# Patient Record
Sex: FEMALE | Race: WHITE | Employment: OTHER | URBAN - METROPOLITAN AREA
[De-identification: names, ages, dates, MRNs, and addresses within clinical notes are randomized per-mention and may not be internally consistent; named-entity substitution may affect disease eponyms.]

---

## 2017-11-27 ENCOUNTER — ANESTHESIA EVENT (OUTPATIENT)
Dept: ENDOSCOPY | Age: 67
End: 2017-11-27
Payer: MEDICARE

## 2017-11-27 ENCOUNTER — ANESTHESIA (OUTPATIENT)
Dept: ENDOSCOPY | Age: 67
End: 2017-11-27
Payer: MEDICARE

## 2017-11-27 ENCOUNTER — HOSPITAL ENCOUNTER (OUTPATIENT)
Age: 67
Setting detail: OUTPATIENT SURGERY
Discharge: HOME | End: 2017-11-27
Attending: INTERNAL MEDICINE | Admitting: INTERNAL MEDICINE
Payer: MEDICARE

## 2017-11-27 VITALS
OXYGEN SATURATION: 98 % | RESPIRATION RATE: 18 BRPM | SYSTOLIC BLOOD PRESSURE: 136 MMHG | HEART RATE: 60 BPM | DIASTOLIC BLOOD PRESSURE: 60 MMHG | TEMPERATURE: 97.1 F

## 2017-11-27 VITALS
RESPIRATION RATE: 20 BRPM | OXYGEN SATURATION: 95 % | SYSTOLIC BLOOD PRESSURE: 83 MMHG | DIASTOLIC BLOOD PRESSURE: 46 MMHG

## 2017-11-27 PROCEDURE — 2500000003 HC RX 250 WO HCPCS: Performed by: NURSE ANESTHETIST, CERTIFIED REGISTERED

## 2017-11-27 PROCEDURE — 3700000000 HC ANESTHESIA ATTENDED CARE: Performed by: INTERNAL MEDICINE

## 2017-11-27 PROCEDURE — 3609010300 HC COLONOSCOPY W/BIOPSY SINGLE/MULTIPLE: Performed by: INTERNAL MEDICINE

## 2017-11-27 PROCEDURE — 7100000011 HC PHASE II RECOVERY - ADDTL 15 MIN: Performed by: INTERNAL MEDICINE

## 2017-11-27 PROCEDURE — 7100000010 HC PHASE II RECOVERY - FIRST 15 MIN: Performed by: INTERNAL MEDICINE

## 2017-11-27 PROCEDURE — 3700000001 HC ADD 15 MINUTES (ANESTHESIA): Performed by: INTERNAL MEDICINE

## 2017-11-27 PROCEDURE — 2580000003 HC RX 258: Performed by: INTERNAL MEDICINE

## 2017-11-27 PROCEDURE — 88305 TISSUE EXAM BY PATHOLOGIST: CPT

## 2017-11-27 PROCEDURE — 6360000002 HC RX W HCPCS: Performed by: NURSE ANESTHETIST, CERTIFIED REGISTERED

## 2017-11-27 RX ORDER — SODIUM CHLORIDE 9 MG/ML
INJECTION, SOLUTION INTRAVENOUS CONTINUOUS
Status: DISCONTINUED | OUTPATIENT
Start: 2017-11-27 | End: 2017-11-27 | Stop reason: HOSPADM

## 2017-11-27 RX ORDER — CARVEDILOL 12.5 MG/1
12.5 TABLET ORAL 2 TIMES DAILY WITH MEALS
COMMUNITY

## 2017-11-27 RX ORDER — SPIRONOLACTONE 25 MG/1
25 TABLET ORAL DAILY
COMMUNITY

## 2017-11-27 RX ORDER — M-VIT,TX,IRON,MINS/CALC/FOLIC 27MG-0.4MG
1 TABLET ORAL DAILY
COMMUNITY

## 2017-11-27 RX ORDER — LIDOCAINE HYDROCHLORIDE 10 MG/ML
INJECTION, SOLUTION EPIDURAL; INFILTRATION; INTRACAUDAL; PERINEURAL PRN
Status: DISCONTINUED | OUTPATIENT
Start: 2017-11-27 | End: 2017-11-27 | Stop reason: SDUPTHER

## 2017-11-27 RX ORDER — LOSARTAN POTASSIUM 25 MG/1
25 TABLET ORAL DAILY
COMMUNITY

## 2017-11-27 RX ORDER — VITAMIN E (DL,TOCOPHERYL ACET) 180 MG
CAPSULE ORAL DAILY
COMMUNITY

## 2017-11-27 RX ORDER — LEVOTHYROXINE SODIUM 0.05 MG/1
50 TABLET ORAL DAILY
COMMUNITY

## 2017-11-27 RX ORDER — LATANOPROST 50 UG/ML
1 SOLUTION/ DROPS OPHTHALMIC NIGHTLY
COMMUNITY

## 2017-11-27 RX ORDER — PROPOFOL 10 MG/ML
INJECTION, EMULSION INTRAVENOUS PRN
Status: DISCONTINUED | OUTPATIENT
Start: 2017-11-27 | End: 2017-11-27 | Stop reason: SDUPTHER

## 2017-11-27 RX ORDER — SIMVASTATIN 40 MG
40 TABLET ORAL NIGHTLY
COMMUNITY

## 2017-11-27 RX ORDER — ACETAMINOPHEN 160 MG
TABLET,DISINTEGRATING ORAL
COMMUNITY

## 2017-11-27 RX ORDER — ALLOPURINOL 100 MG/1
100 TABLET ORAL DAILY
COMMUNITY

## 2017-11-27 RX ORDER — ACETAMINOPHEN 325 MG/1
650 TABLET ORAL EVERY 6 HOURS PRN
COMMUNITY

## 2017-11-27 RX ORDER — FUROSEMIDE 40 MG/1
40 TABLET ORAL 2 TIMES DAILY
COMMUNITY

## 2017-11-27 RX ORDER — FLUVOXAMINE MALEATE 100 MG/1
100 CAPSULE, EXTENDED RELEASE ORAL NIGHTLY
COMMUNITY

## 2017-11-27 RX ORDER — OMEPRAZOLE 20 MG/1
20 CAPSULE, DELAYED RELEASE ORAL DAILY
COMMUNITY

## 2017-11-27 RX ADMIN — SODIUM CHLORIDE: 9 INJECTION, SOLUTION INTRAVENOUS at 10:14

## 2017-11-27 RX ADMIN — PROPOFOL 400 MG: 10 INJECTION, EMULSION INTRAVENOUS at 10:16

## 2017-11-27 RX ADMIN — LIDOCAINE HYDROCHLORIDE 50 MG: 10 INJECTION, SOLUTION EPIDURAL; INFILTRATION; INTRACAUDAL; PERINEURAL at 10:16

## 2017-11-27 ASSESSMENT — PAIN SCALES - GENERAL
PAINLEVEL_OUTOF10: 0

## 2017-11-27 ASSESSMENT — ENCOUNTER SYMPTOMS: SHORTNESS OF BREATH: 0

## 2017-11-27 ASSESSMENT — LIFESTYLE VARIABLES: SMOKING_STATUS: 0

## 2017-11-27 NOTE — ANESTHESIA PRE PROCEDURE
Department of Anesthesiology  Preprocedure Note       Name:  Destiny Tompkins   Age:  69 y.o.  :  1950                                          MRN:  [id number]         Date:  2017      Surgeon: Shady Paulino):  Naeem Mae DO    Procedure: Procedure(s):  COLONOSCOPY WITH BIOPSY    Medications prior to admission:   Prior to Admission medications    Not on File       Current medications:    No current facility-administered medications for this encounter. Allergies: Allergies not on file    Problem List:  There is no problem list on file for this patient. Past Medical History:  No past medical history on file. Past Surgical History:  No past surgical history on file. Social History:    Social History   Substance Use Topics    Smoking status: Not on file    Smokeless tobacco: Not on file    Alcohol use Not on file                                Counseling given: Not Answered      Vital Signs (Current): There were no vitals filed for this visit. BP Readings from Last 3 Encounters:   No data found for BP       NPO Status:                                                                                 BMI:   Wt Readings from Last 3 Encounters:   No data found for Wt     There is no height or weight on file to calculate BMI.    CBC: No results found for: WBC, RBC, HGB, HCT, MCV, RDW, PLT    CMP: No results found for: NA, K, CL, CO2, BUN, CREATININE, GFRAA, AGRATIO, LABGLOM, GLUCOSE, PROT, CALCIUM, BILITOT, ALKPHOS, AST, ALT    POC Tests: No results for input(s): POCGLU, POCNA, POCK, POCCL, POCBUN, POCHEMO, POCHCT in the last 72 hours.     Coags: No results found for: PROTIME, INR, APTT    HCG (If Applicable): No results found for: PREGTESTUR, PREGSERUM, HCG, HCGQUANT     ABGs: No results found for: PHART, PO2ART, CDE4QCE, DNT9RCW, BEART, P3RLUECK     Type & Screen (If Applicable):  No results found for: LABABO, [address]    Anesthesia Evaluation  Patient

## 2017-11-27 NOTE — H&P
History and Physical    Pt Name: Musa Castillo  MRN: [id number]  YOB: 1950  Date of evaluation: 11/27/2017  Primary Care Physician: Gabriella Gmóez  Patient evaluated at the request of  Dr. Cornell Ghosh     Reason for evaluation:  Screening colonoscopy   SUBJECTIVE:   History of Chief Complaint:      Dianne Osborne is a 69 y.o. female     Who is scheduled to have    Her  Colonoscopy   today , her last colonoscopy was approx 5 yrs ago she thinks she had colon polyps , denies smoking , denies GI bleeding , hx of non bloody diarrhea , denies abdominal pain . Past Medical History      has no past medical history on file. Past Surgical History   has no past surgical history on file. Medications   Scheduled Meds:   Continuous Infusions:   sodium chloride       PRN Meds:. Allergies  is allergic to ceclor [cefaclor]. Family History    family history is not on file. No family status information on file. Social History  Social History     Social History    Marital status:      Spouse name: N/A    Number of children: N/A    Years of education: N/A     Occupational History    Not on file. Social History Main Topics    Smoking status: Not on file    Smokeless tobacco: Not on file    Alcohol use Not on file    Drug use: Unknown    Sexual activity: Not on file     Other Topics Concern    Not on file     Social History Narrative    No narrative on file                 Hobbies:  Is a grandmother , read , and brain games     OBJECTIVE:   VITALS:  temperature is 96 °F (35.6 °C). Her blood pressure is 142/93 (abnormal) and her pulse is 61. Her respiration is 16 and oxygen saturation is 98%. CONSTITUTIONAL: Alert and oriented times 3, no acute distress and cooperative to examination. friendly and pleasant     SKIN: rash No    HEENT: Head is normocephalic, atraumatic.  EOMI, PERRLA    Oral air way :slightly narrow Yes    NECK: neck supple, no lymphadenopathy noted,

## 2017-11-27 NOTE — PROGRESS NOTES
Pt comes to bay, arouses to verbal stimuli. Skin warm and dry. IV infusing without problems. Side rails up.

## 2017-11-28 LAB — SURGICAL PATHOLOGY REPORT: NORMAL

## 2022-08-22 ENCOUNTER — HOSPITAL ENCOUNTER (OUTPATIENT)
Age: 72
Discharge: HOME | End: 2022-08-22
Payer: MEDICARE

## 2022-08-22 LAB
ANION GAP SERPL CALCULATED.3IONS-SCNC: 8 MMOL/L (ref 9–17)
BUN BLDV-MCNC: 20 MG/DL (ref 8–23)
BUN/CREAT BLD: 17 (ref 9–20)
CALCIUM SERPL-MCNC: 9.9 MG/DL (ref 8.6–10.4)
CHLORIDE BLD-SCNC: 103 MMOL/L (ref 98–107)
CO2: 26 MMOL/L (ref 20–31)
CREAT SERPL-MCNC: 1.15 MG/DL (ref 0.5–0.9)
GFR AFRICAN AMERICAN: 56 ML/MIN
GFR NON-AFRICAN AMERICAN: 46 ML/MIN
GFR SERPL CREATININE-BSD FRML MDRD: ABNORMAL ML/MIN/{1.73_M2}
GLUCOSE BLD-MCNC: 114 MG/DL (ref 70–99)
POTASSIUM SERPL-SCNC: 4.3 MMOL/L (ref 3.7–5.3)
SODIUM BLD-SCNC: 137 MMOL/L (ref 135–144)

## 2022-08-22 PROCEDURE — 36415 COLL VENOUS BLD VENIPUNCTURE: CPT

## 2022-08-22 PROCEDURE — 80048 BASIC METABOLIC PNL TOTAL CA: CPT

## 2022-09-09 ENCOUNTER — HOSPITAL ENCOUNTER (OUTPATIENT)
Dept: NON INVASIVE DIAGNOSTICS | Age: 72
Discharge: HOME | End: 2022-09-09
Payer: MEDICARE

## 2022-09-09 ENCOUNTER — HOSPITAL ENCOUNTER (OUTPATIENT)
Age: 72
Discharge: HOME | End: 2022-09-09
Payer: MEDICARE

## 2022-09-09 DIAGNOSIS — I50.43 CHF (CONGESTIVE HEART FAILURE), NYHA CLASS I, ACUTE ON CHRONIC, COMBINED (HCC): ICD-10-CM

## 2022-09-09 LAB
ANION GAP SERPL CALCULATED.3IONS-SCNC: 12 MMOL/L (ref 9–17)
BUN BLDV-MCNC: 20 MG/DL (ref 8–23)
BUN/CREAT BLD: 20 (ref 9–20)
CALCIUM SERPL-MCNC: 9.7 MG/DL (ref 8.6–10.4)
CHLORIDE BLD-SCNC: 102 MMOL/L (ref 98–107)
CO2: 22 MMOL/L (ref 20–31)
CREAT SERPL-MCNC: 1.01 MG/DL (ref 0.5–0.9)
GFR AFRICAN AMERICAN: >60 ML/MIN
GFR NON-AFRICAN AMERICAN: 54 ML/MIN
GFR SERPL CREATININE-BSD FRML MDRD: ABNORMAL ML/MIN/{1.73_M2}
GLUCOSE BLD-MCNC: 130 MG/DL (ref 70–99)
LV EF: 40 %
LVEF MODALITY: NORMAL
POTASSIUM SERPL-SCNC: 4.3 MMOL/L (ref 3.7–5.3)
PTH INTACT: 78.3 PG/ML (ref 14–72)
SODIUM BLD-SCNC: 136 MMOL/L (ref 135–144)
VITAMIN D 25-HYDROXY: 58.1 NG/ML

## 2022-09-09 PROCEDURE — 80048 BASIC METABOLIC PNL TOTAL CA: CPT

## 2022-09-09 PROCEDURE — 84155 ASSAY OF PROTEIN SERUM: CPT

## 2022-09-09 PROCEDURE — 82306 VITAMIN D 25 HYDROXY: CPT

## 2022-09-09 PROCEDURE — 36415 COLL VENOUS BLD VENIPUNCTURE: CPT

## 2022-09-09 PROCEDURE — 84165 PROTEIN E-PHORESIS SERUM: CPT

## 2022-09-09 PROCEDURE — 93306 TTE W/DOPPLER COMPLETE: CPT

## 2022-09-09 PROCEDURE — 83970 ASSAY OF PARATHORMONE: CPT

## 2022-09-13 LAB
ALBUMIN (CALCULATED): 4.3 G/DL (ref 3.2–5.2)
ALBUMIN PERCENT: 61 % (ref 45–65)
ALPHA 1 PERCENT: 2 % (ref 3–6)
ALPHA 2 PERCENT: 12 % (ref 6–13)
ALPHA-1-GLOBULIN: 0.2 G/DL (ref 0.1–0.4)
ALPHA-2-GLOBULIN: 0.8 G/DL (ref 0.5–0.9)
BETA GLOBULIN: 0.7 G/DL (ref 0.5–1.1)
BETA PERCENT: 10 % (ref 11–19)
GAMMA GLOBULIN %: 15 % (ref 9–20)
GAMMA GLOBULIN: 1.1 G/DL (ref 0.5–1.5)
PATHOLOGIST: ABNORMAL
PROTEIN ELECTROPHORESIS, SERUM: ABNORMAL
TOTAL PROT. SUM,%: 100 % (ref 98–102)
TOTAL PROT. SUM: 7.1 G/DL (ref 6.3–8.2)
TOTAL PROTEIN: 7.1 G/DL (ref 6.4–8.3)

## 2023-06-09 ENCOUNTER — HOSPITAL ENCOUNTER
Age: 73
End: 2023-06-09
Payer: MEDICARE

## 2023-06-09 DIAGNOSIS — R79.89: Primary | ICD-10-CM

## 2023-06-09 LAB
ALANINE AMINOTRANSFERASE: 25 U/L (ref 14–59)
ALBUMIN GLOBULIN RATIO: 0.9
ALBUMIN LEVEL: 3.6 G/DL (ref 3.4–5)
ALKALINE PHOSPHATASE: 81 U/L (ref 46–116)
ANION GAP: 11.7
ASPARTATE AMINO TRANSFERASE: 23 U/L (ref 15–37)
BLOOD UREA NITROGEN: 21 MG/DL (ref 7–18)
CALCIUM: 9.3 MG/DL (ref 8.5–10.1)
CARBON DIOXIDE: 26.5 MMOL/L (ref 21–32)
CHLORIDE: 104 MMOL/L (ref 98–107)
CO2 BLD-SCNC: 26.5 MMOL/L (ref 21–32)
ESTIMATED GFR (AFRICAN AMERICA: >60 (ref 60–?)
ESTIMATED GFR (NON-AFRICAN AME: 51 (ref 60–?)
GLOBULIN: 3.8 G/DL
GLUCOSE BLD-MCNC: 104 MG/DL (ref 74–106)
POTASSIUM SERPLBLD-SCNC: 4.2 MMOL/L (ref 3.5–5.1)
POTASSIUM: 4.2 MMOL/L (ref 3.5–5.1)
SODIUM BLD-SCNC: 138 MMOL/L (ref 136–145)
SODIUM: 138 MMOL/L (ref 136–145)
TOTAL PROTEIN: 7.4 G/DL (ref 6.4–8.2)

## 2023-06-09 PROCEDURE — 83970 ASSAY OF PARATHORMONE: CPT

## 2023-06-09 PROCEDURE — 80053 COMPREHEN METABOLIC PANEL: CPT

## 2023-06-09 PROCEDURE — 36415 COLL VENOUS BLD VENIPUNCTURE: CPT

## 2023-07-19 ENCOUNTER — HOSPITAL ENCOUNTER
Dept: HOSPITAL 101 - LAB | Age: 73
Discharge: HOME | End: 2023-07-19
Payer: MEDICARE

## 2023-07-19 DIAGNOSIS — G25.81: Primary | ICD-10-CM

## 2023-07-19 LAB — FERRITIN: 65 NG/ML (ref 8–252)

## 2023-07-19 PROCEDURE — 36415 COLL VENOUS BLD VENIPUNCTURE: CPT

## 2023-07-19 PROCEDURE — 82728 ASSAY OF FERRITIN: CPT

## 2023-08-15 ENCOUNTER — APPOINTMENT (OUTPATIENT)
Dept: CT IMAGING | Age: 73
End: 2023-08-15
Payer: OTHER MISCELLANEOUS

## 2023-08-15 ENCOUNTER — HOSPITAL ENCOUNTER (EMERGENCY)
Age: 73
Discharge: HOME | End: 2023-08-15
Attending: FAMILY MEDICINE
Payer: OTHER MISCELLANEOUS

## 2023-08-15 VITALS
TEMPERATURE: 98.3 F | WEIGHT: 220 LBS | OXYGEN SATURATION: 95 % | HEIGHT: 68 IN | SYSTOLIC BLOOD PRESSURE: 167 MMHG | RESPIRATION RATE: 18 BRPM | DIASTOLIC BLOOD PRESSURE: 129 MMHG | HEART RATE: 70 BPM | BODY MASS INDEX: 33.34 KG/M2
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
## 2023-08-15 DIAGNOSIS — Z79.01 LONG TERM (CURRENT) USE OF ANTICOAGULANTS: ICD-10-CM

## 2023-08-15 DIAGNOSIS — S00.03XA SCALP HEMATOMA, INITIAL ENCOUNTER: Primary | ICD-10-CM

## 2023-08-15 PROCEDURE — 70450 CT HEAD/BRAIN W/O DYE: CPT

## 2023-08-15 PROCEDURE — 99284 EMERGENCY DEPT VISIT MOD MDM: CPT

## 2023-08-15 PROCEDURE — 72125 CT NECK SPINE W/O DYE: CPT

## 2023-08-15 RX ORDER — LORATADINE 10 MG/1
10 TABLET ORAL PRN
COMMUNITY

## 2023-08-15 RX ORDER — TIMOLOL MALEATE 5 MG/ML
1 SOLUTION/ DROPS OPHTHALMIC 2 TIMES DAILY
COMMUNITY
Start: 2023-07-17 | End: 2024-07-16

## 2023-08-15 ASSESSMENT — PAIN SCALES - GENERAL: PAINLEVEL_OUTOF10: 6

## 2023-08-15 ASSESSMENT — PAIN - FUNCTIONAL ASSESSMENT
PAIN_FUNCTIONAL_ASSESSMENT: ACTIVITIES ARE NOT PREVENTED
PAIN_FUNCTIONAL_ASSESSMENT: 0-10

## 2023-08-15 ASSESSMENT — PAIN DESCRIPTION - ORIENTATION: ORIENTATION: RIGHT

## 2023-08-15 ASSESSMENT — PAIN DESCRIPTION - PAIN TYPE: TYPE: ACUTE PAIN

## 2023-08-15 NOTE — ED NOTES
Ticket to ride completed.  The following information was reported off:  Name  Allergies  Orientation Level  Destination  Safety Issues  Code Status  Oxygen Requirements  Special needs including mobility, language, communication      Octavia Dominguez RN  08/15/23 0366
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
## 2023-08-31 ENCOUNTER — HOSPITAL ENCOUNTER
Dept: HOSPITAL 101 - LAB | Age: 73
Discharge: HOME | End: 2023-08-31
Payer: MEDICARE

## 2023-08-31 DIAGNOSIS — K21.9: ICD-10-CM

## 2023-08-31 DIAGNOSIS — E78.00: Primary | ICD-10-CM

## 2023-08-31 DIAGNOSIS — E03.9: ICD-10-CM

## 2023-08-31 DIAGNOSIS — G25.81: ICD-10-CM

## 2023-08-31 LAB
ADD MANUAL DIFF: NO
ALANINE AMINOTRANSFERASE: 26 U/L (ref 14–59)
ALBUMIN GLOBULIN RATIO: 1
ALBUMIN LEVEL: 3.9 G/DL (ref 3.4–5)
ALKALINE PHOSPHATASE: 75 U/L (ref 46–116)
ANION GAP: 14.5
ASPARTATE AMINO TRANSFERASE: 21 U/L (ref 15–37)
BLOOD UREA NITROGEN: 24 MG/DL (ref 7–18)
CALCIUM: 9.5 MG/DL (ref 8.5–10.1)
CARBON DIOXIDE: 26.1 MMOL/L (ref 21–32)
CHLORIDE: 102 MMOL/L (ref 98–107)
CHOLESTEROL: 165 MG/DL (ref ?–200)
CO2 BLD-SCNC: 26.1 MMOL/L (ref 21–32)
ESTIMATED GFR (AFRICAN AMERICA: 56 (ref 60–?)
ESTIMATED GFR (NON-AFRICAN AME: 46 (ref 60–?)
FERRITIN: 88 NG/ML (ref 8–252)
FOLATE: 25.2 NG/ML (ref 8.6–58.9)
GLOBULIN: 3.9 G/DL
GLUCOSE BLD-MCNC: 98 MG/DL (ref 74–106)
HCT VFR BLD CALC: 43 % (ref 36–48)
HDL CHOLESTEROL: 62 MG/DL (ref 40–60)
HEMATOCRIT: 43 % (ref 36–48)
HEMOGLOBIN: 13.9 G/DL (ref 12–16)
IMMATURE GRANULOCYTES ABS AUTO: 0.01 10^3/UL (ref 0–0.03)
IMMATURE GRANULOCYTES PCT AUTO: 0.2 % (ref 0–0.5)
IRON: 119 UG/DL (ref 50–170)
LYMPHOCYTES  ABSOLUTE AUTO: 1.4 10^3/UL (ref 1.2–3.8)
MAGNESIUM: 1.7 MG/DL (ref 1.8–2.4)
MCV RBC: 91.7 FL (ref 81–99)
MEAN CORPUSCULAR HEMOGLOBIN: 29.6 PG (ref 26.7–34)
MEAN CORPUSCULAR HGB CONC: 32.3 G/DL (ref 29.9–35.2)
MEAN CORPUSCULAR VOLUME: 91.7 FL (ref 81–99)
PERCENT IRON SATURATION: 35.6 %
PLATELET # BLD: 368 10^3/UL (ref 150–450)
PLATELET COUNT: 368 10^3/UL (ref 150–450)
POTASSIUM SERPLBLD-SCNC: 4.6 MMOL/L (ref 3.5–5.1)
POTASSIUM: 4.6 MMOL/L (ref 3.5–5.1)
RED BLOOD COUNT: 4.69 10^6/UL (ref 4.2–5.4)
SODIUM BLD-SCNC: 138 MMOL/L (ref 136–145)
SODIUM: 138 MMOL/L (ref 136–145)
THYROID STIMULATING HORMONE: 2.77 UIU/ML (ref 0.36–3.74)
TOTAL IRON BINDING CAPACITY: 334 UG/DL (ref 250–450)
TOTAL PROTEIN: 7.8 G/DL (ref 6.4–8.2)
TRIGLYCERIDES: 147 MG/DL (ref ?–150)
VITAMIN B12: 716 PG/ML (ref 193–986)
VLDL CHOLESTEROL: 29.4 MG/DL
WBC # BLD: 6.6 10^3/UL (ref 4–11)
WHITE BLOOD COUNT: 6.6 10^3/UL (ref 4–11)

## 2023-08-31 PROCEDURE — 84443 ASSAY THYROID STIM HORMONE: CPT

## 2023-08-31 PROCEDURE — 83540 ASSAY OF IRON: CPT

## 2023-08-31 PROCEDURE — 36415 COLL VENOUS BLD VENIPUNCTURE: CPT

## 2023-08-31 PROCEDURE — 84439 ASSAY OF FREE THYROXINE: CPT

## 2023-08-31 PROCEDURE — 83735 ASSAY OF MAGNESIUM: CPT

## 2023-08-31 PROCEDURE — 82728 ASSAY OF FERRITIN: CPT

## 2023-08-31 PROCEDURE — 85025 COMPLETE CBC W/AUTO DIFF WBC: CPT

## 2023-08-31 PROCEDURE — 82746 ASSAY OF FOLIC ACID SERUM: CPT

## 2023-08-31 PROCEDURE — 80061 LIPID PANEL: CPT

## 2023-08-31 PROCEDURE — 83550 IRON BINDING TEST: CPT

## 2023-08-31 PROCEDURE — 80053 COMPREHEN METABOLIC PANEL: CPT

## 2023-08-31 PROCEDURE — 82607 VITAMIN B-12: CPT

## 2023-09-18 ENCOUNTER — HOSPITAL ENCOUNTER
Age: 73
Discharge: HOME | End: 2023-09-18
Payer: MEDICARE

## 2023-09-18 DIAGNOSIS — R79.89: Primary | ICD-10-CM

## 2023-09-18 LAB
ANION GAP: 10.8
BLOOD UREA NITROGEN: 24 MG/DL (ref 7–18)
CALCIUM: 9.5 MG/DL (ref 8.5–10.1)
CARBON DIOXIDE: 26.9 MMOL/L (ref 21–32)
CHLORIDE: 103 MMOL/L (ref 98–107)
CO2 BLD-SCNC: 26.9 MMOL/L (ref 21–32)
ESTIMATED GFR (AFRICAN AMERICA: 56 (ref 60–?)
ESTIMATED GFR (NON-AFRICAN AME: 46 (ref 60–?)
GLUCOSE BLD-MCNC: 90 MG/DL (ref 74–106)
POTASSIUM SERPLBLD-SCNC: 4.7 MMOL/L (ref 3.5–5.1)
POTASSIUM: 4.7 MMOL/L (ref 3.5–5.1)
SODIUM BLD-SCNC: 136 MMOL/L (ref 136–145)
SODIUM: 136 MMOL/L (ref 136–145)

## 2023-09-18 PROCEDURE — 84155 ASSAY OF PROTEIN SERUM: CPT

## 2023-09-18 PROCEDURE — 84165 PROTEIN E-PHORESIS SERUM: CPT

## 2023-09-18 PROCEDURE — 36415 COLL VENOUS BLD VENIPUNCTURE: CPT

## 2023-09-18 PROCEDURE — 80048 BASIC METABOLIC PNL TOTAL CA: CPT

## 2023-09-19 LAB
ALBUMIN: 3.7 G/DL (ref 2.9–4.4)
ALPHA-1-GLOBULIN: 0.2 G/DL (ref 0–0.4)
ALPHA-2-GLOBULIN: 0.8 G/DL (ref 0.4–1)
GAMMA GLOBULIN: 1.1 G/DL (ref 0.4–1.8)

## 2023-10-17 ENCOUNTER — HOSPITAL ENCOUNTER (OUTPATIENT)
Age: 73
Setting detail: OUTPATIENT SURGERY
Discharge: HOME | End: 2023-10-17
Attending: INTERNAL MEDICINE | Admitting: INTERNAL MEDICINE
Payer: MEDICARE

## 2023-10-17 ENCOUNTER — ANESTHESIA EVENT (OUTPATIENT)
Dept: OPERATING ROOM | Age: 73
End: 2023-10-17
Payer: MEDICARE

## 2023-10-17 ENCOUNTER — ANESTHESIA (OUTPATIENT)
Dept: OPERATING ROOM | Age: 73
End: 2023-10-17
Payer: MEDICARE

## 2023-10-17 VITALS
RESPIRATION RATE: 14 BRPM | TEMPERATURE: 96.8 F | HEART RATE: 68 BPM | SYSTOLIC BLOOD PRESSURE: 157 MMHG | OXYGEN SATURATION: 94 % | HEIGHT: 69 IN | BODY MASS INDEX: 34.02 KG/M2 | WEIGHT: 229.72 LBS | DIASTOLIC BLOOD PRESSURE: 73 MMHG

## 2023-10-17 DIAGNOSIS — Z86.010 HX OF COLONIC POLYPS: ICD-10-CM

## 2023-10-17 DIAGNOSIS — K21.9 GASTROESOPHAGEAL REFLUX DISEASE, UNSPECIFIED WHETHER ESOPHAGITIS PRESENT: ICD-10-CM

## 2023-10-17 PROCEDURE — 6360000002 HC RX W HCPCS: Performed by: ANESTHESIOLOGY

## 2023-10-17 PROCEDURE — 2500000003 HC RX 250 WO HCPCS: Performed by: NURSE ANESTHETIST, CERTIFIED REGISTERED

## 2023-10-17 PROCEDURE — 2580000003 HC RX 258: Performed by: NURSE ANESTHETIST, CERTIFIED REGISTERED

## 2023-10-17 PROCEDURE — 2500000003 HC RX 250 WO HCPCS: Performed by: ANESTHESIOLOGY

## 2023-10-17 PROCEDURE — 3609010700 HC COLONOSCOPY POLYPECTOMY REMOVAL SNARE/STOMA: Performed by: INTERNAL MEDICINE

## 2023-10-17 PROCEDURE — 6360000002 HC RX W HCPCS: Performed by: NURSE ANESTHETIST, CERTIFIED REGISTERED

## 2023-10-17 PROCEDURE — 2580000003 HC RX 258: Performed by: ANESTHESIOLOGY

## 2023-10-17 PROCEDURE — 3609012400 HC EGD TRANSORAL BIOPSY SINGLE/MULTIPLE: Performed by: INTERNAL MEDICINE

## 2023-10-17 PROCEDURE — 7100000011 HC PHASE II RECOVERY - ADDTL 15 MIN: Performed by: INTERNAL MEDICINE

## 2023-10-17 PROCEDURE — 7100000010 HC PHASE II RECOVERY - FIRST 15 MIN: Performed by: INTERNAL MEDICINE

## 2023-10-17 PROCEDURE — 2709999900 HC NON-CHARGEABLE SUPPLY: Performed by: INTERNAL MEDICINE

## 2023-10-17 PROCEDURE — 3700000000 HC ANESTHESIA ATTENDED CARE: Performed by: INTERNAL MEDICINE

## 2023-10-17 PROCEDURE — A4216 STERILE WATER/SALINE, 10 ML: HCPCS | Performed by: ANESTHESIOLOGY

## 2023-10-17 PROCEDURE — 3700000001 HC ADD 15 MINUTES (ANESTHESIA): Performed by: INTERNAL MEDICINE

## 2023-10-17 PROCEDURE — 88305 TISSUE EXAM BY PATHOLOGIST: CPT

## 2023-10-17 RX ORDER — SODIUM CHLORIDE 0.9 % (FLUSH) 0.9 %
5-40 SYRINGE (ML) INJECTION PRN
Status: DISCONTINUED | OUTPATIENT
Start: 2023-10-17 | End: 2023-10-17 | Stop reason: HOSPADM

## 2023-10-17 RX ORDER — MIDAZOLAM HYDROCHLORIDE 1 MG/ML
2 INJECTION INTRAMUSCULAR; INTRAVENOUS
Status: DISCONTINUED | OUTPATIENT
Start: 2023-10-17 | End: 2023-10-17 | Stop reason: HOSPADM

## 2023-10-17 RX ORDER — LIDOCAINE HYDROCHLORIDE 20 MG/ML
INJECTION, SOLUTION INFILTRATION; PERINEURAL PRN
Status: DISCONTINUED | OUTPATIENT
Start: 2023-10-17 | End: 2023-10-17 | Stop reason: SDUPTHER

## 2023-10-17 RX ORDER — METOCLOPRAMIDE HYDROCHLORIDE 5 MG/ML
10 INJECTION INTRAMUSCULAR; INTRAVENOUS ONCE
Status: COMPLETED | OUTPATIENT
Start: 2023-10-17 | End: 2023-10-17

## 2023-10-17 RX ORDER — SODIUM CHLORIDE 9 MG/ML
INJECTION, SOLUTION INTRAVENOUS CONTINUOUS
Status: DISCONTINUED | OUTPATIENT
Start: 2023-10-17 | End: 2023-10-17 | Stop reason: HOSPADM

## 2023-10-17 RX ORDER — SODIUM CHLORIDE 0.9 % (FLUSH) 0.9 %
5-40 SYRINGE (ML) INJECTION EVERY 12 HOURS SCHEDULED
Status: DISCONTINUED | OUTPATIENT
Start: 2023-10-17 | End: 2023-10-17 | Stop reason: HOSPADM

## 2023-10-17 RX ORDER — MEPERIDINE HYDROCHLORIDE 50 MG/ML
12.5 INJECTION INTRAMUSCULAR; INTRAVENOUS; SUBCUTANEOUS EVERY 5 MIN PRN
Status: DISCONTINUED | OUTPATIENT
Start: 2023-10-17 | End: 2023-10-17 | Stop reason: HOSPADM

## 2023-10-17 RX ORDER — ONDANSETRON 2 MG/ML
4 INJECTION INTRAMUSCULAR; INTRAVENOUS
Status: DISCONTINUED | OUTPATIENT
Start: 2023-10-17 | End: 2023-10-17 | Stop reason: HOSPADM

## 2023-10-17 RX ORDER — FERROUS SULFATE 325(65) MG
325 TABLET ORAL
COMMUNITY

## 2023-10-17 RX ORDER — DIPHENHYDRAMINE HYDROCHLORIDE 50 MG/ML
12.5 INJECTION INTRAMUSCULAR; INTRAVENOUS
Status: DISCONTINUED | OUTPATIENT
Start: 2023-10-17 | End: 2023-10-17 | Stop reason: HOSPADM

## 2023-10-17 RX ORDER — SODIUM CHLORIDE 9 MG/ML
INJECTION, SOLUTION INTRAVENOUS PRN
Status: DISCONTINUED | OUTPATIENT
Start: 2023-10-17 | End: 2023-10-17 | Stop reason: HOSPADM

## 2023-10-17 RX ORDER — SODIUM CHLORIDE, SODIUM LACTATE, POTASSIUM CHLORIDE, CALCIUM CHLORIDE 600; 310; 30; 20 MG/100ML; MG/100ML; MG/100ML; MG/100ML
INJECTION, SOLUTION INTRAVENOUS CONTINUOUS
Status: DISCONTINUED | OUTPATIENT
Start: 2023-10-17 | End: 2023-10-17 | Stop reason: HOSPADM

## 2023-10-17 RX ORDER — METOCLOPRAMIDE HYDROCHLORIDE 5 MG/ML
10 INJECTION INTRAMUSCULAR; INTRAVENOUS
Status: DISCONTINUED | OUTPATIENT
Start: 2023-10-17 | End: 2023-10-17 | Stop reason: HOSPADM

## 2023-10-17 RX ORDER — SODIUM CHLORIDE, SODIUM LACTATE, POTASSIUM CHLORIDE, CALCIUM CHLORIDE 600; 310; 30; 20 MG/100ML; MG/100ML; MG/100ML; MG/100ML
INJECTION, SOLUTION INTRAVENOUS CONTINUOUS PRN
Status: DISCONTINUED | OUTPATIENT
Start: 2023-10-17 | End: 2023-10-17 | Stop reason: SDUPTHER

## 2023-10-17 RX ORDER — PROPOFOL 10 MG/ML
INJECTION, EMULSION INTRAVENOUS PRN
Status: DISCONTINUED | OUTPATIENT
Start: 2023-10-17 | End: 2023-10-17 | Stop reason: SDUPTHER

## 2023-10-17 RX ORDER — HYDROMORPHONE HYDROCHLORIDE 1 MG/ML
0.5 INJECTION, SOLUTION INTRAMUSCULAR; INTRAVENOUS; SUBCUTANEOUS EVERY 5 MIN PRN
Status: DISCONTINUED | OUTPATIENT
Start: 2023-10-17 | End: 2023-10-17 | Stop reason: HOSPADM

## 2023-10-17 RX ORDER — LIDOCAINE HYDROCHLORIDE 10 MG/ML
1 INJECTION, SOLUTION EPIDURAL; INFILTRATION; INTRACAUDAL; PERINEURAL
Status: DISCONTINUED | OUTPATIENT
Start: 2023-10-18 | End: 2023-10-17 | Stop reason: HOSPADM

## 2023-10-17 RX ORDER — MORPHINE SULFATE 2 MG/ML
2 INJECTION, SOLUTION INTRAMUSCULAR; INTRAVENOUS EVERY 5 MIN PRN
Status: DISCONTINUED | OUTPATIENT
Start: 2023-10-17 | End: 2023-10-17 | Stop reason: HOSPADM

## 2023-10-17 RX ORDER — LABETALOL HYDROCHLORIDE 5 MG/ML
10 INJECTION, SOLUTION INTRAVENOUS
Status: DISCONTINUED | OUTPATIENT
Start: 2023-10-17 | End: 2023-10-17 | Stop reason: HOSPADM

## 2023-10-17 RX ADMIN — PROPOFOL 50 MG: 10 INJECTION, EMULSION INTRAVENOUS at 10:32

## 2023-10-17 RX ADMIN — PROPOFOL 80 MG: 10 INJECTION, EMULSION INTRAVENOUS at 10:15

## 2023-10-17 RX ADMIN — LIDOCAINE HYDROCHLORIDE 50 MG: 20 INJECTION, SOLUTION INFILTRATION; PERINEURAL at 10:15

## 2023-10-17 RX ADMIN — PROPOFOL 50 MG: 10 INJECTION, EMULSION INTRAVENOUS at 10:42

## 2023-10-17 RX ADMIN — METOCLOPRAMIDE HYDROCHLORIDE 10 MG: 5 INJECTION INTRAMUSCULAR; INTRAVENOUS at 08:58

## 2023-10-17 RX ADMIN — SODIUM CHLORIDE, POTASSIUM CHLORIDE, SODIUM LACTATE AND CALCIUM CHLORIDE: 600; 310; 30; 20 INJECTION, SOLUTION INTRAVENOUS at 08:38

## 2023-10-17 RX ADMIN — PROPOFOL 50 MG: 10 INJECTION, EMULSION INTRAVENOUS at 10:38

## 2023-10-17 RX ADMIN — SODIUM CHLORIDE, POTASSIUM CHLORIDE, SODIUM LACTATE AND CALCIUM CHLORIDE: 600; 310; 30; 20 INJECTION, SOLUTION INTRAVENOUS at 10:15

## 2023-10-17 RX ADMIN — PROPOFOL 50 MG: 10 INJECTION, EMULSION INTRAVENOUS at 10:18

## 2023-10-17 RX ADMIN — FAMOTIDINE 20 MG: 10 INJECTION, SOLUTION INTRAVENOUS at 08:47

## 2023-10-17 RX ADMIN — PROPOFOL 50 MG: 10 INJECTION, EMULSION INTRAVENOUS at 10:26

## 2023-10-17 RX ADMIN — PROPOFOL 50 MG: 10 INJECTION, EMULSION INTRAVENOUS at 10:22

## 2023-10-17 ASSESSMENT — PAIN - FUNCTIONAL ASSESSMENT: PAIN_FUNCTIONAL_ASSESSMENT: 0-10

## 2023-10-17 NOTE — ANESTHESIA POSTPROCEDURE EVALUATION
Department of Anesthesiology  Postprocedure Note    Patient: Elizabeth Key  MRN: [id number]  YOB: 1950  Date of evaluation: 10/17/2023      Procedure Summary     Date: 10/17/23 Room / Location: [place] [address] / [facility] - INPATIENT    Anesthesia Start: 1011 Anesthesia Stop: 1051    Procedures:       COLONOSCOPY POLYPECTOMY REMOVAL SNARE/STOMA (Anus)      EGD BIOPSY (Mouth) Diagnosis:       Gastroesophageal reflux disease, unspecified whether esophagitis present      Hx of colonic polyps      (Gastroesophageal reflux disease, unspecified whether esophagitis present [K21.9])      (Hx of colonic polyps [Z86.010])    Surgeons: Mercy Muhammad DO Responsible Provider: Dilia Leone MD    Anesthesia Type: MAC ASA Status: 3          Anesthesia Type: No value filed.     Peg Phase I:      Peg Phase II: Peg Score: 10      Anesthesia Post Evaluation    Patient location during evaluation: bedside  Patient participation: complete - patient participated  Level of consciousness: awake  Airway patency: patent  Nausea & Vomiting: no nausea and no vomiting  Complications: no  Cardiovascular status: blood pressure returned to baseline  Respiratory status: acceptable  Hydration status: euvolemic  Pain management: adequate

## 2023-10-17 NOTE — H&P
Interval H&P Note    Pt Name: Leonila Gore  MRN: [id number]  [address] [facility] 1950  Date of evaluation: 10/17/2023      [x] I have reviewed in [facility] the Preoperative Cardiac Clearance Note by Dr Sonal Nixon dated 9/21/23 attached below for the Interval History and Physical note. [x] I have examined  Leonila Gore, a 74 y.o. female, . There are no changes to the patient who is scheduled for COLONOSCOPY DIAGNOSTIC, EGD ESOPHAGOGASTRODUODENOSCOPY by Danish Camilo,  for Gastroesophageal reflux disease, unspecified whether esophagitis present; *. Patient followed bowel prep until watery clear. Previous colonoscopy 11/27/17 with polyps removed  No FH colon cancer or polyps. Patient  bowel changes, bloody tarry stools, diarrhea alternating with constipation, abdominal pain or unintentional weight loss. The patient denies new health changes, fever, chills, wheezing, cough, increased SOB, chest pain, open sores or wounds. No DM     Cardiac clearance: Follows with Dr Sonal Nixon last seen on 9/21/23. \"Patient presents for pre-op evaluation, and risk stratification prior to colonoscopy. She has non-ischemic cardiomyopathy, A Fib, is s/p BI-V ICD implant for primary prevention, and heart failure management. She denies any chest pain or shortness of breath. No recent ICD shocks reported. \" Known HTN, HLD and hypothyroid. Dr Eddie Gutiérrez below. Last Xarelto 10/13/23      [id number] Score: 4  4.8% Stroke risk per year, 6.7% risk of stroke/TIA/systemic embolism  ______________  _________  IMPRESSIONS/PLAN  1. Benign essential hypertension  - POCT EKG    2. Paroxysmal Atrial fibrillation    Pt is on Xarelto. May hold Xarelto for 2 days prior to colonoscopy, and resume it post operatively. Pt may proceed with colonoscopy with a Low cardiac risk. EKG done today shows bi- Ventricular paced rhythm.     3. Non-ischemic dilated cardiomyopathy:    Continue coreg, cozaar, and spironolactone for afterload reducing

## 2023-10-17 NOTE — OP NOTE
atient: Mercedez Rock  YOB: 1950  MRN: [id number]    Date of Procedure: 10/17/2023    Pre-Op Diagnosis Codes:     * Gastroesophageal reflux disease, unspecified whether esophagitis present [K21.9]     * Hx of colonic polyps [Z86.010]    Post-Op Diagnosis: Hiatal hernia. Gastric polyp and hernia sac. Colon polyps. Diverticulosis. Hemorrhoids       Procedure(s):  COLONOSCOPY POLYPECTOMY REMOVAL SNARE/STOMA  EGD BIOPSY    Surgeon(s):  Waldo Crandall DO    Assistant:   * No surgical staff found *    Anesthesia: Monitor Anesthesia Care    Estimated Blood Loss (mL): Minimal    Complications: None    Specimens:   ID Type Source Tests Collected by Time Destination   A : gastric biopsy Tissue Gastric SURGICAL PATHOLOGY [facility] Cr[facility],  10/17/2023 1020    B : Hiatal hernia polyp Tissue Gastric SURGICAL PATHOLOGY [facility],  10/17/2023 1021    C : transverse colon polyp Tissue Colon-Transverse SURGICAL PATHOLOGY [facility],  10/17/2023 1035        Implants:  * No implants in log *      Drains: * No LDAs found *    Findings: See below    Detailed Description of Procedure:         Informed consent was obtained from the patient after explanation of the procedure including indications, description of the procedure,  benefits and possible risks and complications of the procedure, and alternatives. Questions were answered. The patient's history was reviewed and a directed physical examination was performed prior to the procedure. Patient was monitored throughout the procedure with pulse oximetry and periodic assessment of vital signs. Patient was sedated as noted above. With the patient initially in the left lateral decubitus position, a digital rectal examination was performed and revealed negative without mass, lesions or tenderness, negative without mass or lesions.   The Olympus video colonoscope was placed in the patient's rectum and advanced without difficulty  to the cecum, which was identified

## 2023-10-17 NOTE — DISCHARGE INSTRUCTIONS
POST-ENDOSCOPY INSTRUCTIONS:    1. ACTIVITY   No driving, operating machinery, or making important decisions for 24 hours. Resume normal activity after 24 hours. You may return to work after 24 hours. 2. DIET     __x__ (EGD/ERCP):  Do not eat or drink for one hour after your exam.  You may then   try a sip of water, and if you are able to swallow as usual you may advance to a   regular diet. __x__ (Colonscopy/Flex Sig):  Resume your usual diet unless specified below. ____ Diet Modification:    3. MEDICATIONS (Do not consume alcohol, tranquilizers, or sleeping medications for 24           hours unless advised by your physician)       ___x__ Resume your usual medications    4. PHYSICIAN FOLLOW-UP        __x__ Please call the office for an appointment/further instructions. ____ See your family physician. 5. ADDITIONAL INSTRUCTIONS          6. NORMAL CHANGES YOU MAY EXPERIENCE AFTER ENDOSCOPY:          EGD/ERCP     COLONOSCOPY      Sore throat after EGD/ERCP   Passing of gas for several hours after      A bloated feeling and belching from  Some mild abdominal cramping   air in stomach    If a biopsy/polypectomy was done, you      If a biopsy was done, you may spit   may see some spotting of blood   up some blood tinged mucous  You may feel fatigued for the next 24-48         hours due to the prep and sedation    7. CALL YOUR PHYSICIAN IF YOU EXPERIENCE ANY OF THE FOLLOWING:      A.  Passing blood rectally or vomiting blood (color may be red or black)      B. Severe abdominal pain or tenderness (that is not relieved by passing air)      C.   Fever, chills, or excessive sweating      D.  Persistent nausea or vomiting      E.  Redness or swelling at the IV site

## 2023-10-20 LAB — SURGICAL PATHOLOGY REPORT: NORMAL

## 2024-02-01 ENCOUNTER — HOSPITAL ENCOUNTER
Age: 74
Discharge: HOME | End: 2024-02-01
Payer: MEDICARE

## 2024-02-01 DIAGNOSIS — I10: ICD-10-CM

## 2024-02-01 DIAGNOSIS — E03.9: Primary | ICD-10-CM

## 2024-02-01 DIAGNOSIS — G25.81: ICD-10-CM

## 2024-02-01 DIAGNOSIS — K21.9: ICD-10-CM

## 2024-02-01 LAB
ADD MANUAL DIFF: NO
ANION GAP: 13.7
BLOOD UREA NITROGEN: 21 MG/DL (ref 7–18)
CALCIUM: 9.3 MG/DL (ref 8.5–10.1)
CARBON DIOXIDE: 27.6 MMOL/L (ref 21–32)
CHLORIDE: 102 MMOL/L (ref 98–107)
CO2 BLD-SCNC: 27.6 MMOL/L (ref 21–32)
ESTIMATED GFR (AFRICAN AMERICA: 59 (ref 60–?)
ESTIMATED GFR (NON-AFRICAN AME: 48 (ref 60–?)
FERRITIN: 146 NG/ML (ref 8–252)
FOLATE: 23.9 NG/ML (ref 8.6–58.9)
GLUCOSE BLD-MCNC: 105 MG/DL (ref 74–106)
HCT VFR BLD CALC: 40.2 % (ref 36–48)
HEMATOCRIT: 40.2 % (ref 36–48)
HEMOGLOBIN: 12.6 G/DL (ref 12–16)
IMMATURE GRANULOCYTES ABS AUTO: 0.01 10^3/UL (ref 0–0.03)
IMMATURE GRANULOCYTES PCT AUTO: 0.1 % (ref 0–0.5)
IRON: 105 UG/DL (ref 50–170)
LYMPHOCYTES  ABSOLUTE AUTO: 1.2 10^3/UL (ref 1.2–3.8)
MCV RBC: 94.8 FL (ref 81–99)
MEAN CORPUSCULAR HEMOGLOBIN: 29.7 PG (ref 26.7–34)
MEAN CORPUSCULAR HGB CONC: 31.3 G/DL (ref 29.9–35.2)
MEAN CORPUSCULAR VOLUME: 94.8 FL (ref 81–99)
PERCENT IRON SATURATION: 31.4 %
PLATELET # BLD: 351 10^3/UL (ref 150–450)
PLATELET COUNT: 351 10^3/UL (ref 150–450)
POTASSIUM SERPLBLD-SCNC: 4.3 MMOL/L (ref 3.5–5.1)
POTASSIUM: 4.3 MMOL/L (ref 3.5–5.1)
RED BLOOD COUNT: 4.24 10^6/UL (ref 4.2–5.4)
SODIUM BLD-SCNC: 139 MMOL/L (ref 136–145)
SODIUM: 139 MMOL/L (ref 136–145)
THYROID STIMULATING HORMONE: 3.97 UIU/ML (ref 0.36–3.74)
TOTAL IRON BINDING CAPACITY: 334 UG/DL (ref 250–450)
VITAMIN B12: 1137 PG/ML (ref 193–986)
WBC # BLD: 6.8 10^3/UL (ref 4–11)
WHITE BLOOD COUNT: 6.8 10^3/UL (ref 4–11)

## 2024-02-01 PROCEDURE — 82728 ASSAY OF FERRITIN: CPT

## 2024-02-01 PROCEDURE — 85025 COMPLETE CBC W/AUTO DIFF WBC: CPT

## 2024-02-01 PROCEDURE — 84443 ASSAY THYROID STIM HORMONE: CPT

## 2024-02-01 PROCEDURE — 80048 BASIC METABOLIC PNL TOTAL CA: CPT

## 2024-02-01 PROCEDURE — 83540 ASSAY OF IRON: CPT

## 2024-02-01 PROCEDURE — 83550 IRON BINDING TEST: CPT

## 2024-02-01 PROCEDURE — 82746 ASSAY OF FOLIC ACID SERUM: CPT

## 2024-02-01 PROCEDURE — 36415 COLL VENOUS BLD VENIPUNCTURE: CPT

## 2024-02-01 PROCEDURE — 82607 VITAMIN B-12: CPT

## 2024-02-01 PROCEDURE — 84439 ASSAY OF FREE THYROXINE: CPT

## 2024-09-04 ENCOUNTER — HOSPITAL ENCOUNTER
Dept: HOSPITAL 101 - LAB | Age: 74
Discharge: HOME | End: 2024-09-04
Payer: MEDICARE

## 2024-09-04 DIAGNOSIS — M54.16: Primary | ICD-10-CM

## 2024-09-04 DIAGNOSIS — C79.51: ICD-10-CM

## 2024-09-04 DIAGNOSIS — M51.36: ICD-10-CM

## 2024-09-04 LAB
BLOOD UREA NITROGEN: 28 MG/DL (ref 7–18)
ESTIMATED GFR (AFRICAN AMERICA: 56 (ref 60–?)
ESTIMATED GFR (NON-AFRICAN AME: 46 (ref 60–?)

## 2024-09-04 PROCEDURE — 82565 ASSAY OF CREATININE: CPT

## 2024-09-04 PROCEDURE — 72133 CT LUMBAR SPINE W/O & W/DYE: CPT

## 2024-09-04 PROCEDURE — 36415 COLL VENOUS BLD VENIPUNCTURE: CPT

## 2024-09-04 PROCEDURE — 84520 ASSAY OF UREA NITROGEN: CPT

## 2024-09-04 NOTE — CT_ITS
The [address] [address] 
     [phone] 
  
  
Patient Name: 
PING BATRES 
  
MRN: [id number]    YOB: 1950    Sex: F 
Assigned Patient Location: LAB 
Current Patient Location:   
Accession/Order Number: [id number] 
Exam Date: 9/04/2024  10:15    Report Date: 9/05/2024  00:10 
  
At the request of: 
CJ KENNEDY   
  
Procedure:  CT lumbar spine wo/w con 
  
EXAM: CT lumbar spine wo/w con  
  
HISTORY: LUMBAR RADICULOPATHY, METASTASIS TO SPINAL COLUMN 
  
COMPARISON: 4/13/2022.  
  
TECHNIQUE: Axial CT scans of the lumbar spine were obtained without and with  
IV  
contrast administration. MPR images were obtained. Dose reduction techniques  
were achieved by using: automated exposure control and/or adjustment of mA and  
  
/or kV according to patient size and/or use of iterative reconstruction  
technique.. 
  
FINDINGS: Lumbar levoscoliosis without segmental anomaly is unchanged,  
measuring 23 degrees. Decreased disc height with vacuum disc phenomena from L2  
  
to S1 is secondary to disc degeneration. Mild discovertebral complexes from L2  
  
to S1 without central spinal stenosis. Mild to moderate facet arthropathy from  
  
L3 to S1. No significant neural foraminal stenosis. 
  
Cerebral small radiolucencies in from L2 to L5 vertebral bodies are unchanged.  
  
Some of these radiolucencies have fatty density, likely representing focal  
areas of fatty marrow. A benign hemangioma in L3 vertebral body with coarse  
trabeculae is unchanged. 
  
SI joints appear unremarkable. The visualized retroperitoneum shows no  
adenopathy. 
  
ORDER #: [id number] CT/CT lumbar spine wo/w con  
IMPRESSION:   
   
Lumbar levoscoliosis without segmental anomaly is unchanged, measuring 23   
degrees.  
   
Discovertebral degenerative changes from L2 to S1 without central spinal   
stenosis or significant neural foraminal stenosis. Mild to moderate facet   
arthropathy from L3 to S1.  
   
Several small radiolucencies from L2 to L5 are unchanged. I am doubtful that   
these radiolucencies result from metastatic disease.  
   
   
Electronically authenticated by: FLORIAN MENJIVAR   Date: 9/05/2024  00:10

## 2024-09-23 ENCOUNTER — HOSPITAL ENCOUNTER
Dept: HOSPITAL 101 - LAB | Age: 74
Discharge: HOME | End: 2024-09-23
Payer: MEDICARE

## 2024-09-23 DIAGNOSIS — I50.42: Primary | ICD-10-CM

## 2024-09-23 LAB
ANION GAP: 9.9
BLOOD UREA NITROGEN: 23 MG/DL (ref 7–18)
CALCIUM: 9.8 MG/DL (ref 8.5–10.1)
CARBON DIOXIDE: 28.6 MMOL/L (ref 21–32)
CHLORIDE: 104 MMOL/L (ref 98–107)
CO2 BLD-SCNC: 28.6 MMOL/L (ref 21–32)
ESTIMATED GFR (AFRICAN AMERICA: 52 (ref 60–?)
ESTIMATED GFR (NON-AFRICAN AME: 43 (ref 60–?)
GLUCOSE BLD-MCNC: 101 MG/DL (ref 74–106)
POTASSIUM SERPLBLD-SCNC: 4.5 MMOL/L (ref 3.5–5.1)
POTASSIUM: 4.5 MMOL/L (ref 3.5–5.1)
SODIUM BLD-SCNC: 138 MMOL/L (ref 136–145)
SODIUM: 138 MMOL/L (ref 136–145)

## 2024-09-23 PROCEDURE — 80048 BASIC METABOLIC PNL TOTAL CA: CPT

## 2024-09-23 PROCEDURE — 36415 COLL VENOUS BLD VENIPUNCTURE: CPT

## 2024-09-23 NOTE — XMS_ITS
Comprehensive CCD (C-CDA v2.1)  
  
                         Created on: 2024  
  
  
PING CHAVEZ  
External Reference #: CDR,PersonID:[id number]  
: [id number]  
Sex: Undifferentiated  
  
Demographics  
  
  
                                        Address             [address] [place]  [address]  
   
                                        Home Phone          [phone]  
   
                                        Preferred Language  en  
   
                                        Marital Status        
   
                                        Sikhism Affiliation Unknown  
   
                                        Race                White  
   
                                        Ethnic Group        Not  or Lati  
no  
  
  
Author  
  
  
                                        Organization        [facility]  
  
  
Care Team Providers  
  
  
                                Care Team Member Name Role            Phone  
   
                                GREG ROCHA   Unavailable     Unavailable  
   
                                GREG ROCHA   Unavailable     Unavailable  
   
                                KHAMOUSIA, NIDAA Unavailable     Unavailable  
   
                                Unavailable     Primary Care Provider UnavailDR JASMIN [employer]      Unavailable  
   
                                AYALA MOONEY    Attending       Unavailable  
   
                                AYALA MOONEY    Admitting       Unavailable  
   
                                AYALA MOONEY    Consulting      Unavailable  
   
                                KHAMOUSIA, NIDAA Primary Care Physician [phone]  
   
                                Syeda JORDAN [facility] Primary Care Provider [phone]  
   
                                Hamman, Bryan H Unavailable     Unavailable  
   
                                Tram Odom Unavailable     Unavailable  
   
                                MD Salvador Corcoran Attending Provider [phone]  
   
                                MD Syeda [facility] Primary Care Provider [phone]  
   
                                AMELIA HENDRICKSON Attending       Unavailable  
   
                                Salvador CORCORAN Attending       Unavailable  
   
                                Salvador CORCORAN Attending       Unavailable  
   
                                Marty Osborne Attending       Unavaila  
Marty Acuna Admitting       Unavailtio hughes  
   
                                NONE, XXXX      Referring       Unavailable  
   
                                AMELIA HENDRICKSON Referring       Unavailable  
   
                                AMELIA HENDRICKSON Attending       Unavailable  
   
                                AMELIA HENDRICKSON Admitting       Unavailable  
   
                                Babak Montoya Attending       Unavailable  
   
                                J Luis VERDUGO Admitting       Unavailable  
   
                                Blank, Dominguez S Consulting      Unavailable  
   
                                Blank, Dominguez S Consulting      Unavailable  
   
                                Blank, Dominguez S Consulting      Unavailable  
   
                                Blank, Dominguez S Consulting      Unavailable  
   
                                Blank, Dominguez S Consulting      Unavailable  
   
                                Blank, Dominguez S Consulting      Unavailable  
   
                                Blank, Dominguez S Consulting      Unavailable  
   
                                Blank, Dominguez S Consulting      Unavailable  
   
                                Blank, Dominguez S Consulting      Unavailable  
   
                                Blank, Dominguez S Consulting      Unavailable  
   
                                Trae Garcia Attending       Unavailable  
   
                                KHAMOUSIA, NIDAA Referring       Unavailable  
   
                                KHAMOUSIA, NIDAA Primary Care    Unavailable  
   
                                KHAMOUSIA, NIDAA Primary Care    Unavailable  
   
                                KHAMOUSIA, NIDAA Referring       Unavailable  
   
                                KHAMOUSIA, NIDAA Primary Care    Unavailable  
   
                                MARY RIVAS Attending       Unavailable  
   
                                ANGEL GERARDO Referring       Unavailable  
   
                                KHAMOUSIA, NIDAA Primary Care    Unavailable  
   
                                GREG ROCHA   Admitting       Unavailable  
   
                                GREG ROCHA   Attending       Unavailable  
   
                                KHAMOUSIA, NIDAA Primary Care    Unavailable  
   
                                Khamousia Alexander JORDAN Primary Care Provider [phone]  
   
                                MD Alexander Guerra Primary Care Provider [phone]  
   
                                MD Alexei Bartlett II Attending Provider [phone]  
   
                                Khamousia, Nidaa Primary Care    Unavailable  
   
                                Salvador Corcoran Attending       Unavailable  
   
                                Salvador Corcoran Admitting       Unavailable  
   
                                Alexei Bartlett II Attending       Unavailabl  
e  
   
                                DareAlexei desir II Admitting       Unavailabl  
e  
   
                                Khamousia, Nidaa Primary Care    Unavailable  
   
                                [facility]       [facility]  
ilable  
   
                                KHAMOUSIA, NIDAA O Primary Care    Unavailable  
   
                                CJ KENNEDY  Attending       Unavailable  
   
                                CJ KENNEDY  Attending       Unavailable  
   
                                CJ KENNEDY  Attending       Unavailable  
   
                                ANGEL GERARDO Attending       Unavailable  
   
                                KHAMOUSIA, NIDAA O Referring       Unavailable  
   
                                KHAMOUSIA, NIDAA O Primary Care    Unavailable  
   
                                CORRINE COTTER Attending       Unavailable  
   
                                KHAMOUSIA, NIDAA O Referring       Unavailable  
   
                                KHAMOUSIA, NIDAA O Primary Care    Unavailable  
   
                                MICHAELLE HUTCHINSON Attending       Unavailable  
   
                                KHAMOUSIA, NIDAA O Referring       Unavailable  
   
                                KHAMOUSIA, NIDAA O Primary Care    Unavailable  
   
                                CHAPARRO HOLLEY Attending       Unavailable  
   
                                KHAMOUSIA, NIDAA O Referring       Unavailable  
   
                                KHAMOUSIA, NIDAA O Primary Care    Unavailable  
   
                                CHAPARRO HOLLEY Referring       Unavailable  
   
                                KHAMOUSIA, NIDAA O Primary Care    Unavailable  
   
                                KHAMOUSIA, NIDAA O Referring       Unavailable  
   
                                KHAMOUSIA, NIDAA O Primary Care    Unavailable  
   
                                KHAMOUSIA, NIDAA O Referring       Unavailable  
   
                                KHAMOUSIA, NIDAA O Primary Care    Unavailable  
   
                                MICHAELLE HUTCHINSON Attending       Unavailable  
   
                                KHAMOUSIA, NIDAA O Referring       Unavailable  
   
                                KHAMOUSIA, NIDAA O Primary Care    Unavailable  
   
                                CHAPARRO HOLLEY Attending       Unavailable  
   
                                KHAMOUSIA, NIDAA O Referring       Unavailable  
   
                                KHAMOUSIA, NIDAA O Primary Care    Unavailable  
   
                                KHAMOUSIA, NIDAA O Referring       Unavailable  
   
                                KHAMOUSIA, NIDAA O Primary Care    Unavailable  
   
                                CHAPARRO HOLLEY Referring       Unavailable  
   
                                KHAMOUSIA, NIDAA O Primary Care    Unavailable  
  
  
  
Allergies  
  
  
                                                    Allergy   
Classification                          Reported   
Allergen(s)               Allergy Type              Date of   
Onset                     Reaction(s)               Facility  
   
                                                      
(15 sources)                            Cefaclor;   
Translations:   
[cefaclor]                Drug Allergy              10-  
6                                       Eruption of   
skin   
(disorder),   
Itching, Rash                           [facility]  
   
                                                      
(1 source)          Cefaclor            Drug Allergy        02                                                   [facility]   
Repository  
  
  
  
Medications  
Current Medications  
  
  
  
                      Medication Drug Class(es) Dates      Sig (Normalized) Sig   
(Original)  
   
                                                    acetaminophen 325 mg   
oral capsule  
(13 sources)                                        Start:   
2024                              take 325 mg by   
mouth every six   
hours                                   Acetaminophen   
Active 325 MG PO   
Every 6 hours   
2024   
12:00am  
  
  
  
                                        Start: 2018   take 2 tablets by mo  
uth every   
six hours as needed for pain            acetaminophen 325 mg Tab 650 mg = 2   
tab(s), Oral, q6hr, PRN for pain   
Start Date: 18 Status: Ordered  
   
                                        Start: 2018   take 2 tablets by mo  
uth every   
four hours as needed for pain           acetaminophen 325 mg Tab 650 mg = 2   
tab(s), Oral, q4hr, PRN for pain   
Start Date: 18 Status: Ordered  
  
  
  
                                                    acetaminophen 325 mg   
/ HYDROcodone   
bitartrate 5 mg oral   
tablet  
(2 sources)               Opioid Agonist            Start:   
10-                                          Norco 325 mg-5 mg   
oral tablet 1   
tab(s), Oral,   
q4hr for pain, 12   
tab(s), Refill(s)   
0 Start Date:   
10/9/19 Status:   
Ordered  
   
                                                    [id number] 200 actuat   
albuterol 0.09   
mg/actuat metered   
dose inhaler  
(4 sources)                             beta2-Adrenergic   
Agonist                                 Start:   
2022                              take 2 puff(s)   
by inhalation   
every four hours   
for wheezing                            Pro-Air HFA CFC   
free 90 mcg/inh   
MDI 2 puff(s),   
Inhalation, q4hr   
for wheezing, 8.5   
gram, Refill(s)   
0, [facility] ,   
172.7, cm,   
22 0:48:00   
EST,   
Height/Length   
Dosing, 103.5,   
kg, 22   
0:48:00 EST,   
Weight Dosing   
Start Date:   
22 Status:   
Ordered  
   
                                                    Allopurinol  
(13 sources)                            Xanthine Oxidase   
Inhibitor                               Start:   
2024                              take 1 mg by   
mouth once daily                        Allopurinol   
Active MG PO   
Daily 2024   
12:00am  
  
  
  
                                        Start: 2018   take 1 tablet by dawn  
 once   
daily                                   allopurinol 100 mg Tab 100 mg = 1   
tab(s), Oral, Daily, Refills(s) 0   
Start Date: 18 Status: Ordered  
   
                                        Start: 2018   take 1 tablet by dawn  
th twice   
daily                                   allopurinol 100 mg Tab 100 mg = 1   
tab(s), Oral, BID, Refills(s) 0   
Start Date: 18 Status: Ordered  
  
  
  
                                                    calcium carbonate 1500   
mg oral tablet  
(10 sources)                            Start: 2021   take 1 tablet by   
mouth once daily                        calcium (as carbonate)   
600 mg oral tablet 600   
mg = 1 tab(s), Oral,   
Daily, Refills(s) 0   
Start Date: 21   
Status: Ordered  
  
  
  
                                                                calcium carbonat  
e (CALCIUM 500 ORAL) Take by mouth daily. 0 Active  
  
  
  
                                                    carvedilol 6.25   
mg oral tablet  
(13 sources)                            alpha-Adrenergic   
Blocker,   
beta-Adrenergic Blocker   Start: 2024         take 1 mg by   
mouth every   
twelve hours                            Carvedilol   
Active MG PO   
Every 12 hours   
2024 12:00am  
  
  
  
                                        Start: 2022   take 1 tablet by dawn  
th twice   
daily                                   carvedilol 12.5 mg Tab 12.5 mg = 1   
tab(s), Oral, BID, Refills(s) 0   
Start Date: 22 Status:   
Ordered  
   
                                        Start: 2020   take 1 tablet by dawn  
th twice   
daily at mealtime                       carvediloL (COREG) 6.25 mg tablet   
Indications: Paroxysmal atrial   
fibrillation (CMS-HCC) TAKE 1   
TABLET BY MOUTH TWICE DAILY WITH   
MEALS 60 tablet 3 2020 Active  
   
                                        Start: 2018   take 1 tablet by dawn  
th twice   
daily                                   carvedilol 12.5 mg Tab 12.5 mg = 1   
tab(s), Oral, BID, Refills(s) 0   
Start Date: 18 Status: Ordered  
  
  
  
                                                    cholecalciferol 0.05 mg   
oral tablet  
(6 sources)         Vitamin D                               take 1 tablet by   
mouth in the   
morning                                 cholecalciferol, vitamin   
D3, 2,000 units tablet Take   
1 tablet (2,000 Units   
total) by mouth in the   
morning. 0 Active  
  
  
  
                                                                Cholecalciferol   
(VITAMIN D3) 2000 units CAPS Take by mouth 0 Active  
  
  
  
                                                    esomeprazole 20 mg   
delayed release   
oral capsule  
(10 sources)                            Proton Pump   
Inhibitor                 Start: 2021         take 1 capsule   
by mouth in   
the evening                             esomeprazole   
(NexIUM) 20 mg   
capsule Take 1   
capsule (20 mg   
total) by mouth in   
the evening. 0   
2021 Active  
   
                                                    ferrous sulfate 325   
mg oral tablet  
(5 sources)                             Start: 2024   take 1 tablet   
by mouth once   
daily                                   Ferrous Sulfate   
(Ferosul) 325 mg   
(65 mg iron) tablet   
Active MG PO Daily   
2024   
12:00am  
  
  
  
                                                take 1 tablet by mouth in the mo  
rning ferrous sulfate 325 (65 FE) mg EC tablet   
Take   
1 tablet (325 mg total) by mouth in the   
morning. 0 Active  
  
  
  
                                                    fluticasone   
propionate 0.05   
mg/actuat metered   
dose nasal spray  
(4 sources)     Corticosteroid  Start: 2022                 Flonase 0.05 m  
g/inh   
Spray 0.1 mg, 2   
spray(s), Nasal,   
Daily, Refill(s) 0   
Start Date: 22   
Status: Ordered  
   
                                                    fluvoxaMINE maleate   
100 mg oral tablet  
(10 sources)                            Serotonin Reuptake   
Inhibitor           Start: 2022                       fluvoxamine 100 mg   
oral tablet 50 mg =   
0.5 tab(s), Oral,   
BID, # 180 tab(s),   
Refills(s) 0 Start   
Date: 22   
Status: Ordered  
  
  
  
                                                            take 0.5 tablet by m  
outh in the morning,   
then take 0.5 tablet by mouth at bedtime fluvoxaMINE (LUVOX) 100 mg tablet Take   
0.5   
tablets (50 mg total) by mouth in the   
morning and 0.5 tablets (50 mg total)   
before bedtime. 0 Active  
   
                                                take 1 capsule by mouth once mila  
ly fluvoxaMINE (LUVOX CR) 100 MG CP24 extended   
release capsule Take 100 mg by mouth   
nightly 0 Active  
  
  
  
                                                    furosemide 20 mg   
oral tablet  
(13 sources)        Loop Diuretic       Start: 2024   take 10 mg by   
mouth once   
daily                                   Furosemide Active   
10 MG PO Daily   
2024   
12:00am  
  
  
  
                                Start: 2021                 furosemide (LA  
SIX) 20 mg tablet   
Take 0.5 tablets (10 mg total) by   
mouth as needed (please have labs   
drawn for future refills.). 90   
tablet 2 2021 Active  
   
                                Start: 2018                 furosemide 20   
mg Tab See   
Instructions, 1 tab daily prn for   
peripheral edema, tab(s),   
Refills(s) 0 Start Date: 18   
Status: Ordered  
   
                                                            take 1 tablet by dawn  
th twice   
daily                                   furosemide (LASIX) 40 MG tablet   
Take 40 mg by mouth 2 times daily 0   
Active  
  
  
  
                                                    gabapentin 100 mg oral   
capsule  
(11 sources)    Anti-epileptic Agent Start: 2024                 Gabapenti  
n Active MG   
PO 2024   
12:00am  
  
  
  
                                        Start: 2022   take 1 capsule by mo  
uth three   
times daily                             gabapentin 100 mg Cap 100 mg = 1   
cap(s), Oral, TID, Refills(s) 0   
Start Date: 22 Status: Ordered  
  
  
  
                                                    12 hr guaiFENesin   
600 mg extended   
release oral tablet  
(1 source)                                          Start:   
2022  
End: 2022                         take 2   
tablets by   
mouth twice   
daily                                   Mucinex 600 mg   
Tab-ER 1,200 mg =   
2 tab(s), Oral,   
BID, X 7 day(s),   
Refills(s) 0 Start   
Date: 22   
Stop Date: 22   
Status: Ordered  
   
                                                    L.acid/B.bifidum/B.  
animal/FOS   
(PROBIOTIC COMPLEX   
ORAL)  
(4 sources)                                                     L.acid/B.bifidum  
/B  
.animal/FOS   
(PROBIOTIC COMPLEX   
ORAL) Take by   
mouth daily. 0   
Active  
   
                                                    latanoprost 0.05   
mg/ml ophthalmic   
solution  
(2 sources)                             Prostaglandin   
Analog                                              take 1   
drop(s) into   
the eye(s)   
once daily                              latanoprost   
(XALATAN) 0.005 %   
ophthalmic   
solution   
Indications: right   
eye 1 drop nightly   
0 Active  
   
                                                    levoFLOXacin 750 mg   
oral tablet  
(1 source)                              Quinolone   
Antimicrobial                           Start:   
2022  
End: 2022                         take 1 tablet   
by mouth once   
daily                                   Levaquin 750 mg   
Tab 750 mg = 1   
tab(s), Oral,   
Daily, X 8 day(s),   
# 8 tab(s),   
Refills(s) 0,   
Pharmacy: [facility]   
Pharmacy ,   
172.7, cm,   
22 0:48:00   
EST, Height/Length   
Dosing, 103.5, kg,   
22 0:48:00   
EST, Weight Dosing   
Start Date:   
22 Stop   
Date: 22   
Status: Ordered  
   
                                                    levothyroxine   
sodium 0.05 mg oral   
tablet  
(13 sources)              l-Thyroxine               Start:   
2024                              take 1 ug by   
mouth once   
daily                                   Levothyroxine   
Active MCG PO   
Daily 2024 12:00am  
  
  
  
                                        Start: 2018   take 1 tablet by [facility] once   
daily                                   levothyroxine 50 mcg (0.05 mg) Tab   
50 microgram = 1 tab(s), Oral,   
Daily, Refills(s) 0 Start Date:   
18 Status: Ordered  
   
                                                            take 1 tablet by dawn  
 in the   
morning                                 levothyroxine (SYNTHROID,   
LEVOTHROID) 50 MCG tablet Take 1   
tablet (50 mcg total) by mouth in   
the morning. 0 Active  
  
  
  
                                                    loratadine 10 mg oral   
tablet  
(7 sources)                             Start: 2024   take 1 tablet by   
mouth once daily                        Loratadine (Allergy   
Relief (Loratadine)) 10   
mg tablet Active 10 MG PO   
Daily 2024   
12:00am  
  
  
  
                                        Start: 2018   take 1 capsule by mo  
uth once   
daily                                   loratadine 10 mg oral capsule 10 mg   
= 1 cap(s), Oral, Daily, # 20   
cap(s), Refills(s) 0, Pharmacy:   
[facility]  Start Date:   
18 Status: Ordered  
  
  
  
                                                    losartan potassium   
25 mg oral tablet  
(13 sources)                            Angiotensin 2   
Receptor Blocker          Start: 2024         take 1 mg by   
mouth once   
daily                                   Losartan Active   
MG PO Daily   
2024 12:00am  
  
  
  
                                        Start: 2018   take 1 tablet by dawn  
 once   
daily                                   losartan 25 mg Tab 25 mg = 1   
tab(s), Oral, Daily, Refills(s) 0   
Start Date: 18 Status: Ordered  
  
  
  
                                                    magnesium oxide 500 mg   
oral capsule  
(13 sources)                            Start: 2024   take 500 mg by   
mouth once daily                        Magnesium Oxide Active   
500 MG PO Daily   
2024   
12:00am  
  
  
  
                                        Start: 2018   take 1 tablet by dawn  
 once   
daily                                   magnesium oxide 500 mg oral tablet   
500 mg = 1 tab(s), Oral, Daily,   
Refills(s) 0 Start Date: 18   
Status: Ordered  
   
                                                                Magnesium Oxide   
500 MG CAPS Take by   
mouth Daily 0 Active  
  
  
  
                                                    melatonin 3 mg   
oral tablet  
(4 sources)                             Start: 2022   take 1 tablet   
by mouth at   
bedtime as   
needed                                  melatonin 3 mg   
Tab 3 mg = 1   
tab(s), Oral,   
Bedtime, PRN   
Insomnia,   
Refills(s) 0   
Start Date:   
22 Status:   
Ordered  
   
                                                    Multi-Day Plus   
Minerals  
(6 sources)                             Start: 2018   take 1 tablet   
by mouth once   
daily                                   Multi-Day Plus   
Minerals 1   
tab(s), Oral,   
Daily, Refill(s)   
0 Start Date:   
18 Status:   
Ordered  
   
                                                    Multiple   
Vitamins-Minerals   
(THERAPEUTIC   
MULTIVITAMIN-MINE  
RALS) tablet  
(2 sources)                                                 take 1 tablet   
by mouth once   
daily                                   Multiple   
Vitamins-Minerals   
(THERAPEUTIC   
MULTIVITAMIN-MINE  
RALS) tablet Take   
1 tablet by mouth   
daily 0 Active  
   
                                                    multivit-iron-FA-  
calcium &mins   
(THERAGRAN-M) 9   
mg iron-400 mcg   
tablet  
(4 sources)                                                     multivit-iron-FA  
-  
calcium &mins   
(THERAGRAN-M) 9   
mg iron-400 mcg   
tablet Take 1   
tablet by mouth   
in the morning. 0   
Active  
   
                                                    mupirocin 0.02   
mg/mg topical   
ointment  
(2 sources)                             RNA Synthetase   
Inhibitor   
Antibacterial       Start: 2021                       mupirocin Top 2%   
Oint 1 yoko,   
Topical, TID, 15   
gram, Refill(s) 0   
Start Date:   
3/4/21 Status:   
Ordered  
   
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   
[Mass/Vol]      18 mg/dL        Normal          5-21            [facility]  
   
                                        Comment on above:   Performed By: #### 2  
[id number], [id number] ####[facility] [address] [place] [address]   
   
                                                    Urea   
nitrogen/Creatinine   
[Mass ratio]    20 No Units     Normal          10-20           [facility]  
   
                                        Comment on above:   Performed By: #### 2  
492795, [id number] ####[facility] [address] [place] [address]   
   
                                                    C Sputumon 2022   
   
                                                    Bacteria identified   
Respiratory culture Nom   
(Sput)                          Normal                          [facility]  
   
                                        Comment on above:   Performed By: #### 2  
786955 ####[facility]   
[address] [place] [address]   
   
                                                    CHEMISTRYOrdered By: Papo millsolasio on 2022   
   
                                                    Vancomycin trough   
[Moles/Vol]         microgram/mL        Low                 10 - 20   
mcg/mL                                  [facility]   
Remisol  
   
                                        Comment on above:   Result Comment: Resu  
lts verified by repeat analysis   
   
                                                    CHEMISTRYOrdered By: SYSTEM   
SYSTEM on 2022   
   
                          Anion gap [Moles/Vol] 10 mmol/L    Normal       6 - 16  
   
mEq/L                                   [facility]   
Remisol  
   
                          Calcium [Mass/Vol] 10.1 mg/dL   Normal       8.9 - 11.  
1   
mg/dL                                   [facility]   
Remisol  
   
                          Chloride [Moles/Vol] 106 mmol/L   Normal       101 - 1  
11   
mmol/L                                  [facility]   
Remisol  
   
                          CO2 [Moles/Vol] 24 mmol/L    Normal       21 - 31   
mmol/L                                  [facility]   
Remisol  
   
                          Creatinine [Mass/Vol] 0.9 mg/dL    Normal       0.5 -   
1.3   
mg/dL                                   [facility]   
Remisol  
   
                                                    GFR/1.73 sq M.predicted   
among blacks MDRD   
(S/P/Bld) [Vol   
rate/Area]          mL/min/1.73 m2      Normal              >=59mL/min/  
1.73 m2                                 [facility]  
   
                                                    GFR/1.73 sq M.predicted   
among non-blacks MDRD   
(S/P/Bld) [Vol   
rate/Area]          mL/min/1.73 m2      Normal              >=59mL/min/  
1.73 m2                                 [facility]  
   
                          Glucose [Mass/Vol] 102 mg/dL    Normal       55 - 199   
mg/dL                                   [facility]   
Remisol  
   
                          Potassium [Moles/Vol] 3.7 mmol/L   Normal       3.5 -   
5.3   
mmol/L                                  [facility]   
Remisol  
   
                          Sodium [Moles/Vol] 136 mmol/L   Normal       135 - 145  
   
mmol/L                                  [facility]   
Remisol  
   
                                                    Urea nitrogen   
[Mass/Vol]          18 mg/dL            Normal              5 - 21   
mg/dL                                   [facility]   
Remisol  
   
                                                    Urea   
nitrogen/Creatinine   
[Mass ratio]    20 mg/mg        Normal          10 - 20         [facility]   
Remisol  
   
                                                    Monitor Recordon 2022   
   
                                        Monitor Record      [id number]  
1002  
[id number]#[id number]  
:127                Normal                                  [facility]  
   
                                        Monitor Record      [id number]  
1002  
[id number]#1.00CD  
:127                Normal                                  [facility]  
   
                                        Monitor Record      [id number]  
1002  
[id number]#[id number]  
:127                Normal                                  [facility]  
   
                                                    Patient Education - Texton 1  
2022   
   
                                                    Patient Education -   
Text                            Normal                          [facility]  
   
                                                    Progress Note-Physicianon    
   
                      Progress Note-Physician            Normal                F  
[facility]  
   
                                        Comment on above:   Result Comment: Elec  
tronically Signed By: OUMAR OWENS,   
Hilary\.br\Date and Time Signed: 22 08:04   
EST\.br\Electronically Co-Signed By: Jan JORDAN, Babak RUIZ\.br\Date and Time Co-Signed: 22 09:21 EST   
   
                                                    Vanco Troughon 2022   
   
                      VANCOMYCIN <4         Low        10-20      [facility]  
   
                                        Comment on above:   Result Comment: Resu  
lts verified by repeat analysis   
   
                                                            Performed By: #### 2  
[id number] ####[facility]   
[address] [place] [address]   
   
                                                    eGFRon 2022   
   
                                                    GFR/1.73 sq M.predicted   
among blacks MDRD   
(S/P/Bld) [Vol   
rate/Area]      mL/min/{1.73_m2} Normal          >=59            [facility]  
   
                                        Comment on above:   Order Comment: Order  
 added by Discern Expert.   
   
                                                            Result Comment: eGFR  
 is race adjusted. AA=.   
   
                                                            Performed By: #### 2  
522903, [id number] ####[facility] [address] [place] [address]   
   
                                                    GFR/1.73 sq M.predicted   
among non-blacks MDRD   
(S/P/Bld) [Vol   
rate/Area]      mL/min/{1.73_m2} Normal          >=59            [facility]  
   
                                        Comment on above:   Order Comment: Order  
 added by Discern Expert.   
   
                                                            Result Comment:   
deion kidney disease could be indicated at   
eGFR's of less than 60 mL/min/1.73m2. Kidney failure is   
indicated at less than 15 mL/min/1.73m2.   
   
                                                            Performed By: #### 2  
471027, [id number] ####[facility] [address] [place] [address]   
   
                                                    BMPon 2022   
   
                      Anion gap [Moles/Vol] 8 mmol/L   Normal     6-16       [facility]  
   
                                        Comment on above:   Performed By: #### 2  
240372, 22283825, 9409520, [id number]   
####[facility] [address] [place] [address]   
   
                      Calcium [Mass/Vol] 9.8 mg/dL  Normal     8.9-11.1   [facility]  
   
                                        Comment on above:   Performed By: #### 2  
776949, 38053869, 2422098, 0157047   
####[facility] [address] [place] [address]   
   
                      Chloride [Moles/Vol] 108 mmol/L Normal     101-111    [facility]  
   
                                        Comment on above:   Performed By: #### 2  
188120, 60193030, 2401829, 9961278   
####[facility] [address] [place] [address]   
   
                      CO2 [Moles/Vol] 21 mmol/L  Normal     21-31      [facility]  
   
                                        Comment on above:   Performed By: #### 2  
274267, 51929909, 5690802, 1509004   
####[facility] [address] [place] [address]   
   
                      Creatinine [Mass/Vol] 1.0 mg/dL  Normal     0.5-1.3    [facility]  
   
                                        Comment on above:   Performed By: #### 2  
191015, [id number], 6903326, 8192215   
####[facility] [address] [place] [address]   
   
                      Glucose [Mass/Vol] 111 mg/dL  Normal          [facility]  
   
                                        Comment on above:   Result Comment: If t  
his glucose result represents a fasting   
glucose, interpretation should refer to the following   
reference range: 55-99 mg/dL   
   
                                                            Performed By: #### 2  
643641, 10583512, 7426833, 5511511   
####[facility] [address] [place] [address]   
   
                      Potassium [Moles/Vol] 3.2 mmol/L Low        3.5-5.3    [facility]  
   
                                        Comment on above:   Performed By: #### 2  
842034, 75033134, 1687501, 0995873   
####[facility] [address] [place] [address]   
   
                      Sodium [Moles/Vol] 134 mmol/L Low        135-145    [facility]  
   
                                        Comment on above:   Performed By: #### 2  
378935, 40502065, 5962830, 0903706   
####[facility] [address] [place] [address]   
   
                                                    Urea nitrogen   
[Mass/Vol]      20 mg/dL        Normal          5-21            [facility]  
   
                                        Comment on above:   Performed By: #### 2  
155433, 03222368, 1782329, 5059413   
####[facility] [address] [place] [address]   
   
                                                    Urea   
nitrogen/Creatinine   
[Mass ratio]    20 No Units     Normal          10-20           [facility]  
   
                                        Comment on above:   Performed By: #### 2  
808928, 66953271, 0623514, 3226897   
####[facility] [address] [place] [address]   
   
                                                    CHEMISTRYOrdered By: SYSTEM   
SYSTEM on 2022   
   
                          Anion gap [Moles/Vol] 8 mmol/L     Normal       6 - 16  
   
mEq/L                                   [facility]   
Remisol  
   
                          Calcium [Mass/Vol] 9.8 mg/dL    Normal       8.9 - 11.  
1   
mg/dL                                   [facility]   
Remisol  
   
                          Chloride [Moles/Vol] 108 mmol/L   Normal       101 - 1  
11   
mmol/L                                  [facility]   
Remisol  
   
                          CO2 [Moles/Vol] 21 mmol/L    Normal       21 - 31   
mmol/L                                  [facility]   
Remisol  
   
                          Creatinine [Mass/Vol] 1.0 mg/dL    Normal       0.5 -   
1.3   
mg/dL                                   [facility]   
Remisol  
   
                                                    GFR/1.73 sq M.predicted   
among blacks MDRD   
(S/P/Bld) [Vol   
rate/Area]          mL/min/1.73 m2      Normal              >=59mL/min/  
1.73 m2                                 [facility]  
   
                                                    GFR/1.73 sq M.predicted   
among non-blacks MDRD   
(S/P/Bld) [Vol   
rate/Area]          54 mL/min/1.73 m2   Low                 >=59mL/min/  
1.73 m2                                 [facility]  
   
                          Glucose [Mass/Vol] 111 mg/dL    Normal       55 - 199   
mg/dL                                   [facility]   
Remisol  
   
                          Magnesium [Mass/Vol] 1.5 mg/dL    Normal       1.3 - 2  
.4   
mg/dL                                   [facility]   
Remisol  
   
                          Potassium [Moles/Vol] 3.2 mmol/L   Low          3.5 -   
5.3   
mmol/L                                  [facility]   
Remisol  
   
                          Sodium [Moles/Vol] 134 mmol/L   Low          135 - 145  
   
mmol/L                                  [facility]   
Remisol  
   
                          TSH Qn       2.52 m[IU]/L Normal       0.34 - 5.60   
mcIU/mL                                 [facility]   
Remisol  
   
                                                    Urea nitrogen   
[Mass/Vol]          20 mg/dL            Normal              5 - 21   
mg/dL                                   [facility]   
Remisol  
   
                                                    Urea   
nitrogen/Creatinine   
[Mass ratio]    20 mg/mg        Normal          10 - 20         [facility]   
Rem[facility]  
   
                                                    EMS Documentationon 20   
   
                      EMS Documentation            Normal                [facility]  
   
                      EMS Documentation            Normal                [facility]  
   
                                                    Magnesiumon 2022   
   
                      Magnesium [Mass/Vol] 1.5 mg/dL  Normal     1.3-2.4    [facility]  
[facility]  
   
                                        Comment on above:   Performed By: #### 2  
601128, [id number], 9152190, 8639584   
####[facility] [address] [place] [address]   
   
                                                    Monitor Recordon 2022   
   
                                        Monitor Record      [id number]  
1062  
[id number]#[id number]  
:127                Normal                                  [facility]  
   
                                        Monitor Record      [id number]  
1062  
[id number]#[id number]  
:127                Normal                                  [facility]  
   
                                                    Progress Note-Physicianon    
   
                      Progress Note-Physician            Normal                F  
[facility]  
   
                                        Comment on above:   Result Comment: Elec  
tronically Signed By: Hilary NIEVES\.br\Date and Time Signed: 22 09:07   
EST\.br\Electronically Co-Signed By: Jan JORDAN, Babak RUIZ\.erum\Date and Time Co-Signed: 22 09:48 EST   
   
                                                    Sodiumon 2022   
   
                      Sodium [Moles/Vol] 135 mmol/L Normal     135-145    [facility]  
   
                                        Comment on above:   Performed By: #### 2  
251634 ####[facility]   
[address] [place] [address]   
   
                                                    TSHon 2022   
   
                      TSH Qn     2.52 m[IU]/L Normal     0.34-5.60  [facility]  
   
                                        Comment on above:   Performed By: #### 2  
427768, 58464809, 5008671, 8457252   
####[facility] [address] [place] [address]   
   
                                                    eGFRon 2022   
   
                                                    GFR/1.73 sq M.predicted   
among blacks MDRD   
(S/P/Bld) [Vol   
rate/Area]      mL/min/{1.73_m2} Normal          >=59            [facility]  
   
                                        Comment on above:   Order Comment: Order  
 added by Discern Expert.   
   
                                                            Result Comment: eGFR  
 is race adjusted. AA=.   
   
                                                            Performed By: #### 2  
709321, 61714807, 2005080, 9996658   
####[facility] [address] [place] [address]   
   
                                                    GFR/1.73 sq M.predicted   
among non-blacks MDRD   
(S/P/Bld) [Vol   
rate/Area]      54 mL/min/1.73 m2 Low             >=59            [facility]  
   
                                        Comment on above:   Order Comment: Order  
 added by Discern Expert.   
   
                                                            Result Comment:   
deion kidney disease could be indicated at   
eGFR's of less than 60 mL/min/1.73m2. Kidney failure is   
indicated at less than 15 mL/min/1.73m2.   
   
                                                            Performed By: #### 2  
752664, 52836112, 3348671, 4847604   
####[facility] [address] [place] [address]   
   
                                                    BMPon 2022   
   
                      Anion gap [Moles/Vol] 11 mmol/L  Normal     6-16       [facility]  
   
                                        Comment on above:   Performed By: #### 2  
299934, 43751518 ####[facility] [address] [place] [address]   
   
                      Calcium [Mass/Vol] 9.7 mg/dL  Normal     8.9-11.1   [facility]  
   
                                        Comment on above:   Performed By: #### 2  
204820, 96192857 ####[facility] [address] [place] [address]   
   
                      Chloride [Moles/Vol] 104 mmol/L Normal     101-111    [facility]  
   
                                        Comment on above:   Performed By: #### 2  
886458, 49236708 ####[facility] [address] [place] [address]   
   
                      CO2 [Moles/Vol] 22 mmol/L  Normal     21-31      [facility]  
   
                                        Comment on above:   Performed By: #### 2  
209959, 57241375 ####[facility] [address] [place] [address]   
   
                      Creatinine [Mass/Vol] 0.9 mg/dL  Normal     0.5-1.3    [facility]  
   
                                        Comment on above:   Performed By: #### 2  
447983, 58388572 ####[facility] [address] [place] [address]   
   
                      Glucose [Mass/Vol] 98 mg/dL   Normal          [facility]  
   
                                        Comment on above:   Result Comment: If t  
his glucose result represents a fasting   
glucose, interpretation should refer to the following   
reference range: 55-99 mg/dL   
   
                                                            Performed By: #### 2  
821850, [id number] ####[facility] [address] [place] [address]   
   
                      Potassium [Moles/Vol] 3.7 mmol/L Normal     3.5-5.3    [facility]  
   
                                        Comment on above:   Performed By: #### 2  
602324, 54937134 ####[facility] [address] [place] [address]   
   
                      Sodium [Moles/Vol] 133 mmol/L Low        135-145    [facility]  
   
                                        Comment on above:   Performed By: #### 2  
308782, 89305709 ####[facility] [address] [place] [address]   
   
                                                    Urea nitrogen   
[Mass/Vol]      20 mg/dL        Normal          5-21            [facility]  
   
                                        Comment on above:   Performed By: #### 2  
686235, 54159058 ####[facility] [address] [place] [address]   
   
                                                    Urea   
nitrogen/Creatinine   
[Mass ratio]    22 No Units     High            10-20           [facility]  
   
                                        Comment on above:   Performed By: #### 2  
353227, 95544691 ####[facility] [address] [place] [address]   
   
                                                    C Blood Charcoalon    
   
                      Blood Culture Charcoal            Normal                [facility]  
   
                                        Comment on above:   Performed By: #### 1  
[id number] ####[facility]   
[address] [place] [address]   
   
                      Blood Culture Charcoal            Normal                [facility]  
   
                                        Comment on above:   Performed By: #### 1  
[id number] ####[facility]   
[address] [place] [address]   
   
                                                    C Urineon 2022   
   
                                                    Bacteria identified Cx   
Nom (U)                         Normal                          [facility]  
   
                                        Comment on above:   Performed By: #### 2  
120168, [id number] ####[facility] [address] [place] [address]   
   
                                                    CHEMISTRYOrdered By: SYSTEM   
SYSTEM on 2022   
   
                          Calcium [Mass/Vol] 9.7 mg/dL    Normal       8.9 - 11.  
1   
mg/dL                                   [facility]   
Remisol  
   
                          Creatinine [Mass/Vol] 0.9 mg/dL    Normal       0.5 -   
1.3   
mg/dL                                   [facility]   
Rem[facility]  
   
                                                    GFR/1.73 sq M.predicted   
among blacks MDRD   
(S/P/Bld) [Vol   
rate/Area]          mL/min/1.73 m2      Normal              >=59mL/min/  
1.73 m2                                 [facility]  
   
                                                    GFR/1.73 sq M.predicted   
among non-blacks MDRD   
(S/P/Bld) [Vol   
rate/Area]          mL/min/1.73 m2      Normal              >=59mL/min/  
1.73 m2                                 [facility]  
   
                          Glucose [Mass/Vol] 98 mg/dL     Normal       55 - 199   
mg/dL                                   [facility]   
[facility]  
   
                                                    Urea nitrogen   
[Mass/Vol]          20 mg/dL            Normal              5 - 21   
mg/dL                                   [facility]   
[facility]  
   
                                                    Urea   
nitrogen/Creatinine   
[Mass ratio]    22 mg/mg        High            10 - 20         [facility]   
[facility]  
   
                                                    CT Abdomen/Pelvis w/o Contra  
ston 2022   
   
                                                    CT Abdomen/Pelvis w/o   
Contrast                        Normal                          [facility]  
   
                                                    CT Chest w/o Contraston    
   
                      CT Chest w/o Contrast            Normal                [facility]  
   
                                                    Interdisciplinary Note - Jeff  
e Manageron 2022   
   
                                                    Interdisciplinary Note   
-                   Normal                          [facility]  
   
                                        Comment on above:   Result Comment: Elec  
tronically Signed By: Martha Arciniega\.br\Date and Time Signed: 22 12:34 EST   
   
                                                    Laboratory - Microbiology an  
d Antimicrobial susceptibilityOrdered By: Carol Seip  
on   
2022   
   
                                                    Bacteria identified   
Respiratory culture Nom   
(Sput)                                  1+ Candida albicans   
Presumptive  
Scant growth of Normal   
upper respiratory cathi   
isolated                                                    [facility]  
   
                                                    Monitor Recordon 2022   
   
                                        Monitor Record      [id number]  
[id number]  
[id number]  
:127                Normal                                  [facility]  
   
                                                    No Panel InformationOrdered   
By: Carol Seip on 2022   
   
                                        GS                  Rare epithelial cell  
s  
No organisms seen.                                          [facility]  
   
                                                    Outside Recordson 2022  
   
   
                                        Outside Records     [id number]  
[id number]  
[id number]  
:127                Normal                                  [facility]  
   
                                                    Progress Note-Physicianon    
   
                      Progress Note-Physician            Normal                F  
[facility]  
   
                                        Comment on above:   Result Comment: Elec  
tronically Signed By: Po Champagne MD\.br\Date and Time Signed: 22 15:46 EST   
   
                      Progress Note-Physician            Normal                F  
[facility]  
   
                                        Comment on above:   Result Comment: Elec  
tronically Signed By: Hilary NIEVES\.br\Date and Time Signed: 22 10:27   
EST\.br\Electronically Co-Signed By: Babak Montoya MD\.br\Date and Time Co-Signed: 22 11:15 EST   
   
                      Progress Note-Physician            Normal                F  
[facility]  
   
                                        Comment on above:   Result Comment: Elec  
tronically Signed By: Tone JORDAN,   
[place]\.br\Date and Time Signed: 22 07:27 EST   
   
                                                    Sodiumon 2022   
   
                      Sodium [Moles/Vol] 135 mmol/L Normal     135-145    [facility]  
   
                                        Comment on above:   Performed By: #### 2  
[id number] ####[facility]   
[address] [place] [address]   
   
                      Sodium [Moles/Vol] 135 mmol/L Normal     135-145    [facility]  
   
                                        Comment on above:   Performed By: #### 2  
[id number] ####[facility]   
[address] [place] [address]   
   
                      Sodium [Moles/Vol] 132 mmol/L Low        135-145    [facility]  
   
                                        Comment on above:   Performed By: #### 2  
726621 ####[facility]   
[address] [place] [address]   
   
                                                    U Legi Agon 2022   
   
                                                    L. pneumophila 1 Ag IA   
Ql (U)                    Negative                  Invalid   
Interpretation   
Code                      Negative                  [facility]  
   
                                        Comment on above:   Result Comment: Pres  
umptive negative for L. pneumophila   
serogroup 1 antigen in urine,suggesting no recent or current   
infection. Legionnaires' diseasecannot be ruled out since   
other serogroups and species may also causedisease.Performed   
at:  [facility]   
[id number] MD Juwan Butt   
   
                                                            Performed By: #### 2  
[id number] ####[facility]   
[address] [place] [address]   
   
                                                    US Renalon 2022   
   
                      US Renal              Normal                [facility]  
   
                                                    eGFRon 2022   
   
                                                    GFR/1.73 sq M.predicted   
among blacks MDRD   
(S/P/Bld) [Vol   
rate/Area]      mL/min/{1.73_m2} Normal          >=59            [facility]  
   
                                        Comment on above:   Order Comment: Order  
 added by Discern Expert.   
   
                                                            Result Comment: eGFR  
 is race adjusted. AA=.   
   
                                                            Performed By: #### 2  
480223, [id number] ####[facility] [address] [place] [address]   
   
                                                    GFR/1.73 sq M.predicted   
among non-blacks MDRD   
(S/P/Bld) [Vol   
rate/Area]      mL/min/{1.73_m2} Normal          >=59            [facility]  
   
                                        Comment on above:   Order Comment: Order  
 added by Discern Expert.   
   
                                                            Result Comment:   
deion kidney disease could be indicated at   
eGFR's of less than 60 mL/min/1.73m2. Kidney failure is   
indicated at less than 15 mL/min/1.73m2.   
   
                                                            Performed By: #### 2  
750522, [id number] ####[facility] [address] [place] [address]   
   
                                                    Auto Diffon 2022   
   
                      Basophils/100 WBC (Bld) 0.3 %      Normal     0.0-2.0    [facility]  
   
                                        Comment on above:   Order Comment: Order  
 Added by Discern Expert.   
   
                                                            Performed By: #### 2  
432810, 66897233, 8438345, 1373559   
####[facility] [address] [place] [address]   
   
                                                    Basophils/Leukocytes   
Auto (Bld) [Pure #   
fraction]       0.0 E9/L        Normal          0.0-0.2         [facility]  
   
                                        Comment on above:   Order Comment: Order  
 Added by Discern Expert.   
   
                                                            Performed By: #### 2  
320136, 04790071, 6947413, 7169167   
####[address] [place] [address]   
   
                                                    Eosinophils/100 WBC   
(Bld)           0.5 %           Normal          0.0-8.0         [facility]  
   
                                        Comment on above:   Order Comment: Order  
 Added by Discern Expert.   
   
                                                            Performed By: #### 2  
474883, 37595597, 5874212, 8527737   
####[facility] [address] [place] [address]   
   
                                                    Eosinophils/Leukocytes   
Auto (Bld) [Pure #   
fraction]       0.0 E9/L        Normal          0.0-0.5         [facility]  
   
                                        Comment on above:   Order Comment: Order  
 Added by Discern Expert.   
   
                                                            Performed By: #### 2  
022766, 47277145, 9370648, 0147144   
####[facility] [address] [place] [address]   
   
                                                    Lymphocytes/100 WBC   
(Bld)           5.3 %           Low             14.0-50.0       [facility]  
   
                                        Comment on above:   Order Comment: Order  
 Added by Discern Expert.   
   
                                                            Performed By: #### 2  
190630, 43319299, 6744031, 7274940   
####[address] [place] [address]   
   
                                                    Lymphocytes/Leukocytes   
Auto (Bld) [Pure #   
fraction]       0.5 E9/L        Low             1.0-4.0         [facility]  
   
                                        Comment on above:   Order Comment: Order  
 Added by Discern Expert.   
   
                                                            Performed By: #### 2  
707840, 15659912, 3964897, 5336427   
####[address] [place] [address]   
   
                      Monocytes/100 WBC (Bld) 8.5 %      Normal     4.0-14.0   [facility]  
   
                                        Comment on above:   Order Comment: Order  
 Added by Discern Expert.   
   
                                                            Performed By: #### 2  
294233, 64331516, 4675064, 8046642   
####[facility] [address] [place] [address]   
   
                                                    Monocytes/Leukocytes   
Auto (Bld) [Pure #   
fraction]       0.8 E9/L        Normal          0.2-1.0         [facility]  
   
                                        Comment on above:   Order Comment: Order  
 Added by Discern Expert.   
   
                                                            Performed By: #### 2  
438414, 33671952, 1457242, 6701507   
####[facility] [address] [place] [address]   
   
                                                    Neutrophils/100 WBC   
(Bld)           85.4 %          High            36.0-75.0       [facility]  
   
                                        Comment on above:   Order Comment: Order  
 Added by Discern Expert.   
   
                                                            Performed By: #### 2  
815069, 43739812, 8561693, 9045281   
####[facility] [address] [place] [address]   
   
                                                    Neutrophils/Leukocytes   
Auto (Bld) [Pure #   
fraction]       8.5 E9/L        High            2.0-7.5         [facility]  
   
                                        Comment on above:   Order Comment: Order  
 Added by Discern Expert.   
   
                                                            Performed By: #### 2  
117651, 43299542, 4518701, 0652404   
####[facility] [address] [place]   
[place] [address]   
   
                                                    BMPon 2022   
   
                      Anion gap [Moles/Vol] 9 mmol/L   Normal     6-16       [facility]  
   
                                        Comment on above:   Performed By: #### 2  
311343, 80074916, 6476262, 1132996   
####[facility] [address] [place] [address]   
   
                      Calcium [Mass/Vol] 9.2 mg/dL  Normal     8.9-11.1   [facility]  
   
                                        Comment on above:   Performed By: #### 2  
861928, 89706840, 9130381, 3456464   
####[facility] [address] [place] [address]   
   
                      Chloride [Moles/Vol] 106 mmol/L Normal     101-111    [facility]  
   
                                        Comment on above:   Performed By: #### 2  
496191, 52283137, 4120026, 3944299   
####[facility] [address] [place] [address]   
   
                      CO2 [Moles/Vol] 21 mmol/L  Normal     21-31      [facility]  
   
                                        Comment on above:   Performed By: #### 2  
164896, 39687882, 0500506, 5572640   
####[facility] [address] [place] [address]   
   
                      Creatinine [Mass/Vol] 1.0 mg/dL  Normal     0.5-1.3    [facility]  
   
                                        Comment on above:   Performed By: #### 2  
687225, 34996195, 3286128, 7400537   
####[facility] [address] [place] [address]   
   
                      Glucose [Mass/Vol] 95 mg/dL   Normal          [facility]  
   
                                        Comment on above:   Result Comment: If t  
his glucose result represents a fasting   
glucose, interpretation should refer to the following   
reference range: 55-99 mg/dL   
   
                                                            Performed By: #### 2  
680989, 68548031, 9954471, 4902083   
####[facility] [address] [place] [address]   
   
                      Potassium [Moles/Vol] 3.9 mmol/L Normal     3.5-5.3    [facility]  
   
                                        Comment on above:   Performed By: #### 2  
584465, 80912493, 1017105, 2524674   
####[facility] [address] [place] [address]   
   
                      Sodium [Moles/Vol] 132 mmol/L Low        135-145    [facility]  
   
                                        Comment on above:   Performed By: #### 2  
497575, 18305671, 5633258, 4792261   
####[facility] [address] [place] [address]   
   
                                                    Urea nitrogen   
[Mass/Vol]      15 mg/dL        Normal          5-21            [facility]  
   
                                        Comment on above:   Performed By: #### 2  
130592, 35159003, 3685767, 4554415   
####[facility] [address] [place] [address]   
   
                                                    Urea   
nitrogen/Creatinine   
[Mass ratio]    15 No Units     Normal          10-20           [facility]  
   
                                        Comment on above:   Performed By: #### 2  
039773, 71713766, 9428006, 3980258   
####[facility] [address] [place] [address]   
   
                                                    CBC w/ Auto Diffon   
2   
   
                                                    Erythrocyte   
distribution width   
(RBC) [Ratio]   13.6 %          Normal          10.9-14.2       [facility]  
   
                                        Comment on above:   Performed By: #### 2  
172936, 58049368, 3214007, 2298897   
####[facility] [address] [place] [address]   
   
                                                    Hematocrit (Bld)   
[Volume fraction] 35.8 %          Normal          34.0-46.0       [facility]  
   
                                        Comment on above:   Performed By: #### 2  
989317, 55663905, 2914091, 1410833   
####[facility] [address] [place] [address]   
   
                                                    Hemoglobin (Bld)   
[Mass/Vol]      12.2 g/dL       Normal          12.0-16.0       [facility]  
   
                                        Comment on above:   Performed By: #### 2  
749382, 20348579, 1767617, 9806256   
####[facility] [address] [place] [address]   
   
                                                    MCH (RBC) [Entitic   
mass]           30.3 pg         Normal          27.0-34.0       [facility]  
   
                                        Comment on above:   Performed By: #### 2  
034979, 05571561, 1523775, 1316919   
####[facility] [address] [place] [address]   
   
                      MCHC (RBC) [Mass/Vol] 34.1 g/dL  Normal     31.4-36.0  [facility]  
   
                                        Comment on above:   Performed By: #### 2  
746436, 45762734, 3029127, 2440909   
####[place] [id number]   
   
                      MCV (RBC) [Entitic vol] 89.0 fL    Normal     80.0-100.0 F  
[facility]  
   
                                        Comment on above:   Performed By: #### 2  
346749, 14433557, 4431098, 8317449   
####[address] [address]   
   
                                                    Platelet mean volume   
(Bld) [Entitic vol] 8.1 fL          Normal          6.4-10.8        [facility]  
   
                                        Comment on above:   Performed By: #### 2  
586757, 67695543, 3262627, 1183421   
####[address] [address]   
   
                      Platelets (Bld) [#/Vol] 180.0 E9/L Normal     150.0-500.0   
[facility]  
   
                                        Comment on above:   Performed By: #### 2  
230039, 94369467, 9639516, 2367425   
####[address] [address]   
   
                      RBC (Bld) [#/Vol] 4.0 E12/L  Low        4.3-5.9    [facility]  
   
                                        Comment on above:   Performed By: #### 2  
913662, 24673267, 1046470, 9606347   
####[facility] [address] [place] [address]   
   
                                                    WBC corrected for nucl   
RBC Auto (Bld) [#/Vol] 9.9 E9/L        Normal          4.0-11.0        [facility]  
   
                                        Comment on above:   Performed By: #### 2  
644809, 23710917, 3992294, 8415272   
####[facility] [address] [place] [address]   
   
                                                    HEMATOLOGYOrdered By: SYSTEM  
 SYSTEM on 2022   
   
                      Basophils/100 WBC (Bld) 0.3 %      Normal     0.0 - 2.0 %   
[facility]   
HemeAutoSS  
   
                                                    Basophils/Leukocytes   
Auto (Bld) [Pure #   
fraction]           0.0 E9/L            Normal              0.0 - 0.2   
E9/L                                    [facility]   
HemeAutoSS  
   
                                                    Eosinophils/100 WBC   
(Bld)           0.5 %           Normal          0.0 - 8.0 %     [facility]   
HemeAutoSS  
   
                                                    Eosinophils/Leukocytes   
Auto (Bld) [Pure #   
fraction]           0.0 E9/L            Normal              0.0 - 0.5   
E9/L                                    [facility]   
HemeAutoSS  
   
                                                    Lymphocytes/100 WBC   
(Bld)               5.3 %               Low                 14.0 - 50.0   
%                                       [facility]   
HemeAutoSS  
   
                                                    Lymphocytes/Leukocytes   
Auto (Bld) [Pure #   
fraction]           0.5 E9/L            Low                 1.0 - 4.0   
E9/L                                    [facility]   
HemeAutoSS  
   
                          Monocytes/100 WBC (Bld) 8.5 %        Normal       4.0   
- 14.0   
%                                       [facility]   
HemeAutoSS  
   
                                                    Monocytes/Leukocytes   
Auto (Bld) [Pure #   
fraction]           0.8 E9/L            Normal              0.2 - 1.0   
E9/L                                    [facility]   
HemeAutoSS  
   
                                                    Neutrophils/100 WBC   
(Bld)               85.4 %              High                36.0 - 75.0   
%                                       [facility]   
HemeAutoSS  
   
                                                    Neutrophils/Leukocytes   
Auto (Bld) [Pure #   
fraction]           8.5 E9/L            High                2.0 - 7.5   
E9/L                                    [facility]   
HemeAutoSS  
   
                                                    HEMATOLOGYOrdered By: Akiko davis on 2022   
   
                                                    Erythrocyte   
distribution width   
(RBC) [Ratio]       13.6 %              Normal              10.9 - 14.2   
%                                       [facility]   
HemeAutoSS  
   
                                                    Hematocrit (Bld)   
[Volume fraction]   35.8 %              Normal              34.0 - 46.0   
%                                       [facility]   
HemeAutoSS  
   
                                                    Hemoglobin (Bld)   
[Mass/Vol]          12.2 g/dL           Normal              12.0 - 16.0   
gm/dL                                   [facility]   
HemeAutoSS  
   
                                                    MCH (RBC) [Entitic   
mass]               30.3 pg             Normal              27.0 - 34.0   
pg                                      [facility]   
HemeAutoSS  
   
                          MCHC (RBC) [Mass/Vol] 34.1 g/dL    Normal       31.4 -  
 36.0   
gm/dL                                   [facility]   
HemeAutoSS  
   
                          MCV (RBC) [Entitic vol] 89.0 fL      Normal       80.0  
 -   
100.0 fL                                [facility]   
HemeAutoSS  
   
                                                    Platelet mean volume   
(Bld) [Entitic vol] 8.1 fL              Normal              6.4 - 10.8   
fL                                      [facility]   
HemeAutoSS  
   
                          Platelets (Bld) [#/Vol] 180.0 E9/L   Normal       150.  
0 -   
500.0 E9/L                              [facility]   
HemeAutoSS  
   
                          RBC (Bld) [#/Vol] 4.0 E12/L    Low          4.3 - 5.9   
E12/L                                   [facility]   
HemeAutoSS  
   
                                                    WBC corrected for nucl   
RBC Auto (Bld) [#/Vol] 9.9 E9/L            Normal              4.0 - 11.0   
E9/L                                    [facility]   
HemeAutoSS  
   
                                                    No Panel InformationOrdered   
By: ANGPROCESSSERVER MICROBIOLOGY on 2022   
   
                                        Blood Culture Charcoal No growth at 4 da  
ys.   
Final to follow at 7   
days.                                                       [facility]  
   
                                                    Progress Note-Physicianon    
   
                      Progress Note-Physician            Normal                F  
[facility]  
   
                                        Comment on above:   Result Comment: Elec  
tronically Signed By: Dylon JORDAN,   
Marty BAIRD\.br\Date and Time Signed: 22 10:36 EST   
   
                                                    Sodiumon 2022   
   
                      Sodium [Moles/Vol] 133 mmol/L Low        135-145    [facility]  
   
                                        Comment on above:   Performed By: #### 2  
487195 ####[facility]   
[address] [place] [address]   
   
                                                    eGFRon 2022   
   
                                                    GFR/1.73 sq M.predicted   
among blacks MDRD   
(S/P/Bld) [Vol   
rate/Area]      mL/min/{1.73_m2} Normal          >=59            [facility]  
   
                                        Comment on above:   Order Comment: Order  
 added by Discern Expert.   
   
                                                            Result Comment: eGFR  
 is race adjusted. AA=.   
   
                                                            Performed By: #### 2  
726484, 56759994, 5307052, 5158929   
####[facility] [address] [place] [address]   
   
                                                    GFR/1.73 sq M.predicted   
among non-blacks MDRD   
(S/P/Bld) [Vol   
rate/Area]      54 mL/min/1.73 m2 Low             >=59            [facility]  
   
                                        Comment on above:   Order Comment: Order  
 added by Discern Expert.   
   
                                                            Result Comment:   
deion kidney disease could be indicated at   
eGFR's of less than 60 mL/min/1.73m2. Kidney failure is   
indicated at less than 15 mL/min/1.73m2.   
   
                                                            Performed By: #### 2  
375343, 49036227, 2834785, 8451096   
####[facility] [address] [place] [address]   
   
                                                    Auto Diffon 2022   
   
                      Basophils/100 WBC (Bld) 0.9 %      Normal     0.0-2.0    [facility]  
   
                                        Comment on above:   Order Comment: Order  
 Added by Lenin Expert.   
   
                                                            Performed By: #### 2  
702311, 24443352, 0275193, [id number],   
4398250, 4721993 ####[facility] [address]   
[place] [address]   
   
                                                    Basophils/Leukocytes   
Auto (Bld) [Pure #   
fraction]       0.1 E9/L        Normal          0.0-0.2         [facility]  
   
                                        Comment on above:   Order Comment: Order  
 Added by Discern Expert.   
   
                                                            Performed By: #### 2  
315675, 47439246, 9253906, 5367235379,   
0058105, 7147423 ####[facility] [address]   
[place] [address]   
   
                                                    Eosinophils/100 WBC   
(Bld)           0.1 %           Normal          0.0-8.0         [facility]  
   
                                        Comment on above:   Order Comment: Order  
 Added by Lenin Expert.   
   
                                                            Performed By: #### 2  
870434, 94417737, 9479989, 9382477755,   
7040447, 3128072 ####[address]   
[place] [address]   
   
                                                    Eosinophils/Leukocytes   
Auto (Bld) [Pure #   
fraction]       0.0 E9/L        Normal          0.0-0.5         [facility]  
   
                                        Comment on above:   Order Comment: Order  
 Added by Discern Expert.   
   
                                                            Performed By: #### 2  
093363, 17320278, 9959406, 3046063215,   
2628274, 8051357 ####[address] [address]   
   
                                                    Lymphocytes/100 WBC   
(Bld)           4.7 %           Low             14.0-50.0       [facility]  
   
                                        Comment on above:   Order Comment: Order  
 Added by Discern Expert.   
   
                                                            Performed By: #### 2  
513678, 85616995, 7465373, 9065659021,   
9381005, 8185120 ####[address] [address]   
   
                                                    Lymphocytes/Leukocytes   
Auto (Bld) [Pure #   
fraction]       0.4 E9/L        Low             1.0-4.0         [facility]  
   
                                        Comment on above:   Order Comment: Order  
 Added by Discern Expert.   
   
                                                            Performed By: #### 2  
374067, 33544985, 5874531, 0117087966,   
5511631, 0981724 ####[address] [address]   
   
                      Monocytes/100 WBC (Bld) 7.2 %      Normal     4.0-14.0   [facility]  
   
                                        Comment on above:   Order Comment: Order  
 Added by Discern Expert.   
   
                                                            Performed By: #### 2  
429058, 27355775, 5294299, 5125826240,   
7250646, 5902838 ####[address] [address]   
   
                                                    Monocytes/Leukocytes   
Auto (Bld) [Pure #   
fraction]       0.6 E9/L        Normal          0.2-1.0         [facility]  
   
                                        Comment on above:   Order Comment: Order  
 Added by Discern Expert.   
   
                                                            Performed By: #### 2  
262703, 73179808, 7090489, 2702046093,   
5805484, 5037198 ####[address] [place] [address]   
   
                                                    Neutrophils/100 WBC   
(Bld)           87.1 %          High            36.0-75.0       [facility]  
   
                                        Comment on above:   Order Comment: Order  
 Added by Discern Expert.   
   
                                                            Performed By: #### 2  
679547, 30034447, 0560212, 2257534012,   
1759123, 4729661 ####[address] [address]   
   
                                                    Neutrophils/Leukocytes   
Auto (Bld) [Pure #   
fraction]       7.0 E9/L        Normal          2.0-7.5         [facility]  
   
                                        Comment on above:   Order Comment: Order  
 Added by Discern Expert.   
   
                                                            Performed By: #### 2  
702637, 13257432, 5488779, 0907639663,   
3884444, 2344162 ####[address] [address]   
   
                      Basophils/100 WBC (Bld) 0.5 %      Normal     0.0-2.0    [facility]  
   
                                        Comment on above:   Order Comment: Order  
 Added by Discern Expert.   
   
                                                            Performed By: #### 2  
429749, 8756186, 89337049, 2418070,   
40028679, 5011819, 5871789 ####[address] [address]   
   
                                                    Basophils/Leukocytes   
Auto (Bld) [Pure #   
fraction]       0.0 E9/L        Normal          0.0-0.2         [facility]  
   
                                        Comment on above:   Order Comment: Order  
 Added by Discern Expert.   
   
                                                            Performed By: #### 2  
750440, 9033467, 57763542, 5964731,   
13327815, 9025434, 4644868 ####[address] [address]   
   
                                                    Eosinophils/100 WBC   
(Bld)           0.3 %           Normal          0.0-8.0         [facility]  
   
                                        Comment on above:   Order Comment: Order  
 Added by Discern Expert.   
   
                                                            Performed By: #### 2  
147250, 1822395, 26803848, 8327094,   
71603509, 7104394, 0218484 ####[address] [place] [address]   
   
                                                    Eosinophils/Leukocytes   
Auto (Bld) [Pure #   
fraction]       0.0 E9/L        Normal          0.0-0.5         [facility]  
   
                                        Comment on above:   Order Comment: Order  
 Added by Discern Expert.   
   
                                                            Performed By: #### 2  
232873, 1411060, 78041280, 9155826,   
53830359, 9230773, 6696008 ####[address] [address]   
   
                                                    Lymphocytes/100 WBC   
(Bld)           3.6 %           Low             14.0-50.0       [facility]  
   
                                        Comment on above:   Order Comment: Order  
 Added by Discern Expert.   
   
                                                            Performed By: #### 2  
177479, 4336543, 70673289, 9472934,   
18172281, 8784039, 8722443 ####[address] [address]   
   
                                                    Lymphocytes/Leukocytes   
Auto (Bld) [Pure #   
fraction]       0.3 E9/L        Low             1.0-4.0         [facility]  
   
                                        Comment on above:   Order Comment: Order  
 Added by Discern Expert.   
   
                                                            Performed By: #### 2  
960647, 5116766, 54462676, 0915863,   
19611071, 3988144, 0893217 ####[address] [address]   
   
                      Monocytes/100 WBC (Bld) 6.2 %      Normal     4.0-14.0   [facility]  
   
                                        Comment on above:   Order Comment: Order  
 Added by Discern Expert.   
   
                                                            Performed By: #### 2  
411397, 5191564, 69849384, 7877871,   
31190947, 9853895, 4911025 ####[address] [place] [address]   
   
                                                    Monocytes/Leukocytes   
Auto (Bld) [Pure #   
fraction]       0.5 E9/L        Normal          0.2-1.0         [facility]  
   
                                        Comment on above:   Order Comment: Order  
 Added by Lenin Expert.   
   
                                                            Performed By: #### 2  
255211, 3050527, 43470165, 6602351,   
57164390, 6014297, 6930292 ####[facility]   
[address] [place] [address]   
   
                                                    Neutrophils/100 WBC   
(Bld)           89.4 %          High            36.0-75.0       [facility]  
   
                                        Comment on above:   Order Comment: Order  
 Added by Discern Expert.   
   
                                                            Performed By: #### 2  
862100, 1245212, 46611841, 3442482,   
70144167, 0219836, 8297421 ####[facility]   
[address] [place] [address]   
   
                                                    Neutrophils/Leukocytes   
Auto (Bld) [Pure #   
fraction]       7.9 E9/L        High            2.0-7.5         [facility]  
   
                                        Comment on above:   Order Comment: Order  
 Added by Discern Expert.   
   
                                                            Performed By: #### 2  
549795, 5948682, 96499139, 1356436,   
72049662, 3986893, 6204855 ####[facility]   
[address] [place] [address]   
   
                                                    BMPon 2022   
   
                      Anion gap [Moles/Vol] 7 mmol/L   Normal     6-16       [facility]  
   
                                        Comment on above:   Performed By: #### 2  
709204, 81559635, 9030954, 7065603738,   
3350285, 9086722 ####[facility] [address]   
[place] [address]   
   
                      Calcium [Mass/Vol] 9.0 mg/dL  Normal     8.9-11.1   [facility]  
   
                                        Comment on above:   Performed By: #### 2  
246951, 53576131, 3781634, 0372191318,   
2018451, 6002030 ####[facility] [address]   
[place] [address]   
   
                      Chloride [Moles/Vol] 105 mmol/L Normal     101-111    [facility]  
   
                                        Comment on above:   Performed By: #### 2  
769119, 57138014, 1457234, 4960727765,   
9813165, 3776482 ####[facility] [address]   
[place] [address]   
   
                      CO2 [Moles/Vol] 21 mmol/L  Normal     21-31      [facility]  
   
                                        Comment on above:   Performed By: #### 2  
438899, 37171298, 1350439, 3074019388,   
8392562, 3694509 ####[facility] [address]   
[place] [address]   
   
                      Creatinine [Mass/Vol] 1.0 mg/dL  Normal     0.5-1.3    [facility]  
   
                                        Comment on above:   Performed By: #### 2  
680544, 07933362, 6703851, 9850144928,   
7195073, 0667238 ####[facility] [address]   
[place] [address]   
   
                      Glucose [Mass/Vol] 100 mg/dL  Normal          [facility]  
   
                                        Comment on above:   Result Comment: If t  
his glucose result represents a fasting   
glucose, interpretation should refer to the following   
reference range: 55-99 mg/dL   
   
                                                            Performed By: #### 2  
336457, 05604480, 8184686, 8821623989,   
4625258, 4775693 ####[facility] [address]   
[place] [address]   
   
                      Potassium [Moles/Vol] 3.7 mmol/L Normal     3.5-5.3    [facility]  
   
                                        Comment on above:   Performed By: #### 2  
670537, 63590924, 1856497, 3750866660,   
3596801, 2138258 ####[facility] [address]   
[place] [address]   
   
                      Sodium [Moles/Vol] 129 mmol/L Low        135-145    [facility]  
   
                                        Comment on above:   Performed By: #### 2  
177421, 72199004, 2808083, 7978417587,   
7169396, 2417902 ####[facility] [address]   
[place] [address]   
   
                                                    Urea nitrogen   
[Mass/Vol]      15 mg/dL        Normal          5-21            [facility]  
   
                                        Comment on above:   Performed By: #### 2  
161432, 84143027, 7840428, 3782792578,   
1793121, 2888323 ####[facility] [address]   
[place] [address]   
   
                                                    Urea   
nitrogen/Creatinine   
[Mass ratio]    15 No Units     Normal          10-20           [facility]  
   
                                        Comment on above:   Performed By: #### 2  
271984, 89908391, 0994223, 5690656351,   
1750044, 8364938 ####[facility] [address]   
[place] [address]   
   
                                                    CBC w/ Auto Diffon    
   
                                                    Erythrocyte   
distribution width   
(RBC) [Ratio]   13.8 %          Normal          10.9-14.2       [facility]  
   
                                        Comment on above:   Performed By: #### 2  
793524, 21404676, 2163382, 9613013968,   
1658585, 0801465 ####[address]   
[place] [address]   
   
                                                    Hematocrit (Bld)   
[Volume fraction] 36.5 %          Normal          34.0-46.0       [facility]  
   
                                        Comment on above:   Performed By: #### 2  
914437, 05397828, 9466233, 1571991027,   
1891422, 7731027 ####[facility] [address]   
[place] [address]   
   
                                                    Hemoglobin (Bld)   
[Mass/Vol]      12.5 g/dL       Normal          12.0-16.0       [facility]  
   
                                        Comment on above:   Performed By: #### 2  
356554, 83232786, 9046891, 4105015338,   
8486918, 7451946 ####[address]   
[place] [address]   
   
                                                    MCH (RBC) [Entitic   
mass]           29.7 pg         Normal          27.0-34.0       [facility]  
   
                                        Comment on above:   Performed By: #### 2  
203633, 52307160, 9429009, 6424498504,   
1068002, 4523960 ####[address]   
[place] [address]   
   
                      MCHC (RBC) [Mass/Vol] 34.2 g/dL  Normal     31.4-36.0  [facility]  
   
                                        Comment on above:   Performed By: #### 2  
119145, 28436204, 1348536, 8625788947,   
6188955, 5159656 ####[facility] [address]   
[place] [address]   
   
                      MCV (RBC) [Entitic vol] 86.7 fL    Normal     80.0-100.0 F  
[facility]  
   
                                        Comment on above:   Performed By: #### 2  
552522, 45582305, 2221143, 1448449039,   
3338341, 4302785 ####[facility] [address]   
[place] [address]   
   
                                                    Platelet mean volume   
(Bld) [Entitic vol] 7.9 fL          Normal          6.4-10.8        [facility]  
   
                                        Comment on above:   Performed By: #### 2  
685045, 50323305, 8965554, 1572874856,   
8586287, 2971174 ####[address] [address]   
   
                      Platelets (Bld) [#/Vol] 193.0 E9/L Normal     150.0-500.0   
[facility]  
   
                                        Comment on above:   Performed By: #### 2  
870106, 89299042, 1116872, 4430774586,   
3947156, 6376669 ####[address] [address]   
   
                      RBC (Bld) [#/Vol] 4.2 E12/L  Low        4.3-5.9    [facility]  
   
                                        Comment on above:   Performed By: #### 2  
291798, 24663016, 0363325, 9287070588,   
5059478, 2619175 ####[address]   
[place] [address]   
   
                                                    WBC corrected for nucl   
RBC Auto (Bld) [#/Vol] 8.0 E9/L        Normal          4.0-11.0        [facility]  
   
                                        Comment on above:   Performed By: #### 2  
721971, 38943485, 0455375, 5374933373,   
1524666, 5338176 ####[address]   
[place] [address]   
   
                                                    Erythrocyte   
distribution width   
(RBC) [Ratio]   13.4 %          Normal          10.9-14.2       [facility]  
   
                                        Comment on above:   Performed By: #### 2  
724559, 9106102, 34682778, 1694920,   
60972830, 1734287, 2534123 ####[facility]   
[address] [place] [address]   
   
                                                    Hematocrit (Bld)   
[Volume fraction] 36.6 %          Normal          34.0-46.0       [facility]  
   
                                        Comment on above:   Performed By: #### 2  
573835, 3627459, 36827228, 0990101,   
87248848, 0862862, 4392444 ####[facility]   
[address] [place] [address]   
   
                                                    Hemoglobin (Bld)   
[Mass/Vol]      12.5 g/dL       Normal          12.0-16.0       [facility]  
   
                                        Comment on above:   Performed By: #### 2  
002112, 8375061, 74031469, 8821663,   
41348998, 6953557, 5696138 ####[facility]   
[address] [place] [address]   
   
                                                    MCH (RBC) [Entitic   
mass]           29.5 pg         Normal          27.0-34.0       [facility]  
   
                                        Comment on above:   Performed By: #### 2  
864687, 3451385, 68598074, 1257101,   
94835956, 5397209, 4754177 ####[address] [address]   
   
                      MCHC (RBC) [Mass/Vol] 34.1 g/dL  Normal     31.4-36.0  [facility]  
   
                                        Comment on above:   Performed By: #### 2  
063218, 8851853, 74571678, 4028386,   
22628305, 8961369, 7315888 ####[address] [address]   
   
                      MCV (RBC) [Entitic vol] 86.5 fL    Normal     80.0-100.0 F  
[facility]  
   
                                        Comment on above:   Performed By: #### 2  
047972, 2652096, 30503833, 8931534,   
03615474, 4659017, 8249091 ####[facility]   
[address] [place] [address]   
   
                                                    Platelet mean volume   
(Bld) [Entitic vol] 7.5 fL          Normal          6.4-10.8        [facility]  
   
                                        Comment on above:   Performed By: #### 2  
683556, 2399031, 87422486, 1652551,   
15303334, 2703134, 7395567 ####[address] [place] [address]   
   
                      Platelets (Bld) [#/Vol] 191.0 E9/L Normal     150.0-500.0   
[facility]  
   
                                        Comment on above:   Performed By: #### 2  
300533, 9583602, 62924904, 2852761,   
58596756, 9230472, 7347237 ####[address] [address]   
   
                      RBC (Bld) [#/Vol] 4.2 E12/L  Low        4.3-5.9    [facility]  
   
                                        Comment on above:   Performed By: #### 2  
854173, 3485439, 58085915, 6043101,   
67056258, 0357099, 4930483 ####[address] [address]   
   
                                                    WBC corrected for nucl   
RBC Auto (Bld) [#/Vol] 8.8 E9/L        Normal          4.0-11.0        [facility]  
   
                                        Comment on above:   Performed By: #### 2  
209729, 3229228, 88853268, 5998243,   
48343470, 2057079, 4704495 ####[address] [place] [address]   
   
                                                    CEFTRIAXONE:SUSC:PT:ISOLATE:  
ORDQN:MICOrdered By: Richelle Sarkar on 2022   
   
                                        cefTRIAXone KAMRAN [Susc] Streptococcus sal  
ivarius  
In 2 of 2 blood culture   
bottles drawn. Isolated   
from aerobic and   
anaerobic bottles  
Preliminary gram stain   
result of gram positive   
cocci in chains Result   
called to Dr. Wayne   
by [facility] and results read   
back for confirmation on   
2022 16:10:37                                         [facility]  
   
                                                    CHEMISTRYOrdered By: SYSTEM   
SYSTEM on 2022   
   
                          Procalcitonin 1.08 ng/mL   High         0.00 - 0.50   
ng/mL                                   [facility]   
Remisol  
   
                                                    Troponin I.cardiac   
[Mass/Vol]          30.40 pg/mL         High                10.10 -   
27.10 pg/mL                             [facility]   
Remisol  
   
                          Albumin [Mass/Vol] 3.5 g/dL     Normal       3.3 - 5.0  
   
gm/dL                                   [facility]   
Remisol  
   
                                                    Albumin/Globulin [Mass   
ratio]          1.1 {ratio}     Normal          1.1 - 2.2       [facility]   
Remisol  
   
                                                    ALP [Catalytic   
activity/Vol]       67 [iU]/d           Normal              21 - 98   
Int._Unit/L                             [facility]   
Remisol  
   
                                                    ALT No additional   
P-5'-P [Catalytic   
activity/Vol]       33 [iU]/d           Normal              6 - 46   
Int._Unit/L                             [facility]   
Remisol  
   
                                                    AST [Catalytic   
activity/Vol]       38 [iU]/d           Normal              5 - 43   
Int._Unit/L                             [facility]   
Remisol  
   
                          Bilirubin [Mass/Vol] 1.3 mg/dL    High         0.0 - 1  
.1   
mg/dL                                   [facility]   
Remisol  
   
                          Globulin (S) [Mass/Vol] 3.3 g/dL     Normal       1.4   
- 4.0   
gm/dL                                   [facility]   
Remisol  
   
                          Lactate [Mass/Vol] 0.8 mmol/L   Normal       0.5 - 2.2  
   
mmol/L                                  [facility]   
Remisol  
   
                          Protein [Mass/Vol] 6.8 g/dL     Normal       6.0 - 7.8  
   
gm/dL                                   [facility]   
Remisol  
   
                                                    Troponin I.cardiac   
[Mass/Vol]          31.00 pg/mL         High                10.10 -   
27.10 pg/mL                             [facility]   
Remisol  
   
                                                    CHEMISTRYOrdered By: Lab ROP  
User on 2022   
   
                          Glucose [Mass/Vol] 112 mg/dL    High         55 - 99   
mg/dL                                   [facility]   
Subsection  
   
                                        Comment on above:   Result Comment: Catarina limon RN/MD   
   
                                POC Device SN   [id number]    Invalid   
Interpretation   
Code                                                [facility] POC   
Subsection  
   
                                POC User ID     [id number]       Invalid   
Interpretation   
Code                                                [facility] POC   
Subsection  
   
                                POC Username    SARMAD PERDOMO   Invalid   
Interpretation   
Code                                                [facility] POC   
Subsection  
   
                                                    CMPon 2022   
   
                      Albumin [Mass/Vol] 3.5 g/dL   Normal     3.3-5.0    [facility]  
   
                                        Comment on above:   Performed By: #### 2  
719467, 1484684, 96554170, 0948816,   
43093894, 5894889, 3533705 ####[address] [place] [address]   
   
                                                    Albumin/Globulin (S)   
[Mass conc ratio] 1.1             Normal          1.1-2.2         [facility]  
   
                                        Comment on above:   Performed By: #### 2  
364614, 8349629, 52992498, 5806203,   
97293609, 7438312, 7270191 ####[address] [address]   
   
                                                    ALP [Catalytic   
activity/Vol]   67 Int._Unit/L  Normal          21-98           [facility]  
   
                                        Comment on above:   Performed By: #### 2  
846544, 7665812, 53533040, 7371202,   
96444956, 0797552, 9561966 ####[address] [address]   
   
                                                    ALT No additional   
P-5'-P [Catalytic   
activity/Vol]   33 Int._Unit/L  Normal          6-46            [facility]  
   
                                        Comment on above:   Performed By: #### 2  
601991, 2728484, 43629434, 9613411,   
69853383, 0684363, 1338646 ####[address] [address]   
   
                                                    AST [Catalytic   
activity/Vol]   38 Int._Unit/L  Normal          5-43            [facility]  
   
                                        Comment on above:   Performed By: #### 2  
200931, 2063943, 98188144, 1826899,   
57292926, 3171357, 2600532 ####[address] [place] [address]   
   
                      Bilirubin [Mass/Vol] 1.3 mg/dL  High       0.0-1.1    [facility]  
   
                                        Comment on above:   Performed By: #### 2  
234169, 2557531, 51521837, 7994980,   
94673039, 7243920, 2538947 ####[address] [place] [address]   
   
                      Creatinine [Mass/Vol] 0.9 mg/dL  Normal     0.5-1.3    [facility]  
   
                                        Comment on above:   Performed By: #### 2  
483648, 9940650, 41253319, 0374999,   
73946953, 4172443, 8846205 ####[facility]   
[address] [place] [address]   
   
                      Globulin (S) [Mass/Vol] 3.3 g/dL   Normal     1.4-4.0    [facility]  
   
                                        Comment on above:   Performed By: #### 2  
367492, 6470136, 85506697, 6270201,   
47790338, 0192416, 6817581 ####[facility]   
[address] [place] [address]   
   
                      Protein [Mass/Vol] 6.8 g/dL   Normal     6.0-7.8    [facility]  
   
                                        Comment on above:   Performed By: #### 2  
214088, 9264188, 92200686, 6098360,   
03598824, 7656246, 7770003 ####[facility]   
[address] [place] [address]   
   
                                                    Urea nitrogen   
[Mass/Vol]      16 mg/dL        Normal          5-21            [facility]  
   
                                        Comment on above:   Performed By: #### 2  
187143, 3180997, 28882602, 2818986,   
66982521, 2029796, 7079687 ####[facility]   
[address] [place] [address]   
   
                                                    Urea   
nitrogen/Creatinine   
[Mass ratio]    18 No Units     Normal          10-20           [facility]  
   
                                        Comment on above:   Performed By: #### 2  
008313, 1235102, 86190186, 5100521,   
81694812, 1762752, 9805087 ####[facility]   
[address] [place] [address]   
   
                      Anion gap [Moles/Vol] 13 mmol/L  Normal     6-16       [facility]  
   
                                        Comment on above:   Performed By: #### 2  
146340, 9563004, 74040828, 9155582,   
48832975, 0806354, 6171800 ####[facility]   
[address] [place] [address]   
   
                      Calcium [Mass/Vol] 9.1 mg/dL  Normal     8.9-11.1   [facility]  
   
                                        Comment on above:   Performed By: #### 2  
551716, 4570263, 29629606, 4463751,   
08098196, 7583067, 5181890 ####[facility]   
[address] [place] [address]   
   
                      Chloride [Moles/Vol] 99 mmol/L  Low        101-111    [facility]  
[facility]  
   
                                        Comment on above:   Performed By: #### 2  
108947, 3625067, 62674767, 9576924,   
31967961, 7473258, 0195431 ####[facility]   
[address] [place] [address]   
   
                      CO2 [Moles/Vol] 21 mmol/L  Normal     21-31      [facility]  
   
                                        Comment on above:   Performed By: #### 2  
867540, 6700273, 35668061, 3286118,   
69172683, 4029459, 7568664 ####[facility]   
[address] [place] [address]   
   
                      Glucose [Mass/Vol] 113 mg/dL  Normal          [facility]  
   
                                        Comment on above:   Result Comment: If t  
his glucose result represents a fasting   
glucose, interpretation should refer to the following   
reference range: 55-99 mg/dL   
   
                                                            Performed By: #### 2  
726897, 3229508, 99523972, 0530755,   
12194275, 6768284, 9926531 ####[facility]   
[address] [place] [address]   
   
                      Potassium [Moles/Vol] 3.5 mmol/L Normal     3.5-5.3    [facility]  
   
                                        Comment on above:   Performed By: #### 2  
131560, 5819697, 81114184, 3195837,   
41689769, 5040828, 3618297 ####[facility]   
[address] [place] [address]   
   
                      Sodium [Moles/Vol] 129 mmol/L Low        135-145    [facility]  
   
                                        Comment on above:   Performed By: #### 2  
975639, 4616877, 01097727, 6252698,   
95778439, 3112484, 4116576 ####[facility]   
[address] [place] [address]   
   
                                                    COAGULATIONOrdered By: Stacie Patricia on 2022   
   
                          aPTT Coag (PPP) [Time] 34.5 s       Normal       25.1   
- 36.5   
second(s)                               [facility]   
Coag  
   
                                                    INR Coag (PPP)   
[Relative time]           2.7 {INR}                 Invalid   
Interpretation   
Code                                                [facility]   
Coag  
   
                          PT Coag (PPP) [Time] 31.2 s       High         9.4 - 1  
2.5   
second(s)                               [facility]   
Coag  
   
                                                    Capillary Glucose POCon 11-2  
3-2022   
   
                      Glucose [Mass/Vol] 112 mg/dL  High       55-99      [facility]  
   
                                        Comment on above:   Result Comment: Catarina limon RN/MD   
   
                                                            Performed By: #### 2  
[id number] ####[facility]   
[address] [place] [address]   
   
                                                    Cardiology Progress Noteon 1  
2022   
   
                                                    Cardiology Progress   
Note                            Normal                          [facility]  
   
                                        Comment on above:   Result Comment: Elec  
tronically Signed By: Meenu HAWKINS CNP\.br\Date and Time Signed: 22 15:44   
EST\.br\Electronically Co-Signed By: Marty Osborne MD\.br\Date and Time Co-Signed: 22 17:08 EST   
   
                                                    Consent for Treatmenton 11-2  
3-2022   
   
                                        Consent for Treatment [id number]  
[id number]  
[id number]:  
127                 Normal                                  [facility]  
   
                                                    Consultation Noteon 20   
   
                      Consultation Note            Normal                [facility]  
   
                                        Comment on above:   Result Comment: Elec  
tronically Signed By: Dylon JORDAN,   
Marty BAIRD\.br\Date and Time Signed: 22 17:07 EST   
   
                      Consultation Note            Normal                [facility]  
   
                                        Comment on above:   Result Comment: Elec  
tronically Signed By: Po Champagne MD\.br\Date and Time Signed: 22 13:20 EST   
   
                                                    ED Clinical Summaryon 2022   
   
                      ED Clinical Summary            Normal                [facility]  
   
                                                    ED Note-Nursingon 2022  
   
   
                      ED Note-Nursing            Normal                [facility]  
   
                                                    ED Note-Physicianon 20   
   
                      ED Note-Physician            Normal                [facility]  
   
                                        Comment on above:   Result Comment: Elec  
tronically Signed By: Rama Long DO\Date and Time Signed: 22 03:18 EST   
   
                                                    ED Patient Education Noteon   
2022   
   
                                                    ED Patient Education   
Note                            Normal                          [facility]  
   
                                                    ED Patient Summaryon   
022   
   
                      ED Patient Summary            Normal                [facility]  
   
                                                    Echo Transthoracic Completeo  
n 2022   
   
                                                    Echo Transthoracic   
Complete                        Normal                          [facility]  
   
                                                    HEMATOLOGYOrdered By: SYSTEM  
 SYSTEM on 2022   
   
                      Basophils/100 WBC (Bld) 0.9 %      Normal     0.0 - 2.0 %   
[facility]   
[facility]  
   
                                                    Basophils/Leukocytes   
Auto (Bld) [Pure #   
fraction]           0.1 E9/L            Normal              0.0 - 0.2   
E9/L                                    [facility]   
[facility]  
   
                                                    Eosinophils/100 WBC   
(Bld)           0.1 %           Normal          0.0 - 8.0 %     [facility]   
[facility]  
   
                                                    Eosinophils/Leukocytes   
Auto (Bld) [Pure #   
fraction]           0.0 E9/L            Normal              0.0 - 0.5   
E9/L                                    [facility]   
[facility]  
   
                                                    Lymphocytes/100 WBC   
(Bld)               4.7 %               Low                 14.0 - 50.0   
%                                       [facility]   
[facility]  
   
                                                    Lymphocytes/Leukocytes   
Auto (Bld) [Pure #   
fraction]           0.4 E9/L            Low                 1.0 - 4.0   
E9/L                                    [facility]   
[facility]  
   
                          Monocytes/100 WBC (Bld) 7.2 %        Normal       4.0   
- 14.0   
%                                       [facility]   
[facility]  
   
                                                    Monocytes/Leukocytes   
Auto (Bld) [Pure #   
fraction]           0.6 E9/L            Normal              0.2 - 1.0   
E9/L                                    [facility]   
[facility]  
   
                                                    Neutrophils/100 WBC   
(Bld)               87.1 %              High                36.0 - 75.0   
%                                       [facility]   
[facility]  
   
                                                    Neutrophils/Leukocytes   
Auto (Bld) [Pure #   
fraction]           7.0 E9/L            Normal              2.0 - 7.5   
E9/L                                    [facility]   
[facility]  
   
                      Basophils/100 WBC (Bld) 0.5 %      Normal     0.0 - 2.0 %   
[facility]   
[facility]  
   
                                                    Basophils/Leukocytes   
Auto (Bld) [Pure #   
fraction]           0.0 E9/L            Normal              0.0 - 0.2   
E9/L                                    [facility]   
[facility]  
   
                                                    Eosinophils/100 WBC   
(Bld)           0.3 %           Normal          0.0 - 8.0 %     [facility]   
[facility]  
   
                                                    Eosinophils/Leukocytes   
Auto (Bld) [Pure #   
fraction]           0.0 E9/L            Normal              0.0 - 0.5   
E9/L                                    [facility]   
[facility]  
   
                                                    Lymphocytes/100 WBC   
(Bld)               3.6 %               Low                 14.0 - 50.0   
%                                       [facility]   
[facility]  
   
                                                    Lymphocytes/Leukocytes   
Auto (Bld) [Pure #   
fraction]           0.3 E9/L            Low                 1.0 - 4.0   
E9/L                                    [facility]   
[facility]  
   
                          Monocytes/100 WBC (Bld) 6.2 %        Normal       4.0   
- 14.0   
%                                       [facility]   
[facility]  
   
                                                    Monocytes/Leukocytes   
Auto (Bld) [Pure #   
fraction]           0.5 E9/L            Normal              0.2 - 1.0   
E9/L                                    [facility]   
[facility]  
   
                                                    Neutrophils/100 WBC   
(Bld)               89.4 %              High                36.0 - 75.0   
%                                       [facility]   
[facility]  
   
                                                    Neutrophils/Leukocytes   
Auto (Bld) [Pure #   
fraction]           7.9 E9/L            High                2.0 - 7.5   
E9/L                                    [facility]   
[facility]  
   
                                                    HEMATOLOGYOrdered By: Sagar Adams on 2022   
   
                                                    Erythrocyte   
distribution width   
(RBC) [Ratio]       13.8 %              Normal              10.9 - 14.2   
%                                       [facility]   
[facility]  
   
                                                    Hematocrit (Bld)   
[Volume fraction]   36.5 %              Normal              34.0 - 46.0   
%                                       [facility]   
[facility]  
   
                                                    Hemoglobin (Bld)   
[Mass/Vol]          12.5 g/dL           Normal              12.0 - 16.0   
gm/dL                                   [facility]   
[facility]  
   
                                                    MCH (RBC) [Entitic   
mass]               29.7 pg             Normal              27.0 - 34.0   
pg                                      [facility]   
[facility]  
   
                          MCHC (RBC) [Mass/Vol] 34.2 g/dL    Normal       31.4 -  
 36.0   
gm/dL                                   [facility]   
[facility]  
   
                          MCV (RBC) [Entitic vol] 86.7 fL      Normal       80.0  
 -   
100.0 fL                                [facility]   
[facility]  
   
                                                    Platelet mean volume   
(Bld) [Entitic vol] 7.9 fL              Normal              6.4 - 10.8   
fL                                      [facility]   
[facility]  
   
                          Platelets (Bld) [#/Vol] 193.0 E9/L   Normal       150.  
0 -   
500.0 E9/L                              [facility]   
[facility]  
   
                          RBC (Bld) [#/Vol] 4.2 E12/L    Low          4.3 - 5.9   
E12/L                                   [facility]   
[facility]  
   
                                                    WBC corrected for nucl   
RBC Auto (Bld) [#/Vol] 8.0 E9/L            Normal              4.0 - 11.0   
E9/L                                    [facility]   
[facility]  
   
                                                    HEMATOLOGYOrdered By: Prashant Patricia on 2022   
   
                                                    Erythrocyte   
distribution width   
(RBC) [Ratio]       13.4 %              Normal              10.9 - 14.2   
%                                       [facility]   
[facility]  
   
                                                    Hematocrit (Bld)   
[Volume fraction]   36.6 %              Normal              34.0 - 46.0   
%                                       [facility]   
[facility]  
   
                                                    Hemoglobin (Bld)   
[Mass/Vol]          12.5 g/dL           Normal              12.0 - 16.0   
gm/dL                                   [facility]   
[facility]  
   
                                                    MCH (RBC) [Entitic   
mass]               29.5 pg             Normal              27.0 - 34.0   
pg                                      [facility]   
[facility]  
   
                          MCHC (RBC) [Mass/Vol] 34.1 g/dL    Normal       31.4 -  
 36.0   
gm/dL                                   [facility]   
[facility]  
   
                          MCV (RBC) [Entitic vol] 86.5 fL      Normal       80.0  
 -   
100.0 fL                                [facility]   
[facility]  
   
                                                    Platelet mean volume   
(Bld) [Entitic vol] 7.5 fL              Normal              6.4 - 10.8   
fL                                      [facility]   
[facility]  
   
                          Platelets (Bld) [#/Vol] 191.0 E9/L   Normal       150.  
0 -   
500.0 E9/L                              [facility]   
[facility]  
   
                          RBC (Bld) [#/Vol] 4.2 E12/L    Low          4.3 - 5.9   
E12/L                                   [facility]   
[facility]  
   
                                                    WBC corrected for nucl   
RBC Auto (Bld) [#/Vol] 8.8 E9/L            Normal              4.0 - 11.0   
E9/L                                    [facility]   
[facility]  
   
                                                    Influenza A&B Agon   
2   
   
                      Influenzae A Ag Negative   Normal     Negative   [facility]  
   
                                        Comment on above:   Performed By: #### 1  
[id number], [id number] ####[facility] [address] [place] [address]   
   
                      Influenzae B Ag Negative   Normal     Negative   [facility]  
   
                                        Comment on above:   Result Comment: Test  
 sensitivity and specificity vary for age   
group, specimen type, antigen types, and prevalence of   
disease. Test results must be evaluated in conjunction with   
other clinical data available to the physician. Individuals   
who received nasally administered Influenza A vaccine may have   
positive test results up to 3 days after vaccination.   
   
                                                            Performed By: #### 1  
[id number], [id number] ####[facility] [address] [place] [address]   
   
                                                    Interdisciplinary Note - Jeff  
e Manageron 2022   
   
                                                    Interdisciplinary Note   
-                   Normal                          [facility]  
   
                                        Comment on above:   Result Comment: Elec  
tronically Signed By: Martha Arciniega.erum\Date and Time Signed: 22 09:47 EST   
   
                                                    Laboratory - Microbiology an  
d Antimicrobial susceptibilityOrdered By: Richelle Sarkar   
on 2022   
   
                                                    Bacteria identified Cx   
Nom (U)                                 5,000 cfu/ml Mixed skin   
contaminants                                                [facility]  
   
                                                    Lactic Acidon 2022   
   
                      Lactate [Mass/Vol] 0.8 mmol/L Normal     0.5-2.2    [facility]  
   
                                        Comment on above:   Performed By: #### 2  
648912, 2172183, 45438858, 6287053,   
91862667, 3949551, 3069412 ####[facility]   
[address] [place] [address]   
   
                                                    MICRO OTHER TESTSOrdered By:  
 Ana Patricia on 2022   
   
                                        Influenzae A Ag     Negative  
(22 12:48 AM) Normal              Negative            [facility]   
[facility]  
   
                                        Influenzae B Ag     Negative  
(22 12:48 AM) Normal              Negative            [facility]   
[facility]  
   
                                        Rapid COV Int NEG Ctl Pass  
(22 12:48 AM) Normal                                  [facility]   
Sero  
   
                                        Rapid COV Int POS Ctl Pass  
(22 12:48 AM) Normal                                  [facility]   
S[facility]  
   
                                                    SARS-CoV+SARS-CoV-2   
(COVID-19) Ag IA.rapid   
Ql (Resp)                               Not Detected  
(22 12:48 AM)       Normal                    Not   
Detected                                [facility]   
[facility]  
   
                                                    Message from Medicareon 11-2  
3-2022   
   
                                        Message from Medicare [id number].  
[id number]  
[id number]  
:127                Normal                                  [facility]  
   
                                                    Monitor Recordon 2022   
   
                                        Monitor Record      [id number]  
[id number]  
[id number]  
:127                Normal                                  [facility]  
   
                                                    No Panel InformationOrdered   
By: Richelle Sarkar on 2022   
   
                                        Blood Culture Charcoal Streptococcus khalif  
ivarius   
Presumptive refer to   
blood culture from   
22 at 0124 for   
complete susceptibility  
In 2 of 2 blood culture   
bottles drawn. Isolated   
from aerobic and   
anaerobic bottles  
Preliminary gram stain   
result of gram positive   
cocci in chains Result   
called to Dr. Wayne   
by [facility] and results read   
back for confirmation on   
2022 16:11:53                                         [facility]  
   
                                                    PT & PTTon 2022   
   
                      aPTT Coag (PPP) [Time] 34.5 second(s) Normal     25.1-36.5  
  [facility]  
   
                                        Comment on above:   Result Comment: Para  
meter 15 days - 4 weeks 1 - 5 months 6 -   
11 months 1 - 5 years 6 - 10 years 11 - 17 years PTT Mean:   
35.4 (27.6-45.6) Mean: 33.5 (24.8-40.7) Mean: 32.4 (25.1-40.7)   
Mean: 31.6 (24.0-39.2) Mean: 31.6 (26.9-38.7) Mean: 31.0   
(24.6-38.4) Pediatric Reference ranges were obtained from a   
study by Edgar Wilkes et al. prepared from 1437 samples   
obtained at 7 different centers using the same coagulation   
reagent and instrumentation as [facility]. Currently there are no   
coagulation studies available worldwide for children birth to   
14 days, and no normal ranges. Heparin therapeutic range   
(represented by Anti-Factor Xa activity of 0.2 - 0.4 U/mL)   
corresponds to PTT of 56.6 - 109.0 sec.   
   
                                                            Performed By: #### 2  
486234, 2122820, 10150748, 6468487,   
[id number], 4550673, 2948856 ####[facility]   
[address] [place] [address]   
   
                                                    INR Coag (PPP)   
[Relative time]           2.7 {INR}                 Invalid   
Interpretation   
Code                                                [facility]  
   
                                        Comment on above:   Result Comment: INR   
results are specifically intended to   
assess patients stabilized on long-term Anticoagulation   
therapy suggested INR?s ?Less Intensive Anticoagulation? 2.0 ?   
3.0Conventional Range 3.0 ? 4.5   
   
                                                            Performed By: #### 2  
[id number], 7320624, [id number], 7656418,   
[id number], 9683910, 1317458 ####[facility]   
[address] [place] [address]   
   
                      PT Coag (PPP) [Time] 31.2 second(s) High       9.4-12.5     
[facility]  
   
                                        Comment on above:   Result Comment: 15 d  
ays - 4 weeks 1 - 5 months 6 -11 months 1   
? 5 years 6 ? 10 years 11 -17 years Mean: 11.2 (9.5 ? 12.6)   
Mean: 11.0 (9.7 ? 12.8) Mean: 11.0 (9.8 ? 13.0) Mean: 11.3   
(9.9 ? 13.4) Mean: 11.7 (10.0 ? 14.6) Mean: 11.8 (10.0 - 14.1)   
Pediatric Reference ranges were obtained from a study by   
Edgar Wilkes et al. prepared from 1437 samples obtained at 7   
different centers using the same coagulation reagent and   
instrumentation as [facility]. Currently there are no coagulation   
studies available worldwide for children birth to 14 days, and   
no normal ranges.   
   
                                                            Performed By: #### 2  
[id number], 6432107, 71076648, 7473408,   
98187803, 0061759, 3411183 ####[facility]   
[address] [place] [address]   
   
                                                    Procalcitoninon 2022   
   
                      Procalcitonin 1.08 ng/mL High       .00-.50    [facility]  
   
                                        Comment on above:   Result Comment: <0.5  
 ng/mL Low risk of severe sepsis and/or   
shock>2.0 ng/mL High risk of severe sepsis and/or   
shockConcentrations under 0.5 ng/mL do not exclude local   
infections or systemic infections in their initial stages   
(e.g.. under six hours from onset of illness). PCT   
concentrations between 0.5 and 2.0 ng/mL should be interpreted   
with consideration of the patient's history. In this range, it   
is recommended to retest PCT within 6 to 24 hours.   
   
                                                            Performed By: #### 2  
458097, 75926765, 8024597, [id number],   
4510878, 8095422 ####[facility] [address]   
[place] [address]   
   
                                                    Progress Note - Pharmacyon 1  
2022   
   
                                                    Progress Note -   
Pharmacy                        Normal                          [facility]  
   
                                                    Rapid COVID Antigen ([facility])on  
 2022   
   
                      Rapid COV Int NEG Ctl Pass       Normal                [facility]  
   
                                        Comment on above:   Performed By: #### 1  
[id number], [id number] ####[address] [place] [address]   
   
                      Rapid COV Int POS Ctl Pass       Normal                [facility]  
   
                                        Comment on above:   Performed By: #### 1  
[id number], [id number] ####[address] [place] [address]   
   
                                                    SARS-CoV+SARS-CoV-2   
(COVID-19) Ag IA.rapid   
Ql (Resp)           Not detected        Normal              Not   
Detected                                [facility]  
   
                                        Comment on above:   Result Comment: The   
[employer]? System for Rapid Detection of   
SARS-CoV-2 is a chromatographic digital immunoassay intended   
for the direct and qualitative detection of SARS-CoV-2   
nucleocapsid antigens in nasal swabs from individuals who are   
suspected of COVID-19 by their healthcare provider within the   
first five days of the onset of symptoms. Negative results   
should be treated as presumptive, do not rule out SARS-CoV-2   
infection and should not be used as the sole basis for   
treatment or patient management decisions, including infection   
control decisions. Negative results should be considered in   
the context of a patient?s recent exposures, history and the   
presence of clinical signs and symptoms consistent with   
COVID-19, and confirmed with a molecular assay, if necessary,   
for patient management. For in vitro diagnostic use. In the   
[place], only for use under an Emergency Use Authorization. In the   
[place], this test has not been FDA cleared or approved; this test   
has been authorized by FDA under an EUA for use by authorized   
laboratories; use by laboratories certified under the CLIA, 42   
U.S.C. ?263a, that meet requirements to perform moderate,   
high, or waived complexity tests and at the Point of Care   
(POC), i.e., in patient care settings operating under a CLIA   
Certificate of Waiver, Certificate of Compliance, or   
Certificate of Accreditation.This test has been authorized   
only for the detection of proteins from SARS-CoV-2, not for   
any other viruses or pathogens; and, in the [place], this test is   
only authorized for the duration of the declaration that   
circumstances exist justifying the authorization of emergency   
use of in vitro diagnostics for detection and/or diagnosis of   
the virus that causes COVID-19 under Section 564(b)(1) of the   
Act, 21 U.S.C. ? 360bbb-3(b)(1), unless the authorization is   
terminated or revoked sooner.   
   
                                                            Performed By: #### 1  
[id number], [id number] ####[place] [address]   
   
                                                    ADMITTED TO INTENSIVE   
CARE UNIT FOR CONDITION   
OF INTEREST:FIND:PT: NO              Normal                          [facility]  
   
                                        Comment on above:   Performed By: #### 1  
[id number], [id number] ####[place] [address]   
   
                                                    EMPLOYED IN A   
HEALTHCARE   
SETTING:FIND:PT: NO              Normal                          [facility]  
   
                                        Comment on above:   Performed By: #### 1  
[id number], [id number] ####[place] [address]   
   
                                                    FIRST TEST FOR   
CONDITION OF   
INTEREST:FIND:PT: YES             Normal                          [facility]  
   
                                        Comment on above:   Performed By: #### 1  
[id number], [id number] ####[place] [address]   
   
                                                    HAS SYMPTOMS RELATED TO   
CONDITION OF   
INTEREST:FIND:PT: YES             Normal                          [facility]  
   
                                        Comment on above:   Performed By: #### 1  
7807741, [id number] ####[place] [address]   
   
                                                    HOSPITALIZED FOR   
CONDITION OF   
INTEREST:FIND:PT: NO              Normal                          [facility]  
   
                                        Comment on above:   Performed By: #### 1  
8133318, [id number] ####[facility] [address] [place] [address]   
   
                                                    PREGNANCY   
STATUS:FIND:PT: NO              Normal                          [facility]  
   
                                        Comment on above:   Performed By: #### 1  
3444783, [id number] ####[facility] [address] [place] [address]   
   
                                                    RESIDES IN A [place]GREGA   
CARE SETTING:FIND:PT: NO              Normal                          [facility]  
   
                                        Comment on above:   Performed By: #### 1  
[id number], [id number] ####[facility] [address] [place] [address]   
   
                                                    Reference Laboratory Testing  
Ordered By: Generated DomainUser on 2022   
   
                                                    L. pneumophila 1 Ag IA   
Ql (U)                    Negative                  Invalid   
Interpretation   
Code                      Negative                  [facility]   
SendOutsSS  
   
                                        Comment on above:   Result Comment: Pres  
umptive negative for L. pneumophila   
serogroup 1 antigen in urine,  
suggesting no recent or current infection. Legionnaires'   
disease  
cannot be ruled out since other serogroups and species may   
also cause  
disease.  
Performed at:  [address] [id number]  
[id number] MD Juwan Butt   
   
                                                    Troponinon 2022   
   
                                                    Troponin I.cardiac   
[Mass/Vol]      30.40 pg/mL     High            10.10-27.10     [facility]  
   
                                        Comment on above:   Result Comment: The   
95% CI (Confidence Interval) PPV (Positive  
   
Predictive Value) for myocardial infarction in females is 38   
pg/mL, in males 51 pg/mL. The results should be used in   
conjunction with clinical conditions of myocardial   
infarction.(Access High Sensitivity Troponin I Instructions   
For Use, Estefany [place], 2018)   
   
                                                            Performed By: #### 2  
111869, 49062863, 7486981, [id number],   
0920034, 2872633 ####[facility] [address]   
[place] [address]   
   
                                                    Troponin I.cardiac   
[Mass/Vol]      31.00 pg/mL     High            10.10-27.10     [facility]  
   
                                        Comment on above:   Result Comment: The   
95% CI (Confidence Interval) PPV (Positive  
   
Predictive Value) for myocardial infarction in females is 38   
pg/mL, in males 51 pg/mL. The results should be used in   
conjunction with clinical conditions of myocardial   
infarction.(Access High Sensitivity Troponin I Instructions   
For Use, Estefany [place]lys[place], 2018)   
   
                                                            Performed By: #### 2  
241353, 7447563, 59072574, 2281576,   
13013849, 1426509, 1942256 ####[facility]   
[address] [place] [address]   
   
                                                    UA With Cult Reflexon 2022   
   
                                                    Bacteria LM Ql (Urine   
sed)            TRACE           Normal          Trace           [facility]  
   
                                        Comment on above:   Performed By: #### 2  
076381, 32103584 ####[facility] [address] [place] [address]   
   
                      Bilirubin Ql (U) Negative   Normal     Negative   [facility]  
   
                                        Comment on above:   Performed By: #### 2  
345768, 84867807 ####[address] [address]   
   
                      Clarity (U) CLEAR      Normal     Clear      [facility]  
   
                                        Comment on above:   Performed By: #### 2  
120138, 51080956 ####[address] [address]   
   
                      Color (U)  YELLOW     Normal     Yellow     [facility]  
   
                                        Comment on above:   Performed By: #### 2  
460764, 10909859 ####[address] [address]   
   
                                                    Epithelial   
cells.squamous LM.HPF   
(Urine sed) [#/Area] 3-4             Normal          0-2             [facility]  
   
                                        Comment on above:   Performed By: #### 2  
384074, 38369526 ####[facility] [address] [place] [address]   
   
                                                    Glucose Test strip (U)   
[Mass/Vol]      Negative        Normal          Negative        [facility]  
   
                                        Comment on above:   Performed By: #### 2  
401656, 25389751 ####[facility] [address] [place] [address]   
   
                      Hemoglobin Ql (U) 1+         Abnormal   Negative   [facility]  
   
                                        Comment on above:   Performed By: #### 2  
317217, 64722511 ####[facility] [address] [place] [address]   
   
                      Ketones (U) [Mass/Vol] 1+         Abnormal   Negative   [facility]  
   
                                        Comment on above:   Performed By: #### 2  
206030, 61005701 ####[address] [address]   
   
                                                    Lithium.plasma/Lithium.  
RBC (Bld) [Mass ratio] 4-20            Normal          0-3             [facility]  
   
                                        Comment on above:   Performed By: #### 2  
152240, 77373943 ####[address] [address]   
   
                      Nitrite Ql (U) Negative   Normal     Negative   [facility]  
   
                                        Comment on above:   Performed By: #### 2  
938586, 52683809 ####[address] [address]   
   
                                pH (U)          6.0 [pH]        Invalid   
Interpretation   
Code                      5.0-9.0                   [facility]  
   
                                        Comment on above:   Performed By: #### 2  
179698, 47286649 ####[address] [address]   
   
                      Protein (U) [Mass/Vol] 2+         Abnormal   Negative   [facility]  
   
                                        Comment on above:   Performed By: #### 2  
436414, 54820940 ####[address] [address]   
   
                                                    Specific gravity (U)   
[Rel density]             1.015                     Invalid   
Interpretation   
Code                      1.005-1.030               [facility]  
   
                                        Comment on above:   Performed By: #### 2  
851670, 59813282 ####[address] [address]   
   
                                                    Type of Urine   
collection method Clean Catch     Normal                          [facility]  
   
                                        Comment on above:   Performed By: #### 2  
005804, 08470567 ####[address] [address]   
   
                      Urobilinogen Qn (U) 0.2 {Miguel'U}/dL Normal     0.0-1.0   
   [facility]  
   
                                        Comment on above:   Performed By: #### 2  
918321, 84651389 ####[address] [place] [address]   
   
                      WBC Auto Ql (U) TRACE      Abnormal   Negative   [facility]  
   
                                        Comment on above:   Performed By: #### 2  
986775, 25998137 ####[facility] [address] [place] [address]   
   
                                                    WBC LM.HPF (Urine sed)   
[#/Area]        6-15            Abnormal        0-5             [facility]  
   
                                        Comment on above:   Performed By: #### 2  
005219, 37313281 ####[facility] [address] [place] [address]   
   
                                                    URINALYSISOrdered By: Prashant Patricia on 2022   
   
                                                    Bacteria LM Ql (Urine   
sed)            Trace /HPF      Normal          Trace/HPF       [facility]A   
Auto SS  
   
                                        Bilirubin Ql (U)    Negative  
(22 2:58 AM)  Normal              Negative            [facility]   
Auto SS  
   
                                        Clarity (U)         Clear  
(22 2:58 AM)  Normal              Clear               [facility]A   
Auto SS  
   
                                        Color (U)           Yellow  
(22 2:58 AM)  Normal              Yellow              [facility]   
Auto SS  
   
                                                    Epithelial   
cells.squamous LM.HPF   
(Urine sed) [#/Area] 3-4 /HPF        Normal          0-2/HPF         [facility]   
Auto SS  
   
                                                    Glucose Test strip (U)   
[Mass/Vol]                              Negative  
(22 2:58 AM)  Normal              Negative            [facility]A   
Auto SS  
   
                                        Hemoglobin Ql (U)   1+  
*ABN*  
(22 2:58 AM)                      Invalid   
Interpretation   
Code                      Negative                  [facility]   
Auto SS  
   
                                        Ketones (U) [Mass/Vol] 1+  
*ABN*  
(22 2:58 AM)                      Invalid   
Interpretation   
Code                      Negative                  [facility]   
Auto SS  
   
                                                    Lithium.plasma/Lithium.  
RBC (Bld) [Mass ratio] 4-20 /HPF       Normal          0-3/HPF         [facility]   
Auto SS  
   
                                        Nitrite Ql (U)      Negative  
(22 2:58 AM)  Normal              Negative            [facility]   
Auto SS  
   
                                        pH (U)              6.0  
*NA*  
(22 2:58 AM)                      Invalid   
Interpretation   
Code                      5.0 - 9.0                 [facility]   
Auto SS  
   
                                        Protein (U) [Mass/Vol] 2+  
*ABN*  
(22 2:58 AM)                      Invalid   
Interpretation   
Code                      Negative                  [facility]A   
Auto SS  
   
                                                    Specific gravity (U)   
[Rel density]                           1.015  
*NA*  
(22 2:58 AM)                      Invalid   
Interpretation   
Code                                    1.005 -   
1.030                                   [facility]   
Auto SS  
   
                                        UA Spec Desc        Clean Catch  
(22 2:58 AM)  Normal                                  [facility]   
Auto SS  
   
                          Urobilinogen Qn (U) 0.3581371 {Miguel'U}/dL Normal     
    0.0 - 1.0   
EU/dL                                   [facility]   
Auto SS  
   
                                        WBC Auto Ql (U)     Trace  
*ABN*  
(22 2:58 AM)                      Invalid   
Interpretation   
Code                      Negative                  [facility]   
Auto SS  
   
                                                    WBC LM.HPF (Urine sed)   
[#/Area]                  6-15 /HPF                 Invalid   
Interpretation   
Code                      0-5/HPF                   [facility]   
Auto SS  
   
                                                    XR Chest Single Viewon    
   
                      XR Chest Single View            Normal                Fish  
er   
[facility]  
   
                                                    cefTRIAXone KAMRAN [Susc]Ordere  
d By: Richelle Sarkar on 2022   
   
                                                    Streptococcus   
salivarius      Streptococcus salivarius                                 [facility]  
   
                                                    eGFRon 2022   
   
                                                    GFR/1.73 sq M.predicted   
among blacks MDRD   
(S/P/Bld) [Vol   
rate/Area]      mL/min/{1.73_m2} Normal          >=59            [facility]  
   
                                        Comment on above:   Order Comment: Order  
 added by Discern Expert.   
   
                                                            Result Comment: eGFR  
 is race adjusted. AA=.   
   
                                                            Performed By: #### 2  
074838, 30606769, 3258069, [id number],   
0403461, 0618209 ####[facility] [address]   
[place] [address]   
   
                                                    GFR/1.73 sq M.predicted   
among non-blacks MDRD   
(S/P/Bld) [Vol   
rate/Area]      54 mL/min/1.73 m2 Low             >=59            [facility]  
   
                                        Comment on above:   Order Comment: Order  
 added by Discern Expert.   
   
                                                            Result Comment:   
deion kidney disease could be indicated at   
eGFR's of less than 60 mL/min/1.73m2. Kidney failure is   
indicated at less than 15 mL/min/1.73m2.   
   
                                                            Performed By: #### 2  
232085, 34378172, 3597099, [id number],   
9699776, 8721175 ####[facility] [address]   
[place] [address]   
   
                                                    GFR/1.73 sq M.predicted   
among blacks MDRD   
(S/P/Bld) [Vol   
rate/Area]      mL/min/{1.73_m2} Normal          >=59            [facility]  
   
                                        Comment on above:   Order Comment: Order  
 added by Discern Expert.   
   
                                                            Result Comment: eGFR  
 is race adjusted. AA=.   
   
                                                            Performed By: #### 2  
034631, 1437456, 47446732, 8254423,   
34649115, 0413203, 4760904 ####[facility]   
[address] [place] [address]   
   
                                                    GFR/1.73 sq M.predicted   
among non-blacks MDRD   
(S/P/Bld) [Vol   
rate/Area]      mL/min/{1.73_m2} Normal          >=59            [facility]  
   
                                        Comment on above:   Order Comment: Order  
 added by Discern Expert.   
   
                                                            Result Comment:   
deion kidney disease could be indicated at   
eGFR's of less than 60 mL/min/1.73m2. Kidney failure is   
indicated at less than 15 mL/min/1.73m2.   
   
                                                            Performed By: #### 2  
284852, 8272263, 08337169, 4869152,   
35891877, 6598848, 8341493 ####[address] [place] [address]   
   
                                                    Prot. Electroph, Blon 2022   
   
                                        Pathologist Review: ELECTRONICALLY SUDHIR MCWILLIAMS M.D. Normal                                  [facility]  
   
                                        Comment on above:   Performed By: #### P  
SHAR, VD25, PE ####  
[facility]  
[address] [place] [address] [phone]  
: Tae Mccallum MD  
#### BMP ####  
[facility]  
[address] [place] [address] [phone]  
: Greg Paredes MD   
   
                                        Prot. Elect-Interp  NORMAL ELECTROPHORET  
IC   
PATTERN             Normal                                  [facility]  
   
                                        Comment on above:   Performed By: #### P  
SHAR VD25, PE ####  
[address] [id number] [phone]  
: Tae Mccallum MD  
#### BMP ####  
[facility]  
[address] [place] [address]  
[phone]  
: Greg Paredes MD   
   
                                                    Prot. Electroph, Blon 2022   
   
                      Albumin [Mass/Vol] 4.3 g/dL   Normal     3.2-5.2    [facility]  
   
                                        Comment on above:   Performed By: #### P  
THNCA, VD25, PE ####  
[address] [address]  
[phone]  
: Tae Mccallum MD  
#### BMP ####  
[facility]  
[address] [place], [place] [address] [phone]  
: Greg Paredes MD   
   
                      Albumin, % 61 %       Normal     45-65      [facility]  
   
                                        Comment on above:   Performed By: #### P  
THNCA, VD25, PE ####  
[address] [address]  
[phone]  
: Tae Mccallum MD  
#### BMP ####  
[facility]  
[address] [place], [place] [address] [phone]  
: Greg Paredes MD   
   
                      Alpha-1-globulins 0.2 g/dL   Normal     0.1-0.4    [facility]  
   
                                        Comment on above:   Performed By: #### P  
THNCA, VD25, PE ####  
[address] [address]  
[phone]  
: Tae Mccallum MD  
#### BMP ####  
[facility]  
[address], [place] [address] [phone]  
: Greg Paredes MD   
   
                      Alpha-1-globulins,% 2 %        Low        3-6        [facility]  
   
                                        Comment on above:   Performed By: #### P  
THNCA, VD25, PE ####  
[address] [address]208 [phone]  
: Tae Mccallum MD  
#### BMP ####  
[facility]  
[address] [place], [place] [address] [phone]  
: Greg Paredes MD   
   
                      Alpha-2-globulins 0.8 g/dL   Normal     0.5-0.9    [facility]  
   
                                        Comment on above:   Performed By: #### P  
THNCA, VD25, PE ####  
[address] [address]  
[phone]  
: Tae Mccallum MD  
#### BMP ####  
[facility]  
[address], [place] [address] [phone]  
: Greg Paredes MD   
   
                      Alpha-2-globulins,% 12 %       Normal     6-13       [facility]  
   
                                        Comment on above:   Performed By: #### P  
THNCA, VD25, PE ####  
[address] 7873208 [phone]  
: Tae Mccallum MD  
#### BMP ####  
[facility]  
[address], [place] [address] [phone]  
: Greg Paredes MD   
   
                      Beta-globulins 0.7 g/dL   Normal     0.5-1.1    [facility]  
   
                                        Comment on above:   Performed By: #### P  
THNCA, VD25, PE ####  
[address] [address]  
[phone]  
: Tae Mccallum MD  
#### BMP ####  
[facility]  
[address], [place] [address] [phone]  
: Greg Paredes MD   
   
                      Beta-globulins,% 10 %       Low        11-19      [facility]  
   
                                        Comment on above:   Performed By: #### P  
THNCA, VD25, PE ####  
[address] [address]  
[phone]  
: Tae Mccallum MD  
#### BMP ####  
[facility]  
[address], [place] [address] [phone]  
: Greg Paredes MD   
   
                      Gamma-globulins 1.1 g/dL   Normal     0.5-1.5    [facility]  
   
                                        Comment on above:   Performed By: #### P  
THNCA, VD25, PE ####  
[address] [address]  
[phone]  
: Tae Mccallum MD  
#### BMP ####  
[facility]  
[address], [place] [address] [phone]  
: Greg Paredes MD   
   
                      Gamma-globulins,% 15 %       Normal     9-20       [facility]  
   
                                        Comment on above:   Performed By: #### P  
THNCA, VD25, PE ####  
[address] 9000208 [phone]  
: Tae Mccallum MD  
#### BMP ####  
[facility]  
[address], [place] [address] [phone]  
: Greg Paredes MD   
   
                      Total Prot. Sum 7.1 g/dL   Normal     6.3-8.2    [facility]  
   
                                        Comment on above:   Performed By: #### P  
THNCA, VD25, PE ####  
[address] [address]  
[phone]  
: Tae Mccallum MD  
#### BMP ####  
[facility]  
[address] [place] [address]  
[phone]  
: Greg Paredes MD   
   
                      Total Prot. Sum,% 100 %      Normal          [facility]  
   
                                        Comment on above:   Performed By: #### P  
THNCA, VD25, PE ####  
[address] [address]  
[phone]  
: Tae Mccallum MD  
#### BMP ####  
[facility]  
[address]  
[place] [address]  
[phone]  
: Greg Paredes MD   
   
                                                    Basic Metabolic Panelon    
   
                          Anion gap [Moles/Vol] 12 mmol/L                 9 - 17  
   
mmol/L                                  [facility]  
   
                          Calcium [Mass/Vol] 9.7 mg/dL                 8.6 - 10.  
4   
mg/dL                                   [facility]  
   
                          Chloride [Moles/Vol] 102 mmol/L                98 - 10  
7   
mmol/L                                  [facility]  
   
                          CO2 [Moles/Vol] 22 mmol/L                 20 - 31   
mmol/L                                  [facility]  
   
                          Creatinine [Mass/Vol] 1.01 mg/dL   High         0.5 -   
0.9   
mg/dL                                   [facility]  
   
                          GFR  >60                       60 - PI  
NF   
mL/min                                  [facility]  
   
                                                    GFR Non-   
American            54 mL/min           Low                 60 - PINF   
mL/min                                  [facility]  
   
                                                    GFR/1.73 sq M.predicted   
MDRD (S/P/Bld) [Vol   
rate/Area]                                                      [facility]  
   
                                        Comment on above:   Average GFR for 70 o  
r more years old:  
75 mL/min/1.73sq m  
Chronic Kidney Disease:  
<60 mL/min/1.73sq m  
Kidney failure:  
<15 mL/min/1.73sq m  
  
  
eGFR calculated using average adult body mass. Additional eGFR   
calculator available at:  
  
http://www.[employer]/multiple_crcl_2012.htm  
  
  
   
   
                          Glucose [Mass/Vol] 130 mg/dL    High         70 - 99   
mg/dL                                   [facility]  
   
                                                    Interpretation and   
review of laboratory   
results         Abnormal                                        [facility]  
   
                          Potassium [Moles/Vol] 4.3 mmol/L                3.7 -   
5.3   
mmol/L                                  [facility]  
   
                          Sodium [Moles/Vol] 136 mmol/L                135 - 144  
   
mmol/L                                  [facility]  
   
                                                    Urea nitrogen (BldV)   
[Mass/Vol]          20 mg/dL                                8 - 23   
mg/dL                                   [facility]  
   
                                                    Urea   
nitrogen/Creatinine   
(Bld) [Mass ratio] 20                              9 - 20          [facility]  
   
                                                    Basic Metabolic Profon    
   
                      (cont.)               Normal                [facility]  
   
                                        Comment on above:   Result Comment: Aver  
age GFR for 70 or more years old:  
75 mL/min/1.73sq m  
Chronic Kidney Disease:  
<60 mL/min/1.73sq m  
Kidney failure:  
<15 mL/min/1.73sq m  
eGFR calculated using average adult body mass. Additional eGFR   
calculator  
available at:  
http://www.[employer]/multiple_crcl_2012.htm   
   
                                                            Performed By: #### P  
THNCA, VD25, PE ####  
[address] [address]  
[phone]  
: Tae Mccallum MD  
#### BMP ####  
[facility]  
[address], [place] [address] [phone]  
: Greg Paredes MD   
   
                      Anion gap [Moles/Vol] 12 mmol/L  Normal     9-17       [facility]  
   
                                        Comment on above:   Performed By: #### P  
THNCA, VD25, PE ####  
[address] 9[address]2708 [phone]  
: Tae Mccallum MD  
#### BMP ####  
[facility]  
[address], [place] [address] [phone]  
: Greg Paredes MD   
   
                      BUN/CRE Ratio 20         Normal     9-20       [facility]  
   
                                        Comment on above:   Performed By: #### P  
THNCA, VD25, PE ####  
[address] [address]  
[phone]  
: Tae Mccallum MD  
#### BMP ####  
[facility]  
[address], [place] [address] [phone]  
: Greg Paredes MD   
   
                      Calcium [Mass/Vol] 9.7 mg/dL  Normal     8.6-10.4   [facility]  
   
                                        Comment on above:   Performed By: #### P  
THNCA, VD25, PE ####  
[address] 6271408 [phone]  
: Tae Mccallum MD  
#### BMP ####  
[facility]  
[address], [place] [address] [phone]  
: Greg Paredes MD   
   
                      Chloride [Moles/Vol] 102 mmol/L Normal          [facility]  
   
                                        Comment on above:   Performed By: #### P  
THNCA, VD25, PE ####  
[facility]  
[address] [place], [place] [address] [phone]  
: Tae Mccallum MD  
#### BMP ####  
[facility]  
[address], [place] [address] [phone]  
: Greg Paredes MD   
   
                      CO2 [Moles/Vol] 22 mmol/L  Normal     20-31      [facility]  
   
                                        Comment on above:   Performed By: #### P  
THNCA, VD25, PE ####  
[address] [address]2408 [phone]  
: Tae Mccallum MD  
#### BMP ####  
[facility]  
[address], [place] [address] [phone]  
: Greg Paredes MD   
   
                      Creatinine [Mass/Vol] 1.01 mg/dL High       0.50-0.90  [facility]  
   
                                        Comment on above:   Performed By: #### P  
THNCA, VD25, PE ####  
[facility]  
[address] [place] [id number] [phone]  
: Tae Mccallum MD  
#### BMP ####  
[facility]  
[address], [place] [address] [phone]  
: Greg Paredes MD   
   
                      GFR, Amer >60        Normal     >60        [facility]  
   
                                        Comment on above:   Performed By: #### P  
THNCA, VD25, PE ####  
[facility]  
[address] [place], [place] [address] [phone]  
: Tae Mccallum MD  
#### BMP ####  
[facility]  
[address], [place] [address] [phone]  
: Greg Paredes MD   
   
                      GFR,non African Amer 54 mL/min  Low        >60        [facility]  
   
                                        Comment on above:   Performed By: #### P  
THNCA, VD25, PE ####  
[address] 9913708 [phone]  
: Tae Mccallum MD  
#### BMP ####  
[facility]  
[address], [place] [address] [phone]  
: Greg Paredes MD   
   
                      Glucose [Mass/Vol] 130 mg/dL  High       70-99      [facility]  
   
                                        Comment on above:   Performed By: #### P  
THNCA, VD25, PE ####  
[address] [address] [phone]  
: Tae Mccallum MD  
#### BMP ####  
[facility]  
[address] [place] [address]  
[phone]  
: Greg Paredes MD   
   
                      Potassium [Moles/Vol] 4.3 mmol/L Normal     3.7-5.3    [facility]  
   
                                        Comment on above:   Performed By: #### P  
THJAME, VD25, PE ####  
[address] [address]08 [phone]  
: Tae Mccallum MD  
#### BMP ####  
[facility]  
[address] [place] [address]  
[phone]  
: Greg Paredes MD   
   
                      Sodium [Moles/Vol] 136 mmol/L Normal     135-144    [facility]  
   
                                        Comment on above:   Performed By: #### P  
THNCA, VD25, PE ####  
[address] [address]  
[phone]  
: Tae Mccallum MD  
#### BMP ####  
[facility]  
[address], [place] [address] [phone]  
: Greg Paredes MD   
   
                                                    Urea nitrogen   
[Mass/Vol]      20 mg/dL        Normal          8-23            [facility]  
   
                                        Comment on above:   Performed By: #### P  
SHAR VD25, PE ####  
[facility]  
[address] [place] [address]  
[phone]  
: Tae Mccallum MD  
#### BMP ####  
[facility]  
[address], [place] [address] [phone]  
: Greg Paredes MD   
   
                                                    PTH, Intacton 2022   
   
                      PTH, Intact 78.3 pg/mL High       14.0-72.0  [facility]  
   
                                        Comment on above:   Result Comment: SAMP  
LES FROM PATIENTS ROUTINELY RECEIVING HIGH  
   
DOSE BIOTIN THERAPY MAY SHOW  
FALSELY DEPRESSED RESULTS. ADDITIONAL INFORMATION MAY BE   
REQUIRED FOR  
DIAGNOSIS.   
   
                                                            Performed By: #### P  
SHAR VD25, PE ####  
[address] [place], [place] [address] [phone]  
: Tae Mccallum MD  
#### BMP ####  
[facility]  
[address], [place] [address] [phone]  
: Greg Paredes MD   
   
                                                    Interpretation and   
review of laboratory   
results         Abnormal                                        [facility]  
   
                          Pth Intact   78.3 pg/mL   High         14 - 72   
pg/mL                                   [facility]  
   
                                        Comment on above:   SAMPLES FROM PATIENT  
S ROUTINELY RECEIVING HIGH DOSE BIOTIN   
THERAPY MAY SHOW FALSELY  
DEPRESSED RESULTS. ADDITIONAL INFORMATION MAY BE REQUIRED FOR   
DIAGNOSIS.  
  
   
   
                                                                  [facility]  
   
                                                    Prot. Electroph, Blon 2022   
   
                      Protein [Mass/Vol] 7.1 g/dL   Normal     6.4-8.3    [facility]  
   
                                        Comment on above:   Performed By: #### P  
SHAR VD25, PE ####  
[facility]  
[address] [place], [place] [address] [phone]  
: Tae Mccallum MD  
#### BMP ####  
[facility]  
[address], [place] [address] [phone]  
: Greg Paredes MD   
   
                                                    Vitamin D 25 Hydroxyon    
   
                          Vit D, 25-Hydroxy 58.1 ng/mL                29.9 - PIN  
F   
ng/mL                                   [facility]  
   
                                        Comment on above:     
Reference Range:  
Vitamin D status Range  
Deficiency <20 ng/mL  
Mild Deficiency 20-30 ng/mL  
Sufficiency  ng/mL  
Toxicity >100 ng/mL  
  
   
   
                                                                  [facility]  
   
                                                    Vitamin D 25 OHon 2022  
   
   
                      Vitamin D 25 OH 58.1 ng/mL Normal     >29.9      [facility]  
   
                                        Comment on above:   Result Comment:  
Reference Range:  
Vitamin D status Range  
Deficiency <20 ng/mL  
Mild Deficiency 20-30 ng/mL  
Sufficiency  ng/mL  
Toxicity >100 ng/mL   
   
                                                            Performed By: #### P  
SHAR, VD25, PE ####  
[facility]  
[address] [place] [address] [phone]  
: Tae Mccallum MD  
#### BMP ####  
[facility]  
[address] [place] [address] [phone]  
: Greg Paredes MD   
   
                                                    Basic Metabolic Panelon    
   
                          Anion gap [Moles/Vol] 8 mmol/L     Low          9 - 17  
   
mmol/L                                  [facility]  
   
                          Calcium [Mass/Vol] 9.9 mg/dL                 8.6 - 10.  
4   
mg/dL                                   [facility]  
   
                          Chloride [Moles/Vol] 103 mmol/L                98 - 10  
7   
mmol/L                                  [facility]  
   
                          CO2 [Moles/Vol] 26 mmol/L                 20 - 31   
mmol/L                                  [facility]  
   
                          Creatinine [Mass/Vol] 1.15 mg/dL   High         0.5 -   
0.9   
mg/dL                                   [facility]  
   
                          GFR  56 mL/min    Low          60 - PI  
NF   
mL/min                                  [facility]  
   
                                                    GFR Non-   
American            46 mL/min           Low                 60 - PINF   
mL/min                                  [facility]  
   
                                                    GFR/1.73 sq M.predicted   
MDRD (S/P/Bld) [Vol   
rate/Area]                                                      [facility]  
   
                                        Comment on above:   Average GFR for 70 o  
r more years old:  
75 mL/min/1.73sq m  
Chronic Kidney Disease:  
<60 mL/min/1.73sq m  
Kidney failure:  
<15 mL/min/1.73sq m  
  
  
eGFR calculated using average adult body mass. Additional eGFR   
calculator available at:  
  
http://www.[employer]/multiple_crcl_2012.htm  
  
  
   
   
                          Glucose [Mass/Vol] 114 mg/dL    High         70 - 99   
mg/dL                                   [facility]  
   
                                                    Interpretation and   
review of laboratory   
results         Abnormal                                        [facility]  
   
                          Potassium [Moles/Vol] 4.3 mmol/L                3.7 -   
5.3   
mmol/L                                  [facility]  
   
                          Sodium [Moles/Vol] 137 mmol/L                135 - 144  
   
mmol/L                                  [facility]  
   
                                                    Urea nitrogen (BldV)   
[Mass/Vol]          20 mg/dL                                8 - 23   
mg/dL                                   [facility]  
   
                                                    Urea   
nitrogen/Creatinine   
(Bld) [Mass ratio] 17                              9 -           [facility]  
   
                                                    Basic Metabolic Profon    
   
                      (cont.)               [facility]  
   
                                        Comment on above:   Result Comment: Aver  
age GFR for 70 or more years old:  
75 mL/min/1.73sq m  
Chronic Kidney Disease:  
<60 mL/min/1.73sq m  
Kidney failure:  
<15 mL/min/1.73sq m  
eGFR calculated using average adult body mass. Additional eGFR   
calculator  
available at:  
http://www.[employer]/multiple_crcl_2012.htm   
   
                                                            Performed By: #### B  
MP ####  
[facility]  
[address] [place] [address] [phone]  
: Greg Paredes MD   
   
                      Anion gap [Moles/Vol] 8 mmol/L   Low        -       [facility]  
   
                                        Comment on above:   Performed By: #### B  
MP ####  
[facility]  
[address] [place]  
[place] [address] [phone]  
: Greg Paredes MD   
   
                      BUN/CRE Ratio 17         Normal     -       [facility]  
   
                                        Comment on above:   Performed By: #### B  
MP ####  
[facility]  
[address] [place] [place]  
[place] [address] [phone]  
: Greg Paredes MD   
   
                      Calcium [Mass/Vol] 9.9 mg/dL  Normal     8.6-10.4   [facility]  
   
                                        Comment on above:   Performed By: #### B  
MP ####  
[facility]  
[address], [place] [address] [phone]  
: Greg Paredes MD   
   
                      Chloride [Moles/Vol] 103 mmol/L Normal          [facility]  
   
                                        Comment on above:   Performed By: #### B  
MP ####  
[facility]  
[address], [place] [address] [phone]  
: Greg Paredes MD   
   
                      CO2 [Moles/Vol] 26 mmol/L  Normal     20-31      [facility]  
   
                                        Comment on above:   Performed By: #### B  
MP ####  
[facility]  
[address] [place] [address] [phone]  
: Greg Paredes MD   
   
                      Creatinine [Mass/Vol] 1.15 mg/dL High       0.50-0.90  [facility]  
   
                                        Comment on above:   Performed By: #### B  
MP ####  
[facility]  
[address], [place] [address] [phone]  
: Greg Paredes MD   
   
                      GFR, Amer 56 mL/min  Low        >60        [facility]  
   
                                        Comment on above:   Performed By: #### B  
MP ####  
[facility]  
[address], [place] [address] [phone]  
: Greg Paredes MD   
   
                      GFR,non African Amer 46 mL/min  Low        >60        [facility]  
   
                                        Comment on above:   Performed By: #### B  
MP ####  
[facility]  
[address], [place] [address] [phone]  
: Greg Paredes MD   
   
                      Glucose [Mass/Vol] 114 mg/dL  High       70-99      [facility]  
   
                                        Comment on above:   Performed By: #### B  
MP ####  
[facility]  
[address], [place] [address] [phone]  
: Greg Paredes MD   
   
                      Potassium [Moles/Vol] 4.3 mmol/L Normal     3.7-5.3    [facility]  
   
                                        Comment on above:   Performed By: #### B  
MP ####  
[facility]  
[address] [place], [place] [address] [phone]  
: Greg Paredes MD   
   
                      Sodium [Moles/Vol] 137 mmol/L Normal     135-144    [facility]  
   
                                        Comment on above:   Performed By: #### B  
MP ####  
[facility]  
[address] [place], [place] [address] [phone]  
: Greg Paredes MD   
   
                                                    Urea nitrogen   
[Mass/Vol]      20 mg/dL        Normal          8-23            [facility]  
   
                                        Comment on above:   Performed By: #### B  
MP ####  
[facility]  
[address] [place], [place] [address] [phone]  
: Greg Paredes MD   
   
                                                    Coding Summary.on 2022  
   
   
                      Coding Summary.            Normal                [facility]  
   
                                                    Coding Summary.on 2022  
   
   
                      Coding Summary.            Normal                [facility]  
   
                                                    Auto Diffon 2022   
   
                      Basophils/100 WBC (Bld) 0.5 %      Normal     0.0-2.0    [facility]  
   
                                        Comment on above:   Order Comment: Order  
 Added by Discern Expert.   
   
                                                            Performed By: #### 2  
[id number], 0240213, 4575532, [id number],   
[id number] ####[facility] [address]   
[place] [address]   
   
                                                    Basophils/Leukocytes   
Auto (Bld) [Pure #   
fraction]       0.1 E9/L        Normal          0.0-0.2         [facility]  
   
                                        Comment on above:   Order Comment: Order  
 Added by Discern Expert.   
   
                                                            Performed By: #### 2  
709384, 7874232, 8411414, 03062634,   
[id number] ####[facility] [address]   
[place] [address]   
   
                                                    Eosinophils/100 WBC   
(Bld)           1.8 %           Normal          0.0-8.0         [facility]  
   
                                        Comment on above:   Order Comment: Order  
 Added by Discern Expert.   
   
                                                            Performed By: #### 2  
222052, 6671634, 5248596, 34108315,   
[id number] ####[address]   
[place] [address]   
   
                                                    Eosinophils/Leukocytes   
Auto (Bld) [Pure #   
fraction]       0.2 E9/L        Normal          0.0-0.5         [facility]  
   
                                        Comment on above:   Order Comment: Order  
 Added by Discern Expert.   
   
                                                            Performed By: #### 2  
070313, 9073259, 1163807, 11334087,   
[id number] ####[address] [address]   
   
                                                    Lymphocytes/100 WBC   
(Bld)           9.1 %           Low             14.0-50.0       [facility]  
   
                                        Comment on above:   Order Comment: Order  
 Added by Lenin Expert.   
   
                                                            Performed By: #### 2  
617446, 4574864, 9562655, 90635201,   
[id number] ####[address] [address]   
   
                                                    Lymphocytes/Leukocytes   
Auto (Bld) [Pure #   
fraction]       1.2 E9/L        Normal          1.0-4.0         [facility]  
   
                                        Comment on above:   Order Comment: Order  
 Added by Lenin Expert.   
   
                                                            Performed By: #### 2  
077690, 2329213, 7718743, 71730956,   
[id number] ####[address]   
[place] [address]   
   
                      Monocytes/100 WBC (Bld) 11.3 %     Normal     4.0-14.0   [facility]  
   
                                        Comment on above:   Order Comment: Order  
 Added by Discern Expert.   
   
                                                            Performed By: #### 2  
785346, 7138958, 4264161, 46837315,   
[id number] ####[address]   
[place] [address]   
   
                                                    Monocytes/Leukocytes   
Auto (Bld) [Pure #   
fraction]       1.4 E9/L        High            0.2-1.0         [facility]  
   
                                        Comment on above:   Order Comment: Order  
 Added by Lenin Expert.   
   
                                                            Performed By: #### 2  
176078, 3120218, 1347118, 59396762,   
[id number] ####[facility] [address]   
[place] [address]   
   
                                                    Neutrophils/100 WBC   
(Bld)           77.3 %          High            36.0-75.0       [facility]  
   
                                        Comment on above:   Order Comment: Order  
 Added by Discern Expert.   
   
                                                            Performed By: #### 2  
224817, 7491838, 6430795, 91441504,   
[id number] ####[facility] [address]   
[place] [address]   
   
                                                    Neutrophils/Leukocytes   
Auto (Bld) [Pure #   
fraction]       9.9 E9/L        High            2.0-7.5         [facility]  
   
                                        Comment on above:   Order Comment: Order  
 Added by Discern Expert.   
   
                                                            Performed By: #### 2  
181008, 2133535, 3524949, 49448777,   
84290938 ####[facility] [address]   
[place] [address]   
   
                                                    BMPon 2022   
   
                      Creatinine [Mass/Vol] 0.9 mg/dL  Normal     0.5-1.3    [facility]  
   
                                        Comment on above:   Performed By: #### 2  
034336, 5409287, 6223925, 33391087,   
[id number] ####[facility] [address]   
[place] [address]   
   
                                                    Urea nitrogen   
[Mass/Vol]      19 mg/dL        Normal          5-21            [facility]  
   
                                        Comment on above:   Performed By: #### 2  
517630, 7473506, 7497920, 72593905,   
[id number] ####[facility] [address]   
[place] [address]   
   
                                                    Urea   
nitrogen/Creatinine   
[Mass ratio]    21 No Units     High            10-20           [facility]  
   
                                        Comment on above:   Performed By: #### 2  
247037, 6231636, 7440510, 44661937,   
[id number] ####[facility] [address]   
[place] [address]   
   
                      Anion gap [Moles/Vol] 15 mmol/L  Normal     6-16       [facility]  
   
                                        Comment on above:   Performed By: #### 2  
698380, 2408589, 5684337, 57704350,   
66738323 ####[facility] [address]   
[place] [address]   
   
                      Calcium [Mass/Vol] 9.9 mg/dL  Normal     8.9-11.1   [facility]  
   
                                        Comment on above:   Performed By: #### 2  
463518, 4730881, 8814425, 16023785,   
[id number] ####[facility] [address]   
[place] [address]   
   
                      Chloride [Moles/Vol] 99 mmol/L  Low        101-111    [facility]  
er   
[facility]  
   
                                        Comment on above:   Performed By: #### 2  
827276, 1879206, 2014632, 92868289,   
34851281 ####[facility] [address]   
[facility] [place] [address]   
   
                      CO2 [Moles/Vol] 22 mmol/L  Normal     21-31      [facility]  
   
                                        Comment on above:   Performed By: #### 2  
713986, 4409808, 4109664, 90934126,   
63650437 ####[facility] [address]   
[facility] [place] [address]   
   
                      Glucose [Mass/Vol] 101 mg/dL  Normal          [facility]  
   
                                        Comment on above:   Result Comment: If t  
his glucose result represents a fasting   
glucose, interpretation should refer to the following   
reference range: 55-99 mg/dL   
   
                                                            Performed By: #### 2  
388932, 6252311, 4519236, 83267745,   
38580661 ####[facility] [address]   
[place] [address]   
   
                      Potassium [Moles/Vol] 4.5 mmol/L Normal     3.5-5.3    [facility]  
   
                                        Comment on above:   Performed By: #### 2  
517592, 5566251, 9143604, 66542144,   
13428886 ####[facility] [address]   
[place] [address]   
   
                      Sodium [Moles/Vol] 131 mmol/L Low        135-145    [facility]  
   
                                        Comment on above:   Performed By: #### 2  
485222, 5170485, 8480014, 97391282,   
52584333 ####[facility] [address]   
[place] [address]   
   
                                                    CBC w/ Auto Diffon    
   
                                                    Erythrocyte   
distribution width   
(RBC) [Ratio]   13.1 %          Normal          10.9-14.2       [facility]  
   
                                        Comment on above:   Performed By: #### 2  
118402, 2099273, 8788585, 45748647,   
38539377 ####[address]   
[place] [address]   
   
                                                    Hematocrit (Bld)   
[Volume fraction] 36.1 %          Normal          34.0-46.0       [facility]  
   
                                        Comment on above:   Performed By: #### 2  
185307, 2987450, 5228657, 13620182,   
03819217 ####[address] [address]   
   
                                                    Hemoglobin (Bld)   
[Mass/Vol]      11.8 g/dL       Low             12.0-16.0       [facility]  
   
                                        Comment on above:   Performed By: #### 2  
522872, 8904429, 2345688, 92555793,   
16905679 ####[address] [address]   
   
                                                    MCH (RBC) [Entitic   
mass]           29.4 pg         Normal          27.0-34.0       [facility]  
   
                                        Comment on above:   Performed By: #### 2  
585602, 5483706, 3559873, 34830572,   
71012154 ####[address] [address]   
   
                      MCHC (RBC) [Mass/Vol] 32.7 g/dL  Normal     31.4-36.0  [facility]  
   
                                        Comment on above:   Performed By: #### 2  
565278, 8267414, 5152664, 24585870,   
34701999 ####[address] [address]   
   
                      MCV (RBC) [Entitic vol] 89.7 fL    Normal     80.0-100.0 F  
[facility]  
   
                                        Comment on above:   Performed By: #### 2  
688403, 6211216, 1194983, 24069224,   
28080923 ####[facility] [address]   
[place] [address]   
   
                                                    Platelet mean volume   
(Bld) [Entitic vol] 7.5 fL          Normal          6.4-10.8        [facility]  
   
                                        Comment on above:   Performed By: #### 2  
727152, 6824799, 8795057, 46613510,   
70882226 ####[address]   
[place] [address]   
   
                      Platelets (Bld) [#/Vol] 340.0 E9/L Normal     150.0-500.0   
[facility]  
   
                                        Comment on above:   Performed By: #### 2  
854452, 3263773, 5639258, 28952561,   
92435855 ####[address] [address]   
   
                      RBC (Bld) [#/Vol] 4.0 E12/L  Low        4.3-5.9    [facility]  
   
                                        Comment on above:   Performed By: #### 2  
070587, 8731099, 8257345, 10343899,   
39921568 ####[address] [address]   
   
                                                    WBC corrected for nucl   
RBC Auto (Bld) [#/Vol] 12.8 E9/L       High            4.0-11.0        [facility]  
   
                                        Comment on above:   Performed By: #### 2  
817117, 2461207, 1621301, 15025003,   
23397999 ####[address] [address]   
   
                                                    CHEMISTRYOrdered By: SYSTEM   
SYSTEM on 2022   
   
                          Anion gap [Moles/Vol] 15 mmol/L    Normal       6 - 16  
   
mEq/L                                   [facility]   
[facility]  
   
                          Calcium [Mass/Vol] 9.9 mg/dL    Normal       8.9 - 11.  
1   
mg/dL                                   [facility]   
[facility]  
   
                          Chloride [Moles/Vol] 99 mmol/L    Low          101 - 1  
11   
mmol/L                                  [facility]   
[facility]  
   
                          CO2 [Moles/Vol] 22 mmol/L    Normal       21 - 31   
mmol/L                                  [facility]   
[facility]  
   
                          Creatinine [Mass/Vol] 0.9 mg/dL    Normal       0.5 -   
1.3   
mg/dL                                   [facility]   
[facility]  
   
                                                    GFR/1.73 sq M.predicted   
among blacks MDRD   
(S/P/Bld) [Vol   
rate/Area]          mL/min/1.73 m2      Normal              >=59mL/min/  
1.73 m2                                 [facility]  
   
                                                    GFR/1.73 sq M.predicted   
among non-blacks MDRD   
(S/P/Bld) [Vol   
rate/Area]          mL/min/1.73 m2      Normal              >=59mL/min/  
1.73 m2                                 [facility]  
   
                          Glucose [Mass/Vol] 101 mg/dL    Normal       55 - 199   
mg/dL                                   [facility]   
[facility]  
   
                          Potassium [Moles/Vol] 4.5 mmol/L   Normal       3.5 -   
5.3   
mmol/L                                  [facility]   
[facility]  
   
                          Sodium [Moles/Vol] 131 mmol/L   Low          135 - 145  
   
mmol/L                                  [facility]   
[facility]  
   
                                                    Troponin I.cardiac   
[Mass/Vol]          12.30 pg/mL         Normal              10.10 -   
27.10 pg/mL                             [facility]   
[facility]  
   
                                                    Urea nitrogen   
[Mass/Vol]          19 mg/dL            Normal              5 - 21   
mg/dL                                   [facility]   
[facility]  
   
                                                    Urea   
nitrogen/Creatinine   
[Mass ratio]    21 mg/mg        High            10 - 20         [facility]   
[facility]  
   
                                                    Consent for Treatmenton    
   
                                        Consent for Treatment [id number]  
[id number]  
[id number]:  
127                 Normal                                  [facility]  
   
                                                    ED Clinical Summaryon 2022   
   
                      ED Clinical Summary            Normal                [facility]  
   
                                                    ED Note-Nursingon 2022  
   
   
                                        ED Note-Nursing     pt came up to nurses  
   
station and stated she   
wanted to go home. pt   
was asked to stay to   
sign paperwork and pt   
ignored the nurse and   
walked out of the ED   
with her . ED    
made aware.         Normal                                  [facility]  
   
                                        ED Note-Nursing     pt arrived to ed fro  
m   
home with her    
c/o fever of 99.5F and   
persistent cough since   
tuesday. pt denies any   
SOB or CP. pt has been   
taking robitussin and   
musinex at home. pt has   
a non productive cough   
with clear lung sounds   
throughout. VSS     Normal                                  [facility]  
   
                                                    ED Note-Physicianon 20   
   
                      ED Note-Physician            Normal                [facility]  
   
                                        Comment on above:   Result Comment: Elec  
tronically Signed By: Trae Garcia M.D.\Date and Time Signed: 22 06:46 EDT   
   
                                                    ED Patient Education Noteon   
2022   
   
                                                    ED Patient Education   
Note                            Normal                          [facility]  
   
                                                    ED Patient Summaryon   
022   
   
                      ED Patient Summary            Normal                [facility]  
   
                                                    HEMATOLOGYOrdered By: SYSTEM  
 SYSTEM on 2022   
   
                      Basophils/100 WBC (Bld) 0.5 %      Normal     0.0 - 2.0 %   
[facility]   
HemeAutoSS  
   
                                                    Basophils/Leukocytes   
Auto (Bld) [Pure #   
fraction]           0.1 E9/L            Normal              0.0 - 0.2   
E9/L                                    [facility]   
HemeAutoSS  
   
                                                    Eosinophils/100 WBC   
(Bld)           1.8 %           Normal          0.0 - 8.0 %     [facility]   
HemeAutoSS  
   
                                                    Eosinophils/Leukocytes   
Auto (Bld) [Pure #   
fraction]           0.2 E9/L            Normal              0.0 - 0.5   
E9/L                                    [facility]   
HemeAutoSS  
   
                                                    Lymphocytes/100 WBC   
(Bld)               9.1 %               Low                 14.0 - 50.0   
%                                       [facility]   
HemeAutoSS  
   
                                                    Lymphocytes/Leukocytes   
Auto (Bld) [Pure #   
fraction]           1.2 E9/L            Normal              1.0 - 4.0   
E9/L                                    [facility]   
HemeAutoSS  
   
                          Monocytes/100 WBC (Bld) 11.3 %       Normal       4.0   
- 14.0   
%                                       [facility]   
HemeAutoSS  
   
                                                    Monocytes/Leukocytes   
Auto (Bld) [Pure #   
fraction]           1.4 E9/L            High                0.2 - 1.0   
E9/L                                    [facility]   
HemeAutoSS  
   
                                                    Neutrophils/100 WBC   
(Bld)               77.3 %              High                36.0 - 75.0   
%                                       [facility]   
HemeAutoSS  
   
                                                    Neutrophils/Leukocytes   
Auto (Bld) [Pure #   
fraction]           9.9 E9/L            High                2.0 - 7.5   
E9/L                                    [facility]   
HemeAutoSS  
   
                                                    HEMATOLOGYOrdered By: Papo Valdes on 2022   
   
                                                    Erythrocyte   
distribution width   
(RBC) [Ratio]       13.1 %              Normal              10.9 - 14.2   
%                                       [facility]   
HemeAutoSS  
   
                                                    Hematocrit (Bld)   
[Volume fraction]   36.1 %              Normal              34.0 - 46.0   
%                                       [facility]   
HemeAutoSS  
   
                                                    Hemoglobin (Bld)   
[Mass/Vol]          11.8 g/dL           Low                 12.0 - 16.0   
gm/dL                                   [facility]   
HemeAutoSS  
   
                                                    MCH (RBC) [Entitic   
mass]               29.4 pg             Normal              27.0 - 34.0   
pg                                      [facility]   
HemeAutoSS  
   
                          MCHC (RBC) [Mass/Vol] 32.7 g/dL    Normal       31.4 -  
 36.0   
gm/dL                                   [facility]   
HemeAutoSS  
   
                          MCV (RBC) [Entitic vol] 89.7 fL      Normal       80.0  
 -   
100.0 fL                                [facility]   
HemeAutoSS  
   
                                                    Platelet mean volume   
(Bld) [Entitic vol] 7.5 fL              Normal              6.4 - 10.8   
fL                                      [facility]   
HemeAutoSS  
   
                          Platelets (Bld) [#/Vol] 340.0 E9/L   Normal       150.  
0 -   
500.0 E9/L                              [facility]   
HemeAutoSS  
   
                          RBC (Bld) [#/Vol] 4.0 E12/L    Low          4.3 - 5.9   
E12/L                                   [facility]   
HemeAutoSS  
   
                                                    WBC corrected for nucl   
RBC Auto (Bld) [#/Vol] 12.8 E9/L           High                4.0 - 11.0   
E9/L                                    [facility]   
HemeAutoSS  
   
                                                    Troponin 0 Hr.on 2022   
   
                                                    Troponin I.cardiac   
[Mass/Vol]      12.30 pg/mL     Normal          10.10-27.10     [facility]  
   
                                        Comment on above:   Result Comment: The   
95% CI (Confidence Interval) PPV (Positive  
   
Predictive Value) for myocardial infarction in females is 38   
pg/mL, in males 51 pg/mL. The results should be used in   
conjunction with clinical conditions of myocardial   
infarction.(Access High Sensitivity Troponin I Instructions   
For Use, [employer], 2018)   
   
                                                            Performed By: #### 2  
251313, 8678626, 9211149, [id number],   
[id number] ####[facility] [address]   
[place] [address]   
   
                                                    XR Chest Single Viewon    
   
                      XR Chest Single View            Normal                [facility]  
[facility]  
   
                                                    eGFRon 2022   
   
                                                    GFR/1.73 sq M.predicted   
among blacks MDRD   
(S/P/Bld) [Vol   
rate/Area]      mL/min/{1.73_m2} Normal          >=59            [facility]  
   
                                        Comment on above:   Order Comment: Order  
 added by Discern Expert.   
   
                                                            Result Comment: eGFR  
 is race adjusted. AA=.   
   
                                                            Performed By: #### 2  
362871, 3183856, 1457857, 30927204,   
[id number] ####[facility] [address]   
[place] [address]   
   
                                                    GFR/1.73 sq M.predicted   
among non-blacks MDRD   
(S/P/Bld) [Vol   
rate/Area]      mL/min/{1.73_m2} Normal          >=59            [facility]  
   
                                        Comment on above:   Order Comment: Order  
 added by Discern Expert.   
   
                                                            Result Comment:   
deion kidney disease could be indicated at   
eGFR's of less than 60 mL/min/1.73m2. Kidney failure is   
indicated at less than 15 mL/min/1.73m2.   
   
                                                            Performed By: #### 2  
614418, 4663148, 6445531, 82152823,   
[id number] ####[facility] [address]   
[place] [address]   
   
                                                    Ambulatory Visit Summaryon 0  
2022   
   
                                                    Ambulatory Visit   
Summary                         Normal                          [facility]  
   
                                                    Consent for Treatmenton 07-1  
3-2022   
   
                                        Consent for Treatment [id number]  
[id number]  
[id number]  
:127                Normal                                  [facility]  
   
                                                    Family Medicine Office/Clini  
c Noteon 2022   
   
                                                    Family Medicine   
Office/Clinic Note                 Normal                          [facility]  
   
                                        Comment on above:   Result Comment: Elec  
tronically Signed By: Consuelo HENDRICKSON CNP\.erum\Date and Time Signed: 22 14:31 EDT   
   
                                                    MICRO OTHER TESTSOrdered By:  
 Randy Delarosa on 2022   
   
                                        Rapid COV Int NEG Ctl Pass  
(22 2:56 PM)   Normal                                  FT Man UA   
SS  
   
                                        Rapid COV Int POS Ctl Pass  
(22 2:56 PM)   Normal                                  FT Man UA   
SS  
   
                                                    SARS-CoV-2 (COVID-19)   
RNA JOSÉ ANTONIO+probe Ql (Unsp   
spec)                                   Not Detected  
(22 2:56 PM)         Normal                    Not   
Detected                                FT Man UA   
SS  
   
                                                    Patient Educationon 20   
   
                      Patient Education            Normal                [facility]  
   
                                                    Rapid COVID Antigen ([facility])on  
 2022   
   
                      Rapid COV Int NEG Ctl Pass       Normal                Fis  
[facility]  
   
                                        Comment on above:   Performed By: #### 2  
[id number] ####Bhavesh [facility]   
[id number] [place] [address]   
   
                      Rapid COV Int POS Ctl Pass       Normal                Fis  
her   
[facility]  
   
                                        Comment on above:   Performed By: #### 2  
[id number] ####Bhavesh [facility]   
[id number] [place] [address]   
   
                                                    SARS-CoV-2 (COVID-19)   
RNA JOSÉ ANTONIO+probe Ql (Unsp   
spec)               Not detected        Normal              Not   
Detected                                [facility]  
   
                                        Comment on above:   Result Comment: The   
[email or web address]? System for Rapid Detection of   
SARS-CoV-2 is a chromatographic digital immunoassay intended   
for the direct and qualitative detection of SARS-CoV-2   
nucleocapsid antigens in nasal swabs from individuals who are   
suspected of COVID-19 by their healthcare provider within the   
first five days of the onset of symptoms. Negative results   
should be treated as presumptive, do not rule out SARS-CoV-2   
infection and should not be used as the sole basis for   
treatment or patient management decisions, including infection   
control decisions. Negative results should be considered in   
the context of a patient?s recent exposures, history and the   
presence of clinical signs and symptoms consistent with   
COVID-19, and confirmed with a molecular assay, if necessary,   
for patient management. For in vitro diagnostic use. In the   
[place], only for use under an Emergency Use Authorization. In the   
[place], this test has not been FDA cleared or approved; this test   
has been authorized by FDA under an EUA for use by authorized   
laboratories; use by laboratories certified under the CLIA, 42   
U.S.C. ?263a, that meet requirements to perform moderate,   
high, or waived complexity tests and at the Point of Care   
(POC), i.e., in patient care settings operating under a CLIA   
Certificate of Waiver, Certificate of Compliance, or   
Certificate of Accreditation.This test has been authorized   
only for the detection of proteins from SARS-CoV-2, not for   
any other viruses or pathogens; and, in the [place], this test is   
only authorized for the duration of the declaration that   
circumstances exist justifying the authorization of emergency   
use of in vitro diagnostics for detection and/or diagnosis of   
the virus that causes COVID-19 under Section 564(b)(1) of the   
Act, 21 U.S.C. ? 360bbb-3(b)(1), unless the authorization is   
terminated or revoked sooner.   
   
                                                            Performed By: #### 2  
[id number] ####[facility]   
[id number] [place] [address]   
   
                      Employed in Healthcare NO         Normal                [facility]  
   
                                        Comment on above:   Performed By: #### 2  
[id number] ####[facility]   
[address] [place] [address]   
   
                      First Test Unknown    [facility]  
   
                                        Comment on above:   Performed By: #### 2  
[id number] ####[facility]   
[id number] [place] [address]   
   
                      Hospitalized? NO         [facility]  
   
                                        Comment on above:   Performed By: #### 2  
[id number] ####[place] [address] [place] [address]   
   
                      ICU        NO         [facility]  
   
                                        Comment on above:   Performed By: #### 2  
[id number] ####[place] [address] [place] [address]   
   
                      Pregnant?  NO         [facility]  
   
                                        Comment on above:   Performed By: #### 2  
[id number] ####[place] [place] [address]   
   
                                                    Resides in a Congregate   
Care Setting    NO              [facility]  
   
                                        Comment on above:   Performed By: #### 2  
[id number] ####[facility]   
[address] [place] [address]   
   
                                                    Symptomatic as defined   
by CDC          YES             [facility]  
   
                                        Comment on above:   Performed By: #### 2  
[id number] ####[facility]   
[address] [place] [address]   
   
                                                    BUNon 2022   
   
                                                    Urea nitrogen   
[Mass/Vol]      24.0 mg/dL      Critically high 7.0-18.0        [facility]  
   
                                        Comment on above:   Performed By: #### MADDY BENDER ####  
[facility]  
[address] [place] [address]  
Dr. Jayson Jennings   
   
                                                    CREATININEon 2022   
   
                      Creatinine [Mass/Vol] 1.15 mg/dL Critically high 0.52-1.04  
  [facility]  
   
                                        Comment on above:   Performed By: #### B  
UN, CREA ####  
[facility]  
[address] [address]  
Dr. Jayson Jennings   
   
                      EGFR-AF AMERICAN 56 mL/min/1.73m2 Critically low >=60       
  [facility]  
   
                                        Comment on above:   Performed By: #### B  
UN, CREA ####  
[facility]  
[address] [address]  
Dr. Jayson Jennings   
   
                      EGFR-NON AF AMERICAN 46 mL/min/1.73m2 Critically low >=60   
      The   
[facility]  
   
                                        Comment on above:   Performed By: #### B  
UN, CREA ####  
[facility]  
[address] [address]  
Dr. Jayson Jennings   
   
                                                    CT LSPINE WO W CONon   
022   
   
                                        CT LSPINE WO W CON  EXAMINATION: CT TSPI  
NE   
WO W CON, CT LSPINE WO W   
CON  
HISTORY: Secondary   
malignant neoplasm of   
bone  
COMPARISON: No relevant   
comparison available.  
FINDINGS:  
BONES: T5-6: Prominent   
sclerosis and anterior   
margin of the contiguous   
endplates  
suspected secondary to   
bone-on-bone contact and   
degenerative changes.  
T7: Complete loss of   
height and destruction   
with anterior and   
posterior  
displacement of the   
walls resulting in   
marked central canal   
narrowing.  
T8, T9, T11: Mild   
compression fractures   
suspected to be chronic.  
L2, 3, 4, 5: Small areas   
of sonolucency which may   
represent metastasis. A  
larger 17 mm area within   
L3 may represent a   
hemangioma.  
Incidental Schmorl's   
nodes at multiple levels   
projecting into the   
inferior  
endplates of thoracic   
and lumbar vertebral   
bodies. Multilevel   
mild-moderate  
degenerative facet   
arthropathy. Moderate   
left convex curvature of   
the lumbar  
spine.  
DISC SPACES: Marked   
narrowing C6-7, T7-8,   
L2-3, L3-4, L5-S1.   
Multilevel moderate  
and mild narrowing.  
PARASPINOUS: Right renal   
cyst, nonspecific but   
favoring benign   
etiology.  
OTHER: Negative.  
IMPRESSION:  
1. T7 marked compression   
fracture with complete   
loss of height; post   
traumatic  
versus pathologic.   
Retropulsion of the   
posterior wall causing   
marked central  
canal narrowing.  
2. Several areas within   
L2-L5 vertebral bodies   
suspicious for   
metastatic  
disease. Consider   
nuclear medicine whole   
body bone scan for   
evaluation of all  
skeletal structures.  
3. Multilevel marked   
degenerative disc   
disease, degenerative   
facet arthropathy,  
and what is suspected to   
be mild, old compression   
fractures at multiple   
levels.  
Electronically   
authenticated by: JASMIN LINDO Date: 2022   
10:49               Normal                                  [facility]  
   
                                                    ANES POSTPROC EVALon   
021   
   
                                        ANES POSTPROC EVAL  HNO ID: [id number]  
Author: Linda Rushing MD  
Service: Anesthesiology  
Author Type:   
Anesthesiologist  
Type: Anesthesia   
Postprocedure Evaluation  
Filed: 2021 11:29 AM  
Note Text:  
POST ANESTHESIA   
EVALUATION NOTE  
: 1950  
Procedure Summary  
Date: 21 Room /   
Location: AV ENDO 01 /   
AV ENDO  
Anesthesia Start: 1005   
Anesthesia Stop: 1015  
Procedure: EGD (N/A   
Throat) Diagnosis:   
Dysphagia, unspecified   
type  
Surgeons: Roberto Carlos Calderon MD   
Responsible Provider:   
Linda Rushing MD  
Anesthesia Type: MAC ASA   
Status: 3  
Anesthesia Type: MAC  
Last vitals  
Vitals Value Taken Time  
/69 21 1040  
Temp 36.9 ?C (98.4 ?F)   
21 1017  
Pulse 64 21 1040  
Resp 16 21 1040  
SpO2 95 % 21 1040  
Post Anesthesia Patient   
Status  
Patient Evaluation:   
bedside.  
Anticipated Disposition:   
phase 2 then home.  
Neurological Status:   
aware and responsive.  
Pulmonary Status:   
breathing comfortably on   
room air  
Airway Control: returned   
to baseline unsupported.  
Cardiovascular Status:   
stable.  
Pain Management:   
clinically adequate  
- multimodal analgesia   
pain management approach  
Postoperative Hydration:   
acceptable.  
Intraoperative Events:  
no significant   
anesthesia events  
Recommendation: continue   
current plan of care and   
further care per  
PACU/ICU/floor team.  
No complications   
documented.  
SIGNATURE: Linda Rushing MD PATIENT NAME: Ping Chavez  
DATE: 2021   
MRN: [id number]  
TIME: 11:29 AM CSN:   
[id number]           [facility]  
   
                                                    ANES PRE-OPon 2021   
   
                                        ANES PRE-OP         HNO ID: [id number]  
Author: Linda Rushing MD  
Service: Anesthesiology  
Author Type:   
Anesthesiologist  
Type: Anesthesia   
Preprocedure Evaluation  
Filed: 2021 10:09 AM  
Note Text:  
ANESTHESIOLOGY DAY OF   
SURGERY NOTE  
: 1950  
Procedure(s) (LRB):  
EGD (N/A)  
Surgeon(s):  
Roberto Carlos Calderon MD  
Estimated body mass   
index is 34.19 kg/m? as   
calculated from the   
following:  
Height as of 21: 174   
cm (5' 8.5 ).  
Weight as of 21:   
103.5 kg (228 lb 3.2   
oz).  
Most recent hematocrit   
and potassium results:  
Potassium 4.8 2021  
Relevant Problems  
CARDIO  
(+) CHF (congestive   
heart failure) (HCC)  
ENDO  
(+) Hypothyroidism  
Other  
(+) Chronic gout of   
multiple sites  
I - PHYSICAL EVALUATION  
AIRWAY  
Patient intubated: No.  
Tracheostomy tube not   
present  
Mallampati: II.  
TM distance: >3 FB.  
Neck ROM: full ROM   
without neurological   
symptoms.  
Mouth opening: adequate.  
Short neck: no.  
Thick neck: no  
DENTAL  
Normal dental   
observations.  
Dental findings: teeth   
intact.  
Additional exam   
findings: no  
II - ANESTHESIA PLAN  
ASA Score: 3  
Anesthetic Plan: MAC  
NPO Status: adequate  
Monitoring plan:   
Standard ASA.   
Postoperative analgesic   
plan: parenteral or  
oral opioids and   
multimodal analgesia.  
Anesthetic Risks,   
Benefits, Alternatives,   
Personnel Discussed.   
Consent  
obtained from:   
patient.Patient /   
Surrogate agrees to   
blood products: blood  
products not planned  
DNR status not reviewed   
with patient and/or   
family prior to surgery.  
Significant changes in   
the patient condition   
since the History and  
Physical, not otherwise   
documented in primary   
service progress note:   
no.  
Potential Anesthesia   
issues that may suggest   
increased risk of  
complications or   
contraindication to   
planned procedure: none.  
Vitals Value Taken Time  
/89 21 0950  
Pulse  
Resp 16 21 0950  
Temp 35.7 ?C (96.3 ?F)   
21 0950  
SpO2 99 % 21 0950  
Facility-Administered   
Medications as of   
2021  
Medication Dose Route   
Frequency  
- NaCl 0.9% iv infusion   
50 mL/hr INTRAVENOUS   
CONTINUOUS  
Outpatient Medications   
as of 2021  
Medication Sig  
- Omeprazole Magnesium   
(PRILOSEC OTC) 20 mg   
tablet Take 1 tablet by   
mouth  
once daily.  
- acetaminophen   
(TYLENOL) 325 mg tablet   
Take 650 mg by mouth   
every 6 hours  
as needed.  
- allopurinol (ZYLOPRIM)   
100 mg tablet TAKE 1   
TABLET BY MOUTH ONCE   
DAILY  
FOR 90 DAYS  
- carvedilol (COREG)   
6.25 mg tablet Take 6.25   
mg by mouth twice daily   
with  
meals.  
- cholecalciferol   
(VITAMIN D3) 50 mcg   
(2,000 unit) tablet Take   
2,000 Units  
by mouth once daily.  
- dorzolamide-timolol   
(COSOPT) 22.3-6.8 mg/mL   
ophthalmic solution Use   
1  
Drop in both eyes twice   
daily.  
- furosemide (LASIX) 40   
mg tablet Take 10 mg by   
mouth as needed.  
- levothyroxine   
(SYNTHROID) 50 mcg   
tablet Take 50 mcg by   
mouth every  
morning. Take On an   
Empty Stomach  
- loratadine (CLARITIN)   
10 mg tablet Take 10 mg   
by mouth at bedtime as  
needed.  
- losartan (COZAAR) 25   
mg tablet TAKE 1 TABLET   
BY MOUTH ONCE DAILY FOR   
90  
DAYS  
- Magnesium Oxide 500 mg   
tab Take 500 mg by mouth   
once daily.  
- therapeutic   
multivitamin-minerals   
(THERA-M PLUS) 9 mg   
iron-400 mcg  
tablet Take 1 tablet by   
mouth once daily.  
- Lactobacillus   
acidophilus (PROBIOTIC   
ORAL) Take by mouth.  
- flaxseed oil (OMEGA 3   
ORAL) Take by mouth.  
- lactase (ULTRA DAIRY   
DIGESTIVE ORAL) Take by   
mouth.  
- fluvoxaMINE (LUVOX)   
100 mg tablet Take 100   
mg by mouth twice daily.  
- rivaroxaban (XARELTO)   
20 mg tablet TAKE 1   
TABLET BY MOUTH ONCE   
DAILY  
WITH SUPPER  
- simvastatin (ZOCOR) 40   
mg tablet Take 40 mg by   
mouth daily at bedtime.  
- spironolactone   
(ALDACTONE) 25 mg tablet   
TAKE 1 2 (ONE HALF)   
TABLET BY  
MOUTH ONCE DAILY FOR 90   
DAYS  
I have interviewed and   
examined the patient. I   
have reviewed the   
medical  
record and/or the   
pre-anesthesia   
evaluation, pertinent   
labs, and test  
results.  
This contains updated   
information obtained   
within 48 hours of  
Surgery/Procedure.  
SIGNATURE: Linda Rushing MD PATIENT NAME: Ping Chavez  
DATE: 2021   
MRN: [id number]  
TIME: 10:09 AM CSN:   
[id number]           [facility]  
   
                                                    HISTORY PHYSICALon    
   
                                        HISTORY PHYSICAL    HNO ID: [id number]  
Author: Roberto Carlos Calderon MD  
Service: General Surgery  
Author Type: Physician  
Type: HANDP  
Filed: 2021 9:13 AM  
Note Text:  
Here for EGD  
PE:  
HEENT: PERRLA  
Neck: supple  
Chest: Clear  
Heart:RRR  
Abdomen: Benign  
Neuro: wnl  
Back, spine,   
extremities: wnl    Normal                                  [facility]  
   
                                                    Basic Metabolic Panlon    
   
                      Anion gap [Moles/Vol] 10 mmol/L  Normal     9-18       [facility]  
   
                      Calcium [Mass/Vol] 10.2 mg/dL Normal     8.5-10.2   [facility]  
   
                      Chloride [Moles/Vol] 103 mmol/L Normal          [facility]  
   
                      CO2 [Moles/Vol] 25 mmol/L  Normal     22-30      [facility]  
   
                      Creatinine [Mass/Vol] 0.88 mg/dL Normal     0.58-0.96  [facility]  
   
                      eGFR- Amer. >60        Normal                [facility]  
   
                      eGFR-All Other Races >60        Normal                [facility]  
   
                                        Comment on above:   Result Comment: eGFR  
 (Estimated GFR) Units of measure:   
mL/min/1.73 meters squared  
eGFR is derived from the reexpressed MDRD Study equation using   
the following parameters: serum creatinine, age, gender and   
race. The creatinine assay has been calibrated to be traceable   
to IDMS.  
An eGFR <60 mL/min/1.73m2 for >3 months is consistent with   
chronic kidney disease. Refer to KDOQI guidelines for clinical   
interpretation.  
In patients with unstable renal function, e.g. those with   
acute kidney injury, the eGFR may not accurately reflect   
actual GFR.   
   
                      Glucose [Mass/Vol] 85 mg/dL   Normal     74-99      [facility]  
   
                                        Comment on above:   Result Comment: The   
American Diabetes Association (ADA)   
provides guidance for cutoff values for fasting glucose and   
random glucose. The ADA defines fasting as no caloric intake   
for at least 8 hours. Fasting plasma glucose results between   
100 to 125 mg/dL indicate increased risk for diabetes   
(prediabetes).  
Fasting plasma glucose results greater than or equal to 126   
mg/dL meet the criteria for diagnosis of diabetes. In the   
absence of unequivocal hyperglycemia, results should be   
confirmed by repeat testing. In a patient with classic   
symptoms of hyperglycemia or hyperglycemic crisis, random   
plasma glucose results greater than or equal to 200 mg/dL meet   
the criteria for diagnosis of diabetes.  
Reference: Standards of Medical Care in Diabetes 2016,   
American Diabetes Association. Diabetes Care. 2016.39(Suppl   
1).   
   
                      Potassium [Moles/Vol] 4.8 mmol/L Normal     3.7-5.1    [facility]  
   
                      Sodium [Moles/Vol] 138 mmol/L Normal     136-144    [facility]  
   
                                                    Urea nitrogen   
[Mass/Vol]      24 mg/dL        High            7-21            [facility]  
   
                                                    HISTORY PHYSICALon    
   
                                        HISTORY PHYSICAL    HNO ID: [id number]  
Author: GERRI Martinez.CNS  
Service: ?  
Author Type: Nurse   
Specialist  
Type: HANDP  
Filed: 2021 12:21 PM  
Note Text:  
HISTORY AND PHYSICAL   
EXAMINATION  
SERVICE DATE: 2021  
SERVICE TIME: 12:19 PM  
PRIMARY CARE PHYSICIAN:   
Alexander Guerra MD  
REASON FOR VISIT: Ping Chavez is a 71 year old   
female who is scheduled  
for : EGD at the request   
of Dr. Roberto Carlos Calderon for consultation.  
My final recommendation   
will be communicated   
back to the requesting  
physician by way of   
shared medical record or   
letter.  
Subjective  
The patient has the   
following:  
ACTIVE PROBLEM LIST  
Primary Osteoarthritis   
of Both Knees  
Dysphagia  
Chf (Congestive Heart   
Failure) (Hcc)  
Hypothyroidism  
Neuropathy  
Icd (Implantable   
Cardioverter-Defibrillat  
or) in Place  
Chronic Gout of Multiple   
Sites  
Bilateral Malignant   
Neoplasm of Overlapping   
Sites of Breast in   
Female  
(Hcc)  
Other Hyperlipidemia  
Nonischemic Congestive   
Cardiomyopathy (Hcc)  
CHIEF COMPLAINT: Patient   
stated  I have   
difficulty with   
swallowing food so  
will have EGD   
HPI: H/O 71 year old   
female stated she has   
had difficulty with   
swallowing  
food. She has had   
occasional N/V. No mid   
abdominal pain.   
Consulted Dr. Calderon. After exam, Dx   
with Dysphagia   
Unspecified Type.  
REVIEW OF SYSTEMS:  
General: BMI 34.1  
Neurological: H/O   
neuropathy has tingling   
both hands occasionaly.   
No  
history of TIA's,   
stroke, CNS tumor,   
impaired sensorium,   
hemiplegia,  
paraplegia or   
quadraplegia. No   
neurological symptoms or   
problems.  
Respiratory: No history   
of current cough or   
dyspnea, or pneumonia in   
the  
past 6 weeks. No history   
of respiratory/pulmonary   
symptoms or problems.  
Cardiovascular: H/O   
Nonischemic Congestive   
Cardiomyopathy  
Positive for: AICD/PPM   
(iFlexMe   
placed originally 2013   
recent  
check scanned 21),   
CHF (in the past ) and   
hyperlipidemia (takes  
med)  
GI: Recent dysphagia  
: H/O Gout  
GYN: Negative for   
abnormal vaginal   
bleeding, abnormal   
vaginal discharge.  
Endocrine:  
Positive for:   
hypothyroidism (takes   
med daily).  
Hematology: No history   
of bleeding or clotting   
disorder. Patient is not  
taking anti-coagulation   
or platelet medications.   
No history of  
hematological symptoms   
or problems.  
Oncology: H/O Bilateral   
Breast CA had   
rigttMastectomy 1998 had   
radiation  
and chemotherapy  
Psych: No history of   
psychiatric symptoms or   
problems.  
Musculoskeletal:  
Positive for: joint pain   
(generalized ,both   
knees,neck).  
Skin: Negative for   
lesions, rash and   
itching.  
PAST MEDICAL HISTORY  
Diagnosis Date  
- Bilateral malignant   
neoplasm of overlapping   
sites of breast in   
female  
(HCC)  
right  
- CHF (congestive heart   
failure) (HCC)  
- Chronic gout of   
multiple sites  
- Dysphagia  
- History of bilateral   
breast cancer  
- Hypothyroidism  
- ICD (implantable   
cardioverter-defibrillat  
or) in place   
[employer]  
- Neuropathy  
- Nonischemic congestive   
cardiomyopathy (HCC)  
- Other hyperlipidemia  
PAST SURGICAL HISTORY  
Procedure Laterality   
Date  
- CHOLECYSTECTOMY  
- COLONOSCOPY  
- EGD  
- FOOT SURGERY HX Left  
some excess bones   
removed  
- KNEE ARTHROSCOPY Right  
- MASTECTOMY HX Right  
FAMILY HISTORY  
Problem Relation Age of   
Onset  
- Diabetes Mother  
- Hypertension Mother  
- other (MI) Father  
- Cancer Sister  
Social History  
Tobacco Use  
- Smoking status: Never   
Smoker  
- Smokeless tobacco:   
Never Used  
Substance Use Topics  
- Alcohol use: Yes  
Comment: OCC  
- Drug use: Never  
Prior to Admission   
medications as of 21   
1025  
Medication Sig Last Dose   
Taking  
esomeprazole (NEXIUM   
24HR) 20 mg capsule Take   
20 mg by mouth DAILY (6   
AM).  
Yes  
acetaminophen (TYLENOL)   
325 mg tablet Take 650   
mg by mouth every 6   
hours  
as needed. Yes  
allopurinol (ZYLOPRIM)   
100 mg tablet TAKE 1   
TABLET BY MOUTH ONCE   
DAILY FOR  
90 DAYS Yes  
carvedilol (COREG) 6.25   
mg tablet Take 6.25 mg   
by mouth twice daily   
with  
meals. Yes  
dorzolamide-timolol   
(COSOPT) 22.3-6.8 mg/mL   
ophthalmic solution Use   
1 Drop  
in both eyes twice   
daily.  
Yes  
furosemide (LASIX) 40 mg   
tablet Take 10 mg by   
mouth as needed.  
Yes  
levothyroxine   
(SYNTHROID) 50 mcg   
tablet Take 50 mcg by   
mouth every  
morning. Take On an   
Empty Stomach Yes  
loratadine (CLARITIN) 10   
mg tablet Take 10 mg by   
mouth at bedtime as  
needed. Yes  
losartan (COZAAR) 25 mg   
tablet TAKE 1 TABLET BY   
MOUTH ONCE DAILY FOR 90  
DAYS Yes  
Magnesium Oxide 500 mg   
tab Take 500 mg by mouth   
once daily.  
Yes  
simvastatin (ZOCOR) 40   
mg tablet Take 40 mg by   
mouth daily at bedtime.  
Yes  
spironolactone   
(ALDACTONE) 25 mg tablet   
TAKE 1 2 (ONE HALF)   
TABLET BY  
MOUTH ONCE DAILY FOR 90   
DAYS Yes  
Lactobacillus   
acidophilus (PROBIOTIC   
ORAL) Take by mouth. Yes  
calcium carbonate   
(CALCIUM 600 ORAL) Take   
1 Dose by mouth once   
daily.  
8/3/21  
Omeprazole Magnesium   
(PRILOSEC OTC) 20 mg   
tablet Take 1 tablet by   
mouth  
once daily.  
cholecalciferol (VITAMIN   
D3) 50 mcg (2,000 unit)   
tablet Take 2,000 Units  
by mouth once daily.  
8/3/21  
fluvoxaMINE (BULMARO (more   
content not included)... Normal                                  [facility] 2021   
   
                                        CNPN                Telephone (GENSAV)  
------------------------  
--------  
PING CHAVEZ ([id number])   
1950 [address]  
Date Time Provider   
Department  
21 ROBERTO CARLOS CALDERON  
During your visit today,   
we recorded the   
following information   
about you:  
Loreto Gandhi RN   
2021 2:12 PM Signed  
Date/Provider 21   
Gerard  
Procedure:EGD  
Facility:[facility]  
Prep ordered:None  
Knowledge of prep   
instructions:yes  
Diabetic:no  
Blood Thinners:Xarelto-   
pt stated that she has   
permission from her   
cardiologist  
to hold this medication   
prior to her procedure  
Pacemaker with   
defibrillator:no  
Patient aware of date,   
[facility] will call with   
arrival time, and   
verbalized  
understanding.  
Allergies As of Date:   
2021 Noted Allergy   
Reaction  
CECLOR (CEFACLOR)   
2020 2 - Rash  
7 - Swelling  
Comments: And redness  
Date Reviewed:   
2021  
Reviewed by: Lydia Bassett LPN - Fully   
Assessed  
Reason for Visit:  
Pre-Op Teaching [134]  
Prescriptions as of   
2021  
- Omeprazole Magnesium   
(PRILOSEC OTC) 20 mg   
tablet  
Take 1 tablet by mouth   
once daily.  
- acetaminophen   
(TYLENOL) 325 mg tablet  
Take 650 mg by mouth   
every 6 hours as needed.  
- allopurinol (ZYLOPRIM)   
100 mg tablet  
TAKE 1 TABLET BY MOUTH   
ONCE DAILY FOR 90 DAYS  
- carvedilol (COREG)   
6.25 mg tablet  
Take 6.25 mg by mouth   
twice daily with meals.  
- cholecalciferol   
(VITAMIN D3) 50 mcg   
(2,000 unit) tablet  
Take 2,000 Units by   
mouth once daily.  
- dorzolamide-timolol   
(COSOPT) 22.3-6.8 mg/mL   
ophthalmic solution  
Use 1 Drop in both eyes   
twice daily.  
- fluvoxaMINE (LUVOX)   
100 mg tablet  
Take 100 mg by mouth   
twice daily.  
- furosemide (LASIX) 40   
mg tablet  
Take 10 mg by mouth at   
bedtime as needed.  
- levothyroxine   
(SYNTHROID) 50 mcg   
tablet  
Take 50 mcg by mouth   
every morning. Take On   
an Empty Stomach  
- loratadine (CLARITIN)   
10 mg tablet  
Take 10 mg by mouth at   
bedtime as needed.  
- losartan (COZAAR) 25   
mg tablet  
TAKE 1 TABLET BY MOUTH   
ONCE DAILY FOR 90 DAYS  
- Magnesium Oxide 500 mg   
tab  
Take 500 mg by mouth   
once daily.  
- therapeutic   
multivitamin-minerals   
(THERA-M PLUS) 9 mg   
iron-400 mcg tablet  
Take 1 tablet by mouth   
once daily.  
- rivaroxaban (XARELTO)   
20 mg tablet  
TAKE 1 TABLET BY MOUTH   
ONCE DAILY WITH SUPPER  
- simvastatin (ZOCOR) 40   
mg tablet  
Take 40 mg by mouth   
daily at bedtime.  
- spironolactone   
(ALDACTONE) 25 mg tablet  
TAKE 1 2 (ONE HALF)   
TABLET BY MOUTH ONCE   
DAILY FOR 90 DAYS  
- Lactobacillus   
acidophilus (PROBIOTIC   
ORAL)  
Take by mouth.  
- flaxseed oil (OMEGA 3   
ORAL)  
Take by mouth.  
- lactase (ULTRA DAIRY   
DIGESTIVE ORAL)  
Take by mouth.  
Problem List As Of Date   
2021 Noted   
Resolved  
Primary osteoarthritis   
of both knees [M17.0]   
2021  
Encounter Number:   
[id number]  
Encounter Status:Closed   
by LORETO GANDHI RN on   
21             [facility]  
   
                                                    [place] 2021   
   
                                        CNOV                Office Visit (OTOL[facility]  
)  
------------------------  
--------  
PING CHAVEZ ([id number])   
1950 F  
Date Time Provider   
Department  
3/30/21 4:00 PM GAB MCNEILL (CNP) [facility]  
During your visit today,   
we recorded the   
following information   
about you:  
Temperature Pulse   
Respiration  
98.4 degrees 60/minute   
16/minute  
Gab Mcneill APRN.CNP 3/30/2021 5:10   
PM Signed  
Ms. Chavez is a 71 year   
old female who comes in   
for evaluation of sinus   
issues  
and sore throat.  
72yo F presents to   
clinic for chronic   
maxillary sinus issues   
and trouble  
swallowing for the last   
6 months that has   
started to worsen.   
Patient reports  
that she thinks that the   
increase in saliva is   
due to her sinuses.   
Patient  
constantly feels like   
she needs to clear her   
throat. Previously   
worked up for  
acid reflux and was on   
PPIs then transitioned   
to Pepcid which the   
patient  
states has been helping   
with her symptoms.   
Trouble swallowing both   
liquids and  
solids, occasionally   
chokes when eating.   
Patient has tried   
smaller portions,  
more chewing between   
bites, and more liquid   
to help but still   
experiencing  
these issues. When she   
does swallow, she feels   
a thick mucus sensation,  
sometimes goes down the   
wrong pipe.  
Patient is currently   
experiencing sinus   
pressure with mild   
headache. Patient  
endorses PND. Never been   
treated with antibiotics   
in the past.  
Patient has a history of   
radiation for   
inflammatory breast   
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  
her spine.  
Past history :  
PAST MEDICAL HISTORY  
Diagnosis Date  
- CHF (congestive heart   
failure) (HCC)  
- History of bilateral   
breast cancer  
- Hypothyroidism  
- Neuropathy  
Current medication:  
Current Outpatient   
Medications  
Medication Sig  
- acetaminophen   
(TYLENOL) 325 mg tablet   
Take 650 mg by mouth   
every 6 hours as  
needed.  
- allopurinol (ZYLOPRIM)   
100 mg tablet TAKE 1   
TABLET BY MOUTH ONCE   
DAILY FOR 90  
DAYS  
- carvedilol (COREG)   
6.25 mg tablet Take 6.25   
mg by mouth twice daily   
with  
meals.  
- cholecalciferol   
(VITAMIN D3) 50 mcg   
(2,000 unit) tablet Take   
2,000 Units by  
mouth once daily.  
- dorzolamide-timolol   
(COSOPT) 22.3-6.8 mg/mL   
ophthalmic solution Use   
1 Drop in  
both eyes twice daily.  
- famotidine (PEPCID) 20   
mg tablet Take 20 mg by   
mouth at bedtime as   
needed.  
- fluvoxaMINE (LUVOX)   
100 mg tablet Take 100   
mg by mouth twice daily.  
- furosemide (LASIX) 40   
mg tablet Take 10 mg by   
mouth at bedtime as   
needed.  
- levothyroxine   
(SYNTHROID) 50 mcg   
tablet Take 50 mcg by   
mouth every morning.  
Take On an Empty Stomach  
- loratadine (CLARITIN)   
10 mg tablet Take 10 mg   
by mouth at bedtime as   
needed.  
- losartan (COZAAR) 25   
mg tablet TAKE 1 TABLET   
BY MOUTH ONCE DAILY FOR   
90 DAYS  
- Magnesium Oxide 500 mg   
tab Take 500 mg by mouth   
once daily.  
- therapeutic   
multivitamin-minerals   
(THERA-M PLUS) 9 mg   
iron-400 mcg tablet  
Take 1 tablet by mouth   
once daily.  
- rivaroxaban (XARELTO)   
20 mg tablet TAKE 1   
TABLET BY MOUTH ONCE   
DAILY WITH  
SUPPER  
- simvastatin (ZOCOR) 40   
mg tablet Take 40 mg by   
mouth daily at bedtime.  
- spironolactone   
(ALDACTONE) 25 mg tablet   
TAKE 1 2 (ONE HALF)   
TABLET BY MOUTH  
ONCE DAILY FOR 90 DAYS  
- Lactobacillus   
acidophilus (PROBIOTIC   
ORAL) Take by mouth.  
- flaxseed oil (OMEGA 3   
ORAL) Take by mouth.  
- lactase (ULTRA DAIRY   
DIGESTIVE ORAL) Take by   
mouth.  
No current   
facility-administered   
medications for this   
visit.  
Allergies:  
ALLERGIES  
Allergen Reactions  
- Ceclor [Cefaclor]   
Rash, Swelling  
And redness  
Social history:  
Social History  
Tobacco Use  
- Smoking status: Never   
Smoker  
- Smokeless tobacco:   
Never Used  
Substance Use Topics  
- Alcohol use: Yes  
- Drug use: Never  
Family history: No   
family history on file.  
There are no exam notes   
on file for this visit.  
Physical exam:  
General Appearance: 71   
year old female is   
alert, oriented, not in   
acute  
distress. Hearing is   
grossly normal, voice is   
raspy. There is no   
tenderness  
with percussion over the   
paranasal sinuses.  
Eyes: PEERLA,   
extraocular movements   
are full.  
Nose: Clean, septum is   
straight. There are no   
polyps. There is no   
discharge.  
Oropharynx: Teeth are in   
good repair. Lips, gums,   
tongue and posterior   
pharynx  
are within normal   
limits. Gag reflex is   
intact.  
Nasopharynx:   
Visualization is   
limited.  
Hypopharynx:   
Visualization is   
limited.  
Neck: No masses   
palpated. Thyroid is not   
enlarged. Trachea is in   
the midline.  
Ears: Both ear canals   
are clean. Both TMs are   
intact and mobile.  
Impression: Throat   
discomfort, swallowing   
problem, and hoarseness   
of voice  
exact etiology unclear.   
GERD may be   
contributing.  
Plan of management: I   
will perform fiberoptic   
laryngoscopy and discuss   
further  
management options with   
her.  
Procedure: After the   
procedure was explained   
to the patient and   
patient agreed  
to have the procedure   
done 1% Yobani-Synephrine   
and 4% Xylocaine was   
sprayed to  
both nasal cavities.   
Fiberoptic scope was   
inserted through the   
right nasal  
airway. Right nasal   
airway was normal.   
Nasopharynx was normal.   
(more content not   
included)...        Normal                                  [facility]  
   
                                                    CNOVon 2021   
   
                                        CNOV                Office Visit (LOORRM  
)  
------------------------  
--------  
GISELPING ([id number])   
1950 F  
Date Time Provider   
Department  
3/5/21 9:20 AM DAGO LO  
During your visit today,   
we recorded the   
following information   
about you:  
Dago Lo MD   
3/5/2021 8:53 AM Signed  
This document has been   
created with the use of   
voice recognition   
technology. It  
may contain   
inaccuracies:   
misspellings, inaccurate   
syntax or word sense   
that  
escaped review.  
CHIEF COMPLAINT: Ping Chavez is a 71 year old   
female who presents   
today for  
follow up of bilateral   
knee pain secondary to   
osteoarthritis.  
HISTORY OF PRESENT   
ILLNESS:  
PAIN EVALUATION  
3/5/2021  
0839  
Pain Level: 5  
Pain Location: ?  
bilateral knees  
Description: Aching  
Duration Units: Unknown  
Frequency: Intermittent  
Intervention:   
Reposition;Relaxation;Po  
sitioning;Medication;Hea  
t  
HISTORY: Ping Chavez is   
here for follow up of   
complaints of bilateral   
knee  
pain right greater than   
left. She states that   
the injection she   
received in  
September of last year   
offered relief. She   
requested we inject both   
knees  
again today. No new   
injury. No hip or   
neurologic complaints.  
No other musculoskeletal   
complaints  
ROS:  
REVIEW OF SYMPTOMS:  
Constitutional: patient   
denies any recent fever   
or significant change in   
weight  
Gastrointestinal:   
patient denies any   
current abdominal   
discomfort  
Musculoskeletal: as   
noted in the HPI  
Neurologic: as noted in   
the HPI  
SOCIAL HISTORY:  
Tobacco Use: Not on file  
ALLERGIES:  
ALLERGIES  
Allergen Reactions  
- Ceclor [Cefaclor]   
Rash, Swelling  
And redness  
PAST MEDICAL HISTORY:  
PAST MEDICAL HISTORY  
Diagnosis Date  
- CHF (congestive heart   
failure) (HCC)  
- History of bilateral   
breast cancer  
- Hypothyroidism  
- Neuropathy  
SOCIAL HISTORY:  
Tobacco Use: Not on file  
EXAMINATION:  
GENERAL: Appears   
healthy, well-nourished,   
no deformities.  
ORIENTATION: Alert and   
oriented to person place   
and time  
HABITUS: Normal  
GAIT: Normal, the   
patient did not have   
trouble getting onto the   
exam table.  
bilateral knee exam:  
No effusion,  
Tenderness with patellar   
apprehension  
Right knee valgus left   
knee neutral alignment  
Active full extension,   
flexion 120. Good   
patellar tracking/mild   
patella  
femoral crepitation  
Mild pain with palpating   
medial compartment, mild   
pain with palpating   
lateral  
compartment.  
Stable to varus and   
valgus stresses.  
Lachman is negative  
Negative   
anterior/posterior   
drawer.  
Palpable dorsalis pedis   
pulse  
Calf soft and nontender.  
Hip exam negative  
Intact sensation to   
light touch distally.  
.  
RADIOGRAPHS: XR Obtained   
today and personally   
reviewed by myself   
demonstrating  
none today x-rays some   
2020   
reviewed with severe   
lateral  
compartment   
osteoarthritis on the   
right moderate on the   
left  
IMPRESSION:  
Encounter Diagnosis  
ICD-10-CM  
1. Primary   
osteoarthritis of both   
knees M17.0  
Large Joint Arthro/Inj:   
bilateral knee joints  
The risks, benefits and   
alternatives of the   
procedure were reviewed   
with the  
patient/surrogate, who   
agreed to proceed.  
Written Consent   
Obtained: Yes  
Sign In Communication:   
Completed  
Time Out: Time Out   
completed  
The  Time-Out  verifies   
the correct patient,   
procedure, side/site,   
position (if  
applicable) and   
completion and review of   
fire risk   
assessment/protocols (if  
appropriate):  
Affirmation of Time Out:   
Yes  
Signout Discussion: yes  
3/5/2021 8:52 AM  
The procedure site was   
prepped in the usual   
sterile fashion.  
Allergies were reviewed  
Site: bilateral knee   
joints  
Medications (Right): 40   
mg triamcinolone   
acetonide 40 mg/mL  
Medications (Left): 40   
mg triamcinolone   
acetonide 40 mg/mL  
Anesthetics (Right): 4   
mL lidocaine (PF) 10   
mg/mL (1 %)  
Anesthetics (Left): 4 mL   
lidocaine (PF) 10 mg/mL   
(1 %)  
Outcome: Tolerated well,   
no immediate   
complications  
Post-injection   
instructions were   
reviewed with the   
patient and the patient  
voiced understanding of   
these instructions.  
Plan: Patient with   
bilateral knee   
osteoarthritis. She   
requested we injected  
both knees today with   
cortisone. We again   
discussed surgical   
options  
especially with the   
right knee. We discussed   
intervals between   
injections.  
Follow-up as symptoms   
dictate  
Dago Lo MD  
Referring Provider: SELF   
[200]  
Allergies As of Date:   
2021 Noted Allergy   
Reaction  
CECLOR (CEFACLOR)   
2020 2 - Rash  
7 - Swelling  
Comments: And redness  
Date Reviewed:   
2021  
Reviewed by: Kemi Cabrera)   
Demetrio - Fully   
Assessed  
Reason for Visit:  
Follow Up [171]  
Established Patient   
[175]  
Knee Pain [132]  
Injections [199]  
Established Patient   
[175]  
Follow Up [171]  
Knee Pain [132]  
Injections [199]  
Primary Visit   
Diagnosis:Primary   
osteoarthritis of both   
knees [M17.0]  
Order(s):Large Joint   
Arthro/Inj: bilateral   
knee joints [UCZ686]   
Order #:  
[id number]  
[] lidocaine (PF)   
10 mg/mL (1 %) 4 mL   
injection  
(XYLOCAINE)Disp: Rfl:  
[] triamcinolone   
acetonide 40 mg (more   
content not included)... Normal                                  [facility]  
   
                                                    History and Physicalon    
   
                                                    HIM IP Note OR   
Transcription                   Normal                          [facility]  
   
                                                    Surgical Pathologyon   
017   
   
                                        Surgical Pathology  (NOTE)[id number]  
Y   
LABORATORIESCONSULTWaltham Hospital   
PATHOLOGISTS   
[facility] DepartmentANATOMIC   
[id number] [place], [place] [phone]: [phone]   
PATHOLOGY   
CONSULTATIONPatient   
Name: Skyler CHAVEZ   
Rec: [id number] Number:   
[id number]:   
2017Received:   
2017Reported:   
2017 09:51--   
Diagnosis --1. COLON,   
RANDOM BIOPSIES:- NORMAL   
COLONIC MUCOSA.2. COLON,   
BIOPSIES:- ADENOMATOUS   
POLYP.John Momin,**Electronically   
Signed Out**   
sls/2017Clinical   
InformationPre-op   
Diagnosis: FECAL OCCULT   
BLOOD Operative   
Findings: RANDOM COLON   
BX'S; COLON   
POLYPOperation   
Performed: COLONOSCOPY   
WITH BIOPSY Source of   
Specimen1: RANDOM COLON   
BX'S2: COLON POLYPGross   
Description1.  PING CHAVEZ RANDOM COLON   
BX'S  Multiple tan-white   
tissuefragments from 0.2   
to 0.3 cm and are 2.4 x   
0.2 x 0.1 cm in   
aggregate. Entirely 1cs.   
2.  PING GISEL, COLON   
POLYP  Two tan tissue   
fragments, 0.3 to 0.4cm   
and are 0.7 x 0.3 x 0.2   
cm in aggregate.   
Entirely 1cs. rh   
tmMicroscopic   
Description1. The   
mucosal architecture is   
normal. There is no   
evidence ofactive   
inflammation,   
granulomatous   
inflammation, ulcer,   
dysplasia   
ormalignancy.2. The   
biopsies demonstrate   
adenomatous polyp.  Normal                                  [facility]  
  
  
  
Vital Signs  
  
  
                      Date Time  Vital Sign Value      Performing Clinician Jaiden morton  
   
                                                    2024   
13:          Body height         175.3 cm            Angel Sydnie DO  
Work Phone:   
[phone]                          [facility]  
   
                                                    2024   
13:                              Body mass index   
(BMI) [Ratio]             33.82 kg/m2               Angel Sydnie DO  
Work Phone:   
[phone]                          [facility]  
   
                                                    2024   
13:          Body weight         103.87 kg           Angel Sydnie DO  
Work Phone:   
[phone]                          [facility]  
   
                                                    2024   
13:                              Diastolic blood   
pressure                  90 mm[Hg]                 Angel Sydnie DO  
Work Phone:   
[phone]                          [facility]  
   
                                                    2024   
13:          Heart rate          70 /min             Angel Sydnie DO  
Work Phone:   
[phone]                          [employer]  
   
                                                    2024   
13:                              SaO2% (BldA) [Mass   
fraction]                 100 %                     Angel Sydnie DO  
Work Phone:   
[phone]                          [facility]  
   
                                                    2024   
13:                              Systolic blood   
pressure                  152 mm[Hg]                Angel Sydnie DO  
Work Phone:   
[phone]                          [employer]  
   
                                                    2024   
11:          Body height         173.99 cm           MD Alexander Guerra  
Work Phone:   
[phone]                          [facility]  
   
                                                    2024   
11:                              Body mass index   
(BMI) [Ratio]             34.4 kg/m2                MD Alexander Guerra  
Work Phone:   
[phone]                          [facility]  
   
                                                    2024   
11:          Body weight         104.32 kg           MD Alexander Guerra  
Work Phone:   
[phone]                          [facility]  
   
                                                    2023   
09:                              Blood Pressure   
Location                                            Salvador COOK  
Work Phone:   
[phone]                           [facility]  
   
                                                    2023   
09:                              Diastolic blood   
pressure                  81 mm[Hg]                 Salvador COOK  
Work Phone:   
[phone]                           [facility]  
   
                                                    2023   
09:          Heart rate          65 /min             Salvador COOK  
Work Phone:   
[phone]                           [facility]  
   
                                                    2023   
09:                              Systolic blood   
pressure                  184 mm[Hg]                Salvador COOK  
Work Phone:   
[phone]                           [facility]  
   
                                                    2022   
09:                              Blood Pressure   
Location                                            Salvador COOK  
Work Phone:   
[phone]                           [facility]  
   
                                                    2022   
09:                              Diastolic blood   
pressure                  82 mm[Hg]                 Salvador COOK  
Work Phone:   
[phone]                           [facility]  
   
                                                    2022   
09:          Heart rate          77 /min             Salvador COOK  
Work Phone:   
[phone]                           [facility]  
   
                                                    2022   
09:                              Systolic blood   
pressure                  132 mm[Hg]                Salvador COOK  
Work Phone:   
[phone]                           [facility]  
   
                                                    2022   
14:                              Blood Pressure   
Location                                            Marty Osborne  
Work Phone:   
[phone]                           [facility]  
   
                                                    2022   
14:                              Diastolic blood   
pressure                  59 mm[Hg]                 Marty Osborne  
Work Phone:   
[phone]                           [facility]  
   
                                                    2022   
14:          Heart rate          71 /min             Marty Castanedaofferson  
Work Phone:   
[phone]                           [facility]  
   
                                                    2022   
14:          Respiratory rate    18 /min             Marty Christofferson  
Work Phone:   
[phone]                           [facility]  
   
                                                    2022   
14:                              SaO2% (BldA) [Mass   
fraction]                 96 %                      Marty Christofferson  
Work Phone:   
[phone]                           [facility]  
   
                                                    2022   
14:                              Systolic blood   
pressure                  119 mm[Hg]                Marty Castanedaofferson  
Work Phone:   
[phone]                           [facility]  
   
                                                    2022   
15:          Hourly Rounding                         J Luis KARTIK  
Work Phone:   
[phone]                           [facility]  
   
                                                    2022   
15:          Promise to Return                       J Luis KARTIK  
Work Phone:   
[phone]                           [facility]  
   
                                                    2022   
14:          Hourly Rounding                         J Luis KARTIK  
Work Phone:   
[phone]                           [facility]  
   
                                                    2022   
14:          Promise to Return                       J Luis KARTIK  
Work Phone:   
[phone]                           [facility]  
   
                                                    2022   
13:          Hourly Rounding                         J Luis KARTIK  
Work Phone:   
[phone]                           [facility]  
   
                                                    2022   
13:          Promise to Return                       J Luis KARTIK  
Work Phone:   
[phone]                           [facility]  
   
                                                    2022   
11:                              Diastolic blood   
pressure                  88 mm[Hg]                 J Luis KARTIK  
Work Phone:   
[phone]                           [facility]  
   
                                                    2022   
11:                              Systolic blood   
pressure                  174 mm[Hg]                J Luis KARTIK  
Work Phone:   
[phone]                           [facility]  
   
                                                    2022   
11:          Body temperature    97.52 [degF]        J Luis KARTIK  
Work Phone:   
[phone]                           [facility]  
   
                                                    2022   
11:                              Diastolic blood   
pressure                  88 mm[Hg]                 J Luis KARTIK  
Work Phone:   
[phone]                           [facility]  
   
                                                    2022   
11:          Heart rate          66 /min             J Luis KARTIK  
Work Phone:   
[phone]                           [facility]  
   
                                                    2022   
11:          Mean blood pressure 117 mm[Hg]          J Luis KARTIK  
Work Phone:   
[phone]                           [facility]  
   
                                                    2022   
11:                              SaO2% (BldA) [Mass   
fraction]                 96 %                      J Luis KARTIK  
Work Phone:   
[phone]                           [facility]  
   
                                                    2022   
11:                              Systolic blood   
pressure                  174 mm[Hg]                J Luis KARTIK  
Work Phone:   
[phone]                           [facility]  
   
                                                    2022   
07:          Body temperature    98.06 [degF]        J Luis KARTIK  
Work Phone:   
[phone]                           [facility]  
   
                                                    2022   
07:                              Diastolic blood   
pressure                  81 mm[Hg]                 J Luis KARTIK  
Work Phone:   
[phone]                           [facility]  
   
                                                    2022   
07:          Heart rate          79 /min             J Luis KARTIK  
Work Phone:   
[phone]                           [facility]  
   
                                                    2022   
07:          Mean blood pressure 104 mm[Hg]          J Luis KARTIK  
Work Phone:   
[phone]                           [facility]  
   
                                                    2022   
07:                              SaO2% (BldA) [Mass   
fraction]                 95 %                      J Luis KARTIK  
Work Phone:   
[phone]                           [facility]  
   
                                                    2022   
07:                              Systolic blood   
pressure                  151 mm[Hg]                J Luis KARTIK  
Work Phone:   
[phone]                           [facility]  
   
                                                    2022   
07:          Heart rate          80 /min             J Luis KARTIK  
Work Phone:   
[phone]                           [facility]  
   
                                                    2022   
07:                              SaO2% (BldA) [Mass   
fraction]                 96 %                      J Luis LANDASLIN  
Work Phone:   
[phone]                           [facility]  
   
                                                    2022   
01:          Body temperature    98.06 [degF]        J Luis LANDASLIN  
Work Phone:   
[phone]                           [facility]  
   
                                                    2022   
01:          Mean blood pressure 105 mm[Hg]          J Luissavannah LANDASLIN  
Work Phone:   
[phone]                           [facility]  
   
                                                    2022   
01:          Respiratory rate    17 /min             J Luissavannah LANDASLIN  
Work Phone:   
[phone]                           [facility]  
   
                                                    2022   
23:          FIO2                40 %                J Luis VERDUGO  
Work Phone:   
[phone]                           [facility]  
   
                                                    2022   
21:          Mean blood pressure 103 mm[Hg]          J Luissavannah LANDASLIN  
Work Phone:   
[phone]                           [facility]  
   
                                                    2022   
21:          Respiratory rate    18 /min             J Luissavannah LANDASLIN  
Work Phone:   
[phone]                           [facility]  
   
                                                    2022   
20:          Mean blood pressure 110 mm[Hg]          J Luis LANDASLIN  
Work Phone:   
[phone]                           [facility]  
   
                                                    2022   
17:                              Blood Pressure   
Location                                            J Luissavannah LANDASLIN  
Work Phone:   
[phone]                           [facility]  
   
                                                    2022   
17:                              BP/Pulse Patient   
Position                                            J Luissavannah LANDASLIN  
Work Phone:   
[phone]                           [facility]  
   
                                                    2022   
17:          Mean blood pressure 101 mm[Hg]          J Luissavannah LANDASLIN  
Work Phone:   
[phone]                           [facility]  
   
                                                    2022   
15:          Respiratory rate    16 /min             J Luissavannah LANDASLIN  
Work Phone:   
[phone]                           [facility]  
   
                                                    2022   
14:          Respiratory rate    19 /min             J Luissavannah LANDASLIN  
Work Phone:   
[phone]                           [facility]  
   
                                                    2022   
04:          Heart rate          71 /min             J Luis KARTIK  
Work Phone:   
[phone]                           [facility]  
   
                                                    2022   
04:          Respiratory rate    11 /min             J Luissavannah LANDASLIN  
Work Phone:   
[phone]                           [facility]  
   
                                                    2022   
03:          Respiratory rate    18 /min             J Luissavannah LANDASLIN  
Work Phone:   
[phone]                           [facility]  
   
                                                    2022   
00:          gluc                112 mg/dL           J Luis KARTIK  
Work Phone:   
[phone]                           [facility]  
   
                                                    2022   
00:          gluc                                    J Luissavannah LANDASLIN  
Work Phone:   
[phone]                           [facility]  
   
                                                    2022   
00:          Heart rate          83 /min             J Luis LANDASLIN  
Work Phone:   
[phone]                           [facility]  
   
                                                    2022   
01:      Body temperature 98.42 [degF]    [facility]  
   
                                                    2022   
01:                              Diastolic blood   
pressure            70 mm[Hg]           [facility]  
   
                                                    2022   
01:      Heart rate      73 /min         [facility]  
   
                                                    2022   
01:      Mean blood pressure 92 mm[Hg]       [facility]  
   
                                                    2022   
01:      Respiratory rate 19 /min         [facility]  
   
                                                    2022   
01:                              SaO2% (BldA) [Mass   
fraction]           99 %                [facility]  
   
                                                    2022   
01:                              Systolic blood   
pressure            135 mm[Hg]          [facility]  
   
                                                    2022   
00:      Body temperature 98.6 [degF]     [facility]  
   
                                                    2022   
00:                              Diastolic blood   
pressure            67 mm[Hg]           [facility]  
   
                                                    2022   
00:      Heart rate      72 /min         [facility]  
   
                                                    2022   
00:      Mean blood pressure 86 mm[Hg]       [facility]  
   
                                                    2022   
00:                              Systolic blood   
pressure            123 mm[Hg]          [facility]  
   
                                                    2022   
23:                              Diastolic blood   
pressure            78 mm[Hg]           [facility]  
   
                                                    2022   
23:      Heart rate      70 /min         [facility]  
   
                                                    2022   
23:      Respiratory rate 18 /min         [facility]  
   
                                                    2022   
23:                              SaO2% (BldA) [Mass   
fraction]           98 %                [facility]  
   
                                                    2022   
23:                              Systolic blood   
pressure            120 mm[Hg]          [facility]  
  
  
  
Encounters  
  
  
                          Encounter Date Encounter Type Care Provider Facility  
   
                                                    Start: 2024  
End: 2024     ambulatory          [facility]   
[facility]  
   
                                                    Start: 2024  
End: 2024     ambulatory          CJ KENNEDY       Not Available  
   
                          Start: 2024 ambulatory   [facility]   
[facility]  
   
                                                    Start: 2024  
End: 2024     ambulatory          [facility]   
[facility]  
   
                                                    Start: 2024  
End: 2024     ambulatory          CHAPARRO HOLLEY    [facility]   
[facility]  
   
                                                    Start: 2024  
End: 2024     ambulatory          [facility]   
[facility]  
   
                                                    Start: 2024  
End: 2024     ambulatory          CJ KENNEDY       Not Available  
   
                                                    Start: 2024  
End: 2024     ambulatory          [facility]   
[facility]  
   
                                                    Start: 2024  
End: 2024           ambulatory                NEO DAMON   
[facility]  
   
                                                    Start: 2024  
End: 2024     ambulatory          CJ KENNEDY       Not Available  
   
                                                    Start: 2024  
End: 2024                         Office outpatient visit   
25 minutes                              Angel Gerardo DO  
Work Phone:   
[phone]                          [facility]   
Cardiology  
   
                                        Comment on above:   Cardiomyopathy (Prim  
any Dx);  
Persistent atrial fibrillation (CMS-HCC);  
Chronic systolic heart failure (CMS-HCC);  
Left bundle branch block (LBBB)   
   
                                                    Start: 2024  
End: 2024     ambulatory          ANGEL GERARDO     [facility]   
[facility]  
   
                          Start: 2024 Telephone encounter [facility]   
Cardiology  
   
                                        Comment on above:   Appointment   
   
                                                    Start: 2024  
End: 2024     ambulatory          [facility]  [facility]  
   
[facility]  
   
                                        Comment on above:   Primary open angle g  
laucoma of right eye, mild stage   
   
                                                    Start: 2024  
End: 2024     ambulatory          Alexei Bartlett II Facility:[facility]  
   
                                                    Start: 2024  
End: 2024           ambulatory                MD Alexander Guerra  
Work Phone:   
[phone]                          [facility]  
Work Phone:   
[phone]  
   
                                                    Start: 2024  
End: 2024                         Patient encounter   
procedure                               MD Alexander Guerra  
Work Phone:   
[phone]                          [facility]   
[facility]   
Orthopedics  
Work Phone:   
[phone]  
   
                                Start: 2024 Refill          Butch Sifuentes  
sser   
APRN-CNP  
Work Phone:   
[phone]                          [facility]   
Cardiology  
   
                                        Comment on above:   Med Refill   
   
                                                    Start: 10-  
End: 10-     ambulatory          GREG ROCHA        [facility]  
   
                                                    Start: 08-  
End: 08-                         Emergency department   
[facility]             [facility]  
   
                                                    Start: 2023  
End: 2023     ambulatory          Salvador CORCORAN      Facility:[facility]  
   
                                                    Start: 2023  
End: 2023                         Patient encounter   
procedure                               Salvador CORCORAN  
Work Phone:   
[phone]                           [facility]   
[place] [facility]  
Work Phone:   
[phone]  
   
                          Start: 2023 ambulatory   AMELIA Munoz  
ity:[facility]  
   
                                                    Start: 2023  
End: 2023     ambulatory          Alexander Guerra     Facility:[facility]  
   
                                                    Start: 2023  
End: 2023           ambulatory                MD Alexander Guerra  
Work Phone:   
[phone]                          [facility]  
Work Phone:   
[phone]  
   
                                                    Start: 2023  
End: 2023                         Patient encounter   
procedure                               MD Alexander Guerra  
Work Phone:   
[phone]                          [facility]   
[facility]   
[place]  
Work Phone:   
[phone]  
   
                                                    Start: 2022  
End: 2022     ambulatory          Salvador CORCORAN      Facility:[facility]  
   
                                                    Start: 2022  
End: 2022                         Patient encounter   
procedure                               Salvador CORCORAN  
Work Phone:   
[phone]                           [facility]   
[place] [facility]  
Work Phone:   
[phone]  
   
                                                    Start: 2022  
End: 2022     ambulatory          Marty Osborne Facility:[facility]  
   
                                                    Start: 2022  
End: 2022                         Patient encounter   
procedure                               Marty Osborne  
Work Phone:   
[phone]                           [facility]  
Work Phone:   
[phone]  
   
                                                    Start: 2022  
End: 2022                         Evaluation and   
management of inpatient   Babak Montoya          Facility:[facility]  
   
                                                    Start: 2022  
End: 2022                         Evaluation and   
management of inpatient                 J Luis TIO VERDUGO  
Work Phone:   
[phone]                           [facility]  
Work Phone:   
[phone]  
   
                                                    Start: 2022  
End: 09-     ambulatory          [facility]  
   
                                                    Start: 2022  
End: 2022                         Subsequent hospital   
visit by physician                      Alexander Guerra MD  
Work Phone:   
[phone] [facility]  
   
                                                    Start: 2022  
End: 2022     ambulatory          [facility]  
   
                                                    Start: 2022  
End: 2022                         Subsequent hospital   
visit by physician                      Alexander Guerra MD  
Work Phone:   
[phone] [facility]  
   
                                                    Start: 2022  
End: 2022                         Emergency department   
[facility]             [facility]          [facility]  
   
                                                    Start: 2022  
End: 2022                         Emergency department   
patient visit             [facility]          [facility]  
Work Phone:   
[phone]  
   
                                                    Start: 2022  
End: 10-     ambulatory          AMELIA HENDRICKSON Facility:[facility]  
   
                                                    Start: 2022  
End: 10-           Recurring                 Consuelo HENDRICKSON  
Work Phone:   
[phone]                           [facility]  
Work Phone:   
[phone]  
   
                                                    Start: 2022  
End: 2022     ambulatory          DR JASMIN LINDO  Facility:  
   
                                                    Start: 2020  
End: 2020           Orders Only               Dago Lo  
Work Phone:   
[phone]                          Orthopaedics  
   
                                        Comment on above:   Pain (Primary Dx)   
   
                                                    Start: 2017  
End: 2017     Ambulatory          GREG DELGADILLO TODD        [facility]  
  
  
  
Procedures  
  
  
                          Date         Procedure    Procedure Detail Performing   
Clinician  
   
                          Start: 2024 Follow-up visit Follow-up    ANGEL GERARDO  
   
                                        Start: 2024   Visual field xm uni/  
bi   
w/interp extended exam                              Chaparro Holley OD  
Work Phone:   
[phone]  
   
                          Start: 2024 Pelvis X-ray              MD Alexander coleman  
Work Phone:   
[phone]  
   
                          Start: 2024 X-ray of both knees              MD Alexander Guerra  
Work Phone:   
[phone]  
   
                                        Start: 2023   Ultrasonography of b  
ilateral   
kidneys                                             MD Alexander Guerra  
Work Phone:   
[phone]  
   
                                        Start: 2022   Basic metabolic pane  
l   
calcium total                                       Alexander Guerra MD  
Work Phone:   
[phone]  
   
                                        Start: 2022   Basic metabolic pane  
l   
calcium total                                       Alexander Guerra MD  
Work Phone:   
[phone]  
   
                          Start: 2017 SURGICAL PATHOLOGY              ETHAN ROCHA  
   
                          Start: 2017 DISCHARGE PATIENT              GREG ROCHA  
   
                          Start: 2017 SURGICAL PATHOLOGY              ETHANYENIFER ROCHA  
   
                          Start: 2017 Colonoscopy               Alexander wyatt MD  
Work Phone:   
[phone]  
   
                                       Simple mastectomy              Trae armas  
  
  
  
Plan of Treatment  
  
  
                          Date         Care Activity Detail       Author  
   
                                        Start: 2027   Screening for malign  
ant   
neoplasm of colon                                   [facility]  
   
                          Start: 2025 Adult BMI Screening Adult BMI Screen  
ing [facility]  
   
                          Start: 2025 Tobacco Screening Tobacco Screening   
[facility]  
   
                          Start: 2025 Tobacco Screening Tobacco Screening   
[facility]  
   
                                                    Start: 10-  
End: 10-     Clinical Support                        [facility]   
[facility]  
   
                          Start: 2024 Adult BMI Screening Adult BMI Screen  
ing [facility]  
   
                          Start: 2024 Tobacco Screening Tobacco Screening   
[facility]  
   
                                                    Start: 2024  
End: 2024                         Patient encounter   
procedure                               2024 11:10 AM EDT   
Office Visit [facility] [address] [place] [id number] [phone] Chaparro Holley, OD [address] [place]   
[address] [phone]   
(Work) [phone]   
(Fax)                                   [facility]  
   
                                                    Start: 2024  
End: 2024           ambulatory                2024 10:40 AM EDT   
Ophthalmology Imaging   
[facility] [address] [place] [id number] [phone]                            [facility]  
   
                                                    Start: 2024  
End: 2024                         Patient encounter   
procedure                               2024 1:45 PM EDT   
Office Visit [facility]   
[address] [facility] [address]   
[place]   
[id number] [phone]   
Angel Gerardo DO   
[address],   
[address] [place]   
[address] [phone]   
(Work) [phone]   
(Fax)                                   [facility]  
   
                                                    Start: 01-  
End: 01-                         Patient encounter   
procedure                               01/15/2024 11:10 AM EST   
Office Visit [facility] [address] [place] [id number] [phone] Chaparro Holley, OD [address] [place], [place]   
[id number] [phone]   
(Work) [phone]   
(Fax)                                   [facility]  
   
                                                    Start: 01-  
End: 01-           ambulatory                01/15/2024 10:40 AM EST   
Ophthalmology Imaging   
[facility] [address] [place] [id number] [phone]                            [facility]  
   
                                        Start: 2023   COVID-19 Vaccine ( season)                         COVID-19 Vaccine ( season)                         [facility]   
System  
   
                          Start: 2023 Lipid panel  Lipids       [facility]  
   
                          Start: 2022 Influenza vaccination Flu vaccine (#  
1) [facility]  
   
                          Start: 2020 Influenza vaccination INFLUENZA (#1)  
 [facility]  
   
                                        Start: 2013   ADVANCE DIRECTIVE   
DISCUSSION                              ADVANCE DIRECTIVE   
DISCUSSION                              [facility]  
   
                          Start: 2013 BONE DENSITY BONE DENSITY [facility]  
   
                          Start: 2013 Fall Risk Screening Fall Risk Screen  
ing [facility]  
   
                                        Start: 2013   PNEUMOVAX AGE 65 AND  
 OVER   
WITH 5YR LOOKBACK (#1)                  PNEUMOVAX AGE 65 AND   
OVER WITH 5YR LOOKBACK   
(#1)                                    [facility]  
   
                                        Start: 12-   DTaP,Tdap and Td Vac  
cines   
(1 - Tdap)                              DTaP,Tdap and Td   
Vaccines (1 - Tdap)                     [facility]  
   
                                        Start: 12-   DTaP/Tdap/Td vaccine  
 (1 -   
Tdap)                                   DTaP/Tdap/Td vaccine (1   
- Tdap)                                 [facility]  
   
                                        Start: 2003   Screening for   
osteoporosis                            DEXA (modify frequency   
per FRAX score)                         [facility]  
   
                                        Start: 1998   Screening for malign  
ant   
neoplasm of breast        Breast cancer screen      [facility]  
   
                                Start: 1998 SHINGRIX VACCINE (1 of 2) SHIN  
GRIX VACCINE (1 of   
2)                                      [facility]  
   
                                Start: 1998 Tuberculosis screening COLOREC  
COLLINS CANCER   
SCREENING,SEE MODIFIER                  [facility]  
   
                          Start: 1993 DIABETES SCREEN DIABETES SCREEN [facility]  
   
                          Start: 1993 LIPID SCREEN LIPID SCREEN [facility]  
   
                                        Start: 1993   Screening for malign  
ant   
neoplasm of colon                                   [facility]  
   
                          Start: 1988 Mammography  MAMMOGRAM    [facility]  
   
                                        Start: 1967   DTaP/Tdap/Td vaccine  
 (1 -   
Tdap)                                   DTaP/Tdap/Td vaccine (1   
- Tdap)                                 [facility]  
   
                                        Start: 1967   Urine microalbumin   
profile                   DTAP,TDAP,TD (1 - Tdap)   [facility]  
   
                          Start: 1966 Adult BMI Follow Up Plan Adult BMI F  
ollow Up Plan [facility]   
[employer]  
   
                          Start: 1966 HEPATITIS C SCREENING HEPATITIS C SC  
[facility]  
   
                          Start: 1966 Hepatitis C screening Hepatitis C sc  
[facility]  
   
                          Start: 1960 Depression Screen Depression Screen   
[facility]  
   
                          Start: 1960 Depression Screening Depression Scre  
ening [facility]   
[facility]  
   
                          Start: 1948 COVID-19 Vaccine (#1) COVID-19 Vacci  
ne (#1) [facility]  
   
                                        Start: 1948   Medicare Annual Well  
ness   
Visit                                   Medicare Annual Wellness   
Visit                                   [facility]  
   
                                                            Electrophoresis Prot  
ein,   
Serum                                   Electrophoresis Protein,   
Serum Lab Routine   
2022 11:01 AM EDT                 [facility] [employer]  
Work Phone:   
[phone]  
   
                                                      
End: 10-                         Radiologic exam knee   
complete 4/more views                   XR KNEE GENERAL 4V AP   
BOTH/PA BOTH/LAT/MERC   
BILAT Radiology Routine   
Pain 1 Occurrences   
starting 2020   
until 10/08/2021                        [facility]  
   
                                        Comment on above:   1 Occurrences starti  
ng 2020 until 10/08/2021   
   
                                                                 [facility]  
[facility]  
   
                                                                 [facility]  
c  
  
  
  
Immunizations  
  
  
                      Immunization Date Immunization Notes      Care Provider Kal basurto  
   
                                        10-          influenza virus vacc  
ine,   
unspecified formulation                             [employer]  
Work Phone:   
[phone]                           [facility]  
   
                                        10-          SARS-CoV-2 (COVID-19  
)   
mRNA-1273 vaccine                                   [employer]  
Work Phone:   
[phone]                           [facility]  
   
                                        2021          influenza virus vacc  
ine,   
unspecified formulation                             [employer]  
Work Phone:   
[phone]                           [facility]  
   
                                        2021          SARS-CoV-2 (COVID-19  
)   
mRNA-1273 vaccine                                   [employer]  
Work Phone:   
[phone]                           [facility]  
   
                                        2021          SARS-CoV-2 (COVID-19  
)   
mRNA-1273 vaccine                                   [employer]  
Work Phone:   
[phone]                           [facility]  
   
                                        Comment on above:   Result Comment: 2022: [id number]   
   
                                        10-          influenza virus vacc  
ine,   
unspecified formulation                             [employer]  
Work Phone:   
[phone]                           [facility]  
   
                                        2019          pneumococcal   
polysaccharide vaccine,   
23 valent                                           [employer]  
Work Phone:   
[phone]                           [facility]  
   
                                        10-          influenza virus vacc  
ine,   
unspecified formulation                             [employer]  
Work Phone:   
[phone]                           [facility]  
   
                                        2018          zoster vaccine   
recombinant                                         [employer]  
Work Phone:   
[phone]                           [facility]  
   
                                        2018          zoster vaccine   
recombinant                                         [employer]  
Work Phone:   
[phone]                           [facility]  
   
                                        10-          influenza virus vacc  
ine,   
unspecified formulation                             [employer]  
Work Phone:   
[phone]                           [facility]  
   
                                        2017          pneumococcal conjuga  
te   
vaccine, 13 valent                                  [employer]  
Work Phone:   
[phone]                           [facility]  
   
                                        10-          influenza virus vacc  
ine,   
unspecified formulation                             [employer]  
Work Phone:   
[phone]                           [facility]  
   
                                        09-          influenza virus vacc  
ine,   
unspecified formulation                             [employer]  
Work Phone:   
[phone]                           [facility]  
   
                                        2015          pneumococcal conjuga  
te   
vaccine, 13 valent                                  [employer]  
Work Phone:   
[phone]                           [facility]  
   
                                        2014          influenza virus vacc  
ine,   
unspecified formulation                             [employer]  
Work Phone:   
[phone]                           [facility]  
   
                                        2013          influenza virus vacc  
ine,   
unspecified formulation                             [employer]  
Work Phone:   
[phone]                           [facility]  
   
                                        2012          Td(adult) unspecifie  
d   
formulation                                         [employer]  
Work Phone:   
[phone]                           [facility]  
   
                                        10-          influenza virus vacc  
ine,   
unspecified formulation                             [employer]  
Work Phone:   
[phone]                           [facility]  
   
                                        1999          influenza virus vacc  
ine,   
unspecified formulation                             [employer]  
Work Phone:   
[phone]                           [facility]  
  
  
  
Payers  
  
  
                          Date         Payer Category Payer        Policy ID  
   
                          08-   Unknown                   [id number]  
   
                          2023   Self-pay                    
   
                                2020      Unknown         [employer] MEDICARE  
 SUPPLEMENT   
[id number] 2020-Present   
Indemnity                               [id number]   
[id number]  
[id number]  
   
                          2017   Medicare                  [id number]  
   
                                2016      Unknown         MEDICAL MUT[employer] M  
MO   
TRADITIONAL [id number]   
2016-Present [phone]   
[address] [place]   
[address]                              [id number]  
[id number]  
   
                                2013      Medicare        MEDICARE MEDICAR  
E A AND B   
[id number] 2013-Present   
[place]                  [id number]   
[id number]  
[id number]  
   
                                2013      Medicare        MEDICARE MEDICAR  
E PART A & B   
[id number] 2013-Present   
[phone] [address]   
[place] [address]               [id number]  
[id number]  
   
                          1960   Medicare                  [id number]  
   
                          1960   Unknown                   [id number]  
   
                          1948   Unknown                   [id number]   
[id number]  
   
                          1948   Unknown                   [id number]   
[id number]  
   
                          1948   Unknown                   [id number]   
[id number]  
   
                          1948   Unknown                   [id number]   
[id number]  
   
                          1948   Unknown                   [id number]   
[id number]  
   
                          1948   Unknown                   [id number]   
[id number]  
   
                          1948   Unknown                   [id number]   
[id number]  
   
                          1948   Unknown                   [id number]   
[id number]  
   
                          1948   Unknown                   [id number]   
[id number]  
   
                          1948   Unknown                   [id number]   
[id number]  
   
                          1948   Unknown                   [id number]   
[id number]  
   
                          1948   Unknown                   [id number]   
[id number]  
   
                          1948   Unknown                   [id number]   
[id number]  
   
                          1948   Unknown                   [id number]   
[id number]  
   
                          1948   Unknown                   [id number]   
[id number]  
   
                          1948   Unknown                   [id number]   
[id number]  
   
                          1948   Unknown                   [id number]   
[id number]  
   
                          1948   Unknown                   [id number]   
[id number]  
   
                          1948   Unknown                   [id number]   
[id number]  
   
                          1948   Unknown                   [id number]   
[id number]  
   
                          1948   Unknown                   [id number]   
[id number]  
   
                          1948   Unknown                   [id number]   
[id number]   Unknown                   [id number]   
[id number]  
   
                          1948   Unknown                   [id number]   
[id number]  
   
                          1948   Unknown                   [id number]   
[id number]  
   
                          1948   Unknown                   [id number]   
[id number]  
   
                          1948   Unknown                   [id number]   
[id number]  
   
                          1948   Unknown                   [id number]   
[id number]  
   
                          1948   Unknown                   [id number]   
[id number]  
   
                                       Unknown                   [id number]   
[id number]  
   
                                       Unknown                   [id number]   
[id number]  
  
  
  
Social History  
  
  
                          Date         Type         Detail       Facility  
   
                                                            Tobacco smoking stat  
[facility]                      Unknown if ever smoked    [facility]  
   
                          Start: 1950 Sex Assigned At Birth Not on file  C  
[facility]  
   
                                                            Exposure to SARS-CoV  
-2   
(event)                   Not sure                  [facility]  
   
                                       Tobacco                   [facility]  
   
                                        Comment on above:   denies   
   
                                                    Start: 2021  
End: 2023     Sex Assigned At Birth Female              [facility]  
   
                                                            Tobacco smoking stat  
[facility]                                    Tobacco smoking   
consumption unknown                     [facility] [employer]  
Work Phone:   
[phone]  
   
                                                Tobacco smoking status No Smokin  
g Status   
Entered                                 [facility]  
   
                          Start: 1950 Sex Assigned At Birth Female       [facility]  
   
                                                    Start: 2022  
End: 2023           Tobacco smoking status    Never smoked tobacco   
(finding)                               [facility]   
[place]  
   
                                        Comment on above:   denies   
   
                                        Start: 2022   Tobacco use and   
exposure                                Smokeless tobacco   
non-user                                [facility]   
System  
   
                                                    Start: 2023  
End: 2024           Alcohol intake            Current drinker of   
alcohol (finding)                       [facility]   
System  
   
                                                    Start: 2021  
End: 2023                         History of Social   
function                                            [facility]   
System  
   
                                       Housing Instability Unknown      [facility]  
   
                          Start: 2021 Alcohol Comment BEER ONE A WEEK [facility]  
  
  
  
Medical Equipment  
  
  
                                Procedure Code  Equipment Code  Equipment Origin  
al   
Text                      Equipment Identifier      Dates  
   
                                            Biv Icd-10/20/2016 [id number]  Start:  
   
10-  
  
  
  
Functional Status  
  
  
                          Date         Assessment   Result       Facility  
   
                          2023   Functional Status N/A          [facility]  
   
                          2022   Functional Status N/A          [facility]  
   
                          2022   Functional Status N/A          [facility]  
   
                          2022   Functional Status N/A          [facility]  
   
                          2022   Functional Status              [facility]  
   
                          2022   Functional Status N/A          [facility]  
  
  
  
Clinical Notes 2021 to 2024  
Angel Gerardo,  - 2024 1:45 PM EDTTelephone Encounter - Elba Ibrahim,   
[facility] - 2024 8:24 AM EDTTelephone Encounter - Elba Ibrahim, [facility] - 2024
 8:24 AM EDT  
  
                                Note Date & Type Note            Facility  
   
                                                    2024 History of Presen  
t   
illness Narrative                       Formatting of this note is   
different from the original.  
Ping Luevanozhao Chavez  
  
Date of visit: 3/12/2024 YOB: 1950  
Age: 74 y.o. MRN: [id number]  
_______________________  
Patient Active Problem List  
Diagnosis  
Automatic implantable   
cardioverter-defibrillator in   
situ [employer]  
Cancer (CMS-[facility])  
Gout  
Dyslipidemia  
Dyspnea  
Edema  
Dizziness  
Vertigo  
Left bundle branch block   
(LBBB)  
Benign essential hypertension  
Nonischemic congestive   
cardiomyopathy (CMS-HCC)  
Overweight  
Atrial fibrillation (CMS-HCC)  
CHF (congestive heart failure)   
(CMS-HCC)  
  
_______________________  
Allergies  
Allergen Reactions  
Cefaclor  
Other reaction(s):   
Intolerance-unknown  
  
_______________________  
Current Outpatient Medications  
Medication Sig Dispense Refill  
acetaminophen (TYLENOL) 325 mg   
tablet Take 2 tablets (650 mg   
total) by mouth every 6 (six)   
hours as needed for pain.  
allopurinol (ZYLOPRIM) 100 mg   
tablet Take 1 tablet (100 mg   
total) by mouth in the   
morning.  
calcium carbonate (CALCIUM 500   
ORAL) Take by mouth daily.  
carvediloL (COREG) 6.25 mg   
tablet TAKE 1 TABLET BY MOUTH   
TWICE DAILY WITH MEALS 60   
tablet 3  
cholecalciferol, vitamin D3,   
2,000 units tablet Take 1   
tablet (2,000 Units total) by   
mouth in the morning.  
esomeprazole (NexIUM) 20 mg   
capsule Take 1 capsule (20 mg   
total) by mouth in the   
evening.  
ferrous sulfate 325 (65 FE) mg   
EC tablet Take 1 tablet (325   
mg total) by mouth in the   
morning.  
fluvoxaMINE (LUVOX) 100 mg   
tablet Take 0.5 tablets (50 mg   
total) by mouth in the morning   
and 0.5 tablets (50 mg total)   
before bedtime.  
furosemide (LASIX) 20 mg   
tablet Take 0.5 tablets (10 mg   
total) by mouth as needed   
(please have labs drawn for   
future refills.). 90 tablet 2  
gabapentin (NEURONTIN) 100 mg   
capsule Take 1 capsule (100 mg   
total) by mouth 3 (three)   
times a day.  
L.acid/B.bifidum/B.animal/FOS   
(PROBIOTIC COMPLEX ORAL) Take   
by mouth daily.  
levothyroxine (SYNTHROID,   
LEVOTHROID) 50 MCG tablet Take   
1 tablet (50 mcg total) by   
mouth in the morning.  
loratadine (CLARITIN) 10 mg   
tablet Take 1 tablet (10 mg   
total) by mouth as needed for   
allergies.  
losartan (COZAAR) 25 mg tablet   
Take 1 tablet (25 mg total) by   
mouth in the morning.  
magnesium oxide 500 mg tablet   
Take 1 tablet (500 mg total)   
by mouth in the morning.  
multivit-iron-FA-calcium &mins   
(THERAGRAN-M) 9 mg iron-400   
mcg tablet Take 1 tablet by   
mouth in the morning.  
omega-3/dha/epa/dpa/fish oil   
(OMEGA-3 2100 ORAL) Take by   
mouth 2 (two) times a day.  
  
simvastatin (ZOCOR) 40 mg   
tablet Take 1 tablet (40 mg   
total) by mouth nightly.  
spironolactone (ALDACTONE) 25   
mg tablet Take 0.5 tablets   
(12.5 mg total) by mouth in   
the morning.  
timolol (TIMOPTIC) 0.5 %   
ophthalmic solution Administer   
1 drop to both eyes in the   
morning and 1 drop before   
bedtime. 15 mL 3  
XARELTO 20 mg tablet tablet   
TAKE 1 TABLET(20 MG) BY MOUTH   
IN THE MORNING 30 tablet 9  
TRELEGY ELLIPTA 200-62.5-25   
mcg blister with device INHALE   
1 PUFF BY MOUTH ONCE DAILY   
(Patient not taking: Reported   
on 2023)  
  
No current   
facility-administered   
medications for this visit.  
  
_______________________  
Chief Complaint  
Patient presents with  
Follow-up  
Hypertension  
Atrial Fibrillation  
  
_______________________  
History of Present Illness  
Ping was seen today for   
follow-up of her nonischemic   
cardiomyopathy.  
  
She has a biventricular ICD in   
place. She has had no shocks.  
  
She denies any chest pain,   
pressure, or shortness of   
breath. She states that she   
feels somewhat foggy recently   
but has had difficulty with   
the vertigo as well. She has   
been compliant with her   
medications.  
_______________________  
Past Medical History:  
Diagnosis Date  
Cancer (CMS-[facility])  
BREAST  
Cardiomyopathy  
CHF (congestive heart failure)   
(CMS-Formerly [facility])  
Dizziness  
Dyslipidemia  
Dyspnea  
Edema  
Glaucoma  
Gout  
History of YAG laser   
capsulotomy of lens of right   
eye-Dr. Dolan 2018  
History of YAG laser   
capsulotomy of lens, left-Dr. Dolan 2018  
Hypertension  
LBBB (left bundle branch   
block)  
PVD (posterior vitreous   
detachment)  
Vertigo  
  
No data recorded  
_______________________  
Past Surgical History:  
Procedure Laterality Date  
APPENDECTOMY  
BREAST SURGERY  
CARDIAC DEFIBRILLATOR   
PLACEMENT 2013  
[employer] BiV ICD/ Generator   
replacement: 10/20/2016  
CATARACT EXTRACTION W/   
INTRAOCULAR LENS IMPLANT   
Bilateral   
CHOLECYSTECTOMY  
CHOLECYSTECTOMY  
TONSILLECTOMY  
W/ADENOIDS  
  
_______________________  
Family History  
Problem Relation Age of Onset  
Hypertension Mother  
Coronary artery disease Father  
  
_______________________  
Social History  
  
Socioeconomic History  
Marital status:   
Spouse name: Not on file  
Number of children: Not on   
file  
Years of education: Not on   
file  
Highest education level: Not   
on file  
Occupational History  
Not on file  
Tobacco Use  
Smoking status: Never  
Smokeless tobacco: Never  
Vaping Use  
Vaping Use: Never used  
Substance and Sexual Activity  
Alcohol use: Yes  
Comment: BEER ONE A WEEK  
Drug use: No  
Sexual activity: Defer  
Other Topics Concern  
Caffeine Use Yes  
Comment: COFFEE RARE  
Social History Narrative  
Not on file  
  
Social Determinants of Health  
  
Financial Resource Strain: Not   
on file  
Food Insecurity: No Food   
Insecurity (2023)  
Hunger Screening  
Food Insecurity - Worry: Never   
True  
Food Insecurity - Inability:   
Never True  
Transportation Needs: Not on   
file  
Physical Activity: Not on file  
Stress: Not on file  
Social Connections: Not on   
file  
Interpersonal Safety: Not on   
file  
Housing Instability: Not on   
file  
  
_______________________  
Review of Systems  
Review of Systems  
Constitutional: Negative for   
chills, fever, weight gain and   
weight loss.  
HENT: Negative for nosebleeds.  
Eyes: Negative for blurred   
vision and double vision.  
Vascular: Negative for   
asymmetric leg edema,   
claudication, lower extremity   
wounds or ulcers and varicose   
veins.  
Respiratory: Negative for   
cough, shortness of breath and   
wheezing.  
Hematologic/Lymphatic:   
Negative for bleeding problem.   
Does not bruise/bleed easily.  
Skin: Negative for color   
change and rash.  
Musculoskeletal: Negative for   
joint swelling and muscle   
weakness.  
Gastrointestinal: Negative for   
change in bowel habit and   
hematochezia.  
Genitourinary: Negative for   
hematuria.  
Neurological: Negative for   
dizziness, headaches,   
light-headedness, loss of   
balance, numbness and tremors.  
Psychiatric/Behavioral: The   
patient is not   
nervous/anxious.  
Allergic/Immunologic: Negative   
for environmental allergies.  
  
CARDIOVASCULAR: Please review   
HPI.  
_______________________  
Physical Examination  
General appearance: Alert,   
oriented and cooperative. In   
no acute distress.  
Skin: Warm and dry to touch.  
Head: Normocephalic, without   
obvious abnormality,   
atraumatic.  
Ears, Nose, Mouth, Throat:   
Throat clear without erythema   
or exudate. Dentition intact.  
Eyes: Conjunctivae   
unremarkable, EOM intact.  
Neck: No JVD, No carotid   
bruit. Neck supple, trachea   
midline.  
Respiratory: Clear to   
auscultation bilaterally, no   
use of accessory muscles.  
Cardiovascular: RRR with   
normal S1 and S2 with no   
murmurs.  
Gastrointestinal: Soft,   
non-tender. Bowel sounds   
normal.  
Musculoskeletal: No peripheral   
edema.  
Neurologic: Oriented to time,   
person and place, affect   
appropriate. No focal/major   
motor defects noted.  
Psychiatric: Appropriate mood,   
memory and judgement.  
_______________________  
VITAL SIGNS:  
/90   Pulse 70   Ht   
175.3 cm (5' 9 )   Wt 103.9 kg   
(229 lb)   SpO2 100%   BMI   
33.82 kg/m  
_______________________  
No orders of the defined types   
were placed in this encounter.  
  
There are no discontinued   
medications.  
  
_______________________  
[id number] Score: 4  
4.8% Stroke risk per year,   
6.7% risk of   
stroke/TIA/systemic embolism  
______________  
_________  
IMPRESSIONS/PLAN  
1. Cardiomyopathy  
  
2. Persistent atrial   
fibrillation (CMS-HCC)  
  
3. Chronic systolic heart   
failure (CMS-HCC)  
  
4. Left bundle branch block   
(LBBB)  
  
Recent device check appear to   
be favorable.  
  
She will be due for a   
follow-up in 6 months with a   
device check at that time.  
  
She appears to be well   
compensated. She will be   
checking her blood pressure on   
a more regular basis as she   
had an elevated reading today.   
She states that generally is   
in the therapeutic range.  
  
Follow-up is arranged for 6   
months.  
_______________________  
TODAYS ORDERS  
No orders of the defined types   
were placed in this encounter.  
  
_______________________  
FOLLOW UP  
Return in about 6 months   
(around 2024).  
  
PCP: Alexander Guerra MD  
Referring Physician: Alexander Guerra MD  
[address] [place] [address]  
  
  
Electronically signed by Angel Gerardo DO at 2024   
2:15 PM EDT  
documented in this encounter            [facility]  
   
                                                    2024 Miscellaneous   
Notes                                   Formatting of this note might   
be different from the   
original.  
Called patient to remind them   
to bring their most current   
copy of their medication list   
with them to their appt.   
Patient verbalizes   
understanding.  
Electronically signed by   
Elba Ibrahim CMA at   
2024 8:25 AM EDT  
documented in this encounter            [facility]  
   
                                                    2024 Telephone   
encounter Note                          Formatting of this note might   
be different from the   
original.  
Called patient to remind them   
to bring their most current   
copy of their medication list   
with them to their appt.   
Patient verbalizes   
understanding.  
Electronically signed by   
Elba Ibrahim CMA at   
2024 8:25 AM EDT  
                                        [facility]  
   
                                        2024 Note     Right Eye  
Reliability was good.   
Progression has been stable.   
Foveal threshold was  
normal. Findings include   
normal observations.  
  
Left Eye  
Reliability was good.   
Progression has been stable.   
Foveal threshold was  
normal. Findings include   
normal observations.  
  
Notes  
Clean VF OU                             MANUALLY TRANSCRIBED   
RESULTS  
   
                                                    2024 Miscellaneous   
Notes                                   Formatting of this note might   
be different from the   
original.  
23 OV  
  
23 CBC, CMP  
Electronically signed by   
Nelda Palencia RN at   
2024 3:09 PM EST  
documented in this encounter            [facility]  
   
                                                    2024 Telephone   
encounter Note                          Formatting of this note might   
be different from the   
original.  
23 OV  
  
23 CBC, CMP  
Electronically signed by   
Nelda Palencia RN at   
2024 3:09 PM EST  
                                        [facility]  
   
                                                    2023 Hospital Discharg  
e   
instructions                              
  
  
Patient Education  
  
  
2023 10:05:11  
  
  
Hematuria, Adult  
  
  
Hematuria, Adult  
Hematuria is blood in the   
urine. Blood may be visible in   
the urine, or it may be   
identified with a test. This   
condition can be caused by   
infections of the bladder,   
urethra, kidney, or prostate.   
Other possible causes include:  
Kidney stones.  
Cancer of the urinary tract.  
Too much calcium in the urine.  
Conditions that are passed   
from parent to child   
(inherited conditions).  
Exercise that requires a lot   
of energy.  
Infections can usually be   
treated with medicine, and a   
kidney stone usually will pass   
through your urine. If neither   
of these is the cause of your   
hematuria, more tests may be   
needed to identify the cause   
of your symptoms.  
It is very important to tell   
your health care provider   
about any blood in your urine,   
even if it is painless or the   
blood stops without treatment.   
Blood in the urine, when it   
happens and then stops and   
then happens again, can be a   
symptom of a very serious   
condition, including cancer.   
There is no pain in the   
initial stages of many urinary   
cancers.  
Follow these instructions at   
home:  
Medicines  
Take over-the-counter and   
prescription medicines only as   
told by your health care   
provider.  
If you were prescribed an   
antibiotic medicine, take it   
as told by your health care   
provider. Do not stop taking   
the antibiotic even if you   
start to feel better.  
Eating and drinking  
Drink enough fluid to keep   
your urine pale yellow. It is   
recommended that you drink 3 4   
quarts (2.8 3.8 L) a day. If   
you have been diagnosed with   
an infection, drinking   
cranberry juice in addition to   
large amounts of water is   
recommended.  
Avoid caffeine, tea, and   
carbonated beverages. These   
tend to irritate the bladder.  
Avoid alcohol because it may   
irritate the prostate (in   
males).  
General instructions  
If you have been diagnosed   
with a kidney stone, follow   
your health care provider's   
instructions about straining   
your urine to catch the stone.  
Empty your bladder often.   
Avoid holding urine for long   
periods of time.  
If you are female:  
?After a bowel movement, wipe   
from front to back and use   
each piece of toilet paper   
only once.  
?Empty your bladder before and   
after sex.  
Pay attention to any changes   
in your symptoms. Tell your   
health care provider about any   
changes or any new symptoms.  
It is up to you to get the   
results of any tests. Ask your   
health care provider, or the   
department that is doing the   
test, when your results will   
be ready.  
Keep all follow-up visits.   
This is important.  
Contact a health care provider   
if:  
You develop back pain.  
You have a fever or chills.  
You have nausea or vomiting.  
Your symptoms do not improve   
after 3 days.  
Your symptoms get worse.  
Get help right away if:  
You develop severe vomiting   
and are unable to take   
medicine without vomiting.  
You develop severe pain in   
your back or abdomen even   
though you are taking   
medicine.  
You pass a large amount of   
blood in your urine.  
You pass blood clots in your   
urine.  
You feel very weak or like you   
might faint.  
You faint.  
Summary  
Hematuria is blood in the   
urine. It has many possible   
causes.  
It is very important that you   
tell your health care provider   
about any blood in your urine,   
even if it is painless or the   
blood stops without treatment.  
Take over-the-counter and   
prescription medicines only as   
told by your health care   
provider.  
Drink enough fluid to keep   
your urine pale yellow.  
This information is not   
intended to replace advice   
given to you by your health   
care provider. Make sure you   
discuss any questions you have   
with your health care   
provider.  
Document Revised: 2021   
Document Reviewed: 2021  
[employer] [address] Patient Education   
 [employer]  
  
  
  
Follow Up Care  
  
  
2022 09:43:54  
  
  
With:DEBBI JORDAN, Salvador LAZARO, URL  
Address:  
[facility] [address] [place], [place] [id number]-  
[phone]  
  
When:  
Unknown                                 [facility]  
Work Phone: [phone]  
   
                                                    2022 Hospital Discharg  
e   
instructions                              
  
  
Patient Education  
  
  
2022 09:42:28  
  
  
Hematuria, Adult  
  
  
Hematuria, Adult  
Hematuria is blood in the   
urine. Blood may be visible in   
the urine, or it may be   
identified with a test. This   
condition can be caused by   
infections of the bladder,   
urethra, kidney, or prostate.   
Other possible causes include:  
Kidney stones.  
Cancer of the urinary tract.  
Too much calcium in the urine.  
Conditions that are passed   
from parent to child   
(inherited conditions).  
Exercise that requires a lot   
of energy.  
Infections can usually be   
treated with medicine, and a   
kidney stone usually will pass   
through your urine. If neither   
of these is the cause of your   
hematuria, more tests may be   
needed to identify the cause   
of your symptoms.  
It is very important to tell   
your health care provider   
about any blood in your urine,   
even if it is painless or the   
blood stops without treatment.   
Blood in the urine, when it   
happens and then stops and   
then happens again, can be a   
symptom of a very serious   
condition, including cancer.   
There is no pain in the   
initial stages of many urinary   
cancers.  
Follow these instructions at   
home:  
Medicines  
Take over-the-counter and   
prescription medicines only as   
told by your health care   
provider.  
If you were prescribed an   
antibiotic medicine, take it   
as told by your health care   
provider. Do not stop taking   
the antibiotic even if you   
start to feel better.  
Eating and drinking  
Drink enough fluid to keep   
your urine clear or pale   
yellow. It is recommended that   
you drink 3 4 quarts (2.8 3.8   
L) a day. If you have been   
diagnosed with an infection,   
it is recommended that you   
drink cranberry juice in   
addition to large amounts of   
water.  
Avoid caffeine, tea, and   
carbonated beverages. These   
tend to irritate the bladder.  
Avoid alcohol because it may   
irritate the prostate (men).  
General instructions  
If you have been diagnosed   
with a kidney stone, follow   
your health care provider's   
instructions about straining   
your urine to catch the stone.  
Empty your bladder often.   
Avoid holding urine for long   
periods of time.  
If you are female:  
?After a bowel movement, wipe   
from front to back and use   
each piece of toilet paper   
only once.  
?Empty your bladder before and   
after sex.  
Pay attention to any changes   
in your symptoms. Tell your   
health care provider about any   
changes or any new symptoms.  
It is your responsibility to   
get your test results. Ask   
your health care provider, or   
the department performing the   
test, when your results will   
be ready.  
Keep all follow-up visits as   
told by your health care   
provider. This is important.  
Contact a health care provider   
if:  
You develop back pain.  
You have a fever.  
You have nausea or vomiting.  
Your symptoms do not improve   
after 3 days.  
Your symptoms get worse.  
Get help right away if:  
You develop severe vomiting   
and are unable take medicine   
without vomiting.  
You develop severe pain in   
your back or abdomen even   
though you are taking   
medicine.  
You pass a large amount of   
blood in your urine.  
You pass blood clots in your   
urine.  
You feel very weak or like you   
might faint.  
You faint.  
Summary  
Hematuria is blood in the   
urine. It has many possible   
causes.  
It is very important that you   
tell your health care provider   
about any blood in your urine,   
even if it is painless or the   
blood stops without treatment.  
Take over-the-counter and   
prescription medicines only as   
told by your health care   
provider.  
Drink enough fluid to keep   
your urine clear or pale   
yellow.  
This information is not   
intended to replace advice   
given to you by your health   
care provider. Make sure you   
discuss any questions you have   
with your health care   
provider.  
Document Released: 2006   
Document Revised: 2020   
Document Reviewed: 2018  
[employer] [address] Patient Education   
 [employer]  
  
  
  
Follow Up Care  
  
  
2022 12:57:02  
  
  
With:DEBBI JORDAN, Salvador LAZARO, URL  
Address:  
[facility] [address] [place] [address]  
[phone]  
  
When:6 months  
Comments:renal US                       [facility]  
Work Phone: [phone]  
   
                                2022 Note                 [facility]  
   
                                        Comment on above:   Result Comment: Elec  
tronically Signed By: Hilary NIEVES\.br\Date and Time Signed: 22   
11:39 EST\.br\Electronically Co-Signed By: Jan JORDAN,   
Babak RUIZ\.br\Date and Time Co-Signed: 22 12:00   
EST   
  
  
  
                                        2022 Evaluation + Plan note [facility]  
[facility] from:  
  
  
  
                                Title:Discharge Note Author:Sana NIEVES  
neannalisa Date:22  
  
  
  
                                                    Hemodynamically stable condi  
tion  
  
  
  
Discharge To, Anticipated II -  
Home with responsible caregiver  
  
Discharged to -  
Home with family care  
  
Discharge Status: Improved  
  
Discharge Instructions Given: To patient  
  
Discharge disposition: Home  
  
Prescriptions reviewed with Patient  
  
47 minutes spent in discharge time with patient, collaborating MD, nursing 
staff, CRM  
  
Discharge Diet(s): Regular, Fat Modified- Low cholesterol, Low Sodium- 2000 mg   
(22 11:29:00)  
  
  
Prescriptions  
Levaquin 750 mg Tab, 750 mg= 1 tab(s), Oral, Daily  
Nexium 20 mg Cap-DR, 20 mg= 1 cap(s), Oral, Daily  
nystatin 100,000 units/mL Oral Susp, 545055 unit(s)= 5 mL, Oral, q6hrFT  
Pro-Air HFA CFC free 90 mcg/inh MDI, 2 puff(s), Inhalation, q4hr, PRN  
Zofran ODT 4 mg Tab-Dis, 4 mg= 1 tab(s), Oral, q6hr  
  
Home  
acetaminophen 325 mg Tab, 650 mg= 2 tab(s), Oral, q6hr, PRN  
allopurinol 100 mg Tab, 100 mg= 1 tab(s), Oral, Daily  
calcium (as carbonate) 600 mg oral tablet, 600 mg= 1 tab(s), Oral, Daily  
carvedilol 12.5 mg Tab, 12.5 mg= 1 tab(s), Oral, BID  
Flonase 0.05 mg/inh Spray, 0.1 mg= 2 spray(s), Nasal, Daily  
fluvoxamine 100 mg oral tablet, 50 mg= 0.5 tab(s), Oral, BID  
furosemide 20 mg Tab, See Instructions  
gabapentin 100 mg Cap, 100 mg= 1 cap(s), Oral, TID  
levothyroxine 50 mcg (0.05 mg) Tab, 50 mcg= 1 tab(s), Oral, Daily  
losartan 25 mg Tab, 25 mg= 1 tab(s), Oral, Daily  
magnesium oxide 500 mg oral tablet, 500 mg= 1 tab(s), Oral, Daily  
melatonin 3 mg Tab, 3 mg= 1 tab(s), Oral, Bedtime, PRN  
Mucinex 600 mg Tab-ER, 1200 mg= 2 tab(s), Oral, BID  
Multi-Day Plus Minerals, 1 tab(s), Oral, Daily, Not taking  
Nature's Bounty Probiotic, 1 tab(s), Oral, Daily, Not taking: Not on patient's 
home   
medication list  
simvastatin 40 mg Tab, 40 mg= 1 tab(s), Oral, Once a day (at bedtime)  
spironolactone 25 mg Tab, 12.5 mg= 0.5 tab(s), Oral, Daily  
timolol ophthalmic 0.5% solution, 1 drop(s), Eye-Right, Daily  
Trelegy Ellipta 200 mcg-62.5 mcg-25 mcg/inh inhalation powder, 1 puff(s), 
Inhalation,   
Daily  
Vitamin D3 2000 intl units oral Tab, 2000 International_Unit= 1 cap(s), Oral, 
Daily  
Xarelto 20 mg oral tablet, 20 mg= 1 tab(s), Oral, qPM  
  
  
  
  
With When Contact Information Marty Osborne Within 1 to 2 weeks  
[address] [place] [address]-  
[phone] Business (1)  
Additional Instructions:  
  
Alexei CROW Within 2 to 4 weeks  
[address] [place] [address]-  
[phone] Business (1)  
Additional Instructions:  
  
NIDAA KHAMOUSIA In 0 days  
[address] [facility] [place] [address]-  
[phone] Business (1)  
Additional Instructions:  
  
  
  
  
Bacteremia, Adult  
Core Measures- Pneumonia [facility]  
  
  
  
  
  
Extracted from:  
  
                                Title:APSO Note Author:OUMAR CARLOS-BC, Hilary TEZ  
ate:22  
  
  
  
                                                    1. Bacteremia (R78.81: Bacte  
remia)  
Unclear caution - urine is neg, CT chest/abd./pelvis are non acute, ? early PNA 
that   
was treated prior to scans  
  
-CT chest, abd/pelvis - Hyperattenuating 5 mm right midpole renal cortical 
nodule   
finding may represent hemorrhagic cyst, cardiomegaly, right mastectomy, small   
bilateral pleural effusions, no mass identified, small hiatal hernia,   
cholecystectomy, stable left adrenal nodule  
  
-: Bl. cx. prelim - Streptococcus species Non Hemolytic  
-: Bl. cx. - prelim - neg day 1  
-Levaquin initiated in ED -> transitioned to -> IV vanco, IV cefepime - rx. to 
dose -   
  
  
-Consult ID - : case d/w Dr. Foss via phone w/ recs to transition IV   
vanco/cefepime to IV levaquin w/ transition to po at d/c for a total of 2wks.  
  
  
  
  
  
  
  
Ordered:  
\[facility]\"" Hospital Care/Day High 35 Minutes [id number]  
  
2. Hypoxia (R09.02: Hypoxemia)  
Likely 2/2 b/l pleural effusions, ? bronchitis, no PNA on CT  
-Per chart review nrsng reported pulse ox of 84% but not documented  
-Denies home 02 use -> on RA   
-Flu A&B, COVID 19 - neg  
-CXR: No acute process  
-CT chest: as above  
-Procal level - 1.08  
-Sputum cx. - 1+ yeast, resp. cathi  
-Med nebs, mucinex, IS, supplemental 02  
-IV Azithromycin -   
  
  
  
  
  
  
  
  
  
  
3. Bilateral pleural effusion (J90: Pleural effusion, not elsewhere classified)  
Per CT chest  
-: On RA  
  
  
  
4. Urinary tract infection (N39.0: Urinary tract infection, site not specified)  
-Urine cx. - mixed skin contaminants  
  
  
  
5. Fever (R50.9: Fever, unspecified)  
2/2 above  
-Last fever   
-See above  
  
  
  
6. Elevated troponin (R77.8: Other specified abnormalities of plasma proteins)  
Hx. of non ischemic cardiomyopathy  
-Consult FT/HV - no further inpt. cardiac w/u planned -> cont. chronic meds  
-F/U w/ primary cardiologist as outpt.  
  
  
  
  
7. Nodule of kidney (N28.89: Other specified disorders of kidney and ureter)  
Per CT as above  
-Renal US -7 x 8 x 8 mm right mid pole cortical simple renal cyst - pt. states 
she   
was aware of this finding previously  
-F/U w/ urology as oupt.  
  
  
  
8. Open wound of right knee (S81.001A: Unspecified open wound, right knee, 
initial   
encounter)  
Sm. lac type area - no s/s of infection  
-Band aid  
  
  
  
9. Hyponatremia (E87.1: Hypo-osmolality and hyponatremia)  
Acute on chronic  
-Asymptomatic  
-Baseline Na level: 131  
  
  
  
10. Hypokalemia (E87.6: Hypokalemia)  
Replete K/Mag prn  
-Trend labs  
  
  
  
11. Chronic systolic heart failure (I50.22: Chronic systolic (congestive) heart   
failure)  
-EF 40%, normal RV, biatrial enlargement, mild to moderate MR, moderate to 
severe TR,   
mild PA hypertension, pseudo normal diastolic filling pattern, pacemaker RA/RV.  
  
-Consult FT/HV - euvolemic, stable for d/c from cardio standpoint on chronic 
home   
medications -Resume chronic cardiac meds  
- resume spironolactone  
-Hold furosemide - pt. states she takes on a prn wt. gain basis  
-F/U as above  
  
  
  
  
  
  
12. Presence of combination internal cardiac defibrillator (ICD) and pacemaker   
(Z95.810: Presence of automatic (implantable) cardiac defibrillator)  
Hx. of BiV ICD  
-In situ  
  
  
  
13. History of atrial fibrillation (Z86.79: Personal history of other diseases 
of the   
circulatory system)  
-Carvedilol, xarelto,  
  
  
  
14. Hypertension (I10: Essential (primary) hypertension)  
-Carvedilol, losartan,  
-Hold furosemide,  
-Spironolactone  
  
  
  
15. Hyperlipidemia (E78.5: Hyperlipidemia, unspecified)  
-Simvastatin  
  
  
16. Hypothyroidism (E03.9: Hypothyroidism, unspecified)  
-Levothyroxine  
  
  
  
17. Glaucoma (H40.9: Unspecified glaucoma)  
-Timolol  
  
  
  
18. Chronic GERD (K21.9: Gastro-esophageal reflux disease without esophagitis)  
-PPI  
  
  
19. Obesity (E66.9: Obesity, unspecified)  
BMI 33  
-Educated on need for lifestyle modifications with goal of weight loss as 
obesity has   
a negative impact on co-morbid conditions.  
  
  
  
  
20. History of gout (Z87.39: Personal history of other diseases of the   
musculoskeletal system and connective tissue)  
-Allopurinol, gabapentin  
  
  
  
21. History of breast cancer (Z85.3: Personal history of malignant neoplasm of   
breast)  
S/P radical mastectomy  
  
  
  
22. DVT prophylaxis (Z29.9: Encounter for prophylactic measures, unspecified)  
-Xarelto  
  
  
  
Orders:  
fluticasone nasal, 0.1 mg, 2 spray(s), Spray, Nasal, Daily, Routine, Start date   
22 9:00:00 EST   
gabapentin, 300 mg = 1 cap(s), Cap, Oral, Bedtime for 30 day(s), Stop date 
22   
20:59:00 EST, Start date 22 21:00:00 EST  
magnesium sulfate + Generic Diluent 50 mL, 2 gram = 50 mL, Soln-IV, IV 
Piggyback,   
Once, Stop date 22 9:00:00 EST, Routine, Start date 22 9:00:00 EST, 
25   
mL/hr, Infuse over 2 hour(s)  
polyethylene glycol 3350, 17 gram = 1 EA, Powder-Recon, Oral, Once, Stop date   
22 8:00:00 EST, Routine, Start date 22 8:00:00 EST, 22 7:58:00
 EST  
potassium chloride, 40 mEq = 2 tab(s), Tab-ER, Oral, Once, Stop date 22 
8:00:00   
EST, Routine, Start date 22 8:00:00 EST, 22 7:20:00 EST  
spironolactone, 12.5 mg = 0.5 tab(s), Tab, Oral, Daily, Routine, Start date 
22   
9:00:00 EST, 22 8:59:00 EST  
Basic Metabolic Panel  
Communication Order Physician to Nursing  
eGFR  
Magnesium Level  
Physical Therapy Evaluate Patient, Develop a Plan of Care and Implement Plan  
  
-Plan discussed w/ patient, nursing staff and CRM.  
  
This report was transcribed using voice recognition software. Every effort was 
made   
to ensure accuracy, however, inadvertently computerized transcription mistakes 
may be   
present.  
  
  
  
  
  
  
Extracted from:  
  
                                Title:APSO Note Author:Hilary NIEVES  
ate:22  
  
  
  
                                                    1. Bacteremia (R78.81: Bacte  
remia)  
Unclear caution - urine is neg, CT chest/abd./pelvis are non acute  
-CT chest, abd/pelvis - Hyperattenuating 5 mm right midpole renal cortical 
nodule   
finding may represent hemorrhagic cyst, cardiomegaly, right mastectomy, small   
bilateral pleural effusions, no mass identified, small hiatal hernia,   
cholecystectomy, stable left adrenal nodule  
  
-: Bl. cx. prelim - Streptococcus species Non Hemolytic  
-: Bl. cx. - prelim - pending  
-Levaquin initiated in ED -> transitioned to -> IV vanco, IV cefepime - rx. to 
dose -   
  
  
-Consult ID - : case d/w Dr. Foss via phone w/ recs to transition IV   
vanco/cefepime to IV levaquin w/ transition to po at d/c for a total of 2wks.  
  
  
  
  
  
  
  
Ordered:  
\[facility]\"" Hospital Care/Day High 35 Minutes [id number]  
  
2. Hypoxia (R09.02: Hypoxemia)  
Likely 2/2 b/l pleural effusions, ? bronchitis, no PNA on CT  
-Per chart review nrsng reported pulse ox of 84% but not documented  
-Denies home 02 use -> on RA today  
-Flu A&B, COVID 19 - neg  
-CXR: No acute process  
-CT chest: as above  
-Procal level - 1.08  
-Sputum cx. - pending  
-Med nebs, mucinex, IS, supplemental 02  
-IV Azithromycin -   
  
  
  
  
  
  
  
  
  
  
3. Bilateral pleural effusion (J90: Pleural effusion, not elsewhere classified)  
Per CT chest  
-: On RA  
  
  
  
4. Urinary tract infection (N39.0: Urinary tract infection, site not specified)  
-Urine cx. - mixed skin contaminants  
  
  
5. Fever (R50.9: Fever, unspecified)  
2/2 above  
-Last fever   
-See above  
  
  
  
6. Elevated troponin (R77.8: Other specified abnormalities of plasma proteins)  
Hx. of non ischemic cardiomyopathy  
-Consult FT/HV - no further inpt. cardiac w/u planned -> cont. chronic meds  
-F/U w/ primary cardiologist as outpt.  
  
  
  
  
7. Nodule of kidney (N28.89: Other specified disorders of kidney and ureter)  
Per CT as above  
-Renal US -7 x 8 x 8 mm right mid pole cortical simple renal cyst.  
-F/U w/ urology as oupt.  
  
  
  
  
8. Open wound of right knee (S81.001A: Unspecified open wound, right knee, 
initial   
encounter)  
Sm. lac type area - no s/s of infection  
-Band aid  
  
  
  
9. Hyponatremia (E87.1: Hypo-osmolality and hyponatremia)  
Acute on chronic  
-Asymptomatic  
-Baseline Na level: 131  
  
-Consult nephro - defer resuming diuretics to cardio, signed off  
-DC q6 Na levels - stable  
-TSH - wnl  
-Trend BMP  
  
  
  
  
  
  
  
  
  
  
10. Hypokalemia (E87.6: Hypokalemia)  
Replete K/Mag prn  
-Trend labs  
  
  
  
11. Chronic systolic heart failure (I50.22: Chronic systolic (congestive) heart   
failure)  
-Consult FT/HV - euvolemic, stable for d/c from cardio standpoint on chronic 
home   
medications  
-EF 40%, normal RV, biatrial enlargement, mild to moderate MR, moderate to 
severe TR,   
mild PA hypertension, pseudo normal diastolic filling pattern, pacemaker RA/RV.  
-Resume cardiac med per cardio  
- resume spironolactone  
-Hold furosemide - pt. states she takes on a prn wt. gain basis  
-F/U as above  
  
  
  
  
  
  
12. Presence of combination internal cardiac defibrillator (ICD) and pacemaker   
(Z95.810: Presence of automatic (implantable) cardiac defibrillator)  
Hx. of BiV ICD  
-In situ  
  
  
  
13. History of atrial fibrillation (Z86.79: Personal history of other diseases 
of the   
circulatory system)  
-Carvedilol, xarelto,  
  
  
14. Hypertension (I10: Essential (primary) hypertension)  
-Carvedilol, losartan,  
-Hold furosemide,  
-Spironolactone  
  
  
  
15. Hyperlipidemia (E78.5: Hyperlipidemia, unspecified)  
-Simvastatin  
  
  
16. Hypothyroidism (E03.9: Hypothyroidism, unspecified)  
-Levothyroxine  
  
  
17. Glaucoma (H40.9: Unspecified glaucoma)  
-Timolol  
  
  
18. Chronic GERD (K21.9: Gastro-esophageal reflux disease without esophagitis)  
-PPI  
  
  
19. Obesity (E66.9: Obesity, unspecified)  
BMI 33  
-Educated on need for lifestyle modifications with goal of weight loss as 
obesity has   
a negative impact on co-morbid conditions.  
  
  
  
20. History of gout (Z87.39: Personal history of other diseases of the   
musculoskeletal system and connective tissue)  
-Allopurinol, gabapentin  
  
  
21. History of breast cancer (Z85.3: Personal history of malignant neoplasm of   
breast)  
S/P radical mastectomy  
  
  
22. DVT prophylaxis (Z29.9: Encounter for prophylactic measures, unspecified)  
-Xarelto  
  
  
Orders:  
gabapentin, 100 mg = 1 cap(s), Cap, Oral, Daily for 30 day(s), Stop date 
22   
8:59:00 EST, Start date 22 9:00:00 EST  
gabapentin, 300 mg = 1 cap(s), Cap, Oral, Bedtime for 30 day(s), Stop date 
22   
20:59:00 EST, Start date 22 21:00:00 EST  
levofloxacin + Dextrose 5% in Water intravenous solution 150 mL, 750 mg = 150 
mL,   
Soln-IV, IV Piggyback, Daily, Routine, Start date 22 9:00:00 EST, 150 
mL/hr,   
Infuse over 1 hour(s)  
magnesium sulfate + Generic Diluent 50 mL, 2 gram = 50 mL, Soln-IV, IV 
Piggyback,   
Once, Stop date 22 9:00:00 EST, Routine, Start date 22 9:00:00 EST, 
25   
mL/hr, Infuse over 2 hour(s)  
potassium chloride, 40 mEq = 2 tab(s), Tab-ER, Oral, Once, Stop date 22 
8:00:00   
EST, Routine, Start date 22 8:00:00 EST, 22 7:20:00 EST  
spironolactone, 12.5 mg = 0.5 tab(s), Tab, Oral, Daily, Routine, Start date 
22   
9:00:00 EST, 22 8:59:00 EST  
Basic Metabolic Panel  
Cardiac Monitoring  
Communication Order Physician to Nursing  
Communication Order Physician to Nursing  
CT Abdomen/Pelvis w/o Contrast  
CT Chest w/o Contrast  
eGFR  
Extra Lav Tube  
Magnesium Level  
Physical Therapy Evaluate Patient, Develop a Plan of Care and Implement Plan  
Resuscitation Status - Full  
Thyroid Stimulating Hormone  
US Renal  
  
-Plan discussed w/ patient, nursing staff and CRM.  
  
This report was transcribed using voice recognition software. Every effort was 
made   
to ensure accuracy, however, inadvertently computerized transcription mistakes 
may be   
present.  
  
  
  
  
  
  
Extracted from:  
  
                                Title:HypoNa    Author:Mahi JORDAN, Po Date:  
  
  
  
                                                    Impression and Plan  
1. Acute hypovolemic hyponatremia with normal baseline sodium: Likely from 
volume   
depletion. Sodium anthony at 129 and normalized to 135.  
2. Heart failure with EF 40% and moderate to severe TR status post AICD: She was
 on   
Lasix and spironolactone prior to admission; will defer to cardiology whether   
diuretics need to be resumed on outpatient basis.  
3. Acute kidney injury: Likely from volume depletion. Cr normalized  
Will sign off at this time; please reconsult if needed.  
  
  
Extracted from:  
  
                                Title:APSO Note Author:OUMAR GONZALEZ-BC, Hilary REAGAN  
ate:22  
  
  
  
                                                    1. Bacteremia (R78.81: Bacte  
remia)  
Unclear caution - urine is neg  
-: Bl. cx. prelim - Streptococcus species Non Hemolytic  
-: Bl. cx. - prelim - pending  
-Levaquin initiated in ED -> transitioned to -> IV vanco, IV cefepime - rx. to 
dose -   
  
-Consult ID - pending  
-CT chest - pending  
-CT abd/pelvis - pending  
  
  
  
  
  
  
2. Hypoxia (R09.02: Hypoxemia)  
Per chart review nrsng reported pulse ox of 84% but not documented  
-Denies home 02 use -> Remains on NC - will wean down  
-Flu A&B, COVID 19 - neg  
-CXR: No acute process  
-CT chest: as above  
-Procal level - 1.08  
-Sputum cx. - pending  
-Med nebs, mucinex, IS, supplemental 02  
-IV Azithromycin -   
  
  
  
  
  
  
  
  
  
3. Urinary tract infection (N39.0: Urinary tract infection, site not specified)  
-Urine cx. - mixed skin contaminants  
  
  
4. Fever (R50.9: Fever, unspecified)  
2/2 above  
-See above  
  
  
5. Elevated troponin (R77.8: Other specified abnormalities of plasma proteins)  
Hx. of non ischemic cardiomyopathy  
-Consult FT/HV - no further inpt. cardiac w/u planned -> cont. chronic meds  
-F/U w/ primary cardiologist as outpt.  
  
  
  
6. Hyponatremia (E87.1: Hypo-osmolality and hyponatremia)  
Acute on chronic  
-Asymptomatic  
-Baseline Na level: 131  
  
-Consult nephro - hold spironolactone, IV bolus x 1, hold furosemide, 
spironolactone   
until reviewed w/ cardio - see cardio recs to resume home meds as prior  
  
-Na levels q6 per nephro  
-TSH - pending  
-Trend BMP  
  
  
  
  
  
  
  
  
7. Chronic systolic heart failure (I50.22: Chronic systolic (congestive) heart   
failure)  
-Consult FT/HV - euvolemic, stable for d/c from cardio standpoint on chronic 
home   
medications  
-Hold spironolactone per nephro  
-F/U as above  
  
  
  
8. Presence of combination internal cardiac defibrillator (ICD) and pacemaker   
(Z95.810: Presence of automatic (implantable) cardiac defibrillator)  
Hx. of BiV ICD  
-In situ  
  
  
9. History of atrial fibrillation (Z86.79: Personal history of other diseases of
 the   
circulatory system)  
-Carvedilol, xarelto,  
  
  
10. Hypertension (I10: Essential (primary) hypertension)  
-Carvedilol, losartan,  
-Hold furosemide, spironolactone - resume at d/c per cardio  
  
  
11. Hyperlipidemia (E78.5: Hyperlipidemia, unspecified)  
-Simvastatin  
  
  
12. Hypothyroidism (E03.9: Hypothyroidism, unspecified)  
-Levothyroxine,  
  
  
13. Glaucoma (H40.9: Unspecified glaucoma)  
-Timolol  
  
  
14. Chronic GERD (K21.9: Gastro-esophageal reflux disease without esophagitis)  
-PPI  
  
  
15. Obesity (E66.9: Obesity, unspecified)  
BMI 33  
-Educated on need for lifestyle modifications with goal of weight loss as 
obesity has   
a negative impact on co-morbid conditions.  
  
  
16. History of gout (Z87.39: Personal history of other diseases of the   
musculoskeletal system and connective tissue)  
-Allopurinol, gabapentin  
  
  
17. History of breast cancer (Z85.3: Personal history of malignant neoplasm of   
breast)  
S/P radical mastectomy  
  
  
18. DVT prophylaxis (Z29.9: Encounter for prophylactic measures, unspecified)  
-Xarelto  
  
  
Orders:  
gabapentin, 400 mg = 4 cap(s), Cap, Oral, BID, Routine, Start date 22 
21:00:00   
EST, 22 10:21:00 EST  
ipratropium, 2.5 mL, Soln-Inh, NEB, QID, Routine, Start date 22 12:00:00 
EST  
levalbuterol, 0.63 mg = 3 mL, Soln-Inh, Inhalation, QID, Routine, Start date 
22   
12:00:00 EST, 22 8:30:00 EST  
Cardiac Monitoring  
Communication Order Physician to Nursing  
Consult to Infectious Disease Physician  
CT Abdomen/Pelvis w/o Contrast  
CT Chest w/o Contrast  
Incentive Spirometry  
Resuscitation Status - Full  
Thyroid Stimulating Hormone  
  
-Plan discussed w/ patient, nursing staff and CRM.  
  
This report was transcribed using voice recognition software. Every effort was 
made   
to ensure accuracy, however, inadvertently computerized transcription mistakes 
may be   
present.  
  
  
  
  
  
  
Extracted from:  
  
                                Title:APSO Note Author:Tone JORDAN, Abeer Date:22  
  
  
  
                                                    1. Fever (R50.9: Fever, unsp  
ecified)  
Abnormal UA on admission, CXR with vage opacity  
2 bottle of blood Cx with gram positive cocci in chain  
F/U sputum/ urine Cx  
F/U final Blood Cx and repeated blood Cx  
IV antibiotics and reassess  
  
  
  
Ordered:  
  
2. Hypoxia (R09.02: Hypoxemia)  
Desat to 84% in the ER as per the notes, Not on home O2,  
On 2 L NC today , likely due to above  
  
  
3. Urinary tract infection (N39.0: Urinary tract infection, site not specified)  
See #1  
  
4. Elevated troponin (R77.8: Other specified abnormalities of plasma proteins)  
Slight elevation, pt presented with infection, likely type II  
telemetry  
  
  
  
5. Hyponatremia (E87.1: Hypo-osmolality and hyponatremia)  
Improving, Na level is trending up  
Received IVF on admission  
Hx of CHF, with with AICD,  
F/U Na level  
  
  
  
6. Chronic systolic heart failure (I50.22: Chronic systolic (congestive) heart   
failure)  
Not in acute exacerbation  
  
7. Presence of combination internal cardiac defibrillator (ICD) and pacemaker   
(Z95.810: Presence of automatic (implantable) cardiac defibrillator)  
No recent firing  
  
8. History of atrial fibrillation (Z86.79: Personal history of other diseases of
 the   
circulatory system)  
C/W BB and Xarelto  
  
9. Hypertension (I10: Essential (primary) hypertension)  
C/w home meds  
  
10. Hyperlipidemia (E78.5: Hyperlipidemia, unspecified)  
statin  
  
11. Chronic GERD (K21.9: Gastro-esophageal reflux disease without esophagitis)  
was on PPI at home  
  
12. History of gout (Z87.39: Personal history of other diseases of the   
musculoskeletal system and connective tissue)  
allopurinol  
  
13. History of breast cancer (Z85.3: Personal history of malignant neoplasm of   
breast)  
s/p right radical mastectomy in   
  
14. Obesity (E66.9: Obesity, unspecified)  
Lifestyle modification and outpatient follow up with PCP  
  
Orders:  
Basic Metabolic Panel  
Blood Culture Charcoal  
Blood Culture Charcoal  
eGFR  
Extra Lav Tube  
Sodium Level  
This report was transcribed using voice recognition software , Every effort was 
made   
to ensure accuracy , however, inadvertently computerized transcription mistakes 
may   
be present .  
  
  
  
Extracted from:  
  
                                Title:Progress/SOAP Note Author:Marty Johnson. Date:22  
  
  
  
                                                    Stable for discharge from [facility] perspective. Follow-up with her outpatient   
cardiologist. Continue same medications as patient was admitted with for heart   
failure. We will sign off cardiology  
1. Fever (R50.9: Fever, unspecified)  
2. Hypoxia (R09.02: Hypoxemia)  
3. Urinary tract infection (N39.0: Urinary tract infection, site not specified)  
4. Elevated troponin (R77.8: Other specified abnormalities of plasma proteins)  
5. Hyponatremia (E87.1: Hypo-osmolality and hyponatremia)  
6. Chronic systolic heart failure (I50.22: Chronic systolic (congestive) heart   
failure)  
7. Presence of combination internal cardiac defibrillator (ICD) and pacemaker   
(Z95.810: Presence of automatic (implantable) cardiac defibrillator)  
8. History of atrial fibrillation (Z86.79: Personal history of other diseases of
 the   
circulatory system)  
9. Hypertension (I10: Essential (primary) hypertension)  
10. Hyperlipidemia (E78.5: Hyperlipidemia, unspecified)  
11. Chronic GERD (K21.9: Gastro-esophageal reflux disease without esophagitis)  
12. History of gout (Z87.39: Personal history of other diseases of the   
musculoskeletal system and connective tissue)  
13. History of breast cancer (Z85.3: Personal history of malignant neoplasm of   
breast)  
14. Obesity (E66.9: Obesity, unspecified)  
  
  
  
Extracted from:  
  
                                Title:Consult Note Author:Roverto Osborne MD. Date:22  
  
  
  
                                                    Patient with nonischemic car  
diomyopathy and chronic systolic heart failure comes  
 in   
with systemic complaints sounding more like infection possibly pneumonia or UTI 
then   
cardiac event. Elevated troponin probably represents congestive heart failure 
which   
is chronic and acute renal failure. Would not recommend any further intervention
 at   
this point in time. Acceptable to continue cardiac meds as you have done 
including   
Xarelto simvastatin carvedilol thank you for the consultation  
1. Fever (R50.9: Fever, unspecified)  
2. Hypoxia (R09.02: Hypoxemia)  
3. Urinary tract infection (N39.0: Urinary tract infection, site not specified)  
4. Elevated troponin (R77.8: Other specified abnormalities of plasma proteins)  
5. Hyponatremia (E87.1: Hypo-osmolality and hyponatremia)  
6. Chronic systolic heart failure (I50.22: Chronic systolic (congestive) heart   
failure)  
7. Presence of combination internal cardiac defibrillator (ICD) and pacemaker   
(Z95.810: Presence of automatic (implantable) cardiac defibrillator)  
8. History of atrial fibrillation (Z86.79: Personal history of other diseases of
 the   
circulatory system)  
9. Hypertension (I10: Essential (primary) hypertension)  
10. Hyperlipidemia (E78.5: Hyperlipidemia, unspecified)  
11. Chronic GERD (K21.9: Gastro-esophageal reflux disease without esophagitis)  
12. History of gout (Z87.39: Personal history of other diseases of the   
musculoskeletal system and connective tissue)  
13. History of breast cancer (Z85.3: Personal history of malignant neoplasm of   
breast)  
14. Obesity (E66.9: Obesity, unspecified)  
  
  
  
Extracted from:  
  
                                Title:HypoNa    Author:Po Champagne MD Date:  
  
  
  
                                                    Impression and Plan  
1. Acute hypovolemic hyponatremia with normal baseline sodium: Likely from 
volume   
depletion. Sodium anthony at 129. Recommend stopping spironolactone for today. We 
will   
give 500 cc of normal saline bolus. Please check sodiums every 6 hours.  
2. Heart failure with unknown EF status post AICD: Please update 2D echo. She 
was on   
Lasix and spironolactone prior to admission; will defer to cardiology whether   
diuretics need to be resumed on outpatient basis.  
3. Acute kidney injury: Likely from volume depletion. IV fluids as above.  
Thank you for involving us in the consultation of this patient. Please feel free
 to   
call with any questions.  
No in person nephrology service available tomorrow.  
  
  
Extracted from:  
  
                                Title:Admission H & P Author:J Luis VERDUGO DO Date:22  
  
  
  
                                                    1. Fever (R50.9: Fever, unsp  
ecified)  
Suspect urinary tract infection based on abnormal UA however await urine 
culture.   
Note below as well. Chest x-ray demonstrates vague opacity I suspect however 
this is   
overlying breast tissue on the left which she does not have from a prior 
mastectomy   
on the right responsible for the symmetry. Patient however does have a cough 
(see   
below) therefore differential also includes pneumonia versus bronchitis. Note 
patient   
was negative for influenza A&B and COVID 19. Differential also includes 
bronchitis of   
either bacterial or viral origin. Patient does have allergies to Ceclor 
therefore   
Rocephin has not been initiated. Agree with Levaquin pending further evaluation 
as it   
would cover both respiratory and urinary pathogens. Await urine culture. If 
patient   
develops a productive cough we will send sputum for analysis. Await blood 
cultures.   
We will check procalcitonin. Do not feel that patient at present is septic  
Ordered:  
levofloxacin + Dextrose 5% in Water intravenous solution 150 mL, 750 mg = 150 
mL, IV   
Piggyback, q24hr, Routine, Start date 22 1:42:00 EST, 150 mL/hr, Infuse 
over 1   
hour(s), 22 1:42:00 EST  
  
2. Hypoxia (R09.02: Hypoxemia)  
Note Per discussion with emergency department clinician nursing had witnessed 
patient   
on continuous monitor with sats as low as 84%. Curiously when she first 
presented she   
had recorded saturation of 93%. She denied any shortness of breath and her lungs
 were   
clear to auscultation. We will continue O2 for now pending above work-up. Do not
 feel   
that patient is volume overloaded which she would be at risk for with below. Per
   
review of the emergency department documents patient did receive some fluid in 
the   
emergency department. We will be cautious with additional fluid based on her 
history   
in light of a normal lactic acid, normal creatinine not prerenal and not 
hypotensive  
Ordered:  
Procalcitonin  
  
3. Urinary tract infection (N39.0: Urinary tract infection, site not specified)  
Await urine culture. Continue Levaquin  
  
4. Elevated troponin (R77.8: Other specified abnormalities of plasma proteins)  
Slight elevation. Please note patient has nonischemic cardiomyopathy. She denied
 any   
chest pain. She was denying any shortness of breath. EKG demonstrates paced   
ventricle. There were no subsequent troponins ordered we therefore reorder and 
if   
rising will complete trend and consider cardiology consult. Question if this is 
a   
type II in response to above  
Ordered:  
Troponin  
  
5. Hyponatremia (E87.1: Hypo-osmolality and hyponatremia)  
Patient had reportedly received IV fluids in the squad. We will be cautious 
about   
additional fluids with her history of cardiomyopathy. In regards to above 
diagnosis   
this could potentially further support pneumonia if this were secondary to 
SIADH.   
Could also be due to loop diuretic  
  
6. Chronic systolic heart failure (I50.22: Chronic systolic (congestive) heart   
failure)  
Patient states she had an echocardiogram at [facility] ordered by her cardiologist 
in   
[place]. She indicates that there was  decreased function  I suspect she 
is   
referring to ejection fraction. Await confirmation of home meds she had been on   
Coreg, losartan, Lasix and Aldactone we will continue if confirmed while 
watching   
electrolytes. Do not feel the patient is volume overloaded despite reports that   
patient received fluid in route via the squad  
  
7. Presence of combination internal cardiac defibrillator (ICD) and pacemaker   
(Z95.810: Presence of automatic (implantable) cardiac defibrillator)  
No recent firing of the defibrillator. She denies any history of cardiac arrest 
or   
known history of ventricular tachycardia likely this was placed in response to   
diminished ejection fraction in the past  
  
8. History of atrial fibrillation (Z86.79: Personal history of other diseases of
 the   
circulatory system)  
Await confirmation of home meds if confirmed she is on Coreg we will continue 
this,   
continue Xarelto  
  
9. Hypertension (I10: Essential (primary) hypertension)  
Await confirmation of home meds if on Coreg and losartan will be continued  
  
10. Hyperlipidemia (E78.5: Hyperlipidemia, unspecified)  
Continue statin once confirmed  
  
11. Chronic GERD (K21.9: Gastro-esophageal reflux disease without esophagitis)  
Continue PPI once confirmed  
  
12. History of gout (Z87.39: Personal history of other diseases of the   
musculoskeletal system and connective tissue)  
Continue allopurinol once confirmed  
  
13. History of breast cancer (Z85.3: Personal history of malignant neoplasm of   
breast)  
Patient had a right radical mastectomy in  for breast cancer. She received 
chemo   
and radiation therapy with no recent recurrence  
  
14. Obesity (E66.9: Obesity, unspecified)  
With multiple comorbidities patient would benefit from weight loss  
  
Orders:  
acetaminophen, 650 mg = 2 tab(s), Tab, Oral, q6hr PRN Pain, Routine, Start date   
22 5:20:00 EST, 22 5:20:00 EST  
guaifenesin, 1,200 mg = 2 tab(s), Tab-ER, Oral, BID, Routine, Start date 
22   
9:00:00 EST, 22 5:20:00 EST  
hydrALAZINE, 10 mg = 0.5 mL, Injection, IV Push, q6hr PRN Other (see comment),   
Routine, Start date 22 5:20:00 EST, 22 5:20:00 EST  
Sodium Chloride 0.9% intravenous solution 1,000 mL, 1,000 mL, IV, 30 mL/hr, 
Routine,   
Start date 22 5:20:00 EST, 33.3 hour(s), Total volume (mL): 1,000, 103.5 
kg,   
2.23, m2  
Basic Metabolic Panel  
CBC w/ Auto Diff  
Elevate Head of Bed  
Legionella Antigen Urine  
Notify Provider Vital Signs  
Notify Provider Vital Signs  
Oxygen Protocol  
Place in Status  
Pneumonia Quality Measures  
Regular Diet  
Sputum Culture  
Vital Signs  
Weight  
Weight  
Admit patient as a general inpatient with hypoxia febrile illness anticipating 
she   
will require 2 midnight stay  
  
  
  
Extracted from:  
  
                                Title:ED Note   Author:Rama Long DO Date  
:22  
  
  
  
                                                    Elevated troponin (R77.8: Ot  
her specified abnormalities of plasma proteins)  
Hyponatremia (E87.1: Hypo-osmolality and hyponatremia)  
Pneumonia (J18.9: Pneumonia, unspecified organism)  
Orders:  
azithromycin + Sodium Chloride 0.9% intravenous solution 250 mL, 500 mg = 1 EA, 
IV   
Piggyback, Daily, STAT, Start date 22 1:42:00 EST, 250 mL/hr, Infuse over 
60   
minute(s), 22 1:42:00 EST  
levofloxacin + Dextrose 5% in Water intravenous solution 150 mL, 750 mg = 150 
mL, IV   
Piggyback, Once, Stop date 22 1:42:00 EST, STAT, Start date 22 
1:42:00   
EST, 150 mL/hr, Infuse over 1 hour(s), 22 1:42:00 EST  
ondansetron, 4 mg = 2 mL, Injection, IV Push, Once, Stop date 22 3:01:00 
EST,   
STAT, Start date 22 3:01:00 EST, 22 3:01:00 EST  
Sodium Chloride 0.9% intravenous solution, 1,000 mL, Soln-IV, IV, Once, Stop 
date   
22 0:45:00 EST, STAT, Start date 22 0:45:00 EST, Infuse over 61,   
minute(s)  
Automated Diff  
Blood Culture Charcoal  
Blood Culture Charcoal  
CBC w/ Auto Diff  
Comprehensive Metabolic Panel  
Continuous Pulse Oximetry  
ECG 12 Lead Adult  
ED Cardiac Monitoring  
ED Physician consult Hospitalist for continued care  
eGFR  
Influenza A&B Ag  
Lactic Acid  
Oxygen Therapy  
PT & PTT  
Rapid COVID Antigen ([facility] [place]  
Troponin  
UA With Cult Reflex  
XR Chest Single View  
  
  
  
  
Future Appointments  
  
  
  
Appointment Date:2022 09:15:00 AM  
Scheduled Provider:Salvador CORCORAN MD  
Location:[facility]  
Appointment Type:URO Office Visit  
  
  
[facility]11- Hospital Discharge instructions  
  
Patient Education  
  
  
  
2022 11:32:23  
  
  
  
Bacteremia, Adult  
  
  
  
Bacteremia, Adult  
Bacteremia is the presence of bacteria in the blood. When bacteria enter the 
bloodstream, they can cause a life-threatening reaction called sepsis. Sepsis is
 a medical emergency.  
What are the causes?  
This condition is caused by bacteria that get into the blood. Bacteria can enter
 the blood from an infection, including:  
A skin infection or injury, such as a burn or a cut.  
A lung infection (pneumonia).  
An infection in the stomach or intestines.  
An infection in the bladder or urinary system (urinary tract infection).  
A bacterial infection in another part of the body that spreads to the blood.  
Bacteria can also enter the blood during a dental or medical procedure, from 
bleeding gums, or through use of an unclean needle.  
What increases the risk?  
This condition is more likely to develop in children, older adults, and people 
who have:  
A long-term (chronic) disease or condition like diabetes or chronic kidney 
failure.  
An artificial joint or heart valve, or heart valve disease.  
A tube inserted to treat a medical condition, such as a urinary catheter or IV.  
A weak disease-fighting system (immune system).  
Injected illegal drugs.  
Been hospitalized for more than 10 days in a row.  
What are the signs or symptoms?  
Symptoms of this condition include:  
Fever and chills.  
Fast heartbeat and shortness of breath.  
Dizziness, weakness, and low blood pressure.  
Confusion or anxiety.  
Pain in the abdomen, nausea, vomiting, and diarrhea.  
Bacteremia that has spread to other parts of the body may cause symptoms in 
those areas. In some cases, there are no symptoms.  
How is this diagnosed?  
This condition may be diagnosed with a physical exam and tests, such as:  
Blood tests to check for bacteria (cultures) or other signs of infection.  
Tests of any tubes that you have had inserted. These tests check for a source of
 infection.  
Urine tests to check for bacteria in the urine.  
Imaging tests, such as an X-ray, a CT scan, an MRI, or a heart ultrasound. These
 check for a sourceof infection in other parts of your body, such as your lungs,
 heart valves, or joints.  
How is this treated?  
This condition is usually treated in the hospital. If you are treated at home, 
you may need to return to the hospital for medicines, blood tests, and 
evaluation. Treatment may include:  
Antibiotic medicines. These may be given by mouth or directly into your blood 
through an IV. You may need antibiotics for several weeks. At first, you may be 
given an antibiotic to kill most types ofblood bacteria. If tests show that a 
certain kind of bacteria is causing the problem, you may be given a different 
antibiotic.  
IV fluids.  
Removing any catheter or device that could be a source of infection.  
Blood pressure and breathing support, if needed.  
Surgery to control the source or the spread of infection, such as surgery to 
remove an implanted device, abscess, or infected tissue.  
Follow these instructions at home:  
Medicines  
Take over-the-counter and prescription medicines only as told by your health 
care provider.  
If you were prescribed an antibiotic medicine, take it as told by your health 
care provider. Do notstop taking the antibiotic even if you start to feel 
better.  
General instructions  
Rest as needed. Ask your health care provider when you may return to normal 
activities.  
Drink enough fluid to keep your urine pale yellow.  
Do not use any products that contain nicotine or tobacco, such as cigarettes, e-
cigarettes, and chewing tobacco. If you need help quitting, ask your health care
 provider.  
Keep all follow-up visits as told by your health care provider. This is 
important.  
How is this prevented?  
Wash your hands regularly with soap and water. If soap and water are not 
available, use hand .  
You should wash your hands:  
?After using the toilet or changing a diaper.  
?Before preparing, cooking, serving, or eating food.  
?While caring for a sick person or while visiting someone in a hospital.  
?Before and after changing bandages (dressings) over wounds.  
Clean any scrapes or cuts with soap and water and cover them with a clean 
bandage.  
Get vaccinations as recommended by your health care provider.  
Practice good oral hygiene. Brush your teeth two times a day, and floss 
regularly.  
Take good care of your skin. This includes bathing and moisturizing on a regular
 basis.  
Contact a health care provider if:  
Your symptoms get worse, and medicines do not help.  
You have severe pain.  
Get help right away if you have:  
Pain.  
A fever or chills.  
Trouble breathing.  
A fast heart rate.  
Skin that is blotchy, pale, or clammy.  
Confusion.  
Weakness.  
Lack of energy or unusual sleepiness.  
New symptoms that develop after treatment has started.  
These symptoms may represent a serious problem that is an emergency. Do not wait
 to see if the symptoms will go away. Get medical help right away. Call your 
local emergency services (911 in the [place] Do not drive yourself to the 
hospital.  
Summary  
Bacteremia is the presence of bacteria in the blood. When bacteria enter the 
bloodstream, they can cause a life-threatening reaction called sepsis.  
Bacteremia is usually treated with antibiotic medicines in the hospital.  
If you were prescribed an antibiotic medicine, take it as told by your health 
care provider. Do notstop taking the antibiotic even if you start to feel 
better.  
Get help right away if you have any new symptoms that develop after treatment 
has started.  
This information is not intended to replace advice given to you by your health 
care provider. Make sure you discuss any questions you have with your health 
care provider.  
Document Released: 10/01/2007 Document Revised: 2020 Document Reviewed: 
2020  
[employer] Patient Education  [employer]  
  
  
  
  
2022 23:29:07  
  
  
  
Core Measures- Pneumonia MC (Custom)  
  
  
  
Pneumonia  
  
Pneumonia is an infection of the lungs which may be viral or bacterial (with 
germs). Most forms of infectious (disease producing) pneumonias are bacterial. 
This means the are caused by a germ. Usually these infections are caused by 
breathing in infectious particles into the respiratory tract (lungs).  
  
SYMPTOMS  
The most common symptoms (problems) are:  
Cough.  
Fever.  
Chest pain.  
Increased rate of breathing.  
Wheezing.  
Sputum production.  
  
DIAGNOSIS  
Often these infections are diagnosed on exam by your caregiver. Sometimes the 
diagnosis (learning what is wrong) may require chest x-rays, blood analysis, and
 or cultures.  
Your caregiver may do tests (such as blood gasses or pulse oximetry) to see how 
well your lungs areworking.  
Blood cultures may be done to help find the cause of your pneumonia.  
  
TREATMENT  
The bacterial pneumonias generally respond well to antibiotics (medications 
which kill germs). Viral infections must run their course. These infections will
 not respond to antibiotics. A pneumococcalvaccine (shot) is available to 
prevent a common bacterial pneumonia. This is usually suggested for the elderly 
and for other groups of higher risk individuals, such as those on chemotherapy 
or those that have problems with their immune system.  
You will have pneumococcal screening or vaccination if you are over 65 years old
 and are not immunized.  
  
You have received a pneumococcal vaccination on: ____________________  
  
If you are a smoker, it is time to quit. You will receive instructions on how to
 best stop smoking.Your caregivers can provide medications and counseling to 
help you quit.  
  
HOME CARE INSTRUCTIONS  
Cough suppressants may be used if you are losing too much rest; however cough 
protects you by clearing your lungs. This is one reason for not using cough 
suppressants, if able, as they take away thisprotection.  
Your caregiver may have prescribed an antibiotic if she or he feels your cough 
is caused by a bacterial infection. Take all your medication until you are 
finished.  
Your caregiver may also prescribe an expectorant to loosen the mucous to be 
coughed up.  
Only take over-the-counter or prescription medicines for pain, discomfort, or 
fever as directed by your caregiver.  
Smoking is a common cause of bronchitis and can contribute to pneumonia. 
Stopping this habit is an important self help step.  
A cold steam vaporizer or humidifier in your room or home may help loosen 
mucous.  
Cough is most often worse at night. Sleeping in a semi-upright position in a 
recliner, or using a couple pillows under your head will help with this.  
Get rest as you feel it is needed. Your body will usually let you know when to 
rest.  
If you are a smoker and continue to smoke, your cough may last several weeks 
after your pneumonia has cleared.  
Exercise your lungs. Deep breathing helps to open the air passages in your 
lungs. Coughing helps tobring up sputum (mucus) from your lungs. Take a deep 
breath and hold the breath as long as you can.Then push the air out of your 
lungs with a deep, strong cough. Put any sputum that you have coughedup into a 
tissue and throw it away. Take 10 deep breaths in a row every hour that you are 
awake. Remember to follow each deep breath with a cough.  
  
SEEK IMMEDIATE MEDICAL CARE IF:  
You develop more purulent (pus like) sputum, have uncontrolled temperature, or 
your illness becomesworse. This is especially true if you are elderly or 
weakened from any other disease.  
You cannot control your cough with suppressants and are losing sleep.  
You begin coughing up blood.  
You develop pain which is getting worse or is uncontrolled with medications.  
You develop an oral temperature above ____________________, after being afebrile
 (not having a temperature for one or more days).  
Any of the symptoms which initially brought you in for treatment are getting 
worse rather than better, or you develop shortness of breath or chest pain. Dial
 911 for immediate emergency care.  
  
AGREEMENT BETWEEN PATIENT AND HEALTHCARE TEAM:  
Your signature on this document represents an understanding between you and the 
healthcare team that took care of you today. That means that you:  
Understand these discharge instructions.  
Will monitor your condition.  
Will seek immediate medical care as instructed.  
  
Document Released: 2006 Document Re-Released: 06/15/2009  
[facility] Patient Information 2009 [facility] [email or web address].  
  
  
  
  
Follow Up Care  
  
  
  
2022 00:44:11  
  
  
  
With:Alexei CROW  
Address:  
[address] [place] [address]-  
[phone] Business (1)  
  
When:2022 09:15:00  
  
  
  
With:Marty Osborne  
Address:  
[address] [place] [address]-  
[phone] Business (1)  
  
When:1 to 2 weeks  
  
  
  
With:ALEXANDER GUERRA  
Address:  
[address] [place]  
[place] [address]  
[phone] Business (1)  
  
When:  
Unknown  
  
[facility]- NoteCity HospitalComment 
on above:Result Comment: Electronically Signed By: J Luis VERDUGO DO\Date and Time Signed: 22 05:25 MMX52- Evaluation + Plan note
Extracted from:  
  
                                Title:ED Note   Author:Jose TREJO Trae RIVERA Melo  
te:22  
  
  
  
                                                    1. Upper respiratory infecti  
on (J06.9: Acute upper respiratory infection,   
unspecified)  
2. Eloped from emergency department (Z53.21: Procedure and treatment not carried
 out   
due to patient leaving prior to being seen by health care provider)  
  
  
[facility]03- NoteHNO ID: [id number]  
Author: Gab TOBIN (Farshad Mcneill  
Service: ?  
Author Type: Nurse Practitioner  
Type: Progress Notes  
Filed: 3/30/2021 5:10 PM  
Note Text:  
Ms. Chavez is a 71 year old female who comes in for evaluation of sinus  
issues and sore throat.  
72yo F presents to clinic for chronic maxillary sinus issues and trouble  
swallowing for the last 6 months that has started to worsen. Patient  
reports that she thinks that the increase in saliva is due to her sinuses.  
Patient constantly feels like she needs to clear her throat. Previously  
worked up for acid reflux and was on PPIs then transitioned to Pepcid  
which the patient states has been helping with her symptoms. Trouble  
swallowing both liquids and solids, occasionally chokes when eating.  
Patient has tried smaller portions, more chewing between bites, and more  
liquid to help but still experiencing these issues. When she does  
swallow, she feels a thick mucus sensation, sometimes goes down the wrong  
pipe.  
Patient is currently experiencing sinus pressure with mild headache.  
Patient endorses PND. Never been treated with antibiotics in the past.  
Patient has a history of radiation for inflammatory breast cancer that  
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  
Past history :  
PAST MEDICAL HISTORY  
Diagnosis Date  
- CHF (congestive heart failure) (HCC)  
- History of bilateral breast cancer  
- Hypothyroidism  
- Neuropathy  
Current medication:  
Current Outpatient Medications  
Medication Sig  
- acetaminophen (TYLENOL) 325 mg tablet Take 650 mg by mouth every 6 hours  
as needed.  
- allopurinol (ZYLOPRIM) 100 mg tablet TAKE 1 TABLET BY MOUTH ONCE DAILY  
FOR 90 DAYS  
- carvedilol (COREG) 6.25 mg tablet Take 6.25 mg by mouth twice daily with  
meals.  
- cholecalciferol (VITAMIN D3) 50 mcg (2,000 unit) tablet Take 2,000 Units  
by mouth once daily.  
- dorzolamide-timolol (COSOPT) 22.3-6.8 mg/mL ophthalmic solution Use 1  
Drop in both eyes twice daily.  
- famotidine (PEPCID) 20 mg tablet Take 20 mg by mouth at bedtime as  
needed.  
- fluvoxaMINE (LUVOX) 100 mg tablet Take 100 mg by mouth twice daily.  
- furosemide (LASIX) 40 mg tablet Take 10 mg by mouth at bedtime as  
needed.  
- levothyroxine (SYNTHROID) 50 mcg tablet Take 50 mcg by mouth every  
morning. Take On an Empty Stomach  
- loratadine (CLARITIN) 10 mg tablet Take 10 mg by mouth at bedtime as  
needed.  
- losartan (COZAAR) 25 mg tablet TAKE 1 TABLET BY MOUTH ONCE DAILY FOR 90  
DAYS  
- Magnesium Oxide 500 mg tab Take 500 mg by mouth once daily.  
- therapeutic multivitamin-minerals (THERA-M PLUS) 9 mg iron-400 mcg  
tablet Take 1 tablet by mouth once daily.  
- rivaroxaban (XARELTO) 20 mg tablet TAKE 1 TABLET BY MOUTH ONCE DAILY  
WITH SUPPER  
- simvastatin (ZOCOR) 40 mg tablet Take 40 mg by mouth daily at bedtime.  
- spironolactone (ALDACTONE) 25 mg tablet TAKE 1 2 (ONE HALF) TABLET BY  
MOUTH ONCE DAILY FOR 90 DAYS  
- Lactobacillus acidophilus (PROBIOTIC ORAL) Take by mouth.  
- flaxseed oil (OMEGA 3 ORAL) Take by mouth.  
- lactase (ULTRA DAIRY DIGESTIVE ORAL) Take by mouth.  
No current facility-administered medications for this visit.  
Allergies:  
ALLERGIES  
Allergen Reactions  
- Ceclor [Cefaclor] Rash, Swelling  
And redness  
Social history:  
Social History  
Tobacco Use  
- Smoking status: Never Smoker  
- Smokeless tobacco: Never Used  
Substance Use Topics  
- Alcohol use: Yes  
- Drug use: Never  
Family history: No family history on file.  
There are no exam notes on file for this visit.  
Physical exam:  
General Appearance: 71 year old female is alert, oriented, not in acute  
distress. Hearing is grossly normal, voice is raspy. There is no  
tenderness with percussion over the paranasal sinuses.  
Eyes: PEERLA, extraocular movements are full.  
Nose: Clean, septum is straight. There are no polyps. There is no  
discharge.  
Oropharynx: Teeth are in good repair. Lips, gums, tongue and posterior  
pharynx are within normal limits. Gag reflex is intact.  
Nasopharynx: Visualization is limited.  
Hypopharynx: Visualization is limited.  
Neck: No masses palpated. Thyroid is not enlarged. Trachea is in the  
midline.  
Ears: Both ear canals are clean. Both TMs are intact and mobile.  
Impression: Throat discomfort, swallowing problem, and hoarseness of voice  
exact etiology unclear. GERD may be contributing.  
Plan of management: I will perform fiberoptic laryngoscopy and discuss  
further management options with her.  
Procedure: After the procedure was explained to the patient and patient  
agreed to have the procedure done 1% Yobani-Synephrine and 4% Xylocaine was  
sprayed to both nasal cavities. Fiberoptic scope was inserted through the  
right nasal airway. Right nasal airway was normal. Nasopharynx was normal.  
Hypopharyngeal exam revealed moderately severe mucosal thickening in the  
posterior commissure with mild hyperemia. There is pooling of saliva.  
Both vocal cords are mobile. Findings were explained to the patient.  
She will have EGD. We bert (more content not included)...[facility]03- NoteHNO ID: [id number]  
Author: Dago Lo  
Service: ?  
Author Type: Physician  
Type: Progress Notes  
Filed: 3/5/2021 8:53 AM  
Note Text:  
This document has been created with the use of voice recognition  
technology. It may contain inaccuracies: misspellings, inaccurate syntax  
or word sense that escaped review.  
CHIEF COMPLAINT: Ping Chavez is a 71 year old female who presents today  
for follow up of bilateral knee pain secondary to osteoarthritis.  
HISTORY OF PRESENT ILLNESS:  
PAIN EVALUATION  
3/5/2021  
0839  
Pain Level: 5  
Pain Location: ?  
bilateral knees  
Description: Aching  
Duration Units: Unknown  
Frequency: Intermittent  
Intervention: Reposition;Relaxation;Positioning;Medication;Heat  
HISTORY: Ping Chavez is here for follow up of complaints of bilateral  
knee pain right greater than left. She states that the injection she  
received in September of last year offered relief. She requested we  
inject both knees again today. No new injury. No hip or neurologic  
complaints.  
No other musculoskeletal complaints  
ROS:  
REVIEW OF SYMPTOMS:  
Constitutional: patient denies any recent fever or significant change in  
weight  
Gastrointestinal: patient denies any current abdominal discomfort  
Musculoskeletal: as noted in the HPI  
Neurologic: as noted in the HPI  
SOCIAL HISTORY:  
Tobacco Use: Not on file  
ALLERGIES:  
ALLERGIES  
Allergen Reactions  
- Ceclor [Cefaclor] Rash, Swelling  
And redness  
PAST MEDICAL HISTORY:  
PAST MEDICAL HISTORY  
Diagnosis Date  
- CHF (congestive heart failure) (HCC)  
- History of bilateral breast cancer  
- Hypothyroidism  
- Neuropathy  
SOCIAL HISTORY:  
Tobacco Use: Not on file  
EXAMINATION:  
GENERAL: Appears healthy, well-nourished, no deformities.  
ORIENTATION: Alert and oriented to person place and time  
HABITUS: Normal  
GAIT: Normal, the patient did not have trouble getting onto the exam  
table.  
bilateral knee exam:  
No effusion,  
Tenderness with patellar apprehension  
Right knee valgus left knee neutral alignment  
Active full extension, flexion 120. Good patellar tracking/mild patella  
femoral crepitation  
Mild pain with palpating medial compartment, mild pain with palpating  
lateral compartment.  
Stable to varus and valgus stresses.  
Lachman is negative  
Negative anterior/posterior drawer.  
Palpable dorsalis pedis pulse  
Calf soft and nontender.  
Hip exam negative  
Intact sensation to light touch distally.  
.  
RADIOGRAPHS: XR Obtained today and personally reviewed by myself  
demonstrating none today x-rays some 2020 reviewed with  
severe lateral compartment osteoarthritis on the right moderate on the  
left  
IMPRESSION:  
Encounter Diagnosis  
ICD-10-CM  
1. Primary osteoarthritis of both knees M17.0  
Large Joint Arthro/Inj: bilateral knee joints  
The risks, benefits and alternatives of the procedure were reviewed with  
the patient/surrogate, who agreed to proceed.  
Written Consent Obtained: Yes  
Sign In Communication: Completed  
Time Out: Time Out completed  
The  Time-Out  verifies the correct patient, procedure, side/site,  
position (if applicable) and completion and review of fire risk  
assessment/protocols (if appropriate):  
Affirmation of Time Out: Yes  
Signout Discussion: yes  
3/5/2021 8:52 AM  
The procedure site was prepped in the usual sterile fashion.  
Allergies were reviewed  
Site: bilateral knee joints  
Medications (Right): 40 mg triamcinolone acetonide 40 mg/mL  
Medications (Left): 40 mg triamcinolone acetonide 40 mg/mL  
Anesthetics (Right): 4 mL lidocaine (PF) 10 mg/mL (1 %)  
Anesthetics (Left): 4 mL lidocaine (PF) 10 mg/mL (1 %)  
Outcome: Tolerated well, no immediate complications  
Post-injection instructions were reviewed with the patient and the patient  
voiced understanding of these instructions.  
Plan: Patient with bilateral knee osteoarthritis. She requested we  
injected both knees today with cortisone. We again discussed surgical  
options especially with the right knee. We discussed intervals between  
injections.  
Follow-up as symptoms dictate  
[facility]  
  
Future Appointments  
  
  
  
Appointment Date:2022 09:15:00 AM  
Scheduled Provider:Salvador CORCORAN MD  
Location:[facility]  
Appointment Type:URO Office Visit  
  
  
[facility]  
  
Future Appointments  
  
  
  
Appointment Date:2023 09:45:00 AM  
Scheduled Provider:Salvador CORCORAN MD  
Location:[facility]  
Appointment Type:URO Office Visit  
  
  
[facility]  
Work Phone: [phone] noteNo assessment information available
[facility]  
Work Phone: [phone] note*   
  
                          Diagnosis    Onset Date   Resolution   Status  
   
                          Primary osteoarthritis of knees, bilateral              
               acute  
  
  
[facility]  
Work Phone: [phone] note*   
  
                                                    Diagnosis  
   
                                                      
  
  
Primary open angle glaucoma of right eye, mild stage  
  
documented in this encounter  
[facility]   
  
                                                    Diagnosis  
   
                                                      
  
  
Cardiomyopathy (CMS-HCC)- Primary  
   
                                                      
  
  
Persistent atrial fibrillation (CMS-HCC)  
  
  
Atrial fibrillation  
   
                                                      
  
  
Chronic systolic heart failure (CMS-HCC)  
  
  
Chronic systolic heart failure  
   
                                                      
  
  
Left bundle branch block (LBBB)  
  
documented in this encounter  
[facility]  
  
No data available for this section  
  
[facility]  
  
No data available for this section  
  
[facility]ructionsNot on filedocumented in this encounter
[facility]ructionsNot on filedocumented in [facility] encounter
[facility]ructionsNot on filedocumented in [facility]ructions[facility] on filedocumented in [facility]  
  
No data available for this section  
  
[facility]  
  
Summary Purpose  
  
  
                                                      
  
  
  
Family History  
No Family History Records FoundNo Family History Records FoundNo Family History 
Records FoundNo Family History Records FoundNo Family History Records FoundNo 
Family History Records FoundNo Family History Records FoundNo Family History 
Records FoundNo Family History Records FoundNo Family History Records FoundNo 
Family History Records Found  
  
Advance Directives  
No Advanced Directives Records Found  
  
                                Advance Directive Response        Recorded Date/  
Time  
   
                                Advance Directives No                
3 9:26am  
  
  
  
                                Advance Directive Response        Recorded Date/  
Time  
   
                                Advance Directives No                
3 8:26am  
  
  
  
Assessments  
  
  
                                                    Diagnosis  
   
                                                      
  
  
Pain- Primary  
  
  
Generalized pain  
  
  
  
Chief Complaint and Reason for Visit  
  
  
                                        Chief Complaint     Renal cyst  
  
  
  
                                        Chief Complaint     NEW LT KNEE PAIN NX  
M25.562  
   
                                        Reason for Visit    Primary osteoarthrit  
is of knees, bilateral  
  
  
  
Additional Source Comments  
  
  
  
                                                    INFORMATION SOURCE (unrecogn  
ized section and content)  
   
                                          
  
  
  
                                        DATE CREATED        AUTHOR  
   
                                2018                      [facility]  
  
  
  
                                DATE CREATED    AUTHOR          AUTHOR'S ORGANIZ  
ATION  
   
                                2021                      [facility]  
  
  
  
                                DATE CREATED    AUTHOR          AUTHOR'S ORGANIZ  
ATION  
   
                                2021                      [facility]  
  
  
  
                                DATE CREATED    AUTHOR          AUTHOR'S ORGANIZ  
ATION  
   
                                2022                      [facility]  
  
  
  
                                DATE CREATED    AUTHOR          AUTHOR'S ORGANIZ  
ATION  
   
                                2023                      [facility]  
  
  
  
                                DATE CREATED    AUTHOR          AUTHOR'S ORGANIZ  
ATION  
   
                                2023                      [facility]  
spital  
  
  
  
                                DATE CREATED    AUTHOR          AUTHOR'S ORGANIZ  
ATION  
   
                                10/21/2023                      [facility]  
ospital  
  
  
  
                                DATE CREATED    AUTHOR          AUTHOR'S ORGANIZ  
ATION  
   
                                2024                      [facility]  
  
  
  
                                DATE CREATED    AUTHOR          AUTHOR'S ORGANIZ  
ATION  
   
                                2024                      [facility]  
  
  
  
                                DATE CREATED    AUTHOR          AUTHOR'S ORGANIZ  
ATION  
   
                                2024                      [facility]  
dical [facility]  
  
  
  
                                DATE CREATED    AUTHOR          AUTHOR'S ORGANIZ  
ATION  
   
                                2024                      [facility]  
  
  
  
  
  
                                                    Source Comments (unrecognize  
d section and content)  
   
                                                    In the event this informatio  
n is protected by the Federal Confidentiality of   
Alcohol   
and Drug Abuse Patient Records regulations: This information has been disclosed 
to   
you from records protected by Federal confidentiality rules (42 CFR Part 2). The
   
Federal rules prohibit you from making any further disclosure of this 
information   
unless further disclosure is expressly permitted by the written consent of the 
person   
to whom it pertains or as otherwise permitted by 42 CFR Part 2. A general   
authorization for the release of medical or other information is NOT sufficient 
for   
this purpose. The Federal rules restrict any use of the information to 
criminally   
investigate or prosecute any alcohol or drug abuse patient.[facility]  
  
  
  
  
                                                    Care Team (unrecognized sect  
ion and content)  
   
                                          
  
  
  
                      Team Member Relationship Specialty  Start Date End Date  
   
                                                      
  
  
Alexander Guerra MD  
  
  
[address]  
  
  
[place], [place]  
  
  
[phone] (Work)  
  
  
[phone] (Fax) PCP - General   Internal Medicine 17          
  
  
  
                      Team Member Relationship Specialty  Start Date End Date  
   
                                                      
  
  
Alexander Guerra MD  
  
  
[address]  
  
  
[place], [place]  
  
  
[phone] (Work)  
  
  
[phone] (Fax) PCP - General   Internal Medicine 17          
  
  
  
                                                    Team Status: Active   
   
                          Member       Role         Status       Dates  
   
                          Alexander Guerra MD Primary Care Provider Active       
    
  
  
  
                                                    Team Status: Inactive   
   
                          Member       Role         Status       Dates  
   
                          Salvador Corcoran MD Attending Provider Active         
   
                          Alexander Guerra MD Primary Care Provider Active       
    
  
  
  
                      Team Member Relationship Specialty  Start Date End Date  
   
                                                      
  
  
Alexander Guerra MD  
  
  
NPI: [id number]  
  
  
[address] [place] [address]  
  
  
[phone] (Work)  
  
  
[phone] (Fax) PCP - General                   14           
  
  
  
                                                    Team Status: Inactive   
   
                          Member       Role         Status       Dates  
   
                          Alexander Guerra MD Primary Care Provider Active       
  Start: 2024  
End: 2024  
   
                          Alexei Barteltt II, MD Attending Provider Active      
   Start: 2024  
End: 2024  
  
  
  
                      Team Member Relationship Specialty  Start Date End Date  
   
                                                      
  
  
Alexander Guerra MD  
  
  
NPI: [id number]  
  
  
[address] [place] [address]  
  
  
[phone] (Work)  
  
  
[phone] (Fax) PCP - General                   14           
  
  
  
                      Team Member Relationship Specialty  Start Date End Date  
   
                                                      
  
  
Alexander Guerra MD  
  
  
NPI: [id number]  
  
  
[address] [place] [address]  
  
  
[phone] (Work)  
  
  
[phone] (Fax) PCP - General                   14           
  
  
  
  
  
                                                    Goals (unrecognized section   
and content)  
   
                                                    Goals may be documented in a  
n alternate section  
  
  
  
  
                                                    Reason for Visit (unrecogniz  
ed section and content)  
   
                                          
  
  
  
                                        Reason              Comments  
   
                                        Med Refill            
  
  
  
                                Reason          Onset Date      Comments  
   
                                Appointment     2024        
  
  
  
                                        Reason              Comments  
   
                                        Follow-up             
   
                                        Hypertension          
   
                                        Atrial Fibrillation   
  
  
FOR RECORDS PERTAINING TO PATIENTS WHO ARE OR HAVE BEEN ENROLLED IN A CHEMICAL 
DEPENDENCY/SUBSTANCEABUSE PROGRAM, SOME INFORMATION MAY BE OMITTED. This 
clinical summary was aggregated from multiple sources. Caution should be 
exercised in using it in the provision of clinical care. This summary normalizes
 information from multiple sources, and as a consequence, information in this 
document may materially change the coding, format and clinical context of 
patient data. In addition, data may be omitted in some cases. CLINICAL DECISIONS
 SHOULD BE BASED ON THE PRIMARY CLINICAL RECORDS. [facility] provides
 no warranty or guarantee of the accuracy or completeness of information in this
 document.

## 2024-10-26 ENCOUNTER — HOSPITAL ENCOUNTER
Dept: HOSPITAL 101 - LAB | Age: 74
Discharge: HOME | End: 2024-10-26
Payer: MEDICARE

## 2024-10-26 DIAGNOSIS — I50.42: Primary | ICD-10-CM

## 2024-10-26 LAB
POTASSIUM SERPLBLD-SCNC: 4.6 MMOL/L (ref 3.5–5.1)
POTASSIUM: 4.6 MMOL/L (ref 3.5–5.1)

## 2024-10-26 PROCEDURE — 36415 COLL VENOUS BLD VENIPUNCTURE: CPT

## 2024-10-26 PROCEDURE — 84132 ASSAY OF SERUM POTASSIUM: CPT

## 2024-10-26 NOTE — XMS_ITS
Comprehensive CCD (C-CDA v2.1)  
  
                          Created on: 2024  
  
  
Ping Chavez  
External Reference #: CDR,PersonID:[id number]  
: 1950  
Sex: Female  
  
Demographics  
  
  
                                        Address             [address] [place]  [address]  
   
                                        Mobile Phone        [phone]  
   
                                        Home Phone          [phone]  
   
                                        Preferred Language  en  
   
                                        Marital Status        
   
                                        Rastafarian Affiliation Unknown  
   
                                        Race                White  
   
                                        Ethnic Group        Not  or Lati  
no  
  
  
Author  
  
  
                                        Organization        [facility]  
  
  
Care Team Providers  
  
  
                                Care Team Member Name Role            Phone  
   
                                GREG ROCHA   Unavailable     Unavailable  
   
                                GREG ROCHA   Unavailable     Unavailable  
   
                                KHAMOUSIA, NIDAA Unavailable     Unavailable  
   
                                Unavailable     Primary Care Provider UnavailDR JASMIN Wilks Consulting      Unavailable  
   
                                AYALA MOONEY    Attending       Unavailable  
   
                                AYALA MOONEY    Admitting       Unavailable  
   
                                AYALA MOONEY    Consulting      Unavailable  
   
                                KHAMOUSIA, NIDAA Primary Care Physician [phone]  
   
                                Alexander Guerra MD Primary Care Provider [phone]  
   
                                Hamman, Bryan H Unavailable     Unavailable  
   
                                Tram Odom Unavailable     Unavailable  
   
                                MD Salvador Corcoran Attending Provider [phone]  
   
                                MD Alexander Guerra Primary Care Provider [phone]  
   
                                AMELIA HENDRICKSON Attending       Unavailable  
   
                                Salvador CORCORAN Attending       Unavailable  
   
                                Salvador CORCORAN Attending       Unavailable  
   
                                Marty Osborne Attending       Unavaila  
Marty Acuna Admitting       Unavailtio hughes  
   
                                NONE, XXXX      Referring       Unavailable  
   
                                AMELIA HENDRICKSON Referring       Unavailable  
   
                                AMELIA HENDRICKSON Attending       Unavailable  
   
                                AMELIA HENDRICKSON Admitting       Unavailable  
   
                                Babak Montoya Attending       Unavailable  
   
                                J Luis VERDUGO Admitting       Unavailable  
   
                                Blank, Dominguez S Consulting      Unavailable  
   
                                Blank, Dominguez S Consulting      Unavailable  
   
                                Blank, Dominguez S Consulting      Unavailable  
   
                                Blank, Dominguez S Consulting      Unavailable  
   
                                Blank, Dominguez S Consulting      Unavailable  
   
                                Blank, Dominguez S Consulting      Unavailable  
   
                                Blank, Dominguez S Consulting      Unavailable  
   
                                Blank, Dominguez S Consulting      Unavailable  
   
                                Blank, Dominguez S Consulting      Unavailable  
   
                                Blank, Dominguez S Consulting      Unavailable  
   
                                Trae Garcia Attending       Unavailable  
   
                                KHAMOUSIA, NIDAA Referring       Unavailable  
   
                                KHAMOUSIA, NIDAA Primary Care    Unavailable  
   
                                KHAMOUSIA, NIDAA Primary Care    Unavailable  
   
                                KHAMOUSIA, NIDAA Referring       Unavailable  
   
                                KHAMOUSIA, NIDAA Primary Care    Unavailable  
   
                                MARY RIVAS Attending       Unavailable  
   
                                ANGEL GERARDO Referring       Unavailable  
   
                                KHAMOUSIA, NIDAA Primary Care    Unavailable  
   
                                ZACK, GREG L   Admitting       Unavailable  
   
                                GREG ROCHA   Attending       Unavailable  
   
                                KHAMOUSIA, NIDAA Primary Care    Unavailable  
   
                                Khamousia Alexander JORDAN Primary Care Provider [phone]  
[phone]  
   
                                MD Alexander Guerra Primary Care Provider [phone]  
   
                                MD Alexei Bartlett II Attending Provider [phone]  
[phone]  
   
                                Khamousia, Nidaa Primary Care    Unavailable  
   
                                Salvador Corcoran Attending       Unavailable  
   
                                Salvador Corcoran Admitting       Unavailable  
   
                                Alexei Bartlett II Attending       Unavailabl  
e  
   
                                Alexei Bartlett II Admitting       Unavailabl  
e  
   
                                Khamousia, Nidaa Primary Care    Unavailable  
   
                                [facility], Ada MARISOL Referring       Unava  
ilable  
   
                                KHAMOUSIA, NIDAA O Primary Care    Unavailable  
   
                                CJ KENNEDY  Attending       Unavailable  
   
                                BRENT, CJ  Attending       Unavailable  
   
                                BRENT, CJ  Attending       Unavailable  
   
                                BRENT, CJ  Attending       Unavailable  
   
                                ANGEL GERARDO Attending       Unavailable  
   
                                KHAMOUSIA, NIDAA O Referring       Unavailable  
   
                                KHAMOUSIA, NIDAA O Primary Care    Unavailable  
   
                                KHAMOUSIA, NIDAA O Referring       Unavailable  
   
                                KHAMOUSIA, NIDAA O Primary Care    Unavailable  
   
                                CORRINE COTTER Attending       Unavailable  
   
                                KHAMOUSIA, NIDAA O Referring       Unavailable  
   
                                KHAMOUSIA, NIDAA O Primary Care    Unavailable  
   
                                SUSANA HEMPHILL Attending       Unavailable  
   
                                CHAPARRO HOLLEY Attending       Unavailable  
   
                                KHAMOUSIA, NIDAA O Referring       Unavailable  
   
                                KHAMOUSIA, NIDAA O Primary Care    Unavailable  
   
                                CHAPARRO HOLLEY Referring       Unavailable  
   
                                KHAMOUSIA, NIDAA O Primary Care    Unavailable  
   
                                KHAMOUSIA, NIDAA O Referring       Unavailable  
   
                                KHAMOUSIA, NIDAA O Primary Care    Unavailable  
   
                                CHAPARRO HOLLEY Attending       Unavailable  
   
                                KHAMOUSIA, NIDAA O Referring       Unavailable  
   
                                KHAMOUSIA, NIDAA O Primary Care    Unavailable  
   
                                KHAMOUSIA, NIDAA O Referring       Unavailable  
   
                                KHAMOUSIA, NIDAA O Primary Care    Unavailable  
   
                                CHAPARRO HOLLEY Referring       Unavailable  
   
                                KHAMOUSIA, NIDAA O Primary Care    Unavailable  
   
                                KHAMOUSIA, NIDAA O Referring       Unavailable  
   
                                KHAMOUSIA, NIDAA O Primary Care    Unavailable  
   
                                KHAMOUSIA, NIDAA O Referring       Unavailable  
   
                                KHAMOUSIA, NIDAA O Primary Care    Unavailable  
   
                                SUSANA HEMPHILL Attending       Unavailable  
   
                                KHAMOUSIA, NIDAA O Referring       Unavailable  
   
                                KHAMOUSIA, NIDAA O Primary Care    Unavailable  
   
                                ANGEL GERARDO Attending       Unavailable  
   
                                KHAMOUSIA, NIDAA O Referring       Unavailable  
   
                                KHAMOUSIA, NIDAA O Primary Care    Unavailable  
   
                                Unavailable     Primary Care Provider Unavailabl  
e  
   
                                Khamousia Alexander JORDAN Primary Care Provider [phone]  
  
  
  
Allergies  
  
  
                                                    Allergy   
Classification                          Reported   
Allergen(s)               Allergy Type              Date of   
Onset                     Reaction(s)               Facility  
   
                                                      
(20 sources)                            Cefaclor;   
Translations:   
[cefaclor]                Drug Allergy              10-  
6                                       Eruption of   
skin   
(disorder),   
Itching, Rash,   
Swelling                                [facility]  
   
                                                      
(1 source)          Cefaclor            Drug Allergy          
4                                                   [facility]   
Repository  
  
  
  
Medications  
Current Medications  
  
  
  
                      Medication Drug Class(es) Dates      Sig (Normalized) Sig   
(Original)  
   
                                                    acetaminophen 325 mg   
oral capsule  
(20 sources)                                        Start:   
2024                              take 325 mg by   
mouth every six   
hours                                   Acetaminophen   
Active 325 MG PO   
Every 6 hours   
2024   
12:00am  
  
  
  
                                        Start: 2018   take 2 tablets by mo  
uth every   
six hours as needed for pain            acetaminophen 325 mg Tab 650 mg = 2   
tab(s), Oral, q6hr, PRN for pain   
Start Date: 18 Status: Ordered  
   
                                        Start: 2018   take 2 tablets by mo  
uth every   
four hours as needed for pain           acetaminophen 325 mg Tab 650 mg = 2   
tab(s), Oral, q4hr, PRN for pain   
Start Date: 18 Status: Ordered  
   
                                                            take 1 capsule by mo  
uth every   
six hours as needed for pain            acetaminophen (Tylenol) 325 MG   
capsule Take 325 mg by mouth every 6   
(six) hours if needed for mild pain   
2 PO Active  
  
  
  
                                                    acetaminophen 325 mg   
/ HYDROcodone   
bitartrate 5 mg oral   
tablet  
(2 sources)               Opioid Agonist            Start:   
10-                                          Norco 325 mg-5 mg   
oral tablet 1   
tab(s), Oral,   
q4hr for pain, 12   
tab(s), Refill(s)   
0 Start Date:   
10/9/19 Status:   
Ordered  
   
                                                    [id number] 200 actuat   
albuterol 0.09   
mg/actuat metered   
dose inhaler  
(4 sources)                             beta2-Adrenergic   
Agonist                                 Start:   
2022                              take 2 puff(s)   
by inhalation   
every four hours   
for wheezing                            Pro-Air HFA CFC   
free 90 mcg/inh   
MDI 2 puff(s),   
Inhalation, q4hr   
for wheezing, 8.5   
gram, Refill(s)   
0, [facility] 1986,   
172.7, cm,   
22 0:48:00   
EST,   
Height/Length   
Dosing, 103.5,   
kg, 22   
0:48:00 EST,   
Weight Dosing   
Start Date:   
22 Status:   
Ordered  
   
                                                    Allopurinol  
(20 sources)                            Xanthine Oxidase   
Inhibitor                               Start:   
2024                              take 1 mg by   
mouth once daily                        Allopurinol   
Active MG PO   
Daily 2024   
12:00am  
  
  
  
                                        Start: 2018   take 1 tablet by dawn  
th once   
daily                                   allopurinol (ZYLOPRIM) 100 mg tablet   
TAKE 1 TABLET BY MOUTH ONCE DAILY   
FOR 90 DAYS 2020 Active  
   
                                        Start: 2018   take 1 tablet by dawn  
th twice   
daily                                   allopurinol 100 mg Tab 100 mg = 1   
tab(s), Oral, BID, Refills(s) 0   
Start Date: 18 Status: Ordered  
  
  
  
                                                    bifidobacterium animalis   
[id number] unt /   
lactobacillus acidophilus   
[id number] unt oral capsule  
(1 source)                                                  take 1 capsule by   
mouth once daily                        Probiotic Product   
(Probiotic Complex   
Acidophilus) capsule   
Take 1 capsule by mouth   
Daily Active  
   
                                                    calcium carbonate 1500 mg   
oral tablet  
(16 sources)                                        Start:   
2021                              take 1 tablet by   
mouth once daily                        calcium (as carbonate)   
600 mg oral tablet 600   
mg = 1 tab(s), Oral,   
Daily, Refills(s) 0   
Start Date: 21   
Status: Ordered  
  
  
  
                                                take 1 tablet by mouth once cj  
y Calcium Carbonate (CALCIUM 500 PO) Take 500   
mg by   
mouth Daily G Tablet 1 PO Active  
   
                                                                calcium carbonat  
e (CALCIUM 500 ORAL) Take by   
mouth daily. Active  
   
                                                                calcium carbonat  
e (CALCIUM 500 ORAL) Take by   
mouth daily. 0 Active  
  
  
  
                                                    carvedilol 6.25   
mg oral tablet  
(20 sources)                            alpha-Adrenergic   
Blocker,   
beta-Adrenergic Blocker   Start: 2024         take 1 mg by   
mouth every   
twelve hours                            Carvedilol   
Active MG PO   
Every 12 hours   
2024 12:00am  
  
  
  
                                        Start: 2022   take 1 tablet by dawn  
th twice   
daily                                   carvedilol 12.5 mg Tab 12.5 mg = 1   
tab(s), Oral, BID, Refills(s) 0   
Start Date: 22 Status: Ordered  
   
                                                    Start: 2020  
End: 10-                                     carvediloL (COREG) 6.25 mg t  
ablet   
Indications: Paroxysmal atrial   
fibrillation (CMS-HCC) Take 1 tablet   
in the morning, 2 tablets in the   
evening 90 tablet 3 10/01/2024   
Active  
   
                                        Start: 2018   take 1 tablet by dawn  
th twice   
daily                                   carvedilol 12.5 mg Tab 12.5 mg = 1   
tab(s), Oral, BID, Refills(s) 0   
Start Date: 18 Status: Ordered  
  
  
  
                                                    cholecalciferol 0.05 mg   
oral tablet  
(13 sources)        Vitamin D                               take 1 tablet by   
mouth in the   
morning                                 cholecalciferol, vitamin   
D3, 2,000 units tablet Take   
1 tablet (2,000 Units   
total) by mouth in the   
morning. Active  
  
  
  
                                                take 1 capsule by mouth once mila  
ly cholecalciferol (Vitamin D-3) 50 MCG (2000   
UT)   
capsule Take 50 mcg by mouth Daily 1 po Active  
   
                                                take 1 tablet by mouth once cj  
y cholecalciferol (VITAMIN D3) 50 mcg (2,000   
unit)   
tablet Take 2,000 Units by mouth once daily.   
Active  
   
                                                                Cholecalciferol   
(VITAMIN D3) 2000 units CAPS   
Take by mouth 0 Active  
  
  
  
                                                    dorzolamide 20   
mg/ml / timolol 5   
mg/ml ophthalmic   
solution  
(2 sources)                             Carbonic Anhydrase   
Inhibitor,   
beta-Adrenergic Blocker                             take 1 drop(s)   
into the eye(s)   
in the morning,   
then take 1   
drop(s) into   
the eye(s)   
twice daily                             dorzolamide-timolol   
(Cosopt) 2-0.5 %   
ophthalmic solution   
Administer 1 drop into   
both eyes in the morning   
and 1 drop before bedtime.   
22.3- 6.8 MG/ML Solution 1   
DROP OPHT BID . Active  
  
  
  
                                                            take 1 drop(s) into   
the eye(s) twice   
daily                                   dorzolamide-timolol (COSOPT) 22.3-6.8 mg  
/mL   
ophthalmic solution Use 1 Drop in both eyes   
twice daily. Active  
  
  
  
                                                    empagliflozin 10 mg   
oral tablet  
(6 sources)                             Sodium-Glucose   
Cotransporter 2   
Inhibitor                               Start:   
2024                              take 1   
tablet by   
mouth in the   
morning                                 empagliflozin   
(JARDIANCE) 10 mg   
tablet tablet Take   
1 tablet (10 mg   
total) by mouth in   
the morning. 30   
tablet 11   
2024 Active  
   
                                                    esomeprazole 20 mg   
delayed release   
oral capsule  
(16 sources)                            Proton Pump   
Inhibitor                               Start:   
2021                              take 1   
capsule by   
mouth in the   
evening                                 esomeprazole   
(NexIUM) 20 mg   
capsule Take 1   
capsule (20 mg   
total) by mouth in   
the evening.   
2021 Active  
   
                                                    ferrous sulfate 325   
mg oral tablet  
(11 sources)                                        Start:   
2024                              take 1   
tablet by   
mouth once   
daily                                   Ferrous Sulfate   
(Ferosul) 325 mg   
(65 mg iron)   
tablet Active MG   
PO Daily 2024 12:00am  
  
  
  
                                                take 1 tablet by mouth every oth  
er day ferrous sulfate 325 (65 FE) mg EC tablet  
   
Take 1 tablet (325 mg total) by mouth every   
other day. Active  
  
  
  
                                                    Fish Oils  
(1 source)                                                  take 1 capsule   
by mouth in the   
morning                                 omega-3 (fish oil)   
1200 MG capsule   
Take 1,200 mg by   
mouth in the   
morning and 1,200   
mg before bedtime.   
1 PO. Active  
   
                                                    flaxseed oil (OMEGA   
3 ORAL)  
(1 source)                                                      flaxseed oil (OM  
EGA   
3 ORAL) Take by   
mouth. Active  
   
                                                    fluticasone   
propionate 0.05   
mg/actuat metered   
dose nasal spray  
(4 sources)               Corticosteroid            Start:   
20                                                  Flonase 0.05 mg/inh   
Spray 0.1 mg, 2   
spray(s), Nasal,   
Daily, Refill(s) 0   
Start Date:   
22 Status:   
Ordered  
   
                                                    fluvoxaMINE maleate   
100 mg oral tablet  
(11 sources)                            Serotonin Reuptake   
Inhibitor                               Start:   
20                                                  fluvoxamine 100 mg   
oral tablet 50 mg =   
0.5 tab(s), Oral,   
BID, # 180 tab(s),   
Refills(s) 0 Start   
Date: 22   
Status: Ordered  
  
  
  
                                        Start: 2020   take 1 tablet by dawn  
 twice   
daily                                   fluvoxaMINE (LUVOX) 100 mg tablet   
Take 100 mg by mouth twice daily.   
2020 Active  
   
                                                            take 0.5 tablet by m  
outh in the   
morning, then take 0.5 tablet by   
mouth at bedtime                        fluvoxaMINE (LUVOX) 100 mg tablet   
Take 0.5 tablets (50 mg total) by   
mouth in the morning and 0.5   
tablets (50 mg total) before   
bedtime. 0 Active  
   
                                                            take 1 capsule by mo  
uth once   
daily                                   fluvoxaMINE (LUVOX CR) 100 MG CP24   
extended release capsule Take 100   
mg by mouth nightly 0 Active  
  
  
  
                                                    furosemide 20 mg   
oral tablet  
(20 sources)        Loop Diuretic       Start: 2024   take 10 mg by   
mouth once   
daily                                   Furosemide Active   
10 MG PO Daily   
2024   
12:00am  
  
  
  
                                Start: 2021                 furosemide (LA  
SIX) 20 mg tablet Take   
0.5 tablets (10 mg total) by mouth   
as needed (please have labs drawn   
for future refills.). 90 tablet 2   
2021 Active  
   
                                Start: 2018                 furosemide (LA  
SIX) 20 mg tablet   
Indications: Chronic combined   
systolic and diastolic congestive   
heart failure (CMS-HCC) , Persistent   
atrial fibrillation (CMS-HCC) ,   
Nonischemic congestive   
cardiomyopathy (CMS-HCC) , Benign   
essential hypertension , Left bundle   
branch block (LBBB) , Automatic   
implantable   
cardioverter-defibrillator in situ   
Take 1 tablet (20 mg total) by mouth   
as needed (fluid wt gain). Take 1   
tab if fluid wt gain of 3# or more   
in 1 day or 4-5# in 4 days   
2024 Active  
   
                                                                furosemide (LASI  
X) 40 mg tablet Take   
10 mg by mouth as needed. Active  
   
                                                            take 1 tablet by dawn  
 twice   
daily                                   furosemide (LASIX) 40 MG tablet Take   
40 mg by mouth 2 times daily 0   
Active  
  
  
  
                                                    gabapentin 100 mg   
oral capsule  
(17 sources)    Anti-epileptic Agent Start: 2024                 gabapenti  
n (Neurontin)   
100 MG capsule   
Indications: Neuropathy   
TAKE ONE CAPSULE BY   
MOUTH EVERY MORNING, 2   
CAPSULEs AT MIDDAY,   
THEN TAKE 3 CAPSULES BY   
MOUTH EVERY EVENING 180   
capsule 1 2024   
Active  
  
  
  
                                Start: 2024                 Gabapentin Act  
skyler MG PO 2024 12:00am  
   
                                        Start: 2022   take 1 capsule by mo  
uth three   
times daily                             gabapentin 100 mg Cap 100 mg = 1   
cap(s), Oral, TID, Refills(s) 0   
Start Date: 22 Status: Ordered  
  
  
  
                                                    12 hr guaiFENesin 600 mg   
extended release oral   
tablet  
(1 source)                                          Start: 2022  
End: 2022                         take 2 tablets by   
mouth twice daily                       Mucinex 600 mg Tab-ER   
1,200 mg = 2 tab(s),   
Oral, BID, X 7 day(s),   
Refills(s) 0 Start   
Date: 22 Stop   
Date: 22 Status:   
Ordered  
   
                                                    L.acid/B.bifidum/B.anima  
l/FOS (PROBIOTIC COMPLEX   
ORAL)  
(9 sources)                                                     L.acid/B.bifidum  
/B.ani  
mal/FOS (PROBIOTIC   
COMPLEX ORAL) Take by   
mouth daily. Active  
  
  
  
                                                                L.acid/B.bifidum  
/B.animal/FOS (PROBIOTIC COMPLEX ORAL) Take by mouth daily. 0   
Active  
  
  
  
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  
frontal scalp.  
IMPRESSION:  
1. No acute intracranial   
abnormality identified.  
2. Some age-related   
atrophy with associated   
ventricular prominence.  
3. Some edema of the   
right lateral frontal   
scalp.  
  
Interpreted by:  
MARIA ISABEL Samayoa MD  
Signed by:  
MARIA ISABEL Samayoa MD  
8/15/23  
Final result        Normal                                  [facility]  
   
                                                    Patient Educationon 20   
   
                      Patient Education            Normal                [facility]  
   
                                                    Urology Office/Clinic Noteon  
 2023   
   
                                                    Urology Office/Clinic   
Note                            Normal                          [facility]  
   
                                        Comment on above:   Result Comment: Elec  
tronically Signed By: Salvador CORCORAN MD\.br\Date and Time Signed: 23 10:07   
EDT\.br\Electronically Co-Signed By: Dilma Lazaro\.br\Date and Time Co-Signed: 23 10:05 EDT   
   
                                                    RAD - Ultrasound Reporton    
   
                                        RAD - Ultrasound Report [id number]  
[id number]  
[id number]#1.00CD:  
127                 Normal                                  [facility]  
   
                                                    US renal BIon 2023   
   
                                        US renal BI         [facility] [place] [address]  
  
Ultrasound Report  
Signed  
  
Patient: Ping Chavez   
MR#: [id number]  
28  
: 1950   
Acct:[id number]  
  
Age/Sex: 73 / F ADM   
Date: 23  
  
Loc:  Room: Type: REG   
CLI  
Attending Dr: Salvador Corcoran MD  
  
Ordering Provider:   
Salvador Corcoran MD  
Date of Service:   
23  
Accession #:   
([id number]) US/US   
renal BI: RENAL CYST  
  
  
Copies to: Salvador Corcoran MD  
  
  
BILATERAL RENAL AND   
BLADDER ULTRASOUND  
  
CLINICAL HISTORY: Renal   
cyst  
  
COMPARISON: None  
  
Estimation of renal size   
is approximately 12.1 cm   
on the right and 9.3 cm   
on the left. No contour  
deforming mass,   
shadowing stone or   
hydronephrosis. 1 cm   
cyst right kidney  
  
The urinary bladder is   
partially distended with   
a volume of 70.57 ml. No   
shadowing stone or focal  
lesion. No significant   
postvoid residual.  
  
  
ORDER #: [id number] US/US   
renal BI  
IMPRESSION:  
  
No acute findings.  
  
Impression dictated by:   
Harsh Casey Jr.,   
D.OTaya2023 1:48 PM  
  
  
Dictation Location:   
[place] [address]  
  
Tech: Donna Waldrop  
  
Transcribed By: DARYA   
23 1348  
Dictated By: Harsh Casey Jr, DO   
23 1345  
  
Signed By:  
23 1348       Normal                                  [facility]  
   
                                                    Coding Summary.on 2022  
   
   
                      Coding Summary.            Normal                [facility]  
   
                                                    Patient Educationon 20   
   
                      Patient Education            Normal                [facility]  
   
                                                    Urology Office/Clinic Noteon  
 2022   
   
                                                    Urology Office/Clinic   
Note                            Normal                          [facility]  
   
                                        Comment on above:   Result Comment: Elec  
tronically Signed By: DEBBI JORDAN, Salvador LAZARO\.br\Date and Time Signed: 22 09:45   
EST\.br\Electronically Co-Signed By: Ruba Dumont\.br\Date and Time Co-Signed: 22 09:43 EST   
   
                                                    Heart and Vascular Office/Cl  
inic Noteon 2022   
   
                                                    Heart and Vascular   
Office/Clinic Note                 Normal                          [facility]  
   
                                        Comment on above:   Result Comment: Elec  
tronically Signed By: Dylon JORDAN,   
Marty BAIRD\.br\Date and Time Signed: 22 12:56   
EST\.br\Electronically Co-Signed By: Jillian Armstrong\.br\Date and Time Co-Signed: 22 15:48 EST   
   
                                                    Consent for Treatmenton    
   
                                        Consent for Treatment [id number]  
[id number]  
[id number]#1.00CD:  
127                 Normal                                  [facility]  
   
                                                    Physician Orderon 2022  
   
   
                                        Physician Order     [id number]  
[id number]  
[id number]#1.00CD:  
127                 Normal                                  [facility]  
   
                                                    EMS Documentationon 20   
   
                      EMS Documentation            Normal                [facility]  
   
                                                    Coding Summary.on 2022  
   
   
                      Coding Summary.            Normal                [facility]  
   
                                                    C Blood Charcoalon    
   
                      Blood Culture Charcoal            Normal                [facility]  
   
                                        Comment on above:   Performed By: #### 1  
[id number] ####[facility]   
[address] [place] [address]   
   
                      Blood Culture Charcoal            Normal                [facility]  
   
                                        Comment on above:   Performed By: #### 1  
[id number] ####[facility]   
[address] [place] [address]   
   
                                                    Coding Queryon 2022   
   
                      Coding Query            Normal                [facility]  
   
                                                    Auth for Release of Medical   
Recordson 2022   
   
                                                    Auth for Release of   
Medical Records                         [id number]  
[id number]#1.00CD  
:127                Normal                                  [facility]  
   
                                                    CHEMISTRYOrdered By: SYSTEM   
SYSTEM on 2022   
   
                          Anion gap [Moles/Vol] 6 mmol/L     Normal       6 - 16  
   
mEq/L                                   [facility]   
Remisol  
   
                          Chloride [Moles/Vol] 108 mmol/L   Normal       101 - 1  
11   
mmol/L                                  [facility]   
Remisol  
   
                          CO2 [Moles/Vol] 25 mmol/L    Normal       21 - 31   
mmol/L                                  [facility]   
Remisol  
   
                          Potassium [Moles/Vol] 3.7 mmol/L   Normal       3.5 -   
5.3   
mmol/L                                  [facility]   
Remisol  
   
                          Sodium [Moles/Vol] 135 mmol/L   Normal       135 - 145  
   
mmol/L                                  [facility]   
Remisol  
   
                                                    Discharge Instructionson    
   
                                        Discharge Instructions [id number]  
[id number]  
[id number]#1.00CD  
:127                Normal                                  [facility]  
   
                                                    Inpatient Clinical Summaryon  
 2022   
   
                                                    Inpatient Clinical   
Summary                         Normal                          [facility]  
   
                                                    Inpatient Patient Summaryon   
2022   
   
                                                    Inpatient Patient   
Summary                         Normal                          [facility]  
   
                                                    Inpatient Patient   
Summary                         Normal                          [facility]  
   
                                                    Interdisciplinary Note - Jeff  
e Manageron 2022   
   
                                                    Interdisciplinary Note   
-                   Normal                          [facility]  
   
                                        Comment on above:   Result Comment: Elec  
tronically Signed By: Martha Arciniega\.br\Date and Time Signed: 22 13:34 EST   
   
                                                    Lyteson 2022   
   
                      Anion gap [Moles/Vol] 6 mmol/L   Normal     6-16       [facility]  
   
                                        Comment on above:   Performed By: #### 2  
[id number] ####[facility]   
[address] [place] [address]   
   
                      Chloride [Moles/Vol] 108 mmol/L Normal     101-111    [facility]  
   
                                        Comment on above:   Performed By: #### 2  
[id number] ####[facility]   
[address] [place] [address]   
   
                      CO2 [Moles/Vol] 25 mmol/L  Normal     21-31      [facility]  
   
                                        Comment on above:   Performed By: #### 2  
[id number] ####[facility]   
[address] [place] [address]   
   
                      Potassium [Moles/Vol] 3.7 mmol/L Normal     3.5-5.3    [facility]  
   
                                        Comment on above:   Performed By: #### 2  
[id number] ####[facility]   
[address] [place] [address]   
   
                      Sodium [Moles/Vol] 135 mmol/L Normal     135-145    [facility]  
   
                                        Comment on above:   Performed By: #### 2  
[id number] ####[facility]   
[address] [place] [address]   
   
                                                    Monitor Recordon 2022   
   
                                        Monitor Record      [id number]  
[facility]  
[id number]#1.00CD  
:127                Normal                                  [facility]  
   
                                        Monitor Record      [id number]  
[facility]  
[id number]#1.00CD  
:127                Normal                                  [facility]  
   
                                        Monitor Record      [id number]  
[id number]  
[id number]#1.00CD  
:127                Normal                                  [facility]  
   
                                        Monitor Record      [id number]  
[facility]  
[id number]#1.00CD  
:127                Normal                                  [facility]  
   
                                                    BMPon 2022   
   
                      Anion gap [Moles/Vol] 10 mmol/L  Normal     6-16       [facility]  
   
                                        Comment on above:   Performed By: #### 2  
[id number], [id number] ####[place] [address] [place] [address]   
   
                      Calcium [Mass/Vol] 10.1 mg/dL Normal     8.9-11.1   [facility]  
   
                                        Comment on above:   Performed By: #### 2  
217221, [id number] ####[place] [address] [place] [address]   
   
                      Chloride [Moles/Vol] 106 mmol/L Normal     101-111    [facility]  
   
                                        Comment on above:   Performed By: #### 2  
298369, [id number] ####[facility] [address] [place] [address]   
   
                      CO2 [Moles/Vol] 24 mmol/L  Normal     21-31      [facility]  
   
                                        Comment on above:   Performed By: #### 2  
531572, [id number] ####[place] [address] [place] [address]   
   
                      Creatinine [Mass/Vol] 0.9 mg/dL  Normal     0.5-1.3    [facility]  
   
                                        Comment on above:   Performed By: #### 2  
886335, [id number] ####[place] [address] [place] [address]   
   
                      Glucose [Mass/Vol] 102 mg/dL  Normal          [facility]  
   
                                        Comment on above:   Result Comment: If t  
his glucose result represents a fasting   
glucose, interpretation should refer to the following   
reference range: 55-99 mg/dL   
   
                                                            Performed By: #### 2  
146650, [id number] ####[facility] [address] [place] [address]   
   
                      Potassium [Moles/Vol] 3.7 mmol/L Normal     3.5-5.3    [facility]  
   
                                        Comment on above:   Performed By: #### 2  
144081, [id number] ####[facility] [address] [place] [address]   
   
                      Sodium [Moles/Vol] 136 mmol/L Normal     135-145    [facility]  
   
                                        Comment on above:   Performed By: #### 2  
626439, [id number] ####[facility] [address] [place] [address]   
   
                                                    Urea nitrogen   
[Mass/Vol]      18 mg/dL        Normal          5-21            [facility]  
   
                                        Comment on above:   Performed By: #### 2  
860024, [id number] ####[facility] [address] [place] [address]   
   
                                                    Urea   
nitrogen/Creatinine   
[Mass ratio]    20 No Units     Normal          10-20           [facility]  
   
                                        Comment on above:   Performed By: #### 2  
394236, [id number] ####[facility] [address] [place] [address]   
   
                                                    C Sputumon 2022   
   
                                                    Bacteria identified   
Respiratory culture Nom   
(Sput)                          Normal                          [facility]  
   
                                        Comment on above:   Performed By: #### 2  
[id number] ####[facility]   
[address] [place] [address]   
   
                                                    CHEMISTRYOrdered By: Papo castellanos on 2022   
   
                                                    Vancomycin trough   
[Moles/Vol]         microgram/mL        Low                 10 - 20   
mcg/mL                                  [facility]   
Remisol  
   
                                        Comment on above:   Result Comment: Resu  
lts verified by repeat analysis   
   
                                                    CHEMISTRYOrdered By: SYSTEM   
SYSTEM on 2022   
   
                          Anion gap [Moles/Vol] 10 mmol/L    Normal       6 - 16  
   
mEq/L                                   [facility]   
Remisol  
   
                          Calcium [Mass/Vol] 10.1 mg/dL   Normal       8.9 - 11.  
1   
mg/dL                                   [facility]   
Remisol  
   
                          Chloride [Moles/Vol] 106 mmol/L   Normal       101 - 1  
11   
mmol/L                                  [facility]   
Remisol  
   
                          CO2 [Moles/Vol] 24 mmol/L    Normal       21 - 31   
mmol/L                                  [facility]   
Remisol  
   
                          Creatinine [Mass/Vol] 0.9 mg/dL    Normal       0.5 -   
1.3   
mg/dL                                   [facility]   
Remisol  
   
                                                    GFR/1.73 sq M.predicted   
among blacks MDRD   
(S/P/Bld) [Vol   
rate/Area]          mL/min/1.73 m2      Normal              >=59mL/min/  
1.73 m2                                 [facility]  
   
                                                    GFR/1.73 sq M.predicted   
among non-blacks MDRD   
(S/P/Bld) [Vol   
rate/Area]          mL/min/1.73 m2      Normal              >=59mL/min/  
1.73 m2                                 [facility]  
   
                          Glucose [Mass/Vol] 102 mg/dL    Normal       55 - 199   
mg/dL                                   [facility]   
Remisol  
   
                          Potassium [Moles/Vol] 3.7 mmol/L   Normal       3.5 -   
5.3   
mmol/L                                  [facility]   
Remisol  
   
                          Sodium [Moles/Vol] 136 mmol/L   Normal       135 - 145  
   
mmol/L                                  [facility]   
Remisol  
   
                                                    Urea nitrogen   
[Mass/Vol]          18 mg/dL            Normal              5 - 21   
mg/dL                                   [facility]   
Remisol  
   
                                                    Urea   
nitrogen/Creatinine   
[Mass ratio]    20 mg/mg        Normal          10 - 20         [facility]   
Remisol  
   
                                                    Monitor Recordon 2022   
   
                                        Monitor Record      [id number]  
1002  
[id number]#1.00CD  
:127                Normal                                  [facility]  
   
                                        Monitor Record      [id number]  
1002  
[id number]#1.00CD  
:127                Normal                                  [facility]  
   
                                        Monitor Record      [id number]  
1002  
[id number]#1.00CD  
:127                Normal                                  [facility]  
   
                                                    Patient Education - Texton 1  
2022   
   
                                                    Patient Education -   
Text                            Normal                          [facility]  
   
                                                    Progress Note-Physicianon    
   
                      Progress Note-Physician            Normal                [facility]  
   
                                        Comment on above:   Result Comment: Elec  
tronically Signed By: Hilary NIEVES\.br\Date and Time Signed: 22 08:04   
EST\.br\Electronically Co-Signed By: Babak Montoya MD\.br\Date and Time Co-Signed: 22 09:21 EST   
   
                                                    Vanco Troughon 2022   
   
                      VANCOMYCIN <4         Low        10-20      [facility]  
   
                                        Comment on above:   Result Comment: Resu  
lts verified by repeat analysis   
   
                                                            Performed By: #### 2  
[id number] ####[facility]   
[address] [place] [address]   
   
                                                    eGFRon 2022   
   
                                                    GFR/1.73 sq M.predicted   
among blacks MDRD   
(S/P/Bld) [Vol   
rate/Area]      mL/min/{1.73_m2} Normal          >=59            [facility]  
   
                                        Comment on above:   Order Comment: Order  
 added by Discern Expert.   
   
                                                            Result Comment: eGFR  
 is race adjusted. AA=.   
   
                                                            Performed By: #### 2  
[id number], [id number] ####[facility] [address] [place] [address]   
   
                                                    GFR/1.73 sq M.predicted   
among non-blacks MDRD   
(S/P/Bld) [Vol   
rate/Area]      mL/min/{1.73_m2} Normal          >=59            [facility]  
   
                                        Comment on above:   Order Comment: Order  
 added by Discern Expert.   
   
                                                            Result Comment:   
deion kidney disease could be indicated at   
eGFR's of less than 60 mL/min/1.73m2. Kidney failure is   
indicated at less than 15 mL/min/1.73m2.   
   
                                                            Performed By: #### 2  
758023, [id number] ####[facility] [address] [place] [address]   
   
                                                    BMPon 2022   
   
                      Anion gap [Moles/Vol] 8 mmol/L   Normal     6-16       [facility]  
   
                                        Comment on above:   Performed By: #### 2  
541606, [id number], 3412535, 1374501   
####[facility] [address] [place] [address]   
   
                      Calcium [Mass/Vol] 9.8 mg/dL  Normal     8.9-11.1   [facility]  
   
                                        Comment on above:   Performed By: #### 2  
073029, [id number], 4439325, 6223608   
####[facility] [address] [place] [address]   
   
                      Chloride [Moles/Vol] 108 mmol/L Normal     101-111    [facility]  
   
                                        Comment on above:   Performed By: #### 2  
194462, [id number], 3576665, [id number]   
####[facility] [address] [place] [address]   
   
                      CO2 [Moles/Vol] 21 mmol/L  Normal     21-31      [facility]  
   
                                        Comment on above:   Performed By: #### 2  
306915, [id number], 0932709, 6498782   
####[facility] [address] [place] [address]   
   
                      Creatinine [Mass/Vol] 1.0 mg/dL  Normal     0.5-1.3    [facility]  
   
                                        Comment on above:   Performed By: #### 2  
017157, [id number], 3729598, [id number]   
####[facility] [address] [place] [address]   
   
                      Glucose [Mass/Vol] 111 mg/dL  Normal          [facility]  
   
                                        Comment on above:   Result Comment: If t  
his glucose result represents a fasting   
glucose, interpretation should refer to the following   
reference range: 55-99 mg/dL   
   
                                                            Performed By: #### 2  
195548, [id number], [id number], [id number]   
####[facility] [address] [place] [address]   
   
                      Potassium [Moles/Vol] 3.2 mmol/L Low        3.5-5.3    [facility]  
   
                                        Comment on above:   Performed By: #### 2  
034520, 22788532, 7816271, [id number]   
####[facility] [address] [place] [address]   
   
                      Sodium [Moles/Vol] 134 mmol/L Low        135-145    [facility]  
   
                                        Comment on above:   Performed By: #### 2  
[id number], [id number], [id number], 8415794   
####[facility] [address] [place] [address]   
   
                                                    Urea nitrogen   
[Mass/Vol]      20 mg/dL        Normal          5-21            [facility]  
   
                                        Comment on above:   Performed By: #### 2  
471040, [id number], 2806738, 6815479   
####[facility] [address] [place] [address]   
   
                                                    Urea   
nitrogen/Creatinine   
[Mass ratio]    20 No Units     Normal          10-20           [facility]  
   
                                        Comment on above:   Performed By: #### 2  
814883, [id number], 1925544, 4123070   
####[facility] [address] [place] [address]   
   
                                                    CHEMISTRYOrdered By: SYSTEM   
SYSTEM on 2022   
   
                          Anion gap [Moles/Vol] 8 mmol/L     Normal       6 - 16  
   
mEq/L                                   [facility]   
Remisol  
   
                          Calcium [Mass/Vol] 9.8 mg/dL    Normal       8.9 - 11.  
1   
mg/dL                                   FT   
Remisol  
   
                          Chloride [Moles/Vol] 108 mmol/L   Normal       101 - 1  
11   
mmol/L                                  FT   
Remisol  
   
                          CO2 [Moles/Vol] 21 mmol/L    Normal       21 - 31   
mmol/L                                  FT   
Remisol  
   
                          Creatinine [Mass/Vol] 1.0 mg/dL    Normal       0.5 -   
1.3   
mg/dL                                   [facility]   
Rem[facility]  
   
                                                    GFR/1.73 sq M.predicted   
among blacks MDRD   
(S/P/Bld) [Vol   
rate/Area]          mL/min/1.73 m2      Normal              >=59mL/min/  
1.73 m2                                 [facility]  
   
                                                    GFR/1.73 sq M.predicted   
among non-blacks MDRD   
(S/P/Bld) [Vol   
rate/Area]          54 mL/min/1.73 m2   Low                 >=59mL/min/  
1.73 m2                                 [facility]  
   
                          Glucose [Mass/Vol] 111 mg/dL    Normal       55 - 199   
mg/dL                                   FT   
Remisol  
   
                          Magnesium [Mass/Vol] 1.5 mg/dL    Normal       1.3 - 2  
.4   
mg/dL                                   FT   
Remisol  
   
                          Potassium [Moles/Vol] 3.2 mmol/L   Low          3.5 -   
5.3   
mmol/L                                  [facility]   
Remisol  
   
                          Sodium [Moles/Vol] 134 mmol/L   Low          135 - 145  
   
mmol/L                                  FT   
Remisol  
   
                          TSH Qn       2.52 m[IU]/L Normal       0.34 - 5.60   
mcIU/mL                                 FT   
Remisol  
   
                                                    Urea nitrogen   
[Mass/Vol]          20 mg/dL            Normal              5 - 21   
mg/dL                                   FT   
Remisol  
   
                                                    Urea   
nitrogen/Creatinine   
[Mass ratio]    20 mg/mg        Normal          10 - 20         FT   
Remisol  
   
                                                    EMS Documentationon 20   
   
                      EMS Documentation            Normal                [facility]  
   
                      EMS Documentation            Normal                [facility]  
   
                                                    Magnesiumon 2022   
   
                      Magnesium [Mass/Vol] 1.5 mg/dL  Normal     1.3-2.4    [facility]  
   
                                        Comment on above:   Performed By: #### 2  
308369, 11728239, 4869900, 7142088   
####[facility] [address] [place] [address]   
   
                                                    Monitor Recordon 2022   
   
                                        Monitor Record      [id number]  
1062  
[id number]#[id number]  
:127                Normal                                  [facility]  
   
                                        Monitor Record      [id number]  
1062  
[id number]#[id number]  
:127                Normal                                  [facility]  
   
                                                    Progress Note-Physicianon    
   
                      Progress Note-Physician            Normal                [facility]  
   
                                        Comment on above:   Result Comment: Elec  
tronically Signed By: Hilary NIEVES\.br\Date and Time Signed: 22 09:07   
EST\.br\Electronically Co-Signed By: Jan JORDAN, Babak RUIZ\.br\Date and Time Co-Signed: 22 09:48 EST   
   
                                                    Sodiumon 2022   
   
                      Sodium [Moles/Vol] 135 mmol/L Normal     135-145    [facility]  
   
                                        Comment on above:   Performed By: #### 2  
[id number] ####[facility]   
[address] [place] [address]   
   
                                                    TSHon 2022   
   
                      TSH Qn     2.52 m[IU]/L Normal     0.34-5.60  [facility]  
   
                                        Comment on above:   Performed By: #### 2  
766015, 95586950, 0987630, 7450266   
####[facility] [address] [place] [address]   
   
                                                    eGFRon 2022   
   
                                                    GFR/1.73 sq M.predicted   
among blacks MDRD   
(S/P/Bld) [Vol   
rate/Area]      mL/min/{1.73_m2} Normal          >=59            [facility]  
   
                                        Comment on above:   Order Comment: Order  
 added by Discern Expert.   
   
                                                            Result Comment: eGFR  
 is race adjusted. AA=.   
   
                                                            Performed By: #### 2  
536906, 07093580, 8847488, 7255619   
####[facility] [address] [place] [address]   
   
                                                    GFR/1.73 sq M.predicted   
among non-blacks MDRD   
(S/P/Bld) [Vol   
rate/Area]      54 mL/min/1.73 m2 Low             >=59            [facility]  
   
                                        Comment on above:   Order Comment: Order  
 added by Discern Expert.   
   
                                                            Result Comment:   
deion kidney disease could be indicated at   
eGFR's of less than 60 mL/min/1.73m2. Kidney failure is   
indicated at less than 15 mL/min/1.73m2.   
   
                                                            Performed By: #### 2  
920093, 28016682, 9368064, 0143891   
####[facility] [address] [place] [address]   
   
                                                    BMPon 2022   
   
                      Anion gap [Moles/Vol] 11 mmol/L  Normal     6-16       [facility]  
   
                                        Comment on above:   Performed By: #### 2  
646381, [id number] ####[facility] [address] [place] [address]   
   
                      Calcium [Mass/Vol] 9.7 mg/dL  Normal     8.9-11.1   [facility]  
   
                                        Comment on above:   Performed By: #### 2  
833091, [id number] ####[facility] [address] [place] [address]   
   
                      Chloride [Moles/Vol] 104 mmol/L Normal     101-111    [facility]  
   
                                        Comment on above:   Performed By: #### 2  
702868, 59532616 ####[facility] [address] [place] [address]   
   
                      CO2 [Moles/Vol] 22 mmol/L  Normal     21-31      [facility]  
   
                                        Comment on above:   Performed By: #### 2  
571924, 68920221 ####[facility] [address] [place] [address]   
   
                      Creatinine [Mass/Vol] 0.9 mg/dL  Normal     0.5-1.3    [facility]  
   
                                        Comment on above:   Performed By: #### 2  
933682, 27695618 ####[facility] [address] [place] [address]   
   
                      Glucose [Mass/Vol] 98 mg/dL   Normal          [facility]  
   
                                        Comment on above:   Result Comment: If t  
his glucose result represents a fasting   
glucose, interpretation should refer to the following   
reference range: 55-99 mg/dL   
   
                                                            Performed By: #### 2  
958520, [id number] ####[facility] [address] [place] [address]   
   
                      Potassium [Moles/Vol] 3.7 mmol/L Normal     3.5-5.3    [facility]  
   
                                        Comment on above:   Performed By: #### 2  
244951, 39771491 ####[facility] [address] [place] [address]   
   
                      Sodium [Moles/Vol] 133 mmol/L Low        135-145    [facility]  
   
                                        Comment on above:   Performed By: #### 2  
135369, 78426363 ####[facility] [address] [place] [address]   
   
                                                    Urea nitrogen   
[Mass/Vol]      20 mg/dL        Normal          5-21            [facility]  
   
                                        Comment on above:   Performed By: #### 2  
426242, 33566055 ####[facility] [address] [place] [address]   
   
                                                    Urea   
nitrogen/Creatinine   
[Mass ratio]    22 No Units     High            10-20           [facility]  
   
                                        Comment on above:   Performed By: #### 2  
347634, 61930855 ####[facility] [address] [place] [address]   
   
                                                    C Blood Charcoalon    
   
                      Blood Culture Charcoal            Normal                [facility]  
   
                                        Comment on above:   Performed By: #### 1  
7603686 ####[facility]   
[address] [place] [address]   
   
                      Blood Culture Charcoal            Normal                [facility]  
   
                                        Comment on above:   Performed By: #### 1  
2387568 ####[facility]   
[address] [place] [address]   
   
                                                    C Urineon 2022   
   
                                                    Bacteria identified Cx   
Nom (U)                         Normal                          [facility]  
   
                                        Comment on above:   Performed By: #### 2  
991710, [id number] ####[facility] [address] [place] [address]   
   
                                                    CHEMISTRYOrdered By: SYSTEM   
SYSTEM on 2022   
   
                          Calcium [Mass/Vol] 9.7 mg/dL    Normal       8.9 - 11.  
1   
mg/dL                                   [facility]   
Rem[facility]  
   
                          Creatinine [Mass/Vol] 0.9 mg/dL    Normal       0.5 -   
1.3   
mg/dL                                   [facility]   
Rem[facility]  
   
                                                    GFR/1.73 sq M.predicted   
among blacks MDRD   
(S/P/Bld) [Vol   
rate/Area]          mL/min/1.73 m2      Normal              >=59mL/min/  
1.73 m2                                 [facility]  
   
                                                    GFR/1.73 sq M.predicted   
among non-blacks MDRD   
(S/P/Bld) [Vol   
rate/Area]          mL/min/1.73 m2      Normal              >=59mL/min/  
1.73 m2                                 [facility]  
   
                          Glucose [Mass/Vol] 98 mg/dL     Normal       55 - 199   
mg/dL                                   [facility]   
Remisol  
   
                                                    Urea nitrogen   
[Mass/Vol]          20 mg/dL            Normal              5 - 21   
mg/dL                                   [facility]   
Remisol  
   
                                                    Urea   
nitrogen/Creatinine   
[Mass ratio]    22 mg/mg        High            10 - 20         [facility]   
Remisol  
   
                                                    CT Abdomen/Pelvis w/o Contra  
ston 2022   
   
                                                    CT Abdomen/Pelvis w/o   
Contrast                        Normal                          [facility]  
   
                                                    CT Chest w/o Contraston    
   
                      CT Chest w/o Contrast            Normal                [facility]  
   
                                                    Interdisciplinary Note - Jeff  
e Manageron 2022   
   
                                                    Interdisciplinary Note   
-                   Normal                          [facility]  
   
                                        Comment on above:   Result Comment: Elec  
tronically Signed By: Martha Arciniega\.br\Date and Time Signed: 22 12:34 EST   
   
                                                    Laboratory - Microbiology an  
d Antimicrobial susceptibilityOrdered By: Carol Seip  
on   
2022   
   
                                                    Bacteria identified   
Respiratory culture Nom   
(Sput)                                  1+ Candida albicans   
Presumptive  
Scant growth of Normal   
upper respiratory cathi   
isolated                                                    [facility]  
   
                                                    Monitor Recordon 2022   
   
                                        Monitor Record      [id number]  
1062  
[id number]#[id number]  
:127                Normal                                  [facility]  
   
                                                    No Panel InformationOrdered   
By: Carol Seip on 2022   
   
                                        GS                  Rare epithelial cell  
s  
No organisms seen.                                          [facility]  
   
                                                    Outside Recordson 2022  
   
   
                                        Outside Records     [id number]  
0525  
[id number]#[id number]  
:127                Normal                                  [facility]  
   
                                                    Progress Note-Physicianon    
   
                      Progress Note-Physician            Normal                [facility]  
   
                                        Comment on above:   Result Comment: Elec  
tronically Signed By: Po Champagne MD\.br\Date and Time Signed: 22 15:46 EST   
   
                      Progress Note-Physician            Normal                F  
[facility]  
   
                                        Comment on above:   Result Comment: Elec  
tronically Signed By: Hilary NIEVES\.br\Date and Time Signed: 22 10:27   
EST\.br\Electronically Co-Signed By: Jan JORDAN, Babak RUIZ\.br\Date and Time Co-Signed: 22 11:15 EST   
   
                      Progress Note-Physician            Normal                F  
[facility]  
   
                                        Comment on above:   Result Comment: Elec  
tronically Signed By: Tone JORDAN,   
[place]\.br\Date and Time Signed: 22 07:27 EST   
   
                                                    Sodiumon 2022   
   
                      Sodium [Moles/Vol] 135 mmol/L Normal     135-145    [facility]  
   
                                        Comment on above:   Performed By: #### 2  
[id number] ####[facility]   
[address] [place] [address]   
   
                      Sodium [Moles/Vol] 135 mmol/L Normal     135-145    [facility]  
   
                                        Comment on above:   Performed By: #### 2  
927334 ####[facility]   
[address] [place] [address]   
   
                      Sodium [Moles/Vol] 132 mmol/L Low        135-145    [facility]  
   
                                        Comment on above:   Performed By: #### 2  
498238 ####[facility]   
[address] [place] [address]   
   
                                                    U Legi Agon 2022   
   
                                                    L. pneumophila 1 Ag IA   
Ql (U)                    Negative                  Invalid   
Interpretation   
Code                      Negative                  [facility]  
   
                                        Comment on above:   Result Comment: Pres  
umptive negative for L. pneumophila   
serogroup 1 antigen in urine,suggesting no recent or current   
infection. Legionnaires' diseasecannot be ruled out since   
other serogroups and species may also causedisease.Performed   
at:  [facility]   
[id number] MD Juwan Butt   
   
                                                            Performed By: #### 2  
356065 ####[facility]   
[address] [place] [address]   
   
                                                    US Renalon 2022   
   
                      US Renal              Normal                [facility]  
   
                                                    eGFRon 2022   
   
                                                    GFR/1.73 sq M.predicted   
among blacks MDRD   
(S/P/Bld) [Vol   
rate/Area]      mL/min/{1.73_m2} Normal          >=59            [facility]  
   
                                        Comment on above:   Order Comment: Order  
 added by Discern Expert.   
   
                                                            Result Comment: eGFR  
 is race adjusted. AA=.   
   
                                                            Performed By: #### 2  
049950, [id number] ####[facility] [address] [place] [address]   
   
                                                    GFR/1.73 sq M.predicted   
among non-blacks MDRD   
(S/P/Bld) [Vol   
rate/Area]      mL/min/{1.73_m2} Normal          >=59            [facility]  
   
                                        Comment on above:   Order Comment: Order  
 added by Discern Expert.   
   
                                                            Result Comment:   
deion kidney disease could be indicated at   
eGFR's of less than 60 mL/min/1.73m2. Kidney failure is   
indicated at less than 15 mL/min/1.73m2.   
   
                                                            Performed By: #### 2  
942748, [id number] ####[facility] [address] [place] [address]   
   
                                                    Auto Diffon 2022   
   
                      Basophils/100 WBC (Bld) 0.3 %      Normal     0.0-2.0    [facility]  
   
                                        Comment on above:   Order Comment: Order  
 Added by Discern Expert.   
   
                                                            Performed By: #### 2  
676285, [id number], 9953860, 7568015   
####[address] [place] [address]   
   
                                                    Basophils/Leukocytes   
Auto (Bld) [Pure #   
fraction]       0.0 E9/L        Normal          0.0-0.2         [facility]  
   
                                        Comment on above:   Order Comment: Order  
 Added by Discern Expert.   
   
                                                            Performed By: #### 2  
999025, 00558522, 0379597, 8704245   
####[address] [address]   
   
                                                    Eosinophils/100 WBC   
(Bld)           0.5 %           Normal          0.0-8.0         [facility]  
   
                                        Comment on above:   Order Comment: Order  
 Added by Discern Expert.   
   
                                                            Performed By: #### 2  
340113, 63804283, 5863599, 6891444   
####[address] [address]   
   
                                                    Eosinophils/Leukocytes   
Auto (Bld) [Pure #   
fraction]       0.0 E9/L        Normal          0.0-0.5         [facility]  
   
                                        Comment on above:   Order Comment: Order  
 Added by Discern Expert.   
   
                                                            Performed By: #### 2  
132725, [id number], 9722158, 2664229   
####[address] [address]   
   
                                                    Lymphocytes/100 WBC   
(Bld)           5.3 %           Low             14.0-50.0       [facility]  
   
                                        Comment on above:   Order Comment: Order  
 Added by Discern Expert.   
   
                                                            Performed By: #### 2  
381544, 27333238, 5873159, 2798007   
####[address] [place] [address]   
   
                                                    Lymphocytes/Leukocytes   
Auto (Bld) [Pure #   
fraction]       0.5 E9/L        Low             1.0-4.0         [facility]  
   
                                        Comment on above:   Order Comment: Order  
 Added by Discern Expert.   
   
                                                            Performed By: #### 2  
149762, 48358053, 4296944, 7688415   
####[facility] [address] [place] [address]   
   
                      Monocytes/100 WBC (Bld) 8.5 %      Normal     4.0-14.0   F  
[facility]  
   
                                        Comment on above:   Order Comment: Order  
 Added by Discern Expert.   
   
                                                            Performed By: #### 2  
822178, 22574710, 4133892, 0153591   
####[facility] [address] [place] [address]   
   
                                                    Monocytes/Leukocytes   
Auto (Bld) [Pure #   
fraction]       0.8 E9/L        Normal          0.2-1.0         [facility]  
   
                                        Comment on above:   Order Comment: Order  
 Added by Lenin Expert.   
   
                                                            Performed By: #### 2  
682169, 61557699, 8217973, 9926338   
####[facility] [address] [place] [address]   
   
                                                    Neutrophils/100 WBC   
(Bld)           85.4 %          High            36.0-75.0       [facility]  
   
                                        Comment on above:   Order Comment: Order  
 Added by Lenin Expert.   
   
                                                            Performed By: #### 2  
998133, 31684579, 8943350, 9827395   
####[facility] [address] [place] [address]   
   
                                                    Neutrophils/Leukocytes   
Auto (Bld) [Pure #   
fraction]       8.5 E9/L        High            2.0-7.5         [facility]  
   
                                        Comment on above:   Order Comment: Order  
 Added by Discern Expert.   
   
                                                            Performed By: #### 2  
748667, 28185064, 3254443, 9548349   
####[facility] [address]   
[place] [address]   
   
                                                    BMPon 2022   
   
                      Anion gap [Moles/Vol] 9 mmol/L   Normal     6-16       [facility]  
   
                                        Comment on above:   Performed By: #### 2  
908661, 14270950, 8561817, 5317485   
####[facility] [address]   
[place] [address]   
   
                      Calcium [Mass/Vol] 9.2 mg/dL  Normal     8.9-11.1   [facility]  
   
                                        Comment on above:   Performed By: #### 2  
859522, 65787710, 0345184, 9285104   
####[facility] [address]   
[place] [address]   
   
                      Chloride [Moles/Vol] 106 mmol/L Normal     101-111    [facility]  
   
                                        Comment on above:   Performed By: #### 2  
365670, 56675954, 3733222, 6854174   
####[facility] [address] [facility] [place] [address]   
   
                      CO2 [Moles/Vol] 21 mmol/L  Normal     21-31      [facility]  
   
                                        Comment on above:   Performed By: #### 2  
410552, 99458562, 7182564, 3057330   
####[facility] [address] [facility] [place] [address]   
   
                      Creatinine [Mass/Vol] 1.0 mg/dL  Normal     0.5-1.3    [facility]  
   
                                        Comment on above:   Performed By: #### 2  
905713, 88733788, 6860199, 8917302   
####[facility] [address] [facility] [place] [address]   
   
                      Glucose [Mass/Vol] 95 mg/dL   Normal          [facility]  
   
                                        Comment on above:   Result Comment: If t  
his glucose result represents a fasting   
glucose, interpretation should refer to the following   
reference range: 55-99 mg/dL   
   
                                                            Performed By: #### 2  
846518, 86228437, 2474458, 7438186   
####[facility] [address] [facility] [place] [address]   
   
                      Potassium [Moles/Vol] 3.9 mmol/L Normal     3.5-5.3    [facility]  
   
                                        Comment on above:   Performed By: #### 2  
114587, 65133409, 6454414, 6620827   
####[facility] [address] [place] [place] [address]   
   
                      Sodium [Moles/Vol] 132 mmol/L Low        135-145    [facility]  
   
                                        Comment on above:   Performed By: #### 2  
645893, 08470545, 5749198, 1673764   
####[facility] [address] [place] [address]   
   
                                                    Urea nitrogen   
[Mass/Vol]      15 mg/dL        Normal          5-21            [facility]  
   
                                        Comment on above:   Performed By: #### 2  
544748, 73952757, 5984188, 5629773   
####[facility] [address] [place] [address]   
   
                                                    Urea   
nitrogen/Creatinine   
[Mass ratio]    15 No Units     Normal          10-20           [facility]  
   
                                        Comment on above:   Performed By: #### 2  
218799, 99496496, 5354612, 8194062   
####[address] [address] [place] [address]   
   
                                                    CBC w/ Auto Diffon    
   
                                                    Erythrocyte   
distribution width   
(RBC) [Ratio]   13.6 %          Normal          10.9-14.2       [facility]  
   
                                        Comment on above:   Performed By: #### 2  
814289, 95816907, 4291041, 4718790   
####[facility] [address] [place] [address]   
   
                                                    Hematocrit (Bld)   
[Volume fraction] 35.8 %          Normal          34.0-46.0       [facility]  
   
                                        Comment on above:   Performed By: #### 2  
869858, 90632736, 9374017, 9405679   
####[address] [place] [address]   
   
                                                    Hemoglobin (Bld)   
[Mass/Vol]      12.2 g/dL       Normal          12.0-16.0       [facility]  
   
                                        Comment on above:   Performed By: #### 2  
829631, 19203448, 8245608, 3166468   
####[facility] [address] [place] [address]   
   
                                                    MCH (RBC) [Entitic   
mass]           30.3 pg         Normal          27.0-34.0       [facility]  
   
                                        Comment on above:   Performed By: #### 2  
450542, 53346116, 8678018, 7906729   
####[facility] [address] [place] [address]   
   
                      MCHC (RBC) [Mass/Vol] 34.1 g/dL  Normal     31.4-36.0  [facility]  
[facility]  
   
                                        Comment on above:   Performed By: #### 2  
350496, 93841390, 2345059, 6323019   
####[address] [place] [address]   
   
                      MCV (RBC) [Entitic vol] 89.0 fL    Normal     80.0-100.0 F  
[facility]  
   
                                        Comment on above:   Performed By: #### 2  
641547, 17958078, 8876470, 0478648   
####[place] [address]   
   
                                                    Platelet mean volume   
(Bld) [Entitic vol] 8.1 fL          Normal          6.4-10.8        [facility]  
   
                                        Comment on above:   Performed By: #### 2  
362540, 94843064, 9162124, 1128587   
####[place] [address]   
   
                      Platelets (Bld) [#/Vol] 180.0 E9/L Normal     150.0-500.0   
[facility]  
   
                                        Comment on above:   Performed By: #### 2  
640684, 53968662, 0873955, 3881098   
####[place] [address]   
   
                      RBC (Bld) [#/Vol] 4.0 E12/L  Low        4.3-5.9    [facility]  
   
                                        Comment on above:   Performed By: #### 2  
554874, 67399452, 7606109, 7418282   
####[place] [address]   
   
                                                    WBC corrected for nucl   
RBC Auto (Bld) [#/Vol] 9.9 E9/L        Normal          4.0-11.0        [facility]  
   
                                        Comment on above:   Performed By: #### 2  
908836, 99321238, 2258160, 8107192   
####[place] [address]   
   
                                                    HEMATOLOGYOrdered By: SYSTEM  
 SYSTEM on 2022   
   
                      Basophils/100 WBC (Bld) 0.3 %      Normal     0.0 - 2.0 %   
[facility]   
HemeAutoSS  
   
                                                    Basophils/Leukocytes   
Auto (Bld) [Pure #   
fraction]           0.0 E9/L            Normal              0.0 - 0.2   
E9/L                                    [facility]   
HemeAutoSS  
   
                                                    Eosinophils/100 WBC   
(Bld)           0.5 %           Normal          0.0 - 8.0 %     [facility]   
HemeAutoSS  
   
                                                    Eosinophils/Leukocytes   
Auto (Bld) [Pure #   
fraction]           0.0 E9/L            Normal              0.0 - 0.5   
E9/L                                    [facility]   
HemeAutoSS  
   
                                                    Lymphocytes/100 WBC   
(Bld)               5.3 %               Low                 14.0 - 50.0   
%                                       [facility]   
HemeAutoSS  
   
                                                    Lymphocytes/Leukocytes   
Auto (Bld) [Pure #   
fraction]           0.5 E9/L            Low                 1.0 - 4.0   
E9/L                                    [facility]   
HemeAutoSS  
   
                          Monocytes/100 WBC (Bld) 8.5 %        Normal       4.0   
- 14.0   
%                                       [facility]   
HemeAutoSS  
   
                                                    Monocytes/Leukocytes   
Auto (Bld) [Pure #   
fraction]           0.8 E9/L            Normal              0.2 - 1.0   
E9/L                                    [facility]   
HemeAutoSS  
   
                                                    Neutrophils/100 WBC   
(Bld)               85.4 %              High                36.0 - 75.0   
%                                       [facility]   
HemeAutoSS  
   
                                                    Neutrophils/Leukocytes   
Auto (Bld) [Pure #   
fraction]           8.5 E9/L            High                2.0 - 7.5   
E9/L                                    [facility]   
HemeAutoSS  
   
                                                    HEMATOLOGYOrdered By: Akiko davis on 2022   
   
                                                    Erythrocyte   
distribution width   
(RBC) [Ratio]       13.6 %              Normal              10.9 - 14.2   
%                                       [facility]   
HemeAutoSS  
   
                                                    Hematocrit (Bld)   
[Volume fraction]   35.8 %              Normal              34.0 - 46.0   
%                                       [facility]   
HemeAutoSS  
   
                                                    Hemoglobin (Bld)   
[Mass/Vol]          12.2 g/dL           Normal              12.0 - 16.0   
gm/dL                                   [facility]   
HemeAutoSS  
   
                                                    MCH (RBC) [Entitic   
mass]               30.3 pg             Normal              27.0 - 34.0   
pg                                      [facility]   
HemeAutoSS  
   
                          MCHC (RBC) [Mass/Vol] 34.1 g/dL    Normal       31.4 -  
 36.0   
gm/dL                                   [facility]   
HemeAutoSS  
   
                          MCV (RBC) [Entitic vol] 89.0 fL      Normal       80.0  
 -   
100.0 fL                                [facility]   
HemeAutoSS  
   
                                                    Platelet mean volume   
(Bld) [Entitic vol] 8.1 fL              Normal              6.4 - 10.8   
fL                                      [facility]   
HemeAutoSS  
   
                          Platelets (Bld) [#/Vol] 180.0 E9/L   Normal       150.  
0 -   
500.0 E9/L                              [facility]   
HemeAutoSS  
   
                          RBC (Bld) [#/Vol] 4.0 E12/L    Low          4.3 - 5.9   
E12/L                                   [facility]   
HemeAutoSS  
   
                                                    WBC corrected for nucl   
RBC Auto (Bld) [#/Vol] 9.9 E9/L            Normal              4.0 - 11.0   
E9/L                                    [facility]   
HemeAutoSS  
   
                                                    No Panel InformationOrdered   
By: ANGPROCESSSEROTF MICROBIOLOGY on 2022   
   
                                        Blood Culture Charcoal No growth at 4 da  
ys.   
Final to follow at 7   
days.                                                       [facility]  
   
                                                    Progress Note-Physicianon    
   
                      Progress Note-Physician            Normal                F  
[facility]  
   
                                        Comment on above:   Result Comment: Elec  
tronically Signed By: Dylon JORDAN,   
Marty BAIRD\.br\Date and Time Signed: 22 10:36 EST   
   
                                                    Sodiumon 2022   
   
                      Sodium [Moles/Vol] 133 mmol/L Low        135-145    [facility]  
   
                                        Comment on above:   Performed By: #### 2  
200892 ####[facility]   
[address] [place] [address]   
   
                                                    eGFRon 2022   
   
                                                    GFR/1.73 sq M.predicted   
among blacks MDRD   
(S/P/Bld) [Vol   
rate/Area]      mL/min/{1.73_m2} Normal          >=59            [facility]  
   
                                        Comment on above:   Order Comment: Order  
 added by Discern Expert.   
   
                                                            Result Comment: eGFR  
 is race adjusted. AA=.   
   
                                                            Performed By: #### 2  
373621, 46333725, 3007783, 1390559   
####[facility] [address] [place] [address]   
   
                                                    GFR/1.73 sq M.predicted   
among non-blacks MDRD   
(S/P/Bld) [Vol   
rate/Area]      54 mL/min/1.73 m2 Low             >=59            [facility]  
   
                                        Comment on above:   Order Comment: Order  
 added by Discern Expert.   
   
                                                            Result Comment:   
deion kidney disease could be indicated at   
eGFR's of less than 60 mL/min/1.73m2. Kidney failure is   
indicated at less than 15 mL/min/1.73m2.   
   
                                                            Performed By: #### 2  
330723, 55050157, 8310296, 5611593   
####[facility] [address] [place] [address]   
   
                                                    Auto Diffon 2022   
   
                      Basophils/100 WBC (Bld) 0.9 %      Normal     0.0-2.0    [facility]  
   
                                        Comment on above:   Order Comment: Order  
 Added by Discern Expert.   
   
                                                            Performed By: #### 2  
185159, 54507400, 2698696, 1724099430,   
5003680, 5763141 ####[address]   
[place] [address]   
   
                                                    Basophils/Leukocytes   
Auto (Bld) [Pure #   
fraction]       0.1 E9/L        Normal          0.0-0.2         [facility]  
   
                                        Comment on above:   Order Comment: Order  
 Added by Discern Expert.   
   
                                                            Performed By: #### 2  
224617, 97347655, 6278534, 1865614264,   
6495021, 2032184 ####[address]   
[place] [address]   
   
                                                    Eosinophils/100 WBC   
(Bld)           0.1 %           Normal          0.0-8.0         [facility]  
   
                                        Comment on above:   Order Comment: Order  
 Added by Discern Expert.   
   
                                                            Performed By: #### 2  
747865, 75369236, 3371194, 1214368646,   
8215517, 6717409 ####[facility] [address]   
[place] [address]   
   
                                                    Eosinophils/Leukocytes   
Auto (Bld) [Pure #   
fraction]       0.0 E9/L        Normal          0.0-0.5         [facility]  
   
                                        Comment on above:   Order Comment: Order  
 Added by Discern Expert.   
   
                                                            Performed By: #### 2  
596352, 79921485, 4649315, 4786197546,   
7368700, 0307837 ####[address]   
[place] [address]   
   
                                                    Lymphocytes/100 WBC   
(Bld)           4.7 %           Low             14.0-50.0       [facility]  
   
                                        Comment on above:   Order Comment: Order  
 Added by Discern Expert.   
   
                                                            Performed By: #### 2  
183158, 51130699, 9188186, 8063523581,   
8188650, 2120953 ####[facility] [address]   
[place] [address]   
   
                                                    Lymphocytes/Leukocytes   
Auto (Bld) [Pure #   
fraction]       0.4 E9/L        Low             1.0-4.0         [facility]  
   
                                        Comment on above:   Order Comment: Order  
 Added by Discern Expert.   
   
                                                            Performed By: #### 2  
617336, 39327228, 4977352, 7863990965,   
3049689, 2375404 ####[address]   
[place] [address]   
   
                      Monocytes/100 WBC (Bld) 7.2 %      Normal     4.0-14.0   [facility]  
   
                                        Comment on above:   Order Comment: Order  
 Added by Lenin Expert.   
   
                                                            Performed By: #### 2  
587347, 36825923, 3929971, 4638881546,   
8884142, 1963918 ####[facility] [address]   
[place] [address]   
   
                                                    Monocytes/Leukocytes   
Auto (Bld) [Pure #   
fraction]       0.6 E9/L        Normal          0.2-1.0         [facility]  
   
                                        Comment on above:   Order Comment: Order  
 Added by Lenin Expert.   
   
                                                            Performed By: #### 2  
730097, 30419015, 4059522, 0454904836,   
5186305, 0878469 ####[facility] [address]   
[place] [address]   
   
                                                    Neutrophils/100 WBC   
(Bld)           87.1 %          High            36.0-75.0       [facility]  
   
                                        Comment on above:   Order Comment: Order  
 Added by Lenin Expert.   
   
                                                            Performed By: #### 2  
497613, 83346593, 3713872, 7652041270,   
2871515, 3877550 ####[facility] [address]   
[place] [address]   
   
                                                    Neutrophils/Leukocytes   
Auto (Bld) [Pure #   
fraction]       7.0 E9/L        Normal          2.0-7.5         [facility]  
   
                                        Comment on above:   Order Comment: Order  
 Added by Lenin Expert.   
   
                                                            Performed By: #### 2  
780177, 98952931, 9317106, 4855834643,   
6952095, 3687295 ####[facility] [address]   
[place] [address]   
   
                      Basophils/100 WBC (Bld) 0.5 %      Normal     0.0-2.0    [facility]  
   
                                        Comment on above:   Order Comment: Order  
 Added by Discern Expert.   
   
                                                            Performed By: #### 2  
968822, 2993511, 05610014, 9641960,   
75187818, 2384188, 5662207 ####[facility]   
[address] [place] [address]   
   
                                                    Basophils/Leukocytes   
Auto (Bld) [Pure #   
fraction]       0.0 E9/L        Normal          0.0-0.2         [facility]  
   
                                        Comment on above:   Order Comment: Order  
 Added by Lenin Expert.   
   
                                                            Performed By: #### 2  
517945, 6601031, 70350135, 2042704,   
65449894, 3054292, 2133390 ####[facility]   
[address] [place] [address]   
   
                                                    Eosinophils/100 WBC   
(Bld)           0.3 %           Normal          0.0-8.0         [facility]  
   
                                        Comment on above:   Order Comment: Order  
 Added by Lenin Expert.   
   
                                                            Performed By: #### 2  
885393, 5312734, 49647577, 3324106,   
88247196, 7281512, 7991031 ####[facility]   
[address] [place] [address]   
   
                                                    Eosinophils/Leukocytes   
Auto (Bld) [Pure #   
fraction]       0.0 E9/L        Normal          0.0-0.5         [facility]  
   
                                        Comment on above:   Order Comment: Order  
 Added by Lenin Expert.   
   
                                                            Performed By: #### 2  
271491, 7716076, 85027964, 7796186,   
49905833, 2734001, 0606378 ####[facility]   
[address] [place] [address]   
   
                                                    Lymphocytes/100 WBC   
(Bld)           3.6 %           Low             14.0-50.0       [facility]  
   
                                        Comment on above:   Order Comment: Order  
 Added by Discern Expert.   
   
                                                            Performed By: #### 2  
874496, 7184992, 58333566, 7339870,   
00859045, 2799009, 4231690 ####[facility]   
[address] [place] [address]   
   
                                                    Lymphocytes/Leukocytes   
Auto (Bld) [Pure #   
fraction]       0.3 E9/L        Low             1.0-4.0         [facility]  
   
                                        Comment on above:   Order Comment: Order  
 Added by Discern Expert.   
   
                                                            Performed By: #### 2  
214118, 0510919, 33040799, 6241022,   
87873235, 6392037, 2848024 ####[facility]   
[address] [place] [address]   
   
                      Monocytes/100 WBC (Bld) 6.2 %      Normal     4.0-14.0   [facility]  
   
                                        Comment on above:   Order Comment: Order  
 Added by Lenin Expert.   
   
                                                            Performed By: #### 2  
408003, 8569708, 46040173, 2401104,   
22582679, 2095555, 4819926 ####[facility]   
[address] [place] [address]   
   
                                                    Monocytes/Leukocytes   
Auto (Bld) [Pure #   
fraction]       0.5 E9/L        Normal          0.2-1.0         [facility]  
   
                                        Comment on above:   Order Comment: Order  
 Added by Discern Expert.   
   
                                                            Performed By: #### 2  
266808, 7915888, 07325425, 2652650,   
44304987, 7406944, 5100385 ####[facility]   
[address] [place] [address]   
   
                                                    Neutrophils/100 WBC   
(Bld)           89.4 %          High            36.0-75.0       [facility]  
   
                                        Comment on above:   Order Comment: Order  
 Added by Discern Expert.   
   
                                                            Performed By: #### 2  
860208, 0511788, 19670684, 9519263,   
43386584, 7545356, 1190695 ####[facility]   
[address] [place] [address]   
   
                                                    Neutrophils/Leukocytes   
Auto (Bld) [Pure #   
fraction]       7.9 E9/L        High            2.0-7.5         [facility]  
   
                                        Comment on above:   Order Comment: Order  
 Added by Discern Expert.   
   
                                                            Performed By: #### 2  
995219, 8929255, 41478874, 0501158,   
79311136, 0826833, 3881330 ####[facility]   
[address] [place] [address]   
   
                                                    BMPon 2022   
   
                      Anion gap [Moles/Vol] 7 mmol/L   Normal     6-16       [facility] [facility]  
   
                                        Comment on above:   Performed By: #### 2  
276923, 09245058, 2184750, 7876072567,   
7110894, 6988575 ####[facility] [address]   
[place] [address]   
   
                      Calcium [Mass/Vol] 9.0 mg/dL  Normal     8.9-11.1   [facility]  
   
                                        Comment on above:   Performed By: #### 2  
070799, 84865384, 2630976, [id number],   
9392092, 4201352 ####[facility] [address]   
[place] [address]   
   
                      Chloride [Moles/Vol] 105 mmol/L Normal     101-111    [facility]  
   
                                        Comment on above:   Performed By: #### 2  
959260, 40016280, 7049766, 1440280987,   
9738582, 3101760 ####[facility] [address]   
[place] [address]   
   
                      CO2 [Moles/Vol] 21 mmol/L  Normal     21-31      [facility]  
   
                                        Comment on above:   Performed By: #### 2  
796525, 01790228, 5958981, 5202390228,   
4234165, 9133366 ####[facility] [address]   
[place] [address]   
   
                      Creatinine [Mass/Vol] 1.0 mg/dL  Normal     0.5-1.3    [facility]  
   
                                        Comment on above:   Performed By: #### 2  
309736, 25691279, 3300472, 8138719516,   
4399878, 5303695 ####[facility] [address]   
[place] [address]   
   
                      Glucose [Mass/Vol] 100 mg/dL  Normal          [facility]  
   
                                        Comment on above:   Result Comment: If t  
his glucose result represents a fasting   
glucose, interpretation should refer to the following   
reference range: 55-99 mg/dL   
   
                                                            Performed By: #### 2  
123016, 19103679, 5049132, 8776843601,   
7907233, 2586584 ####[facility] [address]   
[place] [address]   
   
                      Potassium [Moles/Vol] 3.7 mmol/L Normal     3.5-5.3    [facility]  
   
                                        Comment on above:   Performed By: #### 2  
372827, 10891617, 2407324, 8968654781,   
1583379, 9025677 ####[facility] [address]   
[place] [address]   
   
                      Sodium [Moles/Vol] 129 mmol/L Low        135-145    [facility]  
   
                                        Comment on above:   Performed By: #### 2  
962415, 74820236, 3964684, 5645499900,   
4294024, 3075382 ####[facility] [address]   
[place] [address]   
   
                                                    Urea nitrogen   
[Mass/Vol]      15 mg/dL        Normal          5-21            [facility]  
   
                                        Comment on above:   Performed By: #### 2  
061400, 27870261, 4472410, 3648026317,   
6709255, 0601435 ####[facility] [address]   
[place] [address]   
   
                                                    Urea   
nitrogen/Creatinine   
[Mass ratio]    15 No Units     Normal          10-20           [facility]  
   
                                        Comment on above:   Performed By: #### 2  
357643, 35471852, 5651407, 4455280502,   
3453445, 1435054 ####[facility] [address]   
[place] [address]   
   
                                                    CBC w/ Auto Diffon    
   
                                                    Erythrocyte   
distribution width   
(RBC) [Ratio]   13.8 %          Normal          10.9-14.2       [facility]  
   
                                        Comment on above:   Performed By: #### 2  
724110, 25430146, 5071220, 1814228373,   
8216279, 6481649 ####[address]   
[place] [address]   
   
                                                    Hematocrit (Bld)   
[Volume fraction] 36.5 %          Normal          34.0-46.0       [facility]  
   
                                        Comment on above:   Performed By: #### 2  
874153, 91242710, 6890776, 2599603459,   
5722362, 1943829 ####[address]   
[place] [address]   
   
                                                    Hemoglobin (Bld)   
[Mass/Vol]      12.5 g/dL       Normal          12.0-16.0       [facility]  
   
                                        Comment on above:   Performed By: #### 2  
822036, 77508396, 4179937, 8825681296,   
6774989, 7006458 ####[address] [address]   
   
                                                    MCH (RBC) [Entitic   
mass]           29.7 pg         Normal          27.0-34.0       [facility]  
   
                                        Comment on above:   Performed By: #### 2  
572461, 29898842, 3473423, 4017396455,   
9337997, 2746138 ####[address] [address]   
   
                      MCHC (RBC) [Mass/Vol] 34.2 g/dL  Normal     31.4-36.0  [facility]  
   
                                        Comment on above:   Performed By: #### 2  
992839, 01619723, 0655503, 6385859148,   
8639095, 7171646 ####[address]   
[place] [address]   
   
                      MCV (RBC) [Entitic vol] 86.7 fL    Normal     80.0-100.0 F  
[facility]  
   
                                        Comment on above:   Performed By: #### 2  
344722, 98094973, 5199187, 6706090705,   
0831141, 6640481 ####[address]   
[place] [address]   
   
                                                    Platelet mean volume   
(Bld) [Entitic vol] 7.9 fL          Normal          6.4-10.8        [facility]  
   
                                        Comment on above:   Performed By: #### 2  
251495, 92760723, 8598905, 2869574877,   
9355161, 5256686 ####[facility] [address]   
[place] [address]   
   
                      Platelets (Bld) [#/Vol] 193.0 E9/L Normal     150.0-500.0   
[facility]  
   
                                        Comment on above:   Performed By: #### 2  
881690, 72839934, 6741216, 8951761529,   
6152690, 8838293 ####[address]   
[place] [address]   
   
                      RBC (Bld) [#/Vol] 4.2 E12/L  Low        4.3-5.9    [facility]  
   
                                        Comment on above:   Performed By: #### 2  
468730, 98451653, 7546487, 9832097104,   
5194970, 8662137 ####[address] [address]   
   
                                                    WBC corrected for nucl   
RBC Auto (Bld) [#/Vol] 8.0 E9/L        Normal          4.0-11.0        [facility]  
   
                                        Comment on above:   Performed By: #### 2  
080433, 39695809, 4601129, 4299751567,   
5222900, 7235683 ####[address]   
[place] [address]   
   
                                                    Erythrocyte   
distribution width   
(RBC) [Ratio]   13.4 %          Normal          10.9-14.2       [facility]  
   
                                        Comment on above:   Performed By: #### 2  
852194, 6133528, 83338723, 2232390,   
94085412, 5021880, 3576910 ####[address] [place] [address]   
   
                                                    Hematocrit (Bld)   
[Volume fraction] 36.6 %          Normal          34.0-46.0       [facility]  
   
                                        Comment on above:   Performed By: #### 2  
171194, 0944641, 12941726, 1718295,   
31602587, 4621227, 6686017 ####[facility]   
[address] [place] [address]   
   
                                                    Hemoglobin (Bld)   
[Mass/Vol]      12.5 g/dL       Normal          12.0-16.0       [facility]  
   
                                        Comment on above:   Performed By: #### 2  
257687, 9130646, 28018486, 2154828,   
59955983, 0170828, 9012032 ####[facility]   
[address] [place] [address]   
   
                                                    MCH (RBC) [Entitic   
mass]           29.5 pg         Normal          27.0-34.0       [facility]  
   
                                        Comment on above:   Performed By: #### 2  
047482, 8242068, 38157307, 4877200,   
77197921, 1339420, 1301741 ####[facility]   
[address] [place] [address]   
   
                      MCHC (RBC) [Mass/Vol] 34.1 g/dL  Normal     31.4-36.0  [facility]  
   
                                        Comment on above:   Performed By: #### 2  
511330, 9219652, 02015317, 0711337,   
04374026, 6624366, 4739924 ####[facility]   
[address] [place] [address]   
   
                      MCV (RBC) [Entitic vol] 86.5 fL    Normal     80.0-100.0 [facility]  
   
                                        Comment on above:   Performed By: #### 2  
724499, 0021605, 39078264, 8519556,   
47362386, 3556517, 8718901 ####[facility]   
[address] [place] [address]   
   
                                                    Platelet mean volume   
(Bld) [Entitic vol] 7.5 fL          Normal          6.4-10.8        [facility]  
   
                                        Comment on above:   Performed By: #### 2  
337265, 9596552, 97658089, 0657593,   
17629888, 3478874, 7949579 ####[facility]   
[address] [place] [address]   
   
                      Platelets (Bld) [#/Vol] 191.0 E9/L Normal     150.0-500.0   
[facility]  
   
                                        Comment on above:   Performed By: #### 2  
739655, 5654086, 30199754, 8884212,   
80714186, 3723873, 7073563 ####[facility]   
[address] [place] [address]   
   
                      RBC (Bld) [#/Vol] 4.2 E12/L  Low        4.3-5.9    [facility]  
   
                                        Comment on above:   Performed By: #### 2  
984874, 3332852, 76055678, 8763313,   
07947572, 6161944, 9567738 ####[facility]   
[address] [place] [address]   
   
                                                    WBC corrected for nucl   
RBC Auto (Bld) [#/Vol] 8.8 E9/L        Normal          4.0-11.0        [facility]  
   
                                        Comment on above:   Performed By: #### 2  
278029, 7782155, 58885199, 4792694,   
64122391, 2898764, 8351627 ####[facility]   
[address] [place] [address]   
   
                                                    CEFTRIAXONE:SUSC:PT:ISOLATE:  
ORDQN:MICOrdered By: Richelle Sarkar on 2022   
   
                                        cefTRIAXone KAMRAN [Susc] Streptococcus sal  
ivarius  
In 2 of 2 blood culture   
bottles drawn. Isolated   
from aerobic and   
anaerobic bottles  
Preliminary gram stain   
result of gram positive   
cocci in chains Result   
called to Dr. Wayne   
by [facility] and results read   
back for confirmation on   
2022 16:10:37                                         [facility]  
   
                                                    CHEMISTRYOrdered By: SYSTEM   
SYSTEM on 2022   
   
                          Procalcitonin 1.08 ng/mL   High         0.00 - 0.50   
ng/mL                                   FT   
Remisol  
   
                                                    Troponin I.cardiac   
[Mass/Vol]          30.40 pg/mL         High                10.10 -   
27.10 pg/mL                             [facility]   
Remisol  
   
                          Albumin [Mass/Vol] 3.5 g/dL     Normal       3.3 - 5.0  
   
gm/dL                                   [facility]   
Remisol  
   
                                                    Albumin/Globulin [Mass   
ratio]          1.1 {ratio}     Normal          1.1 - 2.2       [facility]   
Remisol  
   
                                                    ALP [Catalytic   
activity/Vol]       67 [iU]/d           Normal              21 - 98   
Int._Unit/L                             FT   
Remisol  
   
                                                    ALT No additional   
P-5'-P [Catalytic   
activity/Vol]       33 [iU]/d           Normal              6 - 46   
Int._Unit/L                             [facility]   
Remisol  
   
                                                    AST [Catalytic   
activity/Vol]       38 [iU]/d           Normal              5 - 43   
Int._Unit/L                             [facility]   
Remisol  
   
                          Bilirubin [Mass/Vol] 1.3 mg/dL    High         0.0 - 1  
.1   
mg/dL                                   FT   
Remisol  
   
                          Globulin (S) [Mass/Vol] 3.3 g/dL     Normal       1.4   
- 4.0   
gm/dL                                   [facility]   
Remisol  
   
                          Lactate [Mass/Vol] 0.8 mmol/L   Normal       0.5 - 2.2  
   
mmol/L                                  [facility]   
Remisol  
   
                          Protein [Mass/Vol] 6.8 g/dL     Normal       6.0 - 7.8  
   
gm/dL                                   FT   
Remisol  
   
                                                    Troponin I.cardiac   
[Mass/Vol]          31.00 pg/mL         High                10.10 -   
27.10 pg/mL                             [facility]   
Remisol  
   
                                                    CHEMISTRYOrdered By: Lab ROP  
User on 2022   
   
                          Glucose [Mass/Vol] 112 mg/dL    High         55 - 99   
mg/dL                                   [facility] POC   
Subsection  
   
                                        Comment on above:   Result Comment: Catarina limon RN/MD   
   
                                POC Device SN   [id number]    Invalid   
Interpretation   
Code                                                [facility]   
Subsection  
   
                                POC User ID     [id number]       Invalid   
Interpretation   
Code                                                [facility]   
Subsection  
   
                                POC Username    SARMAD PERDOMO   Invalid   
Interpretation   
Code                                                [facility]   
Subsection  
   
                                                    CMPon 2022   
   
                      Albumin [Mass/Vol] 3.5 g/dL   Normal     3.3-5.0    [facility]  
   
                                        Comment on above:   Performed By: #### 2  
199767, 8252872, 71038992, 6893817,   
68901776, 7126162, 3872997 ####[facility]   
[address] [place] [address]   
   
                                                    Albumin/Globulin (S)   
[Mass conc ratio] 1.1             Normal          1.1-2.2         [facility]  
   
                                        Comment on above:   Performed By: #### 2  
230053, 9114550, 05726641, 4860947,   
38102693, 4506598, 2288177 ####[facility]   
[address] [place] [address]   
   
                                                    ALP [Catalytic   
activity/Vol]   67 Int._Unit/L  Normal          21-98           [facility]  
   
                                        Comment on above:   Performed By: #### 2  
673147, 1781997, 23899742, 3246519,   
08521977, 7144893, 6503277 ####[facility]   
[address] [place] [address]   
   
                                                    ALT No additional   
P-5'-P [Catalytic   
activity/Vol]   33 Int._Unit/L  Normal          6-46            [facility]  
   
                                        Comment on above:   Performed By: #### 2  
636238, 8668666, 90659326, 2997287,   
88268969, 4093462, 2798421 ####[facility]   
[address] [place] [address]   
   
                                                    AST [Catalytic   
activity/Vol]   38 Int._Unit/L  Normal          5-43            [facility]  
   
                                        Comment on above:   Performed By: #### 2  
954223, 0095958, 72623345, 2908020,   
69302752, 9411210, 0609307 ####[facility]   
[address] [place] [address]   
   
                      Bilirubin [Mass/Vol] 1.3 mg/dL  High       0.0-1.1    [facility]  
[facility]  
   
                                        Comment on above:   Performed By: #### 2  
310814, 9620346, 74544839, 7124922,   
58883831, 8803041, 7052125 ####[facility]   
[address] [place] [address]   
   
                      Creatinine [Mass/Vol] 0.9 mg/dL  Normal     0.5-1.3    [facility]  
   
                                        Comment on above:   Performed By: #### 2  
348649, 2116080, 16278935, 7334523,   
98085303, 6100028, 1483095 ####[facility]   
[address] [place] [address]   
   
                      Globulin (S) [Mass/Vol] 3.3 g/dL   Normal     1.4-4.0    F  
[facility]  
   
                                        Comment on above:   Performed By: #### 2  
964227, 8638778, 63013257, 4797491,   
67115256, 5062129, 7053162 ####[facility]   
[address] [place] [address]   
   
                      Protein [Mass/Vol] 6.8 g/dL   Normal     6.0-7.8    [facility]  
   
                                        Comment on above:   Performed By: #### 2  
942205, 0204362, 20756125, 3239120,   
88065982, 1323199, 5649349 ####[facility]   
[address] [place] [address]   
   
                                                    Urea nitrogen   
[Mass/Vol]      16 mg/dL        Normal          5-21            [facility]  
   
                                        Comment on above:   Performed By: #### 2  
224412, 3141692, 77273995, 4698681,   
97148092, 4596645, 1446435 ####[facility]   
[address] [place] [address]   
   
                                                    Urea   
nitrogen/Creatinine   
[Mass ratio]    18 No Units     Normal          10-20           [facility]  
   
                                        Comment on above:   Performed By: #### 2  
717529, 4747522, 64877089, 4931421,   
68922170, 0617642, 6295795 ####[facility]   
[address] [place] [address]   
   
                      Anion gap [Moles/Vol] 13 mmol/L  Normal     6-16       [facility]  
   
                                        Comment on above:   Performed By: #### 2  
016453, 7401222, 48262541, 1655482,   
46440264, 6268547, 3982344 ####[facility]   
[address] [place] [address]   
   
                      Calcium [Mass/Vol] 9.1 mg/dL  Normal     8.9-11.1   [facility]  
   
                                        Comment on above:   Performed By: #### 2  
777192, 3751216, 18585188, 8722355,   
43973361, 5948457, 6253885 ####[facility]   
[address] [place] [address]   
   
                      Chloride [Moles/Vol] 99 mmol/L  Low        101-111    [facility]  
[facility]  
   
                                        Comment on above:   Performed By: #### 2  
534240, 5187450, 32172605, 9641360,   
21150215, 2525320, 1898108 ####[facility]   
[address] [place] [address]   
   
                      CO2 [Moles/Vol] 21 mmol/L  Normal     21-31      [facility]  
   
                                        Comment on above:   Performed By: #### 2  
756833, 9059091, 35037607, 2418709,   
87709037, 6445991, 9620626 ####[facility]   
[address] [place] [address]   
   
                      Glucose [Mass/Vol] 113 mg/dL  Normal          [facility]  
   
                                        Comment on above:   Result Comment: If t  
his glucose result represents a fasting   
glucose, interpretation should refer to the following   
reference range: 55-99 mg/dL   
   
                                                            Performed By: #### 2  
510631, 0548544, 24400866, 5978749,   
63666268, 3710368, 2625692 ####[facility]   
[address] [place] [address]   
   
                      Potassium [Moles/Vol] 3.5 mmol/L Normal     3.5-5.3    [facility]  
   
                                        Comment on above:   Performed By: #### 2  
690786, 5502241, 75596606, 0308876,   
23431752, 3268066, 8904584 ####[facility]   
[address] [place] [address]   
   
                      Sodium [Moles/Vol] 129 mmol/L Low        135-145    [facility]  
   
                                        Comment on above:   Performed By: #### 2  
119422, 7836119, 55531994, 0731560,   
10229088, 7895422, 7267503 ####[facility]   
[address] [place] [address]   
   
                                                    COAGULATIONOrdered By: Stacie Patricia on 2022   
   
                          aPTT Coag (PPP) [Time] 34.5 s       Normal       25.1   
- 36.5   
second(s)                               [facility] Auto   
Coag  
   
                                                    INR Coag (PPP)   
[Relative time]           2.7 {INR}                 Invalid   
Interpretation   
Code                                                [facility] Auto   
Coag  
   
                          PT Coag (PPP) [Time] 31.2 s       High         9.4 - 1  
2.5   
second(s)                               [facility]   
Coag  
   
                                                    Capillary Glucose POCon 11-2  
3-2022   
   
                      Glucose [Mass/Vol] 112 mg/dL  High       55-99      [facility]  
   
                                        Comment on above:   Result Comment: Catarina limon RN/MD   
   
                                                            Performed By: #### 2  
[id number] ####[facility]   
[address] [place] [address]   
   
                                                    Cardiology Progress Noteon 1  
2022   
   
                                                    Cardiology Progress   
Note                            Normal                          [facility]  
   
                                        Comment on above:   Result Comment: Elec  
tronically Signed By: Meenu HAWKINS CNP\.br\Date and Time Signed: 22 15:44   
EST\.br\Electronically Co-Signed By: Dylon JORDAN, Marty BAIRD\.br\Date and Time Co-Signed: 22 17:08 EST   
   
                                                    Consent for Treatmenton 11-2  
3-2022   
   
                                        Consent for Treatment [id number]  
[id number]  
[id number]:  
127                 Normal                                  [facility]  
   
                                                    Consultation Noteon 20   
   
                      Consultation Note            Normal                [facility]  
   
                                        Comment on above:   Result Comment: Elec  
tronically Signed By: Dylon JORDAN,   
Marty BAIRD\.br\Date and Time Signed: 22 17:07 EST   
   
                      Consultation Note            Normal                [facility]  
   
                                        Comment on above:   Result Comment: Elec  
tronically Signed By: Po Champagne MD\.br\Date and Time Signed: 22 13:20 EST   
   
                                                    ED Clinical Summaryon 2022   
   
                      ED Clinical Summary            Normal                [facility]  
   
                                                    ED Note-Nursingon 2022  
   
   
                      ED Note-Nursing            Normal                [facility]  
   
                                                    ED Note-Physicianon 20   
   
                      ED Note-Physician            Normal                [facility]  
   
                                        Comment on above:   Result Comment: Elec  
tronically Signed By: Rama Long DO\.br\Date and Time Signed: 22 03:18 EST   
   
                                                    ED Patient Education Noteon   
2022   
   
                                                    ED Patient Education   
Note                            Normal                          [facility]  
   
                                                    ED Patient Summaryon   
022   
   
                      ED Patient Summary            Normal                [facility]  
   
                                                    Echo Transthoracic Completeo  
n 2022   
   
                                                    Echo Transthoracic   
Complete                        Normal                          [facility]  
   
                                                    HEMATOLOGYOrdered By: SYSTEM  
 SYSTEM on 2022   
   
                      Basophils/100 WBC (Bld) 0.9 %      Normal     0.0 - 2.0 %   
[facility]   
HemeAutoSS  
   
                                                    Basophils/Leukocytes   
Auto (Bld) [Pure #   
fraction]           0.1 E9/L            Normal              0.0 - 0.2   
E9/L                                    [facility]   
HemeAutoSS  
   
                                                    Eosinophils/100 WBC   
(Bld)           0.1 %           Normal          0.0 - 8.0 %     [facility]   
HemeAutoSS  
   
                                                    Eosinophils/Leukocytes   
Auto (Bld) [Pure #   
fraction]           0.0 E9/L            Normal              0.0 - 0.5   
E9/L                                    [facility]   
HemeAutoSS  
   
                                                    Lymphocytes/100 WBC   
(Bld)               4.7 %               Low                 14.0 - 50.0   
%                                       [facility]   
HemeAutoSS  
   
                                                    Lymphocytes/Leukocytes   
Auto (Bld) [Pure #   
fraction]           0.4 E9/L            Low                 1.0 - 4.0   
E9/L                                    [facility]   
HemeAutoSS  
   
                          Monocytes/100 WBC (Bld) 7.2 %        Normal       4.0   
- 14.0   
%                                       [facility]   
HemeAutoSS  
   
                                                    Monocytes/Leukocytes   
Auto (Bld) [Pure #   
fraction]           0.6 E9/L            Normal              0.2 - 1.0   
E9/L                                    [facility]   
HemeAutoSS  
   
                                                    Neutrophils/100 WBC   
(Bld)               87.1 %              High                36.0 - 75.0   
%                                       [facility]   
HemeAutoSS  
   
                                                    Neutrophils/Leukocytes   
Auto (Bld) [Pure #   
fraction]           7.0 E9/L            Normal              2.0 - 7.5   
E9/L                                    [facility]   
HemeAutoSS  
   
                      Basophils/100 WBC (Bld) 0.5 %      Normal     0.0 - 2.0 %   
[facility]   
HemeAutoSS  
   
                                                    Basophils/Leukocytes   
Auto (Bld) [Pure #   
fraction]           0.0 E9/L            Normal              0.0 - 0.2   
E9/L                                    [facility]   
HemeAutoSS  
   
                                                    Eosinophils/100 WBC   
(Bld)           0.3 %           Normal          0.0 - 8.0 %     [facility]   
HemeAutoSS  
   
                                                    Eosinophils/Leukocytes   
Auto (Bld) [Pure #   
fraction]           0.0 E9/L            Normal              0.0 - 0.5   
E9/L                                    [facility]   
HemeAutoSS  
   
                                                    Lymphocytes/100 WBC   
(Bld)               3.6 %               Low                 14.0 - 50.0   
%                                       [facility]   
HemeAutoSS  
   
                                                    Lymphocytes/Leukocytes   
Auto (Bld) [Pure #   
fraction]           0.3 E9/L            Low                 1.0 - 4.0   
E9/L                                    [facility]   
HemeAutoSS  
   
                          Monocytes/100 WBC (Bld) 6.2 %        Normal       4.0   
- 14.0   
%                                       [facility]   
HemeAutoSS  
   
                                                    Monocytes/Leukocytes   
Auto (Bld) [Pure #   
fraction]           0.5 E9/L            Normal              0.2 - 1.0   
E9/L                                    [facility]   
HemeAutoSS  
   
                                                    Neutrophils/100 WBC   
(Bld)               89.4 %              High                36.0 - 75.0   
%                                       [facility]   
HemeAutoSS  
   
                                                    Neutrophils/Leukocytes   
Auto (Bld) [Pure #   
fraction]           7.9 E9/L            High                2.0 - 7.5   
E9/L                                    [facility]   
HemeAutoSS  
   
                                                    HEMATOLOGYOrdered By: Sagar Adams on 2022   
   
                                                    Erythrocyte   
distribution width   
(RBC) [Ratio]       13.8 %              Normal              10.9 - 14.2   
%                                       [facility]   
HemeAutoSS  
   
                                                    Hematocrit (Bld)   
[Volume fraction]   36.5 %              Normal              34.0 - 46.0   
%                                       [facility]   
HemeAutoSS  
   
                                                    Hemoglobin (Bld)   
[Mass/Vol]          12.5 g/dL           Normal              12.0 - 16.0   
gm/dL                                   [facility]   
HemeAutoSS  
   
                                                    MCH (RBC) [Entitic   
mass]               29.7 pg             Normal              27.0 - 34.0   
pg                                      [facility]   
HemeAutoSS  
   
                          MCHC (RBC) [Mass/Vol] 34.2 g/dL    Normal       31.4 -  
 36.0   
gm/dL                                   [facility]   
HemeAutoSS  
   
                          MCV (RBC) [Entitic vol] 86.7 fL      Normal       80.0  
 -   
100.0 fL                                [facility]   
HemeAutoSS  
   
                                                    Platelet mean volume   
(Bld) [Entitic vol] 7.9 fL              Normal              6.4 - 10.8   
fL                                      [facility]   
HemeAutoSS  
   
                          Platelets (Bld) [#/Vol] 193.0 E9/L   Normal       150.  
0 -   
500.0 E9/L                              [facility]   
HemeAutoSS  
   
                          RBC (Bld) [#/Vol] 4.2 E12/L    Low          4.3 - 5.9   
E12/L                                   [facility]   
HemeAutoSS  
   
                                                    WBC corrected for nucl   
RBC Auto (Bld) [#/Vol] 8.0 E9/L            Normal              4.0 - 11.0   
E9/L                                    [facility]   
HemeAutoSS  
   
                                                    HEMATOLOGYOrdered By: Prashant Patricia on 2022   
   
                                                    Erythrocyte   
distribution width   
(RBC) [Ratio]       13.4 %              Normal              10.9 - 14.2   
%                                       [facility]   
HemeAutoSS  
   
                                                    Hematocrit (Bld)   
[Volume fraction]   36.6 %              Normal              34.0 - 46.0   
%                                       [facility]   
HemeAutoSS  
   
                                                    Hemoglobin (Bld)   
[Mass/Vol]          12.5 g/dL           Normal              12.0 - 16.0   
gm/dL                                   [facility]   
HemeAutoSS  
   
                                                    MCH (RBC) [Entitic   
mass]               29.5 pg             Normal              27.0 - 34.0   
pg                                      [facility]   
HemeAutoSS  
   
                          MCHC (RBC) [Mass/Vol] 34.1 g/dL    Normal       31.4 -  
 36.0   
gm/dL                                   [facility]   
HemeAutoSS  
   
                          MCV (RBC) [Entitic vol] 86.5 fL      Normal       80.0  
 -   
100.0 fL                                [facility]   
HemeAutoSS  
   
                                                    Platelet mean volume   
(Bld) [Entitic vol] 7.5 fL              Normal              6.4 - 10.8   
fL                                      [facility]   
HemeAutoSS  
   
                          Platelets (Bld) [#/Vol] 191.0 E9/L   Normal       150.  
0 -   
500.0 E9/L                              [facility]   
HemeAutoSS  
   
                          RBC (Bld) [#/Vol] 4.2 E12/L    Low          4.3 - 5.9   
E12/L                                   [facility]   
HemeAutoSS  
   
                                                    WBC corrected for nucl   
RBC Auto (Bld) [#/Vol] 8.8 E9/L            Normal              4.0 - 11.0   
E9/L                                    [facility]   
HemeAutoSS  
   
                                                    Influenza A&B Agon    
   
                      Influenzae A Ag Negative   Normal     Negative   [facility]  
   
                                        Comment on above:   Performed By: #### 1  
[id number], [id number] ####[facility] [address] [place] [address]   
   
                      Influenzae B Ag Negative   Normal     Negative   [facility]  
   
                                        Comment on above:   Result Comment: Test  
 sensitivity and specificity vary for age   
group, specimen type, antigen types, and prevalence of   
disease. Test results must be evaluated in conjunction with   
other clinical data available to the physician. Individuals   
who received nasally administered Influenza A vaccine may have   
positive test results up to 3 days after vaccination.   
   
                                                            Performed By: #### 1  
[id number], [id number] ####[facility] [address] [place] [address]   
   
                                                    Interdisciplinary Note - Jeff  
e Manageron 2022   
   
                                                    Interdisciplinary Note   
-                   Normal                          [facility]  
   
                                        Comment on above:   Result Comment: Elec  
tronically Signed By: Martha Arciniega\Date and Time Signed: 22 09:47 EST   
   
                                                    Laboratory - Microbiology an  
d Antimicrobial susceptibilityOrdered By: Richelle Sarkar   
on 2022   
   
                                                    Bacteria identified Cx   
Nom (U)                                 5,000 cfu/ml Mixed skin   
contaminants                                                [facility]  
   
                                                    Lactic Acidon 2022   
   
                      Lactate [Mass/Vol] 0.8 mmol/L Normal     0.5-2.2    [facility]  
   
                                        Comment on above:   Performed By: #### 2  
[id number], [id number], [id number], [id number],   
[id number], [id number], [id number] ####[facility]   
[id number] [place] [address]   
   
                                                    MICRO OTHER TESTSOrdered By:  
 Ana Patricia on 2022   
   
                                        Influenzae A Ag     Negative  
(22 12:48 AM) Normal              Negative            [facility]   
[facility]  
   
                                        Influenzae B Ag     Negative  
(22 12:48 AM) Normal              Negative            [facility]   
[facility]  
   
                                        Rapid COV Int NEG Ctl Pass  
(22 12:48 AM) Normal                                  [facility]   
[facility]ero  
   
                                        Rapid COV Int POS Ctl Pass  
(22 12:48 AM) Normal                                  [facility]   
[facility]  
   
                                                    SARS-CoV+SARS-CoV-2   
(COVID-19) Ag IA.rapid   
Ql (Resp)                               Not Detected  
(22 12:48 AM)       Normal                    Not   
Detected                                [facility] Man   
[facility]ero  
   
                                                    Message from Medicareon 11-2  
3-2022   
   
                                        Message from Medicare [id number].  
[id number]  
[id number]  
:127                Normal                                  [facility]  
   
                                                    Monitor Recordon 2022   
   
                                        Monitor Record      [id number]  
1032  
[id number]  
:127                Normal                                  [facility]  
   
                                                    No Panel InformationOrdered   
By: Richelle Sarkar on 2022   
   
                                        Blood Culture Charcoal Streptococcus khalif  
ivarius   
Presumptive refer to   
blood culture from   
22 at 0124 for   
complete susceptibility  
In 2 of 2 blood culture   
bottles drawn. Isolated   
from aerobic and   
anaerobic bottles  
Preliminary gram stain   
result of gram positive   
cocci in chains Result   
called to Dr. Wayne   
by [facility] and results read   
back for confirmation on   
2022 16:11:53                                         [facility]  
   
                                                    PT & PTTon 2022   
   
                      aPTT Coag (PPP) [Time] 34.5 second(s) Normal     25.1-36.5  
  [facility]  
   
                                        Comment on above:   Result Comment: Para  
meter 15 days - 4 weeks 1 - 5 months 6 -   
11 months 1 - 5 years 6 - 10 years 11 - 17 years PTT Mean:   
35.4 (27.6-45.6) Mean: 33.5 (24.8-40.7) Mean: 32.4 (25.1-40.7)   
Mean: 31.6 (24.0-39.2) Mean: 31.6 (26.9-38.7) Mean: 31.0   
(24.6-38.4) Pediatric Reference ranges were obtained from a   
study by Edgar Wilkes et al. prepared from 1437 samples   
obtained at 7 different centers using the same coagulation   
reagent and instrumentation as [facility]. Currently there are no   
coagulation studies available worldwide for children birth to   
14 days, and no normal ranges. Heparin therapeutic range   
(represented by Anti-Factor Xa activity of 0.2 - 0.4 U/mL)   
corresponds to PTT of 56.6 - 109.0 sec.   
   
                                                            Performed By: #### 2  
[id number], 4722338, [id number], 3906026,   
[id number], 1590052, 5444762 ####[facility]   
[address] [place] [address]   
   
                                                    INR Coag (PPP)   
[Relative time]           2.7 {INR}                 Invalid   
Interpretation   
Code                                                [facility]  
   
                                        Comment on above:   Result Comment: INR   
results are specifically intended to   
assess patients stabilized on long-term Anticoagulation   
therapy suggested INR?s ?Less Intensive Anticoagulation? 2.0 ?   
3.0Conventional Range 3.0 ? 4.5   
   
                                                            Performed By: #### 2  
[id number], [id number], [id number], [id number],   
15472905, 8151110, 6093093 ####[facility]   
[id number] [place] [address]   
   
                      PT Coag (PPP) [Time] 31.2 second(s) High       9.4-12.5     
[facility]  
   
                                        Comment on above:   Result Comment: 15 d  
ays - 4 weeks 1 - 5 months 6 -11 months 1   
? 5 years 6 ? 10 years 11 -17 years Mean: 11.2 (9.5 ? 12.6)   
Mean: 11.0 (9.7 ? 12.8) Mean: 11.0 (9.8 ? 13.0) Mean: 11.3   
(9.9 ? 13.4) Mean: 11.7 (10.0 ? 14.6) Mean: 11.8 (10.0 - 14.1)   
Pediatric Reference ranges were obtained from a study by   
Edgar Wilkes et al. prepared from 1437 samples obtained at 7   
different centers using the same coagulation reagent and   
instrumentation as [facility]. Currently there are no coagulation   
studies available worldwide for children birth to 14 days, and   
no normal ranges.   
   
                                                            Performed By: #### 2  
[id number], 4398996, 10892796, 0900693,   
44172497, 2022693, 9337273 ####[facility]   
[address] [place] [address]   
   
                                                    Procalcitoninon 2022   
   
                      Procalcitonin 1.08 ng/mL High       .00-.50    [facility]  
   
                                        Comment on above:   Result Comment: <0.5  
 ng/mL Low risk of severe sepsis and/or   
shock>2.0 ng/mL High risk of severe sepsis and/or   
shockConcentrations under 0.5 ng/mL do not exclude local   
infections or systemic infections in their initial stages   
(e.g.. under six hours from onset of illness). PCT   
concentrations between 0.5 and 2.0 ng/mL should be interpreted   
with consideration of the patient's history. In this range, it   
is recommended to retest PCT within 6 to 24 hours.   
   
                                                            Performed By: #### 2  
[id number], [id number], 6909706, [id number],   
6360463, 2435520 ####[facility] [address]   
[place] [address]   
   
                                                    Progress Note - Pharmacyon 1  
2022   
   
                                                    Progress Note -   
Pharmacy                        Normal                          [facility]  
   
                                                    Rapid COVID Antigen ([facility])on  
 2022   
   
                      Rapid COV Int NEG Ctl Pass       Normal                [facility]  
her   
[facility]  
   
                                        Comment on above:   Performed By: #### 1  
[id number], [id number] ####[facility] [address] [place] [address]   
   
                      Rapid COV Int POS Ctl Pass       Normal                [facility]  
[facility]  
   
                                        Comment on above:   Performed By: #### 1  
[id number], [id number] ####[facility] [address] [place] [address]   
   
                                                    SARS-CoV+SARS-CoV-2   
(COVID-19) Ag IA.rapid   
Ql (Resp)           Not detected        Normal              Not   
Detected                                [facility]  
   
                                        Comment on above:   Result Comment: The   
[employer] System for Rapid Detection of   
SARS-CoV-2 is a chromatographic digital immunoassay intended   
for the direct and qualitative detection of SARS-CoV-2   
nucleocapsid antigens in nasal swabs from individuals who are   
suspected of COVID-19 by their healthcare provider within the   
first five days of the onset of symptoms. Negative results   
should be treated as presumptive, do not rule out SARS-CoV-2   
infection and should not be used as the sole basis for   
treatment or patient management decisions, including infection   
control decisions. Negative results should be considered in   
the context of a patient?s recent exposures, history and the   
presence of clinical signs and symptoms consistent with   
COVID-19, and confirmed with a molecular assay, if necessary,   
for patient management. For in vitro diagnostic use. In the   
[place], only for use under an Emergency Use Authorization. In the   
[place], this test has not been FDA cleared or approved; this test   
has been authorized by FDA under an EUA for use by authorized   
laboratories; use by laboratories certified under the CLIA, 42   
U.S.C. ?263a, that meet requirements to perform moderate,   
high, or waived complexity tests and at the Point of Care   
(POC), i.e., in patient care settings operating under a CLIA   
Certificate of Waiver, Certificate of Compliance, or   
Certificate of Accreditation.This test has been authorized   
only for the detection of proteins from SARS-CoV-2, not for   
any other viruses or pathogens; and, in the [place], this test is   
only authorized for the duration of the declaration that   
circumstances exist justifying the authorization of emergency   
use of in vitro diagnostics for detection and/or diagnosis of   
the virus that causes COVID-19 under Section 564(b)(1) of the   
Act, 21 U.S.C. ? 360bbb-3(b)(1), unless the authorization is   
terminated or revoked sooner.   
   
                                                            Performed By: #### 1  
[id number], [id number] ####[place] [address]   
   
                                                    ADMITTED TO INTENSIVE   
CARE UNIT FOR CONDITION   
OF INTEREST:FIND:PT: NO              Normal                          [facility]  
   
                                        Comment on above:   Performed By: #### 1  
[id number], [id number] ####[place] [address]   
   
                                                    EMPLOYED IN A   
HEALTHCARE   
SETTING:FIND:PT: NO              Normal                          [facility]  
   
                                        Comment on above:   Performed By: #### 1  
[id number], [id number] ####[place] [address]   
   
                                                    FIRST TEST FOR   
CONDITION OF   
INTEREST:FIND:PT: YES             Normal                          [facility]  
   
                                        Comment on above:   Performed By: #### 1  
[id number], [id number] ####[place] [address]   
   
                                                    HAS SYMPTOMS RELATED TO   
CONDITION OF   
INTEREST:FIND:PT: YES             Normal                          [facility]  
   
                                        Comment on above:   Performed By: #### 1  
[id number], [id number] ####[place] [address]   
   
                                                    HOSPITALIZED FOR   
CONDITION OF   
INTEREST:FIND:PT: NO              Normal                          [facility]  
   
                                        Comment on above:   Performed By: #### 1  
[id number], [id number] ####[place] [address]   
   
                                                    PREGNANCY   
STATUS:FIND:PT: NO              Normal                          [facility]  
   
                                        Comment on above:   Performed By: #### 1  
[id number], [id number] ####[place] [address]   
   
                                                    [facility]   
CARE SETTING:FIND:PT: NO              Normal                          [facility]  
   
                                        Comment on above:   Performed By: #### 1  
[id number], [id number] ####[address] [place] [address]   
   
                                                    Reference Laboratory Testing  
Ordered By: [employer] on 2022   
   
                                                    L. pneumophila 1 Ag IA   
Ql (U)                    Negative                  Invalid   
Interpretation   
Code                      Negative                  [facility]   
[facility]  
   
                                        Comment on above:   Result Comment: Pres  
umptive negative for L. pneumophila   
serogroup 1 antigen in urine,  
suggesting no recent or current infection. Legionnaires'   
disease  
cannot be ruled out since other serogroups and species may   
also cause  
disease.  
Performed at:  [address] [id number]  
[id number] MD Juwan Butt   
   
                                                    Troponinon 2022   
   
                                                    Troponin I.cardiac   
[Mass/Vol]      30.40 pg/mL     High            10.10-27.10     [facility]  
   
                                        Comment on above:   Result Comment: The   
95% CI (Confidence Interval) PPV (Positive  
   
Predictive Value) for myocardial infarction in females is 38   
pg/mL, in males 51 pg/mL. The results should be used in   
conjunction with clinical conditions of myocardial   
infarction.(Access High Sensitivity Troponin I Instructions   
For Use, [employer], 2018)   
   
                                                            Performed By: #### 2  
[id number], [id number], 5219314, [id number],   
5500180, [id number] ####[facility] [address]   
[place] [address]   
   
                                                    Troponin I.cardiac   
[Mass/Vol]      31.00 pg/mL     High            10.10-27.10     [facility]  
   
                                        Comment on above:   Result Comment: The   
95% CI (Confidence Interval) PPV (Positive  
   
Predictive Value) for myocardial infarction in females is 38   
pg/mL, in males 51 pg/mL. The results should be used in   
conjunction with clinical conditions of myocardial   
infarction.(Access High Sensitivity Troponin I Instructions   
For Use, [employer]ept, 2018)   
   
                                                            Performed By: #### 2  
061941, 5941970, [id number], 7326328,   
63372907, 1142237, 4765921 ####[facility]   
[address] [place] [address]   
   
                                                    UA With Cult Reflexon 2022   
   
                                                    Bacteria LM Ql (Urine   
sed)            TRACE           Normal          Trace           [facility]  
   
                                        Comment on above:   Performed By: #### 2  
707370, [id number] ####[facility] [address] [place] [address]   
   
                      Bilirubin Ql (U) Negative   Normal     Negative   [facility]  
   
                                        Comment on above:   Performed By: #### 2  
965164, [id number] ####[facility] [address] [place] [address]   
   
                      Clarity (U) CLEAR      Normal     Clear      [facility]  
   
                                        Comment on above:   Performed By: #### 2  
129283, [id number] ####[address] [address]   
   
                      Color (U)  YELLOW     Normal     Yellow     [facility]  
   
                                        Comment on above:   Performed By: #### 2  
309121, [id number] ####[address] [address]   
   
                                                    Epithelial   
cells.squamous LM.HPF   
(Urine sed) [#/Area] 3-4             Normal          0-2             [facility]  
   
                                        Comment on above:   Performed By: #### 2  
351187, [id number] ####[address] [address]   
   
                                                    Glucose Test strip (U)   
[Mass/Vol]      Negative        Normal          Negative        [facility]  
   
                                        Comment on above:   Performed By: #### 2  
838347, [id number] ####[facility] [address] [place] [address]   
   
                      Hemoglobin Ql (U) 1+         Abnormal   Negative   [facility]  
   
                                        Comment on above:   Performed By: #### 2  
970858, [id number] ####[facility] [address] [place] [address]   
   
                      Ketones (U) [Mass/Vol] 1+         Abnormal   Negative   Fi  
[facility]  
   
                                        Comment on above:   Performed By: #### 2  
102365, [id number] ####[address] [address]   
   
                                                    Lithium.plasma/Lithium.  
RBC (Bld) [Mass ratio] 4-20            Normal          0-3             [facility]  
   
                                        Comment on above:   Performed By: #### 2  
865059, [id number] ####[address] [address]   
   
                      Nitrite Ql (U) Negative   Normal     Negative   [facility]  
   
                                        Comment on above:   Performed By: #### 2  
811267, [id number] ####[address] [address]   
   
                                pH (U)          6.0 [pH]        Invalid   
Interpretation   
Code                      5.0-9.0                   [facility]  
   
                                        Comment on above:   Performed By: #### 2  
344802, [id number] ####[place] [address]   
   
                      Protein (U) [Mass/Vol] 2+         Abnormal   Negative   Fi  
[facility]  
   
                                        Comment on above:   Performed By: #### 2  
437193, [id number] ####[place] [address]   
   
                                                    Specific gravity (U)   
[Rel density]             1.015                     Invalid   
Interpretation   
Code                      1.005-1.030               [facility]  
   
                                        Comment on above:   Performed By: #### 2  
349904, [id number] ####[place] [address]   
   
                                                    Type of Urine   
collection method Clean Catch     Normal                          [facility]  
   
                                        Comment on above:   Performed By: #### 2  
614463, [id number] ####[place] [address]   
   
                      Urobilinogen Qn (U) 0.2 {Miguel'U}/dL Normal     0.0-1.0   
   [facility]  
   
                                        Comment on above:   Performed By: #### 2  
447144, [id number] ####[place] [address]   
   
                      WBC Auto Ql (U) TRACE      Abnormal   Negative   [facility]  
   
                                        Comment on above:   Performed By: #### 2  
020192, [id number] ####[place] [address]   
   
                                                    WBC LM.HPF (Urine sed)   
[#/Area]        6-15            Abnormal        0-5             [facility]  
   
                                        Comment on above:   Performed By: #### 2  
208543, [id number] ####[place] [address]   
   
                                                    URINALYSISOrdered By: Prashant Patricia on 2022   
   
                                                    Bacteria LM Ql (Urine   
sed)            Trace /HPF      Normal          Trace/HPF       [facility]   
Auto SS  
   
                                        Bilirubin Ql (U)    Negative  
(22 2:58 AM)  Normal              Negative            [facility]   
Auto SS  
   
                                        Clarity (U)         Clear  
(22 2:58 AM)  Normal              Clear               [facility]   
Auto SS  
   
                                        Color (U)           Yellow  
(22 2:58 AM)  Normal              Yellow              [facility]   
Auto SS  
   
                                                    Epithelial   
cells.squamous LM.HPF   
(Urine sed) [#/Area] 3-4 /HPF        Normal          0-2/HPF         [facility]   
Auto SS  
   
                                                    Glucose Test strip (U)   
[Mass/Vol]                              Negative  
(22 2:58 AM)  Normal              Negative            [facility]   
Auto SS  
   
                                        Hemoglobin Ql (U)   1+  
*ABN*  
(22 2:58 AM)                      Invalid   
Interpretation   
Code                      Negative                  [facility]   
Auto SS  
   
                                        Ketones (U) [Mass/Vol] 1+  
*ABN*  
(22 2:58 AM)                      Invalid   
Interpretation   
Code                      Negative                  [facility]   
Auto SS  
   
                                                    Lithium.plasma/Lithium.  
RBC (Bld) [Mass ratio] 4-20 /HPF       Normal          0-3/HPF         [facility]   
Auto SS  
   
                                        Nitrite Ql (U)      Negative  
(22 2:58 AM)  Normal              Negative            [facility]   
Auto SS  
   
                                        pH (U)              6.0  
*NA*  
(22 2:58 AM)                      Invalid   
Interpretation   
Code                      5.0 - 9.0                 [facility]   
Auto SS  
   
                                        Protein (U) [Mass/Vol] 2+  
*ABN*  
(22 2:58 AM)                      Invalid   
Interpretation   
Code                      Negative                  [facility]   
Auto SS  
   
                                                    Specific gravity (U)   
[Rel density]                           1.015  
*NA*  
(22 2:58 AM)                      Invalid   
Interpretation   
Code                                    1.005 -   
1.030                                   [facility]   
Auto SS  
   
                                        UA Spec Desc        Clean Catch  
(22 2:58 AM)  Normal                                  [facility]   
Auto SS  
   
                          Urobilinogen Qn (U) 0.2249455 {Miguel'U}/dL Normal     
    0.0 - 1.0   
EU/dL                                   [facility]   
Auto SS  
   
                                        WBC Auto Ql (U)     Trace  
*ABN*  
(22 2:58 AM)                      Invalid   
Interpretation   
Code                      Negative                  [facility]   
Auto SS  
   
                                                    WBC LM.HPF (Urine sed)   
[#/Area]                  6-15 /HPF                 Invalid   
Interpretation   
Code                      0-5/HPF                   [facility]   
Auto SS  
   
                                                    XR Chest Single Viewon    
   
                      XR Chest Single View            Normal                Fish  
er   
[facility]  
   
                                                    cefTRIAXone KAMRAN [Susc]Ordere  
d By: Richelle Sarkar on 2022   
   
                                                    Streptococcus   
salivarius      Streptococcus salivarius                                 [facility]  
   
                                                    eGFRon 2022   
   
                                                    GFR/1.73 sq M.predicted   
among blacks MDRD   
(S/P/Bld) [Vol   
rate/Area]      mL/min/{1.73_m2} Normal          >=59            [facility]  
   
                                        Comment on above:   Order Comment: Order  
 added by Discern Expert.   
   
                                                            Result Comment: eGFR  
 is race adjusted. AA=.   
   
                                                            Performed By: #### 2  
184405, [id number], 0929030, [id number],   
2654865, 8651553 ####[facility] [address]   
[place] [address]   
   
                                                    GFR/1.73 sq M.predicted   
among non-blacks MDRD   
(S/P/Bld) [Vol   
rate/Area]      54 mL/min/1.73 m2 Low             >=59            [facility]  
   
                                        Comment on above:   Order Comment: Order  
 added by Discern Expert.   
   
                                                            Result Comment:   
deion kidney disease could be indicated at   
eGFR's of less than 60 mL/min/1.73m2. Kidney failure is   
indicated at less than 15 mL/min/1.73m2.   
   
                                                            Performed By: #### 2  
530691, [id number], 4569629, [id number],   
5788559, 1450186 ####[facility] [address]   
[place] [address]   
   
                                                    GFR/1.73 sq M.predicted   
among blacks MDRD   
(S/P/Bld) [Vol   
rate/Area]      mL/min/{1.73_m2} Normal          >=59            [facility]  
   
                                        Comment on above:   Order Comment: Order  
 added by Discern Expert.   
   
                                                            Result Comment: eGFR  
 is race adjusted. AA=.   
   
                                                            Performed By: #### 2  
132165, 4607230, 61365021, 3663641,   
68733869, 6324087, 5518478 ####[facility]   
[id number] [place] [address]   
   
                                                    GFR/1.73 sq M.predicted   
among non-blacks MDRD   
(S/P/Bld) [Vol   
rate/Area]      mL/min/{1.73_m2} Normal          >=59            [facility]  
   
                                        Comment on above:   Order Comment: Order  
 added by Discern Expert.   
   
                                                            Result Comment:   
deion kidney disease could be indicated at   
eGFR's of less than 60 mL/min/1.73m2. Kidney failure is   
indicated at less than 15 mL/min/1.73m2.   
   
                                                            Performed By: #### 2  
191075, 5666492, 41431866, 1258718,   
52117089, 5895047, 1028478 ####[facility]   
[address] [place] [address]   
   
                                                    Prot. Electroph, Blon 2022   
   
                                        Pathologist Review: ELECTRONICALLY SUDHIR MCWILLIAMS M.D. Normal                                  [facility]  
   
                                        Comment on above:   Performed By: #### P  
SHAR, VD25, PE ####  
[address] [id number] [phone]  
: Tae Mccallum MD  
#### BMP ####  
[facility]  
[address] [place] [address] [phone]  
: Greg Paredes MD   
   
                                        Prot. Elect-Interp  NORMAL ELECTROPHORET  
IC   
PATTERN             Normal                                  [facility]  
   
                                        Comment on above:   Performed By: #### P  
DIDINCTIO, VD25, PE ####  
[facility] [employer]  
[address] [place] [id number] [phone]  
: Tae Mccallum MD  
#### BMP ####  
[facility]  
[address] [place] [address] [phone]  
: Greg Paredes MD   
   
                                                    Prot. Electroph, Blon 2022   
   
                      Albumin [Mass/Vol] 4.3 g/dL   Normal     3.2-5.2    [facility]  
   
                                        Comment on above:   Performed By: #### P  
THNCA, VD25, PE ####  
[address] [address]11808 [phone]  
: Tae Mccallum MD  
#### BMP ####  
[facility]  
[address] [place], [place] [address] [phone]  
: Greg Paredes MD   
   
                      Albumin, % 61 %       Normal     45-65      [facility]  
   
                                        Comment on above:   Performed By: #### P  
THNCA, VD25, PE ####  
[address] [address]  
[phone]  
: Tae Mccallum MD  
#### BMP ####  
[facility]  
[address] [place], [place] [address] [phone]  
: Greg Paredes MD   
   
                      Alpha-1-globulins 0.2 g/dL   Normal     0.1-0.4    [facility]  
   
                                        Comment on above:   Performed By: #### P  
THNCA, VD25, PE ####  
[address] [address]  
[phone]  
: Tae Mccallum MD  
#### BMP ####  
[facility]  
[address] [place], [place] [address] [phone]  
: Greg Paredes MD   
   
                      Alpha-1-globulins,% 2 %        Low        3-6        [facility]  
   
                                        Comment on above:   Performed By: #### P  
THNCA, VD25, PE ####  
[address] [address]  
[phone]  
: Tae Mccallum MD  
#### BMP ####  
[facility]  
[address] [place], [place] 218[address] [phone]  
: Greg Paredes MD   
   
                      Alpha-2-globulins 0.8 g/dL   Normal     0.5-0.9    [facility]  
   
                                        Comment on above:   Performed By: #### P  
THNCA, VD25, PE ####  
[address] [address]  
[phone]  
: Tae Mccallum MD  
#### BMP ####  
[facility]  
[address] [place], [place] 1566[address] [phone]  
: Greg Paredes MD   
   
                      Alpha-2-globulins,% 12 %       Normal     6-13       [facility]  
   
                                        Comment on above:   Performed By: #### P  
THNCA, VD25, PE ####  
[address] 1025308 [phone]  
: Tae Mccallum MD  
#### BMP ####  
[facility]  
[address] [place], [place] 6840[address] [phone]  
: Greg Paredes MD   
   
                      Beta-globulins 0.7 g/dL   Normal     0.5-1.1    [facility]  
   
                                        Comment on above:   Performed By: #### P  
THNCA, VD25, PE ####  
[address] 6660308 [phone]  
: Tae Mccallum MD  
#### BMP ####  
[facility]  
[address] [place], [place] 3478[address] [phone]  
: Greg Paredes MD   
   
                      Beta-globulins,% 10 %       Low        11-19      [facility]  
   
                                        Comment on above:   Performed By: #### P  
THNCA, VD25, PE ####  
[address] 2889708 [phone]  
: Tae Mccallum MD  
#### BMP ####  
[facility]  
[address] [place], [place] 19[address] [phone]  
: Greg Paredes MD   
   
                      Gamma-globulins 1.1 g/dL   Normal     0.5-1.5    [facility]  
   
                                        Comment on above:   Performed By: #### P  
THNCA, VD25, PE ####  
[address] 7[address]2708 [phone]  
: Tae Mccallum MD  
#### BMP ####  
[facility]  
[address] [place], [place] 2095[address] [phone]  
: Greg Paredes MD   
   
                      Gamma-globulins,% 15 %       Normal     9-20       [facility]  
   
                                        Comment on above:   Performed By: #### P  
THNCA, VD25, PE ####  
[place] [address] [place], [place] 4525508 [phone]  
: Tae Mccallum MD  
#### BMP ####  
[facility]  
[address] [place], [place] [address] [phone]  
: Greg Paredes MD   
   
                      Total Prot. Sum 7.1 g/dL   Normal     6.3-8.2    [facility]  
   
                                        Comment on above:   Performed By: #### P  
THNCA, VD25, PE ####  
[address] [address] [phone]  
: Tae Mccallum MD  
#### BMP ####  
[facility]  
[address] [place], [place] [address] [phone]  
: Greg Paredes MD   
   
                      Total Prot. Sum,% 100 %      Normal          [facility]  
   
                                        Comment on above:   Performed By: #### P  
THNCA, VD25, PE ####  
[address] [address]25008 [phone]  
: Tae Mccallum MD  
#### BMP ####  
[facility]  
[address] [place], [place] [address]365[address] [phone]  
: Greg Paredes MD   
   
                                                    Basic Metabolic Panelon 09-0  
   
   
                          Anion gap [Moles/Vol] 12 mmol/L                 9 - 17  
   
mmol/L                                  [facility]  
   
                          Calcium [Mass/Vol] 9.7 mg/dL                 8.6 - 10.  
4   
mg/dL                                   [facility]  
   
                          Chloride [Moles/Vol] 102 mmol/L                98 - 10  
7   
mmol/L                                  [facility]  
   
                          CO2 [Moles/Vol] 22 mmol/L                 20 - 31   
mmol/L                                  [facility]  
   
                          Creatinine [Mass/Vol] 1.01 mg/dL   High         0.5 -   
0.9   
mg/dL                                   [facility]  
   
                          GFR  >60                       60 - PI  
NF   
mL/min                                  [facility]  
   
                                                    GFR Non-   
American            54 mL/min           Low                 60 - PINF   
mL/min                                  [facility]  
   
                                                    GFR/1.73 sq M.predicted   
MDRD (S/P/Bld) [Vol   
rate/Area]                                                      [facility]  
   
                                        Comment on above:   Average GFR for 70 o  
r more years old:  
75 mL/min/1.73sq m  
Chronic Kidney Disease:  
<60 mL/min/1.73sq m  
Kidney failure:  
<15 mL/min/1.73sq m  
  
  
eGFR calculated using average adult body mass. Additional eGFR   
calculator available at:  
  
http://www.[employer].edu/[employer]_crcl_.htm  
  
  
   
   
                          Glucose [Mass/Vol] 130 mg/dL    High         70 - 99   
mg/dL                                   [facility]  
   
                                                    Interpretation and   
review of laboratory   
results         Abnormal                                        [facility]  
   
                          Potassium [Moles/Vol] 4.3 mmol/L                3.7 -   
5.3   
mmol/L                                  [facility]  
   
                          Sodium [Moles/Vol] 136 mmol/L                135 - 144  
   
mmol/L                                  [facility]  
   
                                                    Urea nitrogen (BldV)   
[Mass/Vol]          20 mg/dL                                8 - 23   
mg/dL                                   [facility]  
   
                                                    Urea   
nitrogen/Creatinine   
(Bld) [Mass ratio] 20                              9 - 20          [facility]  
   
                                                    Basic Metabolic Profon    
   
                      (cont.)               Normal                [facility]  
   
                                        Comment on above:   Result Comment: Aver  
age GFR for 70 or more years old:  
75 mL/min/1.73sq m  
Chronic Kidney Disease:  
<60 mL/min/1.73sq m  
Kidney failure:  
<15 mL/min/1.73sq m  
eGFR calculated using average adult body mass. Additional eGFR   
calculator  
available at:  
http://www.[employer].edu/multiple_crcl_.htm   
   
                                                            Performed By: #### P  
SHAR VD25, PE ####  
[employer]  
[address] [place] [address]  
[phone]  
: Tae Mccallum MD  
#### BMP ####  
[facility]  
[address] [place], [place] [address] [phone]  
: Greg Paredes MD   
   
                      Anion gap [Moles/Vol] 12 mmol/L  Normal     9-17       [facility]  
   
                                        Comment on above:   Performed By: #### P  
THNCA, VD25, PE ####  
[address] [id number] [phone]  
: Tae Mccallum MD  
#### BMP ####  
[facility]  
[address] [place], [place] 56228[address] [phone]  
: Greg Paredes MD   
   
                      BUN/CRE Ratio 20         Normal     -       [facility]  
   
                                        Comment on above:   Performed By: #### P  
THNCA, VD25, PE ####  
[address] 3389408 [phone]  
: Tae Mccallum MD  
#### BMP ####  
[facility]  
[address] [place], [place] [address] [phone]  
: Greg Paredes MD   
   
                      Calcium [Mass/Vol] 9.7 mg/dL  Normal     8.6-10.4   [facility]  
   
                                        Comment on above:   Performed By: #### P  
THNCA, VD25, PE ####  
[address] [id number] [phone]  
: Tae Mccallum MD  
#### BMP ####  
[facility]  
[address] [place] [address]  
[phone]  
: Greg Paredes MD   
   
                      Chloride [Moles/Vol] 102 mmol/L Normal          [facility]  
   
                                        Comment on above:   Performed By: #### P  
THNCA, VD25, PE ####  
[address] [address]  
[phone]  
: Tae Mccallum MD  
#### BMP ####  
[facility]  
[address], [place] [address] [phone]  
: Greg Paredes MD   
   
                      CO2 [Moles/Vol] 22 mmol/L  Normal     20-31      [facility]  
   
                                        Comment on above:   Performed By: #### P  
THNCA, VD25, PE ####  
[address] [place], [place] [address] [phone]  
: Tae Mccallum MD  
#### BMP ####  
[facility]  
[address], [place] [address] [phone]  
: Greg Paredes MD   
   
                      Creatinine [Mass/Vol] 1.01 mg/dL High       0.50-0.90  [facility]  
   
                                        Comment on above:   Performed By: #### P  
THNCA, VD25, PE ####  
[address] 4579[address] [phone]  
: Tae Mccallum MD  
#### BMP ####  
[facility]  
[address], [place] [address] [phone]  
: Greg Paredes MD   
   
                      GFR, Amer >60        Normal     >60        [facility]  
   
                                        Comment on above:   Performed By: #### P  
THNCA, VD25, PE ####  
[address] 2989008 [phone]  
: Tae Mccallum MD  
#### BMP ####  
[facility]  
[address] [place] [address]  
[phone]  
: Greg Paredes MD   
   
                      GFR,non African Amer 54 mL/min  Low        >60        [facility]  
   
                                        Comment on above:   Performed By: #### P  
THNCA, VD25, PE ####  
[address] [address]  
[phone]  
: Tae Mccallum MD  
#### BMP ####  
[facility]  
[address], [place] [address] [phone]  
: Greg Paredes MD   
   
                      Glucose [Mass/Vol] 130 mg/dL  High       70-99      [facility]  
   
                                        Comment on above:   Performed By: #### P  
THNCA, VD25, PE ####  
[address] [address]  
[phone]  
: Tae Mccallum MD  
#### BMP ####  
[facility]  
[address], [place] [address] [phone]  
: Greg Paredes MD   
   
                      Potassium [Moles/Vol] 4.3 mmol/L Normal     3.7-5.3    [facility]  
   
                                        Comment on above:   Performed By: #### P  
THNCA, VD25, PE ####  
[address] 5606508 [phone]  
: Tae Mccallum MD  
#### BMP ####  
[facility]  
[address], [place] [address] [phone]  
: Greg Paredes MD   
   
                      Sodium [Moles/Vol] 136 mmol/L Normal     135-144    [facility]  
   
                                        Comment on above:   Performed By: #### P  
THNCA, VD25, PE ####  
[address] [address]  
[phone]  
: Tae Mccallum MD  
#### BMP ####  
[facility]  
[address] [place] [address]  
[phone]  
: Greg Paredes MD   
   
                                                    Urea nitrogen   
[Mass/Vol]      20 mg/dL        Normal          8-23            [facility]  
   
                                        Comment on above:   Performed By: #### P  
THNCA, VD25, PE ####  
[address] [address]  
[phone]  
: Tae Mccallum MD  
#### BMP ####  
[facility]  
[address], [place] [address] [phone]  
: Greg Paredes MD   
   
                                                    PTH, Intacton 2022   
   
                      PTH, Intact 78.3 pg/mL High       14.0-72.0  [facility]  
   
                                        Comment on above:   Result Comment: SAMP  
LES FROM PATIENTS ROUTINELY RECEIVING HIGH  
   
DOSE BIOTIN THERAPY MAY SHOW  
FALSELY DEPRESSED RESULTS. ADDITIONAL INFORMATION MAY BE   
REQUIRED FOR  
DIAGNOSIS.   
   
                                                            Performed By: #### P  
THNCA, VD25, PE ####  
[facility]  
[address] [place] [address]  
[phone]  
: Tae Mccallum MD  
#### BMP ####  
[facility]  
[address] [place], [place] [address] [phone]  
: Greg Paredes MD   
   
                                                    Interpretation and   
review of laboratory   
results         Abnormal                                        [facility]  
   
                          Pth Intact   78.3 pg/mL   High         14 - 72   
pg/mL                                   [facility]  
   
                                        Comment on above:   SAMPLES FROM PATIENT  
S ROUTINELY RECEIVING HIGH DOSE BIOTIN   
THERAPY MAY SHOW FALSELY  
DEPRESSED RESULTS. ADDITIONAL INFORMATION MAY BE REQUIRED FOR   
DIAGNOSIS.  
  
   
   
                                                                  [facility]  
   
                                                    Prot. Electroph, Blon 2022   
   
                      Protein [Mass/Vol] 7.1 g/dL   Normal     6.4-8.3    [facility]  
   
                                        Comment on above:   Performed By: #### P  
THNCA, VD25, PE ####  
[facility]  
[address] [place] [address]  
[phone]  
: Tae Mccallum MD  
#### BMP ####  
[facility]  
[address] [place] [address] [phone]  
: Greg Paredes MD   
   
                                                    Vitamin D 25 Hydroxyon    
   
                          Vit D, 25-Hydroxy 58.1 ng/mL                29.9 - PIN  
F   
ng/mL                                   [facility]  
   
                                        Comment on above:     
Reference Range:  
Vitamin D status  Range  
Deficiency <20 ng/mL  
Mild Deficiency 20-30 ng/mL  
Sufficiency  ng/mL  
Toxicity >100 ng/mL  
  
   
   
                                                                  [facility]  
   
                                                    Vitamin D 25 OHon 2022  
   
   
                      Vitamin D 25 OH 58.1 ng/mL Normal     >29.9      [facility]  
   
                                        Comment on above:   Result Comment:  
Reference Range:  
Vitamin D status Range  
Deficiency <20 ng/mL  
Mild Deficiency 20-30 ng/mL  
Sufficiency  ng/mL  
Toxicity >100 ng/mL   
   
                                                            Performed By: #### P  
SHAR, VD25, PE ####  
[facility]  
[address] [place] [address] [phone]  
: Tae Mccallum MD  
#### BMP ####  
[facility]  
[address] [place]  
[place] [address] [phone]  
: Greg Paredes MD   
   
                                                    Basic Metabolic Panelon    
   
                          Anion gap [Moles/Vol] 8 mmol/L     Low          9 - 17  
   
mmol/L                                  [facility]  
   
                          Calcium [Mass/Vol] 9.9 mg/dL                 8.6 - 10.  
4   
mg/dL                                   [facility]  
   
                          Chloride [Moles/Vol] 103 mmol/L                98 - 10  
7   
mmol/L                                  [facility]  
   
                          CO2 [Moles/Vol] 26 mmol/L                 20 - 31   
mmol/L                                  [facility]  
   
                          Creatinine [Mass/Vol] 1.15 mg/dL   High         0.5 -   
0.9   
mg/dL                                   [facility]  
   
                          GFR  56 mL/min    Low          60 - PI  
NF   
mL/min                                  [facility]  
   
                                                    GFR Non-   
American            46 mL/min           Low                 60 - PINF   
mL/min                                  [facility]  
   
                                                    GFR/1.73 sq M.predicted   
MDRD (S/P/Bld) [Vol   
rate/Area]                                                      [facility]  
   
                                        Comment on above:   Average GFR for 70 o  
r more years old:  
75 mL/min/1.73sq m  
Chronic Kidney Disease:  
<60 mL/min/1.73sq m  
Kidney failure:  
<15 mL/min/1.73sq m  
  
  
eGFR calculated using average adult body mass. Additional eGFR   
calculator available at:  
  
http://www.[employer].com/multiple_crcl_2012.htm  
  
  
   
   
                          Glucose [Mass/Vol] 114 mg/dL    High         70 - 99   
mg/dL                                   [facility]  
   
                                                    Interpretation and   
review of laboratory   
results         Abnormal                                        [facility] [employer]  
   
                          Potassium [Moles/Vol] 4.3 mmol/L                3.7 -   
5.3   
mmol/L                                  [facility]  
   
                          Sodium [Moles/Vol] 137 mmol/L                135 - 144  
   
mmol/L                                  [facility]  
   
                                                    Urea nitrogen (BldV)   
[Mass/Vol]          20 mg/dL                                8 - 23   
mg/dL                                   [facility]  
   
                                                    Urea   
nitrogen/Creatinine   
(Bld) [Mass ratio] 17                              9 - 20          [facility]  
   
                                                    Basic Metabolic Profon    
   
                      (cont.)               Normal                [facility]  
   
                                        Comment on above:   Result Comment: Aver  
age GFR for 70 or more years old:  
75 mL/min/1.73sq m  
Chronic Kidney Disease:  
<60 mL/min/1.73sq m  
Kidney failure:  
<15 mL/min/1.73sq m  
eGFR calculated using average adult body mass. Additional eGFR   
calculator  
available at:  
http://www.[employer].edu/multiple_crcl_2012.htm   
   
                                                            Performed By: #### B  
MP ####  
[facility]  
[address] [place] [address] [phone]  
: Greg Paredes MD   
   
                      Anion gap [Moles/Vol] 8 mmol/L   Low        -       [facility]  
   
                                        Comment on above:   Performed By: #### B  
MP ####  
[facility]  
[address] [place] [address] [phone]  
: Greg Paredes MD   
   
                      BUN/CRE Ratio 17         Normal     -       [facility]  
   
                                        Comment on above:   Performed By: #### B  
MP ####  
[facility]  
[address] [place] [address] [phone]  
: Greg Paredes MD   
   
                      Calcium [Mass/Vol] 9.9 mg/dL  Normal     8.6-10.4   [facility]  
   
                                        Comment on above:   Performed By: #### B  
MP ####  
[facility]  
[address] [place] [address] [phone]  
: Greg Paredes MD   
   
                      Chloride [Moles/Vol] 103 mmol/L Normal          [facility]  
   
                                        Comment on above:   Performed By: #### B  
MP ####  
[facility]  
[address] [place] [address] [phone]  
: Greg Paredes MD   
   
                      CO2 [Moles/Vol] 26 mmol/L  Normal     20-31      [facility]  
   
                                        Comment on above:   Performed By: #### B  
MP ####  
[facility]  
[address], [place] [address] [phone]  
: Greg Paredes MD   
   
                      Creatinine [Mass/Vol] 1.15 mg/dL High       0.50-0.90  [facility]  
   
                                        Comment on above:   Performed By: #### B  
MP ####  
[facility]  
[address], [place] [address] [phone]  
: Greg Paredes MD   
   
                      GFR, Amer 56 mL/min  Low        >60        [facility]  
   
                                        Comment on above:   Performed By: #### B  
MP ####  
[facility]  
[address], [place] [address] [phone]  
: Greg Paredes MD   
   
                      GFR,non African Amer 46 mL/min  Low        >60        [facility]  
   
                                        Comment on above:   Performed By: #### B  
MP ####  
[facility]  
[address], [place] [address] [phone]  
: Greg Paredes MD   
   
                      Glucose [Mass/Vol] 114 mg/dL  High       70-99      [facility]  
   
                                        Comment on above:   Performed By: #### B  
MP ####  
[facility]  
[address], [place] [address] [phone]  
: Greg Paredes MD   
   
                      Potassium [Moles/Vol] 4.3 mmol/L Normal     3.7-5.3    [facility]  
   
                                        Comment on above:   Performed By: #### B  
MP ####  
[facility]  
[address], [place] [address] [phone]  
: Greg Paredes MD   
   
                      Sodium [Moles/Vol] 137 mmol/L Normal     135-144    [facility]  
   
                                        Comment on above:   Performed By: #### B  
MP ####  
[facility]  
[address] [place] [address]  
[phone]  
: Greg Paredes MD   
   
                                                    Urea nitrogen   
[Mass/Vol]      20 mg/dL        Normal          8-23            [facility]  
   
                                        Comment on above:   Performed By: #### B  
 ####  
[facility]  
[address] [place] [place]  
[place] [address]  
[phone]  
: Greg Paredes MD   
   
                                                    Coding Summary.on 2022  
   
   
                      Coding Summary.            Normal                [facility]  
   
                                                    Coding Summary.on 2022  
   
   
                      Coding Summary.            Normal                [facility]  
   
                                                    Auto Diffon 2022   
   
                      Basophils/100 WBC (Bld) 0.5 %      Normal     0.0-2.0    [facility]  
   
                                        Comment on above:   Order Comment: Order  
 Added by Discern Expert.   
   
                                                            Performed By: #### 2  
975181, 1741497, 5337194, 11048542,   
[id number] ####[facility] [address]   
[place] [address]   
   
                                                    Basophils/Leukocytes   
Auto (Bld) [Pure #   
fraction]       0.1 E9/L        Normal          0.0-0.2         [facility]  
   
                                        Comment on above:   Order Comment: Order  
 Added by Discern Expert.   
   
                                                            Performed By: #### 2  
623726, 3486056, 5952752, 53551610,   
[id number] ####[facility] [address]   
[place] [address]   
   
                                                    Eosinophils/100 WBC   
(Bld)           1.8 %           Normal          0.0-8.0         [facility]  
   
                                        Comment on above:   Order Comment: Order  
 Added by Discern Expert.   
   
                                                            Performed By: #### 2  
945364, 8886097, 8160463, 16405694,   
[id number] ####[facility] [address]   
[place] [address]   
   
                                                    Eosinophils/Leukocytes   
Auto (Bld) [Pure #   
fraction]       0.2 E9/L        Normal          0.0-0.5         [facility]  
   
                                        Comment on above:   Order Comment: Order  
 Added by Discern Expert.   
   
                                                            Performed By: #### 2  
964991, 9993619, 4462518, 06510979,   
[id number] ####[facility] [address]   
[place] [address]   
   
                                                    Lymphocytes/100 WBC   
(Bld)           9.1 %           Low             14.0-50.0       [facility]  
   
                                        Comment on above:   Order Comment: Order  
 Added by Discern Expert.   
   
                                                            Performed By: #### 2  
849797, 9725506, 8220708, 83792506,   
[id number] ####[id number]   
[place] [address]   
   
                                                    Lymphocytes/Leukocytes   
Auto (Bld) [Pure #   
fraction]       1.2 E9/L        Normal          1.0-4.0         [facility]  
   
                                        Comment on above:   Order Comment: Order  
 Added by Discern Expert.   
   
                                                            Performed By: #### 2  
774332, 7151484, 4979602, 48398795,   
[id number] ####[id number]   
[place] [address]   
   
                      Monocytes/100 WBC (Bld) 11.3 %     Normal     4.0-14.0   [facility]  
   
                                        Comment on above:   Order Comment: Order  
 Added by Discern Expert.   
   
                                                            Performed By: #### 2  
660921, 1975117, 9409338, 47251224,   
[id number] ####[address]   
[place] [address]   
   
                                                    Monocytes/Leukocytes   
Auto (Bld) [Pure #   
fraction]       1.4 E9/L        High            0.2-1.0         [facility]  
   
                                        Comment on above:   Order Comment: Order  
 Added by Discern Expert.   
   
                                                            Performed By: #### 2  
138503, 7574229, 9964904, 76137917,   
[id number] ####[facility] [address]   
[place] [address]   
   
                                                    Neutrophils/100 WBC   
(Bld)           77.3 %          High            36.0-75.0       [facility]  
   
                                        Comment on above:   Order Comment: Order  
 Added by Discern Expert.   
   
                                                            Performed By: #### 2  
273195, 0301036, 8037902, 52590439,   
[id number] ####[address]   
[place] [address]   
   
                                                    Neutrophils/Leukocytes   
Auto (Bld) [Pure #   
fraction]       9.9 E9/L        High            2.0-7.5         [facility]  
   
                                        Comment on above:   Order Comment: Order  
 Added by Discern Expert.   
   
                                                            Performed By: #### 2  
236922, 8674715, 8484414, 31354605,   
[id number] ####[facility] [address]   
[place] [address]   
   
                                                    BMPon 2022   
   
                      Creatinine [Mass/Vol] 0.9 mg/dL  Normal     0.5-1.3    [facility]  
   
                                        Comment on above:   Performed By: #### 2  
636254, 5216560, 2889607, 53129545,   
[id number] ####[facility] [address]   
[place] [address]   
   
                                                    Urea nitrogen   
[Mass/Vol]      19 mg/dL        Normal          5-21            [facility]  
   
                                        Comment on above:   Performed By: #### 2  
778370, 0061570, 6067292, 42951936,   
[id number] ####[facility] [address]   
[place] [address]   
   
                                                    Urea   
nitrogen/Creatinine   
[Mass ratio]    21 No Units     High            10-20           [facility]  
   
                                        Comment on above:   Performed By: #### 2  
925001, 3918027, 6666098, 40793807,   
[id number] ####[facility] [address]   
[place] [address]   
   
                      Anion gap [Moles/Vol] 15 mmol/L  Normal     6-16       [facility]  
   
                                        Comment on above:   Performed By: #### 2  
132256, 3357922, 5399553, 27766889,   
[id number] ####[facility] [address]   
[place] [address]   
   
                      Calcium [Mass/Vol] 9.9 mg/dL  Normal     8.9-11.1   [facility]  
   
                                        Comment on above:   Performed By: #### 2  
574027, 0086631, 5169815, 44762779,   
[id number] ####[facility] [address]   
[place] [address]   
   
                      Chloride [Moles/Vol] 99 mmol/L  Low        101-111    [facility]  
[facility]  
   
                                        Comment on above:   Performed By: #### 2  
622423, 6715806, 3566967, 41182313,   
[id number] ####[facility] [address]   
[place] [address]   
   
                      CO2 [Moles/Vol] 22 mmol/L  Normal     21-31      [facility]  
   
                                        Comment on above:   Performed By: #### 2  
367523, 1352267, 6687901, 98402866,   
[id number] ####[facility] [address]   
[place] [address]   
   
                      Glucose [Mass/Vol] 101 mg/dL  Normal          [facility]  
   
                                        Comment on above:   Result Comment: If t  
his glucose result represents a fasting   
glucose, interpretation should refer to the following   
reference range: 55-99 mg/dL   
   
                                                            Performed By: #### 2  
892612, 4841157, 5767072, 28000370,   
[id number] ####[facility] [address]   
[place] [address]   
   
                      Potassium [Moles/Vol] 4.5 mmol/L Normal     3.5-5.3    [facility]  
   
                                        Comment on above:   Performed By: #### 2  
731353, 7958989, 0958250, 65072741,   
[id number] ####[facility] [address]   
[place] [address]   
   
                      Sodium [Moles/Vol] 131 mmol/L Low        135-145    [facility]  
   
                                        Comment on above:   Performed By: #### 2  
609005, 7584894, 7084440, 16937833,   
[id number] ####[facility] [address]   
[place] [address]   
   
                                                    CBC w/ Auto Diffon    
   
                                                    Erythrocyte   
distribution width   
(RBC) [Ratio]   13.1 %          Normal          10.9-14.2       [facility]  
   
                                        Comment on above:   Performed By: #### 2  
534286, 8520464, 7458720, 69210510,   
[id number] ####[facility] [address]   
[place] [address]   
   
                                                    Hematocrit (Bld)   
[Volume fraction] 36.1 %          Normal          34.0-46.0       [facility]  
   
                                        Comment on above:   Performed By: #### 2  
876603, 1547052, 2454536, 92558018,   
[id number] ####[facility] [address]   
[place] [address]   
   
                                                    Hemoglobin (Bld)   
[Mass/Vol]      11.8 g/dL       Low             12.0-16.0       [facility]  
   
                                        Comment on above:   Performed By: #### 2  
[id number], [id number], 9687513, [id number],   
[id number] ####[facility] [address] [place] [address]   
   
                                                    MCH (RBC) [Entitic   
mass]           29.4 pg         Normal          27.0-34.0       [facility]  
   
                                        Comment on above:   Performed By: #### 2  
[id number], [id number], [id number], [id number],   
[id number] ####[address] [address]   
   
                      MCHC (RBC) [Mass/Vol] 32.7 g/dL  Normal     31.4-36.0  [facility]  
   
                                        Comment on above:   Performed By: #### 2  
[id number], 1500538, [id number], [id number],   
[id number] ####[address] [address]   
   
                      MCV (RBC) [Entitic vol] 89.7 fL    Normal     80.0-100.0 F  
[facility]  
   
                                        Comment on above:   Performed By: #### 2  
700009, 0677818, 6438847, [id number],   
[id number] ####[facility] [address]   
[place] [address]   
   
                                                    Platelet mean volume   
(Bld) [Entitic vol] 7.5 fL          Normal          6.4-10.8        [facility]  
   
                                        Comment on above:   Performed By: #### 2  
[id number], 8889764, 1166719, [id number],   
[id number] ####[facility] [address] [place] [address]   
   
                      Platelets (Bld) [#/Vol] 340.0 E9/L Normal     150.0-500.0   
[facility]  
   
                                        Comment on above:   Performed By: #### 2  
683811, 9514398, 9701758, [id number],   
[id number] ####[facility] [address]   
[place] [address]   
   
                      RBC (Bld) [#/Vol] 4.0 E12/L  Low        4.3-5.9    [facility]  
   
                                        Comment on above:   Performed By: #### 2  
463179, 8037295, 8855019, [id number],   
[id number] ####[facility] [address]   
[place] [address]   
   
                                                    WBC corrected for nucl   
RBC Auto (Bld) [#/Vol] 12.8 E9/L       High            4.0-11.0        [facility]  
   
                                        Comment on above:   Performed By: #### 2  
065187, 8120062, 3702954, [id number],   
[id number] ####[facility] [address]   
[place] [address]   
   
                                                    CHEMISTRYOrdered By: SYSTEM   
SYSTEM on 2022   
   
                          Anion gap [Moles/Vol] 15 mmol/L    Normal       6 - 16  
   
mEq/L                                   FT   
[facility]  
   
                          Calcium [Mass/Vol] 9.9 mg/dL    Normal       8.9 - 11.  
1   
mg/dL                                   FT   
[facility]  
   
                          Chloride [Moles/Vol] 99 mmol/L    Low          101 - 1  
11   
mmol/L                                  FT   
[facility]  
   
                          CO2 [Moles/Vol] 22 mmol/L    Normal       21 - 31   
mmol/L                                  FT   
[facility]  
   
                          Creatinine [Mass/Vol] 0.9 mg/dL    Normal       0.5 -   
1.3   
mg/dL                                   FT   
[facility]  
   
                                                    GFR/1.73 sq M.predicted   
among blacks MDRD   
(S/P/Bld) [Vol   
rate/Area]          mL/min/1.73 m2      Normal              >=59mL/min/  
1.73 m2                                 FT Chem S  
   
                                                    GFR/1.73 sq M.predicted   
among non-blacks MDRD   
(S/P/Bld) [Vol   
rate/Area]          mL/min/1.73 m2      Normal              >=59mL/min/  
1.73 m2                                 [facility]  
   
                          Glucose [Mass/Vol] 101 mg/dL    Normal       55 - 199   
mg/dL                                   [facility]   
Remisol  
   
                          Potassium [Moles/Vol] 4.5 mmol/L   Normal       3.5 -   
5.3   
mmol/L                                  [facility]   
Remisol  
   
                          Sodium [Moles/Vol] 131 mmol/L   Low          135 - 145  
   
mmol/L                                  [facility]   
Remisol  
   
                                                    Troponin I.cardiac   
[Mass/Vol]          12.30 pg/mL         Normal              10.10 -   
27.10 pg/mL                             [facility]   
Remisol  
   
                                                    Urea nitrogen   
[Mass/Vol]          19 mg/dL            Normal              5 - 21   
mg/dL                                   [facility]   
Remisol  
   
                                                    Urea   
nitrogen/Creatinine   
[Mass ratio]    21 mg/mg        High            10 - 20         [facility]   
Remisol  
   
                                                    Consent for Treatmenton    
   
                                        Consent for Treatment [id number]  
[id number]  
[id number]:  
127                 Normal                                  [facility]  
   
                                                    ED Clinical Summaryon 2022   
   
                      ED Clinical Summary            Normal                [facility]  
   
                                                    ED Note-Nursingon 2022  
   
   
                                        ED Note-Nursing     pt came up to nurses  
   
station and stated she   
wanted to go home. pt   
was asked to stay to   
sign paperwork and pt   
ignored the nurse and   
walked out of the ED   
with her . ED    
made aware.         Normal                                  [facility]  
   
                                        ED Note-Nursing     pt arrived to ed fro  
m   
home with her    
c/o fever of 99.5F and   
persistent cough since   
tuesday. pt denies any   
SOB or CP. pt has been   
taking robitussin and   
musinex at home. pt has   
a non productive cough   
with clear lung sounds   
throughout. VSS     Normal                                  [facility]  
   
                                                    ED Note-Physicianon 20   
   
                      ED Note-Physician            Normal                [facility]  
   
                                        Comment on above:   Result Comment: Elec  
tronically Signed By: Jose TREJO, Trae RIVERA\.br\Date and Time Signed: 22 06:46 EDT   
   
                                                    ED Patient Education Noteon   
2022   
   
                                                    ED Patient Education   
Note                            Normal                          [facility]  
   
                                                    ED Patient Summaryon   
022   
   
                      ED Patient Summary            Normal                [facility]  
   
                                                    HEMATOLOGYOrdered By: SYSTEM  
 SYSTEM on 2022   
   
                      Basophils/100 WBC (Bld) 0.5 %      Normal     0.0 - 2.0 %   
[facility]   
HemeAutoSS  
   
                                                    Basophils/Leukocytes   
Auto (Bld) [Pure #   
fraction]           0.1 E9/L            Normal              0.0 - 0.2   
E9/L                                    [facility]   
HemeAutoSS  
   
                                                    Eosinophils/100 WBC   
(Bld)           1.8 %           Normal          0.0 - 8.0 %     [facility]   
HemeAutoSS  
   
                                                    Eosinophils/Leukocytes   
Auto (Bld) [Pure #   
fraction]           0.2 E9/L            Normal              0.0 - 0.5   
E9/L                                    [facility]   
HemeAutoSS  
   
                                                    Lymphocytes/100 WBC   
(Bld)               9.1 %               Low                 14.0 - 50.0   
%                                       [facility]   
HemeAutoSS  
   
                                                    Lymphocytes/Leukocytes   
Auto (Bld) [Pure #   
fraction]           1.2 E9/L            Normal              1.0 - 4.0   
E9/L                                    [facility]   
Heme[facility]SS  
   
                          Monocytes/100 WBC (Bld) 11.3 %       Normal       4.0   
- 14.0   
%                                       [facility]   
HemeAutoSS  
   
                                                    Monocytes/Leukocytes   
Auto (Bld) [Pure #   
fraction]           1.4 E9/L            High                0.2 - 1.0   
E9/L                                    [facility]   
[facility]eme[facility]  
   
                                                    Neutrophils/100 WBC   
(Bld)               77.3 %              High                36.0 - 75.0   
%                                       [facility]   
HemeAutoSS  
   
                                                    Neutrophils/Leukocytes   
Auto (Bld) [Pure #   
fraction]           9.9 E9/L            High                2.0 - 7.5   
E9/L                                    [facility]   
[facility]  
   
                                                    HEMATOLOGYOrdered By: Papo Valdes on 2022   
   
                                                    Erythrocyte   
distribution width   
(RBC) [Ratio]       13.1 %              Normal              10.9 - 14.2   
%                                       [facility]   
HemeAutoSS  
   
                                                    Hematocrit (Bld)   
[Volume fraction]   36.1 %              Normal              34.0 - 46.0   
%                                       [facility]   
Heme[facility]SS  
   
                                                    Hemoglobin (Bld)   
[Mass/Vol]          11.8 g/dL           Low                 12.0 - 16.0   
gm/dL                                   [facility]   
HemeAutoSS  
   
                                                    MCH (RBC) [Entitic   
mass]               29.4 pg             Normal              27.0 - 34.0   
pg                                      [facility]   
HemeAutoSS  
   
                          MCHC (RBC) [Mass/Vol] 32.7 g/dL    Normal       31.4 -  
 36.0   
gm/dL                                   [facility]   
HemeAutoSS  
   
                          MCV (RBC) [Entitic vol] 89.7 fL      Normal       80.0  
 -   
100.0 fL                                [facility]   
HemeAutoSS  
   
                                                    Platelet mean volume   
(Bld) [Entitic vol] 7.5 fL              Normal              6.4 - 10.8   
fL                                      [facility]   
HemeAutoSS  
   
                          Platelets (Bld) [#/Vol] 340.0 E9/L   Normal       150.  
0 -   
500.0 E9/L                              [facility]   
HemeAutoSS  
   
                          RBC (Bld) [#/Vol] 4.0 E12/L    Low          4.3 - 5.9   
E12/L                                   [facility]   
HemeAutoSS  
   
                                                    WBC corrected for nucl   
RBC Auto (Bld) [#/Vol] 12.8 E9/L           High                4.0 - 11.0   
E9/L                                    [facility]   
HemeAutoSS  
   
                                                    Troponin 0 Hr.on 2022   
   
                                                    Troponin I.cardiac   
[Mass/Vol]      12.30 pg/mL     Normal          10.10-27.10     [facility]  
   
                                        Comment on above:   Result Comment: The   
95% CI (Confidence Interval) PPV (Positive  
   
Predictive Value) for myocardial infarction in females is 38   
pg/mL, in males 51 pg/mL. The results should be used in   
conjunction with clinical conditions of myocardial   
infarction.(Access High Sensitivity Troponin I Instructions   
For Use, [employer], 2018)   
   
                                                            Performed By: #### 2  
[id number], [id number], [id number], [id number],   
[id number] ####[facility] [address]   
[place] [address]   
   
                                                    XR Chest Single Viewon    
   
                      XR Chest Single View            Normal                [facility]  
   
                                                    eGFRon 2022   
   
                                                    GFR/1.73 sq M.predicted   
among blacks MDRD   
(S/P/Bld) [Vol   
rate/Area]      mL/min/{1.73_m2} Normal          >=59            [facility]  
   
                                        Comment on above:   Order Comment: Order  
 added by Discern Expert.   
   
                                                            Result Comment: eGFR  
 is race adjusted. AA=.   
   
                                                            Performed By: #### 2  
[id number], 4089954, 2804974, [id number],   
[id number] ####[facility] [address]   
[place] [address]   
   
                                                    GFR/1.73 sq M.predicted   
among non-blacks MDRD   
(S/P/Bld) [Vol   
rate/Area]      mL/min/{1.73_m2} Normal          >=59            [facility]  
   
                                        Comment on above:   Order Comment: Order  
 added by Discern Expert.   
   
                                                            Result Comment:   
deion kidney disease could be indicated at   
eGFR's of less than 60 mL/min/1.73m2. Kidney failure is   
indicated at less than 15 mL/min/1.73m2.   
   
                                                            Performed By: #### 2  
[id number], 5526342, [id number], [id number],   
[id number] ####[facility] [address]   
[place] [address]   
   
                                                    Ambulatory Visit Summaryon 0  
2022   
   
                                                    Ambulatory Visit   
Summary                         Normal                          [facility]  
   
                                                    Consent for Treatmenton 07-1  
3-2022   
   
                                        Consent for Treatment [id number]  
[id number]  
[id number]  
:127                Normal                                  [facility]  
   
                                                    Family Medicine Office/Clini  
c Noteon 2022   
   
                                                    Family Medicine   
Office/Clinic Note                 Normal                          [facility]  
   
                                        Comment on above:   Result Comment: Elec  
tronically Signed By: MADHAVI CISNEROS, Consuelo CAMARA\anthony\Date and Time Signed: 22 14:31 EDT   
   
                                                    MICRO OTHER TESTSOrdered By:  
 Randy Delarosa on 2022   
   
                                        Rapid COV Int NEG Ctl Pass  
(22 2:56 PM)   Normal                                  [facility]   
SS  
   
                                        Rapid COV Int POS Ctl Pass  
(22 2:56 PM)   Normal                                  [facility]   
SS  
   
                                                    SARS-CoV-2 (COVID-19)   
RNA JOSÉ ANTONIO+probe Ql (Unsp   
spec)                                   Not Detected  
(22 2:56 PM)         Normal                    Not   
Detected                                [facility]   
SS  
   
                                                    Patient Educationon 20   
   
                      Patient Education            Normal                [facility]  
   
                                                    Rapid COVID Antigen ([facility])on  
 2022   
   
                      Rapid COV Int NEG Ctl Pass       Normal                [facility]  
   
                                        Comment on above:   Performed By: #### 2  
[id number] ####[facility]   
[id number] [place] [address]   
   
                      Rapid COV Int POS Ctl Pass       Normal                [facility]  
   
                                        Comment on above:   Performed By: #### 2  
[id number] ####[facility]   
[address] [place] [address]   
   
                                                    SARS-CoV-2 (COVID-19)   
RNA JOSÉ ANTONIO+probe Ql (Unsp   
spec)               Not detected        Normal              Not   
Detected                                [facility]  
   
                                        Comment on above:   Result Comment: The   
[employer]? System for Rapid Detection of   
SARS-CoV-2 is a chromatographic digital immunoassay intended   
for the direct and qualitative detection of SARS-CoV-2   
nucleocapsid antigens in nasal swabs from individuals who are   
suspected of COVID-19 by their healthcare provider within the   
first five days of the onset of symptoms. Negative results   
should be treated as presumptive, do not rule out SARS-CoV-2   
infection and should not be used as the sole basis for   
treatment or patient management decisions, including infection   
control decisions. Negative results should be considered in   
the context of a patient?s recent exposures, history and the   
presence of clinical signs and symptoms consistent with   
COVID-19, and confirmed with a molecular assay, if necessary,   
for patient management. For in vitro diagnostic use. In the   
[place], only for use under an Emergency Use Authorization. In the   
[place], this test has not been FDA cleared or approved; this test   
has been authorized by FDA under an EUA for use by authorized   
laboratories; use by laboratories certified under the CLIA, 42   
U.S.C. ?263a, that meet requirements to perform moderate,   
high, or waived complexity tests and at the Point of Care   
(POC), i.e., in patient care settings operating under a CLIA   
Certificate of Waiver, Certificate of Compliance, or   
Certificate of Accreditation.This test has been authorized   
only for the detection of proteins from SARS-CoV-2, not for   
any other viruses or pathogens; and, in the [place], this test is   
only authorized for the duration of the declaration that   
circumstances exist justifying the authorization of emergency   
use of in vitro diagnostics for detection and/or diagnosis of   
the virus that causes COVID-19 under Section 564(b)(1) of the   
Act, 21 U.S.C. ? 360bbb-3(b)(1), unless the authorization is   
terminated or revoked sooner.   
   
                                                            Performed By: #### 2  
[id number] ####[facility]   
[id number] [place] [address]   
   
                      Employed in Healthcare NO         Normal                Fi  
[facility]  
   
                                        Comment on above:   Performed By: #### 2  
[id number] ####[facility]   
[id number] [facility] [place] [address]   
   
                      First Test Unknown    Normal                [facility]  
   
                                        Comment on above:   Performed By: #### 2  
[id number] ####[facility]   
[address] [facility] [place] [address]   
   
                      Hospitalized? NO         Normal                [facility]  
   
                                        Comment on above:   Performed By: #### 2  
[id number] ####[facility]   
[id number] [facility] [place] [address]   
   
                      ICU        NO         Normal                [facility]  
   
                                        Comment on above:   Performed By: #### 2  
[id number] ####[facility]   
[address] [facility] [place] [address]   
   
                      Pregnant?  NO         Normal                [facility]  
   
                                        Comment on above:   Performed By: #### 2  
[id number] ####[facility]   
[address] [facility] [place] [address]   
   
                                                    [facility]   
Care Setting    NO              Normal                          [facility]  
   
                                        Comment on above:   Performed By: #### 2  
[id number] ####[facility]   
[address] [facility] [place] [address]   
   
                                                    Symptomatic as defined   
by CDC          YES             Normal                          [facility]  
   
                                        Comment on above:   Performed By: #### 2  
[id number] ####[facility]   
[address] [facility] [place] [address]   
   
                                                    BUNon 2022   
   
                                                    Urea nitrogen   
[Mass/Vol]      24.0 mg/dL      Critically high 7.0-18.0        [facility]  
   
                                        Comment on above:   Performed By: #### B  
UN, CREA ####  
[facility]  
[address] [place] [address]  
Dr. Jayson Jennings   
   
                                                    CREATININEon 2022   
   
                      Creatinine [Mass/Vol] 1.15 mg/dL Critically high 0.52-1.04  
  [facility]  
   
                                        Comment on above:   Performed By: #### B  
UN, CREA ####  
[facility]  
[address] [address]  
Dr. Jayson Jennnigs   
   
                      EGFR-AF AMERICAN 56 mL/min/1.73m2 Critically low >=60       
  The   
[facility]  
   
                                        Comment on above:   Performed By: #### B  
UN, CREA ####  
[facility]  
[address] [address]  
Dr. Jayson Jennings   
   
                      EGFR-NON AF AMERICAN 46 mL/min/1.73m2 Critically low >=60   
      [facility]  
   
                                        Comment on above:   Performed By: #### B  
MADDY HILTON ####  
[facility]  
[address] [address]  
Dr. Jayson Jennings   
   
                                                    CT LSPINE WO W CONon   
022   
   
                                        CT LSPINE WO W CON  EXAMINATION: CT TSPI  
NE   
WO W CON, CT LSPINE WO W   
CON  
HISTORY: Secondary   
malignant neoplasm of   
bone  
COMPARISON: No relevant   
comparison available.  
FINDINGS:  
BONES: T5-6: Prominent   
sclerosis and anterior   
margin of the contiguous   
endplates  
suspected secondary to   
bone-on-bone contact and   
degenerative changes.  
T7: Complete loss of   
height and destruction   
with anterior and   
posterior  
displacement of the   
walls resulting in   
marked central canal   
narrowing.  
T8, T9, T11: Mild   
compression fractures   
suspected to be chronic.  
L2, 3, 4, 5: Small areas   
of sonolucency which may   
represent metastasis. A  
larger 17 mm area within   
L3 may represent a   
hemangioma.  
Incidental Schmorl's   
nodes at multiple levels   
projecting into the   
inferior  
endplates of thoracic   
and lumbar vertebral   
bodies. Multilevel   
mild-moderate  
degenerative facet   
arthropathy. Moderate   
left convex curvature of   
the lumbar  
spine.  
DISC SPACES: Marked   
narrowing C6-7, T7-8,   
L2-3, L3-4, L5-S1.   
Multilevel moderate  
and mild narrowing.  
PARASPINOUS: Right renal   
cyst, nonspecific but   
favoring benign   
etiology.  
OTHER: Negative.  
IMPRESSION:  
1. T7 marked compression   
fracture with complete   
loss of height; post   
traumatic  
versus pathologic.   
Retropulsion of the   
posterior wall causing   
marked central  
canal narrowing.  
2. Several areas within   
L2-L5 vertebral bodies   
suspicious for   
metastatic  
disease. Consider   
nuclear medicine whole   
body bone scan for   
evaluation of all  
skeletal structures.  
3. Multilevel marked   
degenerative disc   
disease, degenerative   
facet arthropathy,  
and what is suspected to   
be mild, old compression   
fractures at multiple   
levels.  
Electronically   
authenticated by: JASMIN LINDO Date: 2022   
10:49               Normal                                  [facility]  
   
                                                    ANES POSTPROC EVALon   
021   
   
                                        ANES POSTPROC EVAL  HNO ID: [id number]  
Author: Linda Rushing MD  
Service: Anesthesiology  
Author Type:   
Anesthesiologist  
Type: Anesthesia   
Postprocedure Evaluation  
Filed: 2021 11:29 AM  
Note Text:  
POST ANESTHESIA   
EVALUATION NOTE  
: 1950  
Procedure Summary  
Date: 21 Room /   
Location: [facility]O 01 /   
[facility]  
Anesthesia Start: 1005   
Anesthesia Stop: 1015  
Procedure: EGD (N/A   
Throat) Diagnosis:   
Dysphagia, unspecified   
type  
Surgeons: Roberto Carlos Calderon MD   
Responsible Provider:   
Linda Rushing MD  
Anesthesia Type: MAC ASA   
Status: 3  
Anesthesia Type: MAC  
Last vitals  
Vitals Value Taken Time  
/69 21 1040  
Temp 36.9 ?C (98.4 ?F)   
21 1017  
Pulse 64 21 1040  
Resp 16 21 1040  
SpO2 95 % 21 1040  
Post Anesthesia Patient   
Status  
Patient Evaluation:   
bedside.  
Anticipated Disposition:   
phase 2 then home.  
Neurological Status:   
aware and responsive.  
Pulmonary Status:   
breathing comfortably on   
room air  
Airway Control: returned   
to baseline unsupported.  
Cardiovascular Status:   
stable.  
Pain Management:   
clinically adequate  
- multimodal analgesia   
pain management approach  
Postoperative Hydration:   
acceptable.  
Intraoperative Events:  
no significant   
anesthesia events  
Recommendation: continue   
current plan of care and   
further care per  
PACU/ICU/floor team.  
No complications   
documented.  
SIGNATURE: Linda Rushing MD PATIENT NAME: Ping Chavez  
DATE: 2021   
MRN: [id number]  
TIME: 11:29 AM CSN:   
[id number]           [facility]  
   
                                                    ANES PRE-OPon 2021   
   
                                        ANES PRE-OP         HNO ID: [id number]  
Author: Linda Rushing MD  
Service: Anesthesiology  
Author Type:   
Anesthesiologist  
Type: Anesthesia   
Preprocedure Evaluation  
Filed: 2021 10:09 AM  
Note Text:  
ANESTHESIOLOGY DAY OF   
SURGERY NOTE  
: 1950  
Procedure(s) (LRB):  
EGD (N/A)  
Surgeon(s):  
Roberto Carlos Calderon MD  
Estimated body mass   
index is 34.19 kg/m? as   
calculated from the   
following:  
Height as of 21: 174   
cm (5' 8.5 ).  
Weight as of 21:   
103.5 kg (228 lb 3.2   
oz).  
Most recent hematocrit   
and potassium results:  
Potassium 4.8 2021  
Relevant Problems  
CARDIO  
(+) CHF (congestive   
heart failure) (HCC)  
ENDO  
(+) Hypothyroidism  
Other  
(+) Chronic gout of   
multiple sites  
I - PHYSICAL EVALUATION  
AIRWAY  
Patient intubated: No.  
Tracheostomy tube not   
present  
Mallampati: II.  
TM distance: >3 FB.  
Neck ROM: full ROM   
without neurological   
symptoms.  
Mouth opening: adequate.  
Short neck: no.  
Thick neck: no  
DENTAL  
Normal dental   
observations.  
Dental findings: teeth   
intact.  
Additional exam   
findings: no  
II - ANESTHESIA PLAN  
ASA Score: 3  
Anesthetic Plan: MAC  
NPO Status: adequate  
Monitoring plan:   
Standard ASA.   
Postoperative analgesic   
plan: parenteral or  
oral opioids and   
multimodal analgesia.  
Anesthetic Risks,   
Benefits, Alternatives,   
Personnel Discussed.   
Consent  
obtained from:   
patient.Patient /   
Surrogate agrees to   
blood products: blood  
products not planned  
DNR status not reviewed   
with patient and/or   
family prior to surgery.  
Significant changes in   
the patient condition   
since the History and  
Physical, not otherwise   
documented in primary   
service progress note:   
no.  
Potential Anesthesia   
issues that may suggest   
increased risk of  
complications or   
contraindication to   
planned procedure: none.  
Vitals Value Taken Time  
/89 21 0950  
Pulse  
Resp 16 21 0950  
Temp 35.7 ?C (96.3 ?F)   
21 0950  
SpO2 99 % 21 0950  
Facility-Administered   
Medications as of   
2021  
Medication Dose Route   
Frequency  
- NaCl 0.9% iv infusion   
50 mL/hr INTRAVENOUS   
CONTINUOUS  
Outpatient Medications   
as of 2021  
Medication Sig  
- Omeprazole Magnesium   
(PRILOSEC OTC) 20 mg   
tablet Take 1 tablet by   
mouth  
once daily.  
- acetaminophen   
(TYLENOL) 325 mg tablet   
Take 650 mg by mouth   
every 6 hours  
as needed.  
- allopurinol (ZYLOPRIM)   
100 mg tablet TAKE 1   
TABLET BY MOUTH ONCE   
DAILY  
FOR 90 DAYS  
- carvedilol (COREG)   
6.25 mg tablet Take 6.25   
mg by mouth twice daily   
with  
meals.  
- cholecalciferol   
(VITAMIN D3) 50 mcg   
(2,000 unit) tablet Take   
2,000 Units  
by mouth once daily.  
- dorzolamide-timolol   
(COSOPT) 22.3-6.8 mg/mL   
ophthalmic solution Use   
1  
Drop in both eyes twice   
daily.  
- furosemide (LASIX) 40   
mg tablet Take 10 mg by   
mouth as needed.  
- levothyroxine   
(SYNTHROID) 50 mcg   
tablet Take 50 mcg by   
mouth every  
morning. Take On an   
Empty Stomach  
- loratadine (CLARITIN)   
10 mg tablet Take 10 mg   
by mouth at bedtime as  
needed.  
- losartan (COZAAR) 25   
mg tablet TAKE 1 TABLET   
BY MOUTH ONCE DAILY FOR   
90  
DAYS  
- Magnesium Oxide 500 mg   
tab Take 500 mg by mouth   
once daily.  
- therapeutic   
multivitamin-minerals   
(THERA-M PLUS) 9 mg   
iron-400 mcg  
tablet Take 1 tablet by   
mouth once daily.  
- Lactobacillus   
acidophilus (PROBIOTIC   
ORAL) Take by mouth.  
- flaxseed oil (OMEGA 3   
ORAL) Take by mouth.  
- lactase (ULTRA DAIRY   
DIGESTIVE ORAL) Take by   
mouth.  
- fluvoxaMINE (LUVOX)   
100 mg tablet Take 100   
mg by mouth twice daily.  
- rivaroxaban (XARELTO)   
20 mg tablet TAKE 1   
TABLET BY MOUTH ONCE   
DAILY  
WITH SUPPER  
- simvastatin (ZOCOR) 40   
mg tablet Take 40 mg by   
mouth daily at bedtime.  
- spironolactone   
(ALDACTONE) 25 mg tablet   
TAKE 1 2 (ONE HALF)   
TABLET BY  
MOUTH ONCE DAILY FOR 90   
DAYS  
I have interviewed and   
examined the patient. I   
have reviewed the   
medical  
record and/or the   
pre-anesthesia   
evaluation, pertinent   
labs, and test  
results.  
This contains updated   
information obtained   
within 48 hours of  
Surgery/Procedure.  
SIGNATURE: Linda Rushing MD PATIENT NAME: Ping Chavez  
DATE: 2021   
MRN: [id number]  
TIME: 10:09 AM CSN:   
[id number]           Normal                                  [facility]  
   
                                                    HISTORY PHYSICALon    
   
                                        HISTORY PHYSICAL    HNO ID: [id number]  
Author: Roberto Carlos Calderon MD  
Service: General Surgery  
Author Type: Physician  
Type: HANDP  
Filed: 2021 9:13 AM  
Note Text:  
Here for EGD  
PE:  
HEENT: PERRLA  
Neck: supple  
Chest: Clear  
Heart:RRR  
Abdomen: Benign  
Neuro: wnl  
Back, spine,   
extremities: wnl    Normal                                  [facility]  
   
                                                    Basic Metabolic Panlon    
   
                      Anion gap [Moles/Vol] 10 mmol/L  Normal     9-18       [facility]  
   
                      Calcium [Mass/Vol] 10.2 mg/dL Normal     8.5-10.2   [facility]  
   
                      Chloride [Moles/Vol] 103 mmol/L Normal          [facility]  
   
                      CO2 [Moles/Vol] 25 mmol/L  Normal     22-30      [facility]  
   
                      Creatinine [Mass/Vol] 0.88 mg/dL Normal     0.58-0.96  [facility]  
   
                      eGFR- Amer. >60        Normal                [facility]  
   
                      eGFR-All Other Races >60        Normal                [facility]  
[facility]  
   
                                        Comment on above:   Result Comment: eGFR  
 (Estimated GFR) Units of measure:   
mL/min/1.73 meters squared  
eGFR is derived from the reexpressed MDRD Study equation using   
the following parameters: serum creatinine, age, gender and   
race. The creatinine assay has been calibrated to be traceable   
to IDMS.  
An eGFR <60 mL/min/1.73m2 for >3 months is consistent with   
chronic kidney disease. Refer to KDOQI guidelines for clinical   
interpretation.  
In patients with unstable renal function, e.g. those with   
acute kidney injury, the eGFR may not accurately reflect   
actual GFR.   
   
                      Glucose [Mass/Vol] 85 mg/dL   Normal     74-99      [facility]  
   
                                        Comment on above:   Result Comment: The   
American Diabetes Association (ADA)   
provides guidance for cutoff values for fasting glucose and   
random glucose. The ADA defines fasting as no caloric intake   
for at least 8 hours. Fasting plasma glucose results between   
100 to 125 mg/dL indicate increased risk for diabetes   
(prediabetes).  
Fasting plasma glucose results greater than or equal to 126   
mg/dL meet the criteria for diagnosis of diabetes. In the   
absence of unequivocal hyperglycemia, results should be   
confirmed by repeat testing. In a patient with classic   
symptoms of hyperglycemia or hyperglycemic crisis, random   
plasma glucose results greater than or equal to 200 mg/dL meet   
the criteria for diagnosis of diabetes.  
Reference: Standards of Medical Care in Diabetes 2016,   
American Diabetes Association. Diabetes Care. 2016.39(Suppl   
1).   
   
                      Potassium [Moles/Vol] 4.8 mmol/L Normal     3.7-5.1    [facility]  
   
                      Sodium [Moles/Vol] 138 mmol/L Normal     136-144    [facility]  
   
                                                    Urea nitrogen   
[Mass/Vol]      24 mg/dL        High            7-21            [facility]  
   
                                                    HISTORY PHYSICALon    
   
                                        HISTORY PHYSICAL    HNO ID: [id number]  
Author: GERRI Martinez.CNS  
Service: ?  
Author Type: Nurse   
Specialist  
Type: HANDP  
Filed: 2021 12:21 PM  
Note Text:  
HISTORY AND PHYSICAL   
EXAMINATION  
SERVICE DATE: 2021  
SERVICE TIME: 12:19 PM  
PRIMARY CARE PHYSICIAN:   
Alexander Guerra MD  
REASON FOR VISIT: Ping Chavez is a 71 year old   
female who is scheduled  
for : EGD at the request   
of Dr. Roberto Carlos Calderon for consultation.  
My final recommendation   
will be communicated   
back to the requesting  
physician by way of   
shared medical record or   
letter.  
Subjective  
The patient has the   
following:  
ACTIVE PROBLEM LIST  
Primary Osteoarthritis   
of Both Knees  
Dysphagia  
Chf (Congestive Heart   
Failure) (Hcc)  
Hypothyroidism  
Neuropathy  
Icd (Implantable   
Cardioverter-Defibrillat  
or) in Place  
Chronic Gout of Multiple   
Sites  
Bilateral Malignant   
Neoplasm of Overlapping   
Sites of Breast in   
Female  
(Hcc)  
Other Hyperlipidemia  
Nonischemic Congestive   
Cardiomyopathy (Hcc)  
CHIEF COMPLAINT: Patient   
stated  I have   
difficulty with   
swallowing food so  
will have EGD   
HPI: H/O 71 year old   
female stated she has   
had difficulty with   
swallowing  
food. She has had   
occasional N/V. No mid   
abdominal pain.   
Consulted Dr. Calderon. After exam, Dx   
with Dysphagia   
Unspecified Type.  
REVIEW OF SYSTEMS:  
General: BMI 34.1  
Neurological: H/O   
neuropathy has tingling   
both hands occasionaly.   
No  
history of TIA's,   
stroke, CNS tumor,   
impaired sensorium,   
hemiplegia,  
paraplegia or   
quadraplegia. No   
neurological symptoms or   
problems.  
Respiratory: No history   
of current cough or   
dyspnea, or pneumonia in   
the  
past 6 weeks. No history   
of respiratory/pulmonary   
symptoms or problems.  
Cardiovascular: H/O   
Nonischemic Congestive   
Cardiomyopathy  
Positive for: AICD/PPM   
([employer]   
placed originally 2013   
recent  
check scanned 21),   
CHF (in the past ) and   
hyperlipidemia (takes  
med)  
GI: Recent dysphagia  
: H/O Gout  
GYN: Negative for   
abnormal vaginal   
bleeding, abnormal   
vaginal discharge.  
Endocrine:  
Positive for:   
hypothyroidism (takes   
med daily).  
Hematology: No history   
of bleeding or clotting   
disorder. Patient is not  
taking anti-coagulation   
or platelet medications.   
No history of  
hematological symptoms   
or problems.  
Oncology: H/O Bilateral   
Breast CA had   
rigttMastectomy 1998 had   
radiation  
and chemotherapy  
Psych: No history of   
psychiatric symptoms or   
problems.  
Musculoskeletal:  
Positive for: joint pain   
(generalized ,both   
knees,neck).  
Skin: Negative for   
lesions, rash and   
itching.  
PAST MEDICAL HISTORY  
Diagnosis Date  
- Bilateral malignant   
neoplasm of overlapping   
sites of breast in   
female  
(HCC)  
right  
- CHF (congestive heart   
failure) (HCC)  
- Chronic gout of   
multiple sites  
- Dysphagia  
- History of bilateral   
breast cancer  
- Hypothyroidism  
- ICD (implantable   
cardioverter-defibrillat  
or) in place   
[employer]  
- Neuropathy  
- Nonischemic congestive   
cardiomyopathy (HCC)  
- Other hyperlipidemia  
PAST SURGICAL HISTORY  
Procedure Laterality   
Date  
- CHOLECYSTECTOMY  
- COLONOSCOPY  
- EGD  
- FOOT SURGERY HX Left  
some excess bones   
removed  
- KNEE ARTHROSCOPY Right  
- MASTECTOMY HX Right  
FAMILY HISTORY  
Problem Relation Age of   
Onset  
- Diabetes Mother  
- Hypertension Mother  
- other (MI) Father  
- Cancer Sister  
Social History  
Tobacco Use  
- Smoking status: Never   
Smoker  
- Smokeless tobacco:   
Never Used  
Substance Use Topics  
- Alcohol use: Yes  
Comment: OCC  
- Drug use: Never  
Prior to Admission   
medications as of 21   
1025  
Medication Sig Last Dose   
Taking  
esomeprazole (NEXIUM   
24HR) 20 mg capsule Take   
20 mg by mouth DAILY (6   
AM).  
Yes  
acetaminophen (TYLENOL)   
325 mg tablet Take 650   
mg by mouth every 6   
hours  
as needed. Yes  
allopurinol (ZYLOPRIM)   
100 mg tablet TAKE 1   
TABLET BY MOUTH ONCE   
DAILY FOR  
90 DAYS Yes  
carvedilol (COREG) 6.25   
mg tablet Take 6.25 mg   
by mouth twice daily   
with  
meals. Yes  
dorzolamide-timolol   
(COSOPT) 22.3-6.8 mg/mL   
ophthalmic solution Use   
1 Drop  
in both eyes twice   
daily.  
Yes  
furosemide (LASIX) 40 mg   
tablet Take 10 mg by   
mouth as needed.  
Yes  
levothyroxine   
(SYNTHROID) 50 mcg   
tablet Take 50 mcg by   
mouth every  
morning. Take On an   
Empty Stomach Yes  
loratadine (CLARITIN) 10   
mg tablet Take 10 mg by   
mouth at bedtime as  
needed. Yes  
losartan (COZAAR) 25 mg   
tablet TAKE 1 TABLET BY   
MOUTH ONCE DAILY FOR 90  
DAYS Yes  
Magnesium Oxide 500 mg   
tab Take 500 mg by mouth   
once daily.  
Yes  
simvastatin (ZOCOR) 40   
mg tablet Take 40 mg by   
mouth daily at bedtime.  
Yes  
spironolactone   
(ALDACTONE) 25 mg tablet   
TAKE 1 2 (ONE HALF)   
TABLET BY  
MOUTH ONCE DAILY FOR 90   
DAYS Yes  
Lactobacillus   
acidophilus (PROBIOTIC   
ORAL) Take by mouth. Yes  
calcium carbonate   
(CALCIUM 600 ORAL) Take   
1 Dose by mouth once   
daily.  
8/3/21  
Omeprazole Magnesium   
(PRILOSEC OTC) 20 mg   
tablet Take 1 tablet by   
mouth  
once daily.  
cholecalciferol (VITAMIN   
D3) 50 mcg (2,000 unit)   
tablet Take 2,000 Units  
by mouth once daily.  
8/3/21  
fluvoxaMINE (BULMARO (more   
content not included)... Normal                                  [facility]  
   
                                                    Vito 2021   
   
                                        [facility]                Telephone (Cater to u)  
------------------------  
--------  
PING CHAVEZ ([id number])   
1950 [address]  
Date Time Provider   
Department  
21 ROBERTO CARLOS CALDERON  
During your visit today,   
we recorded the   
following information   
about you:  
Loreto Gandhi RN   
2021 2:12 PM Signed  
Date/Provider 21   
Gerard  
Procedure:EGD  
Facility:[facility]  
Prep ordered:None  
Knowledge of prep   
instructions:yes  
Diabetic:no  
Blood Thinners:Xarelto-   
pt stated that she has   
permission from her   
cardiologist  
to hold this medication   
prior to her procedure  
Pacemaker with   
defibrillator:no  
Patient aware of date,   
[facility] will call with   
arrival time, and   
verbalized  
understanding.  
Allergies As of Date:   
2021 Noted Allergy   
Reaction  
CECLOR (CEFACLOR)   
2020 2 - Rash  
7 - Swelling  
Comments: And redness  
Date Reviewed:   
2021  
Reviewed by: Lydia Bassett LPN - Fully   
Assessed  
Reason for Visit:  
Pre-Op Teaching [134]  
Prescriptions as of   
2021  
- Omeprazole Magnesium   
(PRILOSEC OTC) 20 mg   
tablet  
Take 1 tablet by mouth   
once daily.  
- acetaminophen   
(TYLENOL) 325 mg tablet  
Take 650 mg by mouth   
every 6 hours as needed.  
- allopurinol (ZYLOPRIM)   
100 mg tablet  
TAKE 1 TABLET BY MOUTH   
ONCE DAILY FOR 90 DAYS  
- carvedilol (COREG)   
6.25 mg tablet  
Take 6.25 mg by mouth   
twice daily with meals.  
- cholecalciferol   
(VITAMIN D3) 50 mcg   
(2,000 unit) tablet  
Take 2,000 Units by   
mouth once daily.  
- dorzolamide-timolol   
(COSOPT) 22.3-6.8 mg/mL   
ophthalmic solution  
Use 1 Drop in both eyes   
twice daily.  
- fluvoxaMINE (LUVOX)   
100 mg tablet  
Take 100 mg by mouth   
twice daily.  
- furosemide (LASIX) 40   
mg tablet  
Take 10 mg by mouth at   
bedtime as needed.  
- levothyroxine   
(SYNTHROID) 50 mcg   
tablet  
Take 50 mcg by mouth   
every morning. Take On   
an Empty Stomach  
- loratadine (CLARITIN)   
10 mg tablet  
Take 10 mg by mouth at   
bedtime as needed.  
- losartan (COZAAR) 25   
mg tablet  
TAKE 1 TABLET BY MOUTH   
ONCE DAILY FOR 90 DAYS  
- Magnesium Oxide 500 mg   
tab  
Take 500 mg by mouth   
once daily.  
- therapeutic   
multivitamin-minerals   
(THERA-M PLUS) 9 mg   
iron-400 mcg tablet  
Take 1 tablet by mouth   
once daily.  
- rivaroxaban (XARELTO)   
20 mg tablet  
TAKE 1 TABLET BY MOUTH   
ONCE DAILY WITH SUPPER  
- simvastatin (ZOCOR) 40   
mg tablet  
Take 40 mg by mouth   
daily at bedtime.  
- spironolactone   
(ALDACTONE) 25 mg tablet  
TAKE 1 2 (ONE HALF)   
TABLET BY MOUTH ONCE   
DAILY FOR 90 DAYS  
- Lactobacillus   
acidophilus (PROBIOTIC   
ORAL)  
Take by mouth.  
- flaxseed oil (OMEGA 3   
ORAL)  
Take by mouth.  
- lactase (ULTRA DAIRY   
DIGESTIVE ORAL)  
Take by mouth.  
Problem List As Of Date   
2021 Noted   
Resolved  
Primary osteoarthritis   
of both knees [M17.0]   
2021  
Encounter Number:   
[id number]  
Encounter Status:Closed   
by LORETO GANDHI RN on   
21             [facility]  
   
                                                    CNOVon 2021   
   
                                        CNOV                Office Visit ([facility]  
)  
------------------------  
--------  
PING CHAVEZ ([id number])   
1950 F  
Date Time Provider   
Department  
3/30/21 4:00 PM GAB MCNEILL (CNP) [facility]  
During your visit today,   
we recorded the   
following information   
about you:  
Temperature Pulse   
Respiration  
98.4 degrees 60/minute   
16/minute  
Gab Mcneill APRN.CNP 3/30/2021 5:10   
PM Signed  
Ms. Chavez is a 71 year   
old female who comes in   
for evaluation of sinus   
issues  
and sore throat.  
72yo F presents to   
clinic for chronic   
maxillary sinus issues   
and trouble  
swallowing for the last   
6 months that has   
started to worsen.   
Patient reports  
that she thinks that the   
increase in saliva is   
due to her sinuses.   
Patient  
constantly feels like   
she needs to clear her   
throat. Previously   
worked up for  
acid reflux and was on   
PPIs then transitioned   
to Pepcid which the   
patient  
states has been helping   
with her symptoms.   
Trouble swallowing both   
liquids and  
solids, occasionally   
chokes when eating.   
Patient has tried   
smaller portions,  
more chewing between   
bites, and more liquid   
to help but still   
experiencing  
these issues. When she   
does swallow, she feels   
a thick mucus sensation,  
sometimes goes down the   
wrong pipe.  
Patient is currently   
experiencing sinus   
pressure with mild   
headache. Patient  
endorses PND. Never been   
treated with antibiotics   
in the past.  
Patient has a history of   
radiation for   
inflammatory breast   
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  
her spine.  
Past history :  
PAST MEDICAL HISTORY  
Diagnosis Date  
- CHF (congestive heart   
failure) (HCC)  
- History of bilateral   
breast cancer  
- Hypothyroidism  
- Neuropathy  
Current medication:  
Current Outpatient   
Medications  
Medication Sig  
- acetaminophen   
(TYLENOL) 325 mg tablet   
Take 650 mg by mouth   
every 6 hours as  
needed.  
- allopurinol (ZYLOPRIM)   
100 mg tablet TAKE 1   
TABLET BY MOUTH ONCE   
DAILY FOR 90  
DAYS  
- carvedilol (COREG)   
6.25 mg tablet Take 6.25   
mg by mouth twice daily   
with  
meals.  
- cholecalciferol   
(VITAMIN D3) 50 mcg   
(2,000 unit) tablet Take   
2,000 Units by  
mouth once daily.  
- dorzolamide-timolol   
(COSOPT) 22.3-6.8 mg/mL   
ophthalmic solution Use   
1 Drop in  
both eyes twice daily.  
- famotidine (PEPCID) 20   
mg tablet Take 20 mg by   
mouth at bedtime as   
needed.  
- fluvoxaMINE (LUVOX)   
100 mg tablet Take 100   
mg by mouth twice daily.  
- furosemide (LASIX) 40   
mg tablet Take 10 mg by   
mouth at bedtime as   
needed.  
- levothyroxine   
(SYNTHROID) 50 mcg   
tablet Take 50 mcg by   
mouth every morning.  
Take On an Empty Stomach  
- loratadine (CLARITIN)   
10 mg tablet Take 10 mg   
by mouth at bedtime as   
needed.  
- losartan (COZAAR) 25   
mg tablet TAKE 1 TABLET   
BY MOUTH ONCE DAILY FOR   
90 DAYS  
- Magnesium Oxide 500 mg   
tab Take 500 mg by mouth   
once daily.  
- therapeutic   
multivitamin-minerals   
(THERA-M PLUS) 9 mg   
iron-400 mcg tablet  
Take 1 tablet by mouth   
once daily.  
- rivaroxaban (XARELTO)   
20 mg tablet TAKE 1   
TABLET BY MOUTH ONCE   
DAILY WITH  
SUPPER  
- simvastatin (ZOCOR) 40   
mg tablet Take 40 mg by   
mouth daily at bedtime.  
- spironolactone   
(ALDACTONE) 25 mg tablet   
TAKE 1 2 (ONE HALF)   
TABLET BY MOUTH  
ONCE DAILY FOR 90 DAYS  
- Lactobacillus   
acidophilus (PROBIOTIC   
ORAL) Take by mouth.  
- flaxseed oil (OMEGA 3   
ORAL) Take by mouth.  
- lactase (ULTRA DAIRY   
DIGESTIVE ORAL) Take by   
mouth.  
No current   
facility-administered   
medications for this   
visit.  
Allergies:  
ALLERGIES  
Allergen Reactions  
- Ceclor [Cefaclor]   
Rash, Swelling  
And redness  
Social history:  
Social History  
Tobacco Use  
- Smoking status: Never   
Smoker  
- Smokeless tobacco:   
Never Used  
Substance Use Topics  
- Alcohol use: Yes  
- Drug use: Never  
Family history: No   
family history on file.  
There are no exam notes   
on file for this visit.  
Physical exam:  
General Appearance: 71   
year old female is   
alert, oriented, not in   
acute  
distress. Hearing is   
grossly normal, voice is   
raspy. There is no   
tenderness  
with percussion over the   
paranasal sinuses.  
Eyes: PEERLA,   
extraocular movements   
are full.  
Nose: Clean, septum is   
straight. There are no   
polyps. There is no   
discharge.  
Oropharynx: Teeth are in   
good repair. Lips, gums,   
tongue and posterior   
pharynx  
are within normal   
limits. Gag reflex is   
intact.  
Nasopharynx:   
Visualization is   
limited.  
Hypopharynx:   
Visualization is   
limited.  
Neck: No masses   
palpated. Thyroid is not   
enlarged. Trachea is in   
the midline.  
Ears: Both ear canals   
are clean. Both TMs are   
intact and mobile.  
Impression: Throat   
discomfort, swallowing   
problem, and hoarseness   
of voice  
exact etiology unclear.   
GERD may be   
contributing.  
Plan of management: I   
will perform fiberoptic   
laryngoscopy and discuss   
further  
management options with   
her.  
Procedure: After the   
procedure was explained   
to the patient and   
patient agreed  
to have the procedure   
done 1% Yobani-Synephrine   
and 4% Xylocaine was   
sprayed to  
both nasal cavities.   
Fiberoptic scope was   
inserted through the   
right nasal  
airway. Right nasal   
airway was normal.   
Nasopharynx was normal.   
(more content not   
included)...        Normal                                  [facility]  
   
                                                    CNOVon 2021   
   
                                        CNOV                Office Visit (LOORRM  
)  
------------------------  
--------  
PING CHAVEZ ([id number])   
1950 F  
Date Time Provider   
Department  
3/5/21 9:20 AM DAGO LO  
During your visit today,   
we recorded the   
following information   
about you:  
Dago Lo MD   
3/5/2021 8:53 AM Signed  
This document has been   
created with the use of   
voice recognition   
technology. It  
may contain   
inaccuracies:   
misspellings, inaccurate   
syntax or word sense   
that  
escaped review.  
CHIEF COMPLAINT: Ping Chavez is a 71 year old   
female who presents   
today for  
follow up of bilateral   
knee pain secondary to   
osteoarthritis.  
HISTORY OF PRESENT   
ILLNESS:  
PAIN EVALUATION  
3/5/2021  
0839  
Pain Level: 5  
Pain Location: ?  
bilateral knees  
Description: Aching  
Duration Units: Unknown  
Frequency: Intermittent  
Intervention:   
Reposition;Relaxation;Po  
sitioning;Medication;Hea  
t  
HISTORY: Ping Chavez is   
here for follow up of   
complaints of bilateral   
knee  
pain right greater than   
left. She states that   
the injection she   
received in  
September of last year   
offered relief. She   
requested we inject both   
knees  
again today. No new   
injury. No hip or   
neurologic complaints.  
No other musculoskeletal   
complaints  
ROS:  
REVIEW OF SYMPTOMS:  
Constitutional: patient   
denies any recent fever   
or significant change in   
weight  
Gastrointestinal:   
patient denies any   
current abdominal   
discomfort  
Musculoskeletal: as   
noted in the HPI  
Neurologic: as noted in   
the HPI  
SOCIAL HISTORY:  
Tobacco Use: Not on file  
ALLERGIES:  
ALLERGIES  
Allergen Reactions  
- Ceclor [Cefaclor]   
Rash, Swelling  
And redness  
PAST MEDICAL HISTORY:  
PAST MEDICAL HISTORY  
Diagnosis Date  
- CHF (congestive heart   
failure) ([facility])  
- History of bilateral   
breast cancer  
- Hypothyroidism  
- Neuropathy  
SOCIAL HISTORY:  
Tobacco Use: Not on file  
EXAMINATION:  
GENERAL: Appears   
healthy, well-nourished,   
no deformities.  
ORIENTATION: Alert and   
oriented to person place   
and time  
HABITUS: Normal  
GAIT: Normal, the   
patient did not have   
trouble getting onto the   
exam table.  
bilateral knee exam:  
No effusion,  
Tenderness with patellar   
apprehension  
Right knee valgus left   
knee neutral alignment  
Active full extension,   
flexion 120. Good   
patellar tracking/mild   
patella  
femoral crepitation  
Mild pain with palpating   
medial compartment, mild   
pain with palpating   
lateral  
compartment.  
Stable to varus and   
valgus stresses.  
Lachman is negative  
Negative   
anterior/posterior   
drawer.  
Palpable dorsalis pedis   
pulse  
Calf soft and nontender.  
Hip exam negative  
Intact sensation to   
light touch distally.  
.  
RADIOGRAPHS: XR Obtained   
today and personally   
reviewed by myself   
demonstrating  
none today x-rays some   
2020   
reviewed with severe   
lateral  
compartment   
osteoarthritis on the   
right moderate on the   
left  
IMPRESSION:  
Encounter Diagnosis  
ICD-10-CM  
1. Primary   
osteoarthritis of both   
knees M17.0  
Large Joint Arthro/Inj:   
bilateral knee joints  
The risks, benefits and   
alternatives of the   
procedure were reviewed   
with the  
patient/surrogate, who   
agreed to proceed.  
Written Consent   
Obtained: Yes  
Sign In Communication:   
Completed  
Time Out: Time Out   
completed  
The  Time-Out  verifies   
the correct patient,   
procedure, side/site,   
position (if  
applicable) and   
completion and review of   
fire risk   
assessment/protocols (if  
appropriate):  
Affirmation of Time Out:   
Yes  
Signout Discussion: yes  
3/5/2021 8:52 AM  
The procedure site was   
prepped in the usual   
sterile fashion.  
Allergies were reviewed  
Site: bilateral knee   
joints  
Medications (Right): 40   
mg triamcinolone   
acetonide 40 mg/mL  
Medications (Left): 40   
mg triamcinolone   
acetonide 40 mg/mL  
Anesthetics (Right): 4   
mL lidocaine (PF) 10   
mg/mL (1 %)  
Anesthetics (Left): 4 mL   
lidocaine (PF) 10 mg/mL   
(1 %)  
Outcome: Tolerated well,   
no immediate   
complications  
Post-injection   
instructions were   
reviewed with the   
patient and the patient  
voiced understanding of   
these instructions.  
Plan: Patient with   
bilateral knee   
osteoarthritis. She   
requested we injected  
both knees today with   
cortisone. We again   
discussed surgical   
options  
especially with the   
right knee. We discussed   
intervals between   
injections.  
Follow-up as symptoms   
dictate  
Dago Lo MD  
Referring Provider: SELF   
[200]  
Allergies As of Date:   
2021 Noted Allergy   
Reaction  
CECLOR (CEFACLOR)   
2020 2 - Rash  
7 - Swelling  
Comments: And redness  
Date Reviewed:   
2021  
Reviewed by: Kemi Atkinson (Ma) - Fully   
Assessed  
Reason for Visit:  
Follow Up [171]  
Established Patient   
[175]  
Knee Pain [132]  
Injections [199]  
Established Patient   
[175]  
Follow Up [171]  
Knee Pain [132]  
Injections [199]  
Primary Visit   
Diagnosis:Primary   
osteoarthritis of both   
knees [M17.0]  
Order(s):Large Joint   
Arthro/Inj: bilateral   
knee joints [HOJ251]   
Order #:  
[id number]  
[] lidocaine (PF)   
10 mg/mL (1 %) 4 mL   
injection  
(XYLOCAINE)Disp: Rfl:  
[] triamcinolone   
acetonide 40 mg (more   
content not included)... Normal                                  [facility]  
   
                                                    XR Knee - bilateral 4 Viewso  
n 2020   
   
                                                            IMPRESSION:  
  
Osteoarthritis right   
knee, severe in the   
lateral compartment.  
  
Osteoarthritis left   
knee, moderate in the   
lateral compartment.  
  
  
  
  
  
: PSCB  
Transcribe Date/Time:   
Sep 9 2020 9:46A  
  
Dictated by : THERON ALVAREZ MD  
  
This examination was   
interpreted and the   
report reviewed and  
electronically signed   
by:  
THERON ALVAREZ MD   
on Sep 9 2020 9:47AM EST  
  
                                                            DIVISION OF   
RADIOLOGY  
   
                                                            * * *Final Report* *  
 *  
  
DATE OF EXAM: Sep 9 2020   
9:20AM  
  
LZX 5618 - XR KNEE 4V   
AP/PA/LAT/[facility] PRISCILA /   
ACCESSION # [id number]  
  
PROCEDURE REASON: Pain  
  
* * * * Physician   
Interpretation * * * *  
  
X-RAYS BILATERAL KNEES  
  
HISTORY: bilateral knee   
pain. Pain  
  
TECHNIQUE: 4 views of   
each knee.  
  
COMPARISON: None  
  
RESULT:  
Tricompartmental   
osteoarthritis bilateral   
knees and   
chondrocalcinosis.  
Findings are severe in   
the lateral compartment   
of the right knee and  
moderate in the lateral   
compartment of left   
knee. No fracture or  
significant joint   
effusion involving   
either knee.  
  
                                                            DIVISION OF   
RADIOLOGY  
   
                                                            Provider, [facility]  
[facility] - 2020   
Formatting of this note   
might be different from   
the original.  
* * *Final Report* * *  
  
DATE OF EXAM: Sep 9 2020   
9:20AM  
  
LZX 5618 - XR KNEE 4V   
AP/PA/LAT/[facility] PRISCILA /   
ACCESSION # [id number]  
  
PROCEDURE REASON: Pain  
  
* * * * Physician   
Interpretation * * * *  
  
X-RAYS BILATERAL KNEES  
  
HISTORY: bilateral knee   
pain. Pain  
  
TECHNIQUE: 4 views of   
each knee.  
  
COMPARISON: None  
  
RESULT:  
Tricompartmental   
osteoarthritis bilateral   
knees and   
chondrocalcinosis.  
Findings are severe in   
the lateral compartment   
of the right knee and  
moderate in the lateral   
compartment of left   
knee. No fracture or  
significant joint   
effusion involving   
either knee.  
  
  
IMPRESSION  
IMPRESSION:  
  
Osteoarthritis right   
knee, severe in the   
lateral compartment.  
  
Osteoarthritis left   
knee, moderate in the   
lateral compartment.  
  
  
  
  
  
: WILLIE  
Transcribe Date/Time:   
Sep 9 2020 9:46A  
  
Dictated by : THERON ALVAREZ MD  
  
This examination was   
interpreted and the   
report reviewed and  
electronically signed   
by:  
THERON ALVAREZ MD   
on Sep 9 2020 9:47AM EST  
  
  
                                                            [facility]  
   
                                                    Radiology Study   
observation (narrative)                                                 [facility]   
[facility]  
   
                                                    XR Knee - bilateral 4 ViewsO  
rdered By: [facility] Provider on 2020   
   
                                                                  [facility]  
   
                                                    History and Physicalon    
   
                                                    HIM IP Note OR   
Transcription                   Normal                          [facility]  
   
                                                    Surgical Pathologyon   
017   
   
                                        Surgical Pathology  (NOTE)[id number]  
Y   
LABORATORIESCONSULTING   
PATHOLOGISTS   
[facility]   
[id number] [place], [place] [phone]: [phone]   
PATHOLOGY   
CONSULTATIONPatient   
Name: Skyler CHAVEZ   
Rec: [id number] Number:   
[id number]:   
2017Received:   
2017Reported:   
2017 09:51--   
Diagnosis --1. COLON,   
RANDOM BIOPSIES:- NORMAL   
COLONIC MUCOSA.2. COLON,   
BIOPSIES:- ADENOMATOUS   
POLYP.John Momin,**Electronically   
Signed Out**   
slsClinical   
InformationPre-op   
Diagnosis: FECAL OCCULT   
BLOOD Operative   
Findings: RANDOM COLON   
BX'S; COLON   
POLYPOperation   
Performed: COLONOSCOPY   
WITH BIOPSY Source of   
Specimen1: RANDOM COLON   
BX'S2: COLON POLYPGross   
Description1.  PING CHAVEZ RANDOM COLON   
BX'S  Multiple tan-white   
tissuefragments from 0.2   
to 0.3 cm and are 2.4 x   
0.2 x 0.1 cm in   
aggregate. Entirely 1cs.   
2.  PING CHAVEZ, COLON   
POLYP  Two tan tissue   
fragments, 0.3 to 0.4cm   
and are 0.7 x 0.3 x 0.2   
cm in aggregate.   
Entirely 1cs. rh   
tmMicroscopic   
Description1. The   
mucosal architecture is   
normal. There is no   
evidence ofactive   
inflammation,   
granulomatous   
inflammation, ulcer,   
dysplasia   
ormalignancy.2. The   
biopsies demonstrate   
adenomatous polyp.  Normal                                  [facility]  
  
  
  
Vital Signs  
  
  
                      Date Time  Vital Sign Value      Performing Clinician Jaiden morton  
   
                                                    10-   
10:          Body height         175.3 cm            Angel Sydnie DO  
Work Phone:   
[phone]                          [facility]  
   
                                                    10-   
10:                              Body mass index   
(BMI) [Ratio]             32.77 kg/m2               Angel Sydnie DO  
Work Phone:   
[phone]                          [facility] MD [employer]   
[facility]  
   
                                                    10-   
10:          Body weight         100.7 kg            Angel Sydnie DO  
Work Phone:   
[phone]                          [facility] [employer]   
[facility]  
   
                                                    10-   
10:                              Diastolic blood   
pressure                  82 mm[Hg]                 Angel Sydnie DO  
Work Phone:   
[phone]                          [facility]  
   
                                                    10-   
10:          Heart rate          70 /min             Angel Sydnie DO  
Work Phone:   
[phone]                          [facility]  
   
                                                    10-   
10:                              SaO2% (BldA) [Mass   
fraction]                 94 %                      Angel Sydnei DO  
Work Phone:   
[phone]                          [facility] [employer]   
[facility]  
   
                                                    10-   
10:                              Systolic blood   
pressure                  110 mm[Hg]                Angel Sydnie DO  
Work Phone:   
[phone]                          [facility]  
   
                                                    2024   
13:          Body height         175.3 cm            Angel Sydnie DO  
Work Phone:   
[phone]                          [facility]  
   
                                                    2024   
13:                              Body mass index   
(BMI) [Ratio]             33.82 kg/m2               Angel Sydnie DO  
Work Phone:   
[phone]                          [facility] [employer]   
[facility]  
   
                                                    2024   
13:          Body weight         103.87 kg           Angel Sydnie DO  
Work Phone:   
[phone]                          [facility]  
   
                                                    2024   
13:                              Diastolic blood   
pressure                  90 mm[Hg]                 Angel Sydnie DO  
Work Phone:   
[phone]                          [facility]  
   
                                                    2024   
13:          Heart rate          70 /min             Angel Sydnie DO  
Work Phone:   
[phone]                          [facility]  
   
                                                    2024   
13:                              SaO2% (BldA) [Mass   
fraction]                 100 %                     Angel Sydnie DO  
Work Phone:   
[phone]                          [facility]  
   
                                                    2024   
13:                              Systolic blood   
pressure                  152 mm[Hg]                Angel Sydnie DO  
Work Phone:   
[phone]                          [facility]  
   
                                                    2024   
11:          Body height         173.99 cm           MD Alexander Guerra  
Work Phone:   
[phone]                          [facility]  
   
                                                    2024   
11:                              Body mass index   
(BMI) [Ratio]             34.4 kg/m2                MD Alexander Guerra  
Work Phone:   
[phone]                          [facility]  
   
                                                    2024   
11:          Body weight         104.32 kg           MD Alexander Guerra  
Work Phone:   
[phone]                          [facility]  
   
                                                    2023   
09:                              Blood Pressure   
Location                                            [facility] [employer]  
Work Phone:   
[phone]                           [facility]   
[facility]  
   
                                                    2023   
09:                              Diastolic blood   
pressure                  81 mm[Hg]                 [facility] [employer]  
Work Phone:   
[phone]                           [facility]   
[facility]  
   
                                                    2023   
09:          Heart rate          65 /min             [facility] [employer]  
Work Phone:   
[phone]                           [facility]   
of [facility]  
   
                                                    2023   
09:                              Systolic blood   
pressure                  184 mm[Hg]                [facility]  
Work Phone:   
[phone]                           [facility]   
of [facility]  
   
                                                    2022   
09:                              Blood Pressure   
Location                                            [facility] [employer]  
Work Phone:   
[phone]                           [facility]   
[facility]  
   
                                                    2022   
09:                              Diastolic blood   
pressure                  82 mm[Hg]                 [facility] [employer]  
Work Phone:   
[phone]                           [facility]   
[facility]  
   
                                                    2022   
09:          Heart rate          77 /min             [facility] CORCORAN  
Work Phone:   
[phone]                           [facility]   
of [facility]  
   
                                                    2022   
09:                              Systolic blood   
pressure                  132 mm[Hg]                [facility]  
Work Phone:   
[phone]                           [facility]   
of [facility]  
   
                                                    2022   
14:                              Blood Pressure   
Location                                            Marty Osborne  
Work Phone:   
[phone]                           [facility]  
   
                                                    2022   
14:                              Diastolic blood   
pressure                  59 mm[Hg]                 Marty Christofferson  
Work Phone:   
[phone]                           [facility]  
   
                                                    2022   
14:          Heart rate          71 /min             Marty Castanedaofferson  
Work Phone:   
[phone]                           [facility]  
   
                                                    2022   
14:          Respiratory rate    18 /min             Marty Osborne  
Work Phone:   
[phone]                           [facility]  
   
                                                    2022   
14:                              SaO2% (BldA) [Mass   
fraction]                 96 %                      Marty Castanedaofferson  
Work Phone:   
[phone]                           [facility]  
   
                                                    2022   
14:                              Systolic blood   
pressure                  119 mm[Hg]                Marty Christofferson  
Work Phone:   
[phone]                           [facility]  
   
                                                    2022   
15:          Hourly Rounding                         J Luis KARTIK  
Work Phone:   
[phone]                           [facility]  
   
                                                    2022   
15:          Promise to Return                       J Luis KARTIK  
Work Phone:   
[phone]                           [facility]  
   
                                                    2022   
14:          Hourly Rounding                         J Luis KARTIK  
Work Phone:   
[phone]                           [facility]  
   
                                                    2022   
14:          Promise to Return                       J Luis KARTIK  
Work Phone:   
[phone]                           [facility]  
   
                                                    2022   
13:          Hourly Rounding                         J Luis KARTIK  
Work Phone:   
[phone]                           [facility]  
   
                                                    2022   
13:          Promise to Return                       J Luis KARTIK  
Work Phone:   
[phone]                           [facility]  
   
                                                    2022   
11:                              Diastolic blood   
pressure                  88 mm[Hg]                 J Luis KARTIK  
Work Phone:   
[phone]                           [facility]  
   
                                                    2022   
11:                              Systolic blood   
pressure                  174 mm[Hg]                J Luis KARTIK  
Work Phone:   
[phone]                           [facility]  
   
                                                    2022   
11:          Body temperature    97.52 [degF]        J Luis KARTIK  
Work Phone:   
[phone]                           [facility]  
   
                                                    2022   
11:                              Diastolic blood   
pressure                  88 mm[Hg]                 J Luis KARTIK  
Work Phone:   
[phone]                           [facility]  
   
                                                    2022   
11:          Heart rate          66 /min             J Luis KARTIK  
Work Phone:   
[phone]                           [facility]  
   
                                                    2022   
11:          Mean blood pressure 117 mm[Hg]          J Luis KARTIK  
Work Phone:   
[phone]                           [facility]  
   
                                                    2022   
11:                              SaO2% (BldA) [Mass   
fraction]                 96 %                      J Luis KARTIK  
Work Phone:   
[phone]                           [facility]  
   
                                                    2022   
11:                              Systolic blood   
pressure                  174 mm[Hg]                J Luis KARTIK  
Work Phone:   
[phone]                           [facility] [facility]  
   
                                                    2022   
07:          Body temperature    98.06 [degF]        J Luis KARTIK  
Work Phone:   
[phone]                           [facility]  
   
                                                    2022   
07:                              Diastolic blood   
pressure                  81 mm[Hg]                 J Luis KARTIK  
Work Phone:   
[phone]                           [facility]  
   
                                                    2022   
07:          Heart rate          79 /min             J Luis KARTIK  
Work Phone:   
[phone]                           [facility]  
   
                                                    2022   
07:          Mean blood pressure 104 mm[Hg]          J Luis KARTIK  
Work Phone:   
[phone]                           [facility]  
   
                                                    2022   
07:                              SaO2% (BldA) [Mass   
fraction]                 95 %                      J Luis KARTIK  
Work Phone:   
[phone]                           [facility]  
   
                                                    2022   
07:                              Systolic blood   
pressure                  151 mm[Hg]                J Luis KARTIK  
Work Phone:   
[phone]                           [facility]  
   
                                                    2022   
07:          Heart rate          80 /min             J Luis KARTIK  
Work Phone:   
[phone]                           [facility]  
   
                                                    2022   
07:                              SaO2% (BldA) [Mass   
fraction]                 96 %                      J Luissavannah LANDASLIN  
Work Phone:   
[phone]                           [facility]  
   
                                                    2022   
01:          Body temperature    98.06 [degF]        J Luissavannah LANDASLIN  
Work Phone:   
[phone]                           [facility]  
   
                                                    2022   
01:          Mean blood pressure 105 mm[Hg]          J Luissavannah LANDASLIN  
Work Phone:   
[phone]                           [facility]  
   
                                                    2022   
01:          Respiratory rate    17 /min             J Luissavannah LANDASLIN  
Work Phone:   
[phone]                           [facility]  
   
                                                    2022   
23:          FIO2                40 %                J Luissavannah LANDASLIN  
Work Phone:   
[phone]                           [facility]  
   
                                                    2022   
21:          Mean blood pressure 103 mm[Hg]          J Luis KARTIK  
Work Phone:   
[phone]                           [facility]  
   
                                                    2022   
21:          Respiratory rate    18 /min             J Luis KARTIK  
Work Phone:   
[phone]                           [facility]  
   
                                                    2022   
20:          Mean blood pressure 110 mm[Hg]          J Luis KARTIK  
Work Phone:   
[phone]                           [facility]  
   
                                                    2022   
17:                              Blood Pressure   
Location                                            J Luissavannah LANDASLIN  
Work Phone:   
[phone]                           [facility]  
   
                                                    2022   
17:                              BP/Pulse Patient   
Position                                            J Luissavannah LANDASLIN  
Work Phone:   
[phone]                           [facility]  
   
                                                    2022   
17:          Mean blood pressure 101 mm[Hg]          J Luissavannah LANDASLIN  
Work Phone:   
[phone]                           [facility]  
   
                                                    2022   
15:          Respiratory rate    16 /min             J Luissavannah LANADSLIN  
Work Phone:   
[phone]                           [facility]  
   
                                                    2022   
14:          Respiratory rate    19 /min             J Luis KARTIK  
Work Phone:   
[phone]                           [facility]  
   
                                                    2022   
04:          Heart rate          71 /min             J Luissavannah LANDASLIN  
Work Phone:   
[phone]                           [facility]  
   
                                                    2022   
04:          Respiratory rate    11 /min             J Luis KARTIK  
Work Phone:   
[phone]                           [facility]  
   
                                                    2022   
03:          Respiratory rate    18 /min             J Luis KARTIK  
Work Phone:   
[phone]                           [facility]  
   
                                                    2022   
00:          gluc                112 mg/dL           J Luis KARTIK  
Work Phone:   
[phone]                           [facility]  
   
                                                    2022   
00:          gluc                                    J Luis KARTIK  
Work Phone:   
[phone]                           [facility]  
   
                                                    2022   
00:          Heart rate          83 /min             J Luis KARTIK  
Work Phone:   
[phone]                           [facility]  
   
                                                    2022   
01:      Body temperature 98.42 [degF]    [facility]  
   
                                                    2022   
01:                              Diastolic blood   
pressure            70 mm[Hg]           [facility]  
   
                                                    2022   
01:      Heart rate      73 /min         [facility]  
   
                                                    2022   
01:      Mean blood pressure 92 mm[Hg]       [facility]  
   
                                                    2022   
01:      Respiratory rate 19 /min         [facility]  
   
                                                    2022   
01:                              SaO2% (BldA) [Mass   
fraction]           99 %                [facility]  
   
                                                    2022   
01:                              Systolic blood   
pressure            135 mm[Hg]          [facility]  
   
                                                    2022   
00:      Body temperature 98.6 [degF]     [facility]  
   
                                                    2022   
00:                              Diastolic blood   
pressure            67 mm[Hg]           [facility]  
   
                                                    2022   
00:      Heart rate      72 /min         [facility]  
   
                                                    2022   
00:      Mean blood pressure 86 mm[Hg]       [facility]  
   
                                                    2022   
00:                              Systolic blood   
pressure            123 mm[Hg]          [facility]  
   
                                                    2022   
23:                              Diastolic blood   
pressure            78 mm[Hg]           [facility]  
   
                                                    2022   
23:      Heart rate      70 /min         [facility]  
   
                                                    2022   
23:      Respiratory rate 18 /min         [facility]  
   
                                                    2022   
23:                              SaO2% (BldA) [Mass   
fraction]           98 %                [facility]  
   
                                                    2022   
23:                              Systolic blood   
pressure            120 mm[Hg]          [facility]  
  
  
  
Encounters  
  
  
                          Encounter Date Encounter Type Care Provider Facility  
   
                                                    Start: 10-  
End: 10-           Marzena Mina APRN-CNP  
Work Phone:   
[phone]                          [facility]   
Cardiology  
   
                                        Comment on above:   Med Refill   
   
                                                    Start: 10-  
End: 10-           Telephone encounter       Cj Kennedy NP  
Work Phone:   
[phone]                          [facility]   
ROUTE  
   
                                        Comment on above:   CT SCAN FYI   
   
                                                    Start: 10-  
End: 10-           Orders Only               Susana Hemphill APRN-CNP  
Work Phone:   
[phone]                          [facility]   
Cardiology  
   
                                        Comment on above:   Chronic combined sys  
tolic and diastolic congestive heart   
failure   
(CMS-HCC);  
Persistent atrial fibrillation (CMS-HCC);  
Nonischemic congestive cardiomyopathy (CMS-HCC);  
Benign essential hypertension;  
Left bundle branch block (LBBB);  
Automatic implantable cardioverter-defibrillator in situ [employer]   
   
                                                    Start: 10-  
End: 10-                         Office outpatient visit   
25 minutes                              Angel Yiries DO  
Work Phone:   
[phone]                          [facility]   
Cardiology  
   
                                        Comment on above:   Persistent atrial fi  
brillation (CMS-HCC) (Primary Dx);  
Chronic combined systolic and diastolic congestive heart failure   
(CMS-HCC);  
Left bundle branch block (LBBB)   
   
                                                    Start: 10-  
End: 10-     ambulatory          [facility]  
   
                                        Comment on above:   Automatic implantabl  
e cardioverter-defibrillator in situ   
[employer]   
[employer] (Primary Dx)   
   
                                                    Start: 10-  
End: 10-           Orders Only               Susana Hemphill APRN-CNP  
Work Phone:   
[phone]                          [facility]   
Cardiology  
   
                                        Comment on above:   Chronic systolic con  
gestive heart failure (CMS-HCC) (Primary   
Dx);  
Paroxysmal atrial fibrillation (CMS-HCC)   
   
                                                    Start: 2024  
End: 2024     ambulatory          CJ KENNEDY       Not Available  
   
                                                    Start: 2024  
End: 2024     ambulatory          [facility]   
[facility]  
   
                                                    Start: 2024  
End: 2024     ambulatory          CJ KENNEDY       Not Available  
   
                          Start: 2024 ambulatory   [facility]   
[facility]  
   
                                                    Start: 2024  
End: 2024     ambulatory          [facility]   
[facility]  
   
                                                    Start: 2024  
End: 2024     ambulatory          CHAPARRO HOLLEY    [facility]   
[facility]  
   
                                                    Start: 2024  
End: 2024     ambulatory          [facility]   
[facility]  
   
                                                    Start: 2024  
End: 2024     ambulatory          CJ KENNEDY       Not Available  
   
                                                    Start: 2024  
End: 2024     ambulatory          [facility]   
[facility]  
   
                                                    Start: 2024  
End: 2024           ambulatory                NEO DAMON   
[facility]  
   
                                                    Start: 2024  
End: 2024     ambulatory          CJ KENNEDY       Not Available  
   
                                                    Start: 2024  
End: 2024                         Office outpatient visit   
25 minutes                              Angel Gerardo DO  
Work Phone:   
[phone]                          [facility]   
Cardiology  
   
                                        Comment on above:   Cardiomyopathy (Prim  
any Dx);  
Persistent atrial fibrillation (CMS-HCC);  
Chronic systolic heart failure (CMS-HCC);  
Left bundle branch block (LBBB)   
   
                                                    Start: 2024  
End: 2024     ambulatory          [facility]   
[facility]ulatory [facility]  
   
                          Start: 2024 Telephone encounter Elba Ibrahim CMA  
 [facility]   
Cardiology  
   
                                        Comment on above:   Appointment   
   
                                                    Start: 2024  
End: 2024     ambulatory          [facility]  [facility]  
   
[facility]  
   
                                        Comment on above:   Primary open angle g  
laucoma of right eye, mild stage   
   
                                                    Start: 2024  
End: 2024     ambulatory          Alexei Bartlett II Facility:[facility]  
   
                                                    Start: 2024  
End: 2024           ambulatory                MD Alexander Guerra  
Work Phone:   
[phone]                          [facility]  
Work Phone:   
[phone]  
   
                                                    Start: 2024  
End: 2024                         Patient encounter   
procedure                               MD Alexander Guerra  
Work Phone:   
[phone]                          [facility]   
[facility]   
Orthopedics  
Work Phone:   
[phone]  
   
                                Start: 2024 Refill          Butch Sifuentes  
sser   
APRN-CNP  
Work Phone:   
[phone]                          [facility]   
Cardiology  
   
                                        Comment on above:   Med Refill   
   
                                                    Start: 10-  
End: 10-     ambulatory          GREG DELGADILLO TODD        [facility]  
   
                                                    Start: 08-  
End: 08-                         [facility]   
[facility]             [facility]  
   
                                                    Start: 2023  
End: 2023     ambulatory          Salvador CORCORAN      Facility:[facility]  
   
                                                    Start: 2023  
End: 2023                         Patient encounter   
procedure                               Salvador CORCORAN  
Work Phone:   
[phone]                           [facility]   
[facility]  
Work Phone:   
[phone]  
   
                          Start: 2023 ambulatory   CNP Consuelo Munoz  
ity:[facility]  
   
                                                    Start: 2023  
End: 2023     ambulatory          Alexander Guerra     Facility:[facility]  
   
                                                    Start: 2023  
End: 2023           ambulatory                MD Alexander Guerra  
Work Phone:   
[phone]                          [facility]  
Work Phone:   
[phone]  
   
                                                    Start: 2023  
End: 2023                         Patient encounter   
procedure                               MD Alexander Guerra  
Work Phone:   
[phone]                          [facility]   
[facility]   
[place]  
Work Phone:   
[phone]  
   
                                                    Start: 2022  
End: 2022     ambulatory          Salvador CORCORAN      Facility:[facility]  
   
                                                    Start: 2022  
End: 2022                         Patient encounter   
procedure                               Salvador CORCORAN  
Work Phone:   
[phone]                           [facility]   
[facility]  
Work Phone:   
[phone]  
   
                                                    Start: 2022  
End: 2022     ambulatory          Marty Osborne Facility:[facility]  
   
                                                    Start: 2022  
End: 2022                         Patient encounter   
procedure                               Marty Osborne  
Work Phone:   
[phone]                           [facility]  
Work Phone:   
[phone]  
   
                                                    Start: 2022  
End: 2022                         Evaluation and   
management of inpatient   Babak Montoya          Facility:[facility]  
   
                                                    Start: 2022  
End: 2022                         Evaluation and   
management of inpatient                 J Luis VERDUGO  
Work Phone:   
[phone]                           [facility]  
Work Phone:   
[phone]  
   
                                                    Start: 2022  
End: 09-     ambulatory          [facility]  
   
                                                    Start: 2022  
End: 2022                         Subsequent hospital   
visit by physician                      Alexander Guerra MD  
Work Phone:   
[phone]                          [facility]  
   
                                                    Start: 2022  
End: 2022     ambulatory          [facility]  
   
                                                    Start: 2022  
End: 2022                         Subsequent hospital   
visit by physician                      Alexander Guerra MD  
Work Phone:   
[phone]                          [facility]  
   
                                                    Start: 2022  
End: 2022                         Emergency department   
patient visit             Trae NICOLE Jose          Facility:[facility]  
   
                                                    Start: 2022  
End: 2022                         Emergency department   
patient visit             [facility] NICOLE Jose          [facility]  
Work Phone:   
[phone]  
   
                                                    Start: 2022  
End: 10-     ambulatory          AMELIA HENDRICKSON Facility:[facility]  
   
                                                    Start: 2022  
End: 10-           Recurring                 Consuelo HENDRICKSON  
Work Phone:   
[phone]                           [facility]  
Work Phone:   
[phone]  
   
                                                    Start: 2022  
End: 2022     ambulatory          DR JASMIN LINDO  Facility:  
   
                                                    Start: 2020  
End: 2020                         Subsequent hospital   
visit by physician                      Jessie Quintana 1  
Work Phone:   
[phone]                          Radiology  
   
                                        Comment on above:   Pain [R52]   
   
                                                    Start: 2020  
End: 2020           Orders Only               Dago Lo  
Work Phone:   
[phone]                          Orthopaedics  
   
                                        Comment on above:   Pain (Primary Dx)   
   
                                                    Start: 2017  
End: 2017     Ambulatory          GREG ROCHA        [facility]  
  
  
  
Procedures  
  
  
                          Date         Procedure    Procedure Detail Performing   
Clinician  
   
                          Start: 2024 Follow-up visit Follow-up    ANGEL EGRARDO  
   
                                        Start: 2024   Visual field xm uni/  
bi   
w/interp extended exam                              Chaparro Holley OD  
Work Phone:   
[phone]  
   
                          Start: 2024 Pelvis X-ray              MD Alexander coleman  
Work Phone:   
[phone]  
   
                          Start: 2024 X-ray of both knees              MD Alexander Guerra  
Work Phone:   
[phone]  
   
                                        Start: 2023   Ultrasonography of b  
ilateral   
kidneys                                             MD Alexander Guerra  
Work Phone:   
[phone]  
   
                                        Start: 2022   Basic metabolic pane  
l   
calcium total                                       Alexander Guerra MD  
Work Phone:   
[phone]  
   
                                        Start: 2022   Basic metabolic pane  
l   
calcium total                                       Alexander Guerra MD  
Work Phone:   
[phone]  
   
                                        Start: 2020   Radiologic exam knee  
   
complete 4/more views                               Dago Lo MD  
Work Phone:   
[phone]  
   
                          Start: 2017 SURGICAL PATHOLOGY              ETHAN  
TEZ ROCHA  
   
                          Start: 2017 DISCHARGE PATIENT              GREG ROCHA  
   
                          Start: 2017 SURGICAL PATHOLOGY              ETHAN ROCHA  
   
                          Start: 2017 Colonoscopy               Alexander wyatt MD  
Work Phone:   
[phone]  
   
                                       Simple mastectomy              Trae armas  
  
  
  
Plan of Treatment  
  
  
                          Date         Care Activity Detail       Author  
   
                                        Start: 2027   Screening for malign  
ant   
neoplasm of colon                                   [facility]  
   
                          Start: 10- Adult BMI Screening Adult BMI Screen  
ing [facility]  
   
                          Start: 10- Tobacco Screening Tobacco Screening   
[facility]  
   
                          Start: 2025 Adult BMI Screening Adult BMI Screen  
ing [facility]  
   
                          Start: 2025 Tobacco Screening Tobacco Screening   
[facility]  
   
                          Start: 2025 Adult BMI Screening Adult BMI Screen  
ing [facility]  
   
                          Start: 2025 Tobacco Screening Tobacco Screening   
[facility]  
   
                          Start: 2025 Tobacco Screening Tobacco Screening   
[facility]  
   
                                                    Start: 2025  
End: 2025                         Patient encounter   
procedure                               2025 11:10 AM EST   
[facility]   
[facility] [address] [place] [address] [phone] Chaparro Holley,  W   
[address] [place] [address]   
[phone] (Work)   
[phone] (Fax)                      [facility]   
[facility]  
   
                                                    Start: 2025  
End: 2025           ambulatory                2025 10:40 AM EST   
Ophthalmology Imaging   
[facility] [address] [place] [address] [phone]                            [facility]   
[facility]  
   
                                                    Start: 2024  
End: 2024                         Patient encounter   
procedure                               2024 4:20 PM EST   
Office Visit [facility]   
NEURO [address] [place]   
[address] [phone]   
Cj Kennedy,    
[address] [address] [phone] (Work)   
[phone] (Fax)                      [facility]  
   
                                                    Start: 10-  
End: 10-                         Potassium [Moles/volume]   
in Serum or Plasma                      Potassium Lab Routine   
Chronic combined   
systolic and diastolic   
congestive heart failure   
(CMS-HCC) Expected:   
10/25/2024   
(Approximate), Expires:   
10/18/2025                              [facility]  
Work Phone:   
[phone]  
   
                                        Comment on above:   Expected: 10/25/2024  
 (Approximate), Expires: 10/18/2025   
   
                                                    Start: 10-  
End: 10-                         Basic metabolic 2000   
panel - Serum or Plasma                 Basic Metabolic Panel   
Lab Routine Paroxysmal   
atrial fibrillation   
(CMS-HCC) Chronic   
systolic congestive   
heart failure (CMS-HCC)   
Expected: 10/08/2024   
(Approximate), Expires:   
10/01/2025                              [facility]  
Work Phone:   
[phone]  
   
                                        Comment on above:   Expected: 10/08/2024  
 (Approximate), Expires: 10/01/2025   
   
                                                    Start: 10-  
End: 10-           Device Interrogation      Device Interrogation   
Cardiac Services Routine   
Persistent atrial   
fibrillation (CMS-HCC)   
Chronic combined   
systolic and diastolic   
congestive heart failure   
(CMS-HCC) Left bundle   
branch block (LBBB)   
Expected: 10/03/2024,   
Expires: 10/03/2025                     [employer]  
Work Phone:   
[phone]  
   
                                        Comment on above:   Expected: 10/03/2024  
, Expires: 10/03/2025   
   
                                                    Start: 10-  
End: 10-     Clinical Support                        [facility]  
   
                          Start: 2024 Adult BMI Screening Adult BMI Screen  
ing [facility]  
   
                          Start: 2024 Tobacco Screening Tobacco Screening   
[facility]  
   
                                        Start: 2024   Covid-19 Vaccine (3   
-   
2024-25 season)                         Covid-19 Vaccine (3 -   
2024-25 season)                         [facility]  
   
                                        Start: 2024   COVID-19 Vaccine (4   
-   
2023-24 season)                         COVID-19 Vaccine (4 -   
2023-24 season)                         [facility]  
   
                          Start: 2024 Influenza vaccination              P  
[facility]  
   
                                                    Start: 2024  
End: 2024                         Patient encounter   
procedure                               2024 11:10 AM EDT   
Office Visit [facility]   
[facility] [address] [place] [id number] [phone] Chaparro Holley, OD [address] [place]   
[address] [phone]   
(Work) [phone]   
(Fax)                                   [facility]   
[facility]  
   
                                                    Start: 2024  
End: 2024           ambulatory                2024 10:40 AM EDT   
Ophthalmology Imaging   
[facility] [address] [place] [address] [phone]                            [facility]   
[facility]  
   
                          Start: 2024 Diabetes Screening Diabetes Screenin  
g [facility]  
   
                                                    Start: 2024  
End: 2024                         Patient encounter   
procedure                               2024 1:45 PM EDT   
Office Visit [facility]   
[facility]   
[address]   
[place]   
[id number] [phone]   
Angel Gerardo, DO   
[address]   
[address] [place]   
[address] [phone]   
(Work) [phone]   
(Fax)                                   [facility]  
   
                                                    Start: 01-  
End: 01-                         Patient encounter   
procedure                               01/15/2024 11:10 AM EST   
Office Visit [facility]   
[facility] [address] [place] [address] [phone] Chaparro Holley, OD [address] [place]   
[address] [phone]   
(Work) [phone]   
(Fax)                                   [facility]   
[facility]  
   
                                                    Start: 01-  
End: 01-           ambulatory                01/15/2024 10:40 AM EST   
Ophthalmology Imaging   
[facility] [address] [place] [address] [phone]                            [facility]   
[facility]  
   
                                        Start: 2024   Advance Directive   
Discussion                              Advance Directive   
Discussion                              [facility]  
   
                                        Start: 2023   COVID-19 Vaccine ( season)                         COVID-19 Vaccine ( season)                         [facility]  
   
                          Start: 2023 Lipid panel  Lipids       [facility]  
   
                                        Start: 2023   RSV Vaccine (1 - 1-d  
ose   
75+ series)                             RSV Vaccine (1 - 1-dose   
75+ series)                             [facility]  
   
                          Start: 2022 Influenza vaccination Flu vaccine (#  
1) [facility]  
   
                          Start: 2020 Influenza vaccination INFLUENZA (#1)  
 [facility]  
   
                                        Start: 2013   ADVANCE DIRECTIVE   
DISCUSSION                              ADVANCE DIRECTIVE   
DISCUSSION                              [facility]  
   
                          Start: 2013 BONE DENSITY BONE DENSITY [facility]  
   
                          Start: 2013 Fall Risk Screening Fall Risk Screen  
ing [facility] MD [employer]   
[facility]  
   
                                        Start: 2013   PNEUMOVAX AGE 65 AND  
 OVER   
WITH 5YR LOOKBACK (#1)                  PNEUMOVAX AGE 65 AND   
OVER WITH 5YR LOOKBACK   
(#1)                                    [facility]  
   
                                        Start: 2013   Screening for   
osteoporosis              Bone Density Screening    [facility]  
   
                                        Start: 12-   DTaP,Tdap and Td Vac  
cines   
(1 - Tdap)                              DTaP,Tdap and Td   
Vaccines (1 - Tdap)                     [facility]  
   
                                        Start: 12-   DTaP/Tdap/Td vaccine  
 (1 -   
Tdap)                                   DTaP/Tdap/Td vaccine (1   
- Tdap)                                 [facility]  
   
                                        Start: 12-   Urine microalbumin   
profile                                 DTaP,Tdap,Td Vaccine (1   
- Tdap)                                 [facility]  
   
                                        Start: 2003   Screening for   
osteoporosis                            DEXA (modify frequency   
per FRAX score)                         [facility]  
   
                                        Start: 1998   Screening for malign  
ant   
neoplasm of breast        Breast cancer screen      [facility]  
   
                                Start: 1998 SHINGRIX VACCINE (1 of 2) SHIN  
GRIX VACCINE (1 of   
2)                                      [facility]  
   
                                Start: 1998 Tuberculosis screening COLOREC  
COLLINS CANCER   
SCREENING,SEE MODIFIER                  [facility]  
   
                          Start: 1993 DIABETES SCREEN DIABETES SCREEN [facility]  
   
                          Start: 1993 LIPID SCREEN LIPID SCREEN [facility]  
   
                                        Start: 1993   Screening for malign  
ant   
neoplasm of colon                                   [facility]  
   
                          Start: 1988 Mammography  MAMMOGRAM    [facility]  
   
                                        Start: 1967   DTaP/Tdap/Td vaccine  
 (1 -   
Tdap)                                   DTaP/Tdap/Td vaccine (1   
- Tdap)                                 [facility]  
   
                                        Start: 1967   Urine microalbumin   
profile                   DTAP,TDAP,TD (1 - Tdap)   [facility]  
   
                          Start: 1966 Adult BMI Follow Up Plan Adult BMI F  
ollow Up Plan [facility]  
   
                          Start: 1966 Anxiety Screening Anxiety Screening   
[facility]  
   
                          Start: 1966 Depression Screening Depression Scre  
ing [facility]  
   
                          Start: 1966 HEPATITIS C SCREENING [facility]ITIS C [facility]  
[facility]  
   
                          Start: 1966 Hepatitis C screening              B  
ON [facility]  
   
                          Start: 1960 Depression Screen Depression Screen   
[facility]  
   
                          Start: 1960 Depression Screening Depression Scre  
ening [facility]  
   
                          Start: 1948 COVID-19 Vaccine (#1) COVID-19 Vacci  
ne (#1) [facility]  
   
                                        Start: 1948   Medicare Annual Well  
ness   
Visit                                   Medicare Annual Wellness   
Visit                                   [facility]  
   
                                                            Electrophoresis Prot  
ein,   
Serum                                   Electrophoresis Protein,   
Serum Lab Routine   
2022 11:01 AM EDT                 PADMINI AMY [employer]  
Work Phone:   
[phone]  
   
                                                      
End: 10-                         Radiologic exam knee   
complete 4/more views                   XR KNEE GENERAL 4V AP   
BOTH/PA BOTH/LAT/MERC   
BILAT Radiology Routine   
Pain 1 Occurrences   
starting 2020   
until 10/08/2021                        [facility]  
   
                                        Comment on above:   1 Occurrences starti  
ng 2020 until 10/08/2021   
   
                                                                 [facility]  
   
                                                                 [facility]  
c  
  
  
  
Immunizations  
  
  
                      Immunization Date Immunization Notes      Care Provider Fa  
[facility]  
   
                                        10-          influenza virus vacc  
ine,   
unspecified formulation                             Susana Hemphill   
APRN-CNP  
Work Phone:   
[phone]                          [facility]   
[facility]  
   
                                        10-          influenza virus vacc  
ine,   
unspecified formulation                             Salvador CORCORAN  
Work Phone:   
[phone]                           [facility]   
[facility]  
   
                                        2022          pneumococcal conjuga  
te   
vaccine, 13 valent                                  Cj Kennedy NP  
Work Phone:   
[phone]                          [facility]  
   
                                        10-          SARS-CoV-2 (COVID-19  
)   
mRNA-1273 vaccine                                   Salvador CORCORAN  
Work Phone:   
[phone]                           [facility]   
[facility]  
   
                                        2021          influenza virus vacc  
ine,   
unspecified formulation                             Salvador CORCORAN  
Work Phone:   
[phone]                           [facility]   
[facility]  
   
                                        2021          SARS-CoV-2 (COVID-19  
)   
mRNA-1273 vaccine                                   Salvador CORCORAN  
Work Phone:   
[phone]                           [facility]   
[facility]  
   
                                        2021          SARS-CoV-2 (COVID-19  
)   
mRNA-1273 vaccine                                   Salvador CORCORAN  
Work Phone:   
[phone]                           [facility]   
[facility]  
   
                                        Comment on above:   Result Comment: 2022: [id number]   
   
                                        10-          influenza virus vacc  
ine,   
unspecified formulation                             Salvador CORCORAN  
Work Phone:   
[phone]                           [facility]   
[facility]  
   
                                        2019          pneumococcal   
polysaccharide vaccine,   
23 valent                                           [employer]  
Work Phone:   
[phone]                           [facility]   
[facility]  
   
                                        10-          influenza virus vacc  
ine,   
unspecified formulation                             [facility]  
Work Phone:   
[phone]                           [facility]   
[facility]  
   
                                        2018          zoster vaccine   
recombinant                                         [employer]  
Work Phone:   
[phone]                           [facility]   
[facility]  
   
                                        2018          zoster vaccine   
recombinant                                         [employer]  
Work Phone:   
[phone]                           [facility]   
[facility]  
   
                                        10-          influenza virus vacc  
ine,   
unspecified formulation                             [employer]  
Work Phone:   
[phone]                           [facility]   
[facility]  
   
                                        2017          pneumococcal conjuga  
te   
vaccine, 13 valent                                  [employer]  
Work Phone:   
[phone]                           [facility]   
[facility]  
   
                                        10-          influenza virus vacc  
ine,   
unspecified formulation                             [employer]  
Work Phone:   
[phone]                           [facility]   
[facility]  
   
                                        09-          influenza virus vacc  
ine,   
unspecified formulation                             [employer]  
Work Phone:   
[phone]                           [facility]   
[facility]  
   
                                        2015          pneumococcal conjuga  
te   
vaccine, 13 valent                                  [facility]  
Work Phone:   
[phone]                           [facility]   
[facility]  
   
                                        2014          influenza virus vacc  
ine,   
unspecified formulation                             [employer]  
Work Phone:   
[phone]                           [facility]   
[facility]  
   
                                        2013          influenza virus vacc  
ine,   
unspecified formulation                             [employer]  
Work Phone:   
[phone]                           [facility]   
[facility]  
   
                                        2012          Td(adult) unspecifie  
d   
formulation                                         [employer]  
Work Phone:   
[phone]                           [facility]   
[facility]  
   
                                        10-          influenza virus vacc  
ine,   
unspecified formulation                             [employer]  
Work Phone:   
[phone]                           [facility]   
[facility]  
   
                                        1999          influenza virus vacc  
ine,   
unspecified formulation                             Salvador CORCORAN  
Work Phone:   
[phone]                           [facility]   
[facility]  
  
  
  
Payers  
  
  
                          Date         Payer Category Payer        Policy ID  
   
                          08-   Unknown                   [id number]  
   
                          2023   Self-pay                    
   
                                2020      Unknown         [employer] MEDICARE  
   
SUPPLEMENT [id number]   
2020-Present Indemnity              [id number]   
[id number]  
[id number]  
   
                          2017   Medicare                  [id number]  
   
                                2016      Commercial Indemnity MED[employer] MUT  
UAL Member   
Subscriber Plan / Payer   
(Effective   
2016-Present) Name:   
Ping Chavez Member ID:   
[id number] Relation to   
Subscriber: Self Name:   
Ping Chavez Subscriber   
ID: [id number] Payer ID:   
Not on file Group ID:   
[id number] Type: Not on   
file Phone: [phone]   
Address: [address] [address]                [id number]  
[id number]  
   
                          2016   Unknown                   [id number]  
[id number]  
[id number]  
   
                                2013      Medicare        MEDICARE MEDICAR  
E A AND B   
[id number]   
2013-Present   
[place]                  [id number]   
[id number]  
[id number]  
   
                          2013   Medicare                  [id number]  
[id number]  
[id number]  
   
                          1960   Medicare                  [id number]  
   
                          1960   Unknown                   [id number]  
   
                          1948   Unknown                   [id number]   
[id number]  
[id number]  
   
                          1948   Unknown                   [id number]   
[id number]  
[id number]  
   
                          1948   Unknown                   [id number]   
[id number]  
[id number]  
   
                          1948   Unknown                   [id number]   
[id number]  
[id number]  
   
                          1948   Unknown                   [id number]   
[id number]  
[id number]  
   
                          1948   Unknown                   [id number]   
[id number]  
[id number]  
   
                          1948   Unknown                   [id number]   
[id number]  
[id number]  
   
                          1948   Unknown                   [id number]   
[id number]  
27  
   
                          1948   Unknown                   [id number]   
[id number]  
27  
   
                          1948   Unknown                   [id number]   
[id number]  
74  
   
                          1948   Unknown                   [id number]   
[id number]  
74  
   
                          1948   Unknown                   [id number]   
[id number]  
74  
   
                          1948   Unknown                   [id number]   
[id number]  
74  
   
                          1948   Unknown                   [id number]   
[id number]  
77  
   
                          1948   Unknown                   [id number]   
[id number]  
[id number]  
   
                          1948   Unknown                   [id number]   
[id number]  
[id number]  
   
                          1948   Unknown                   [id number]   
[id number]  
259  
   
                          1948   Unknown                   [id number]   
[id number]  
[id number]  
   
                          1948   Unknown                   [id number]   
[id number]  
[id number]  
   
                          1948   Unknown                   [id number]   
[id number]  
[id number]  
   
                          1948   Unknown                   [id number]   
[id number]  
[id number]  
   
                          1948   Unknown                   [id number]   
[id number]  
286  
   
                          1948   Unknown                   [id number]   
[id number]  
286  
   
                          1948   Unknown                   [id number]   
[id number]  
286  
   
                          1950   Unknown                   [id number]   
[id number]  
[id number]  
   
                          1950   Unknown                   [id number]   
[id number]  
[id number]  
   
                          1950   Unknown                   [id number]   
[id number]  
[id number]  
   
                          1950   Unknown                   [id number]   
[id number]  
[id number]  
   
                          1950   Unknown                   [id number]   
[id number]  
[id number]  
   
                          1950   Unknown                   [id number]   
[id number]  
[id number]  
   
                          1950   Unknown                   [id number]   
[id number]  
[id number]  
   
                          1950   Unknown                   [id number]   
[id number]  
[id number]  
   
                                       Unknown                   [id number]   
[id number]  
[id number]  
   
                                       Unknown                   [id number]   
[id number]  
[id number]  
  
  
  
Social History  
  
  
                          Date         Type         Detail       Facility  
   
                                                            Tobacco smoking stat  
[facility]                      Unknown if ever smoked    [facility]  
   
                          Start: 1950 Sex Assigned At Birth Not on file  C  
[facility]  
   
                                                    Start: 2020  
End: 2020                         Exposure to SARS-CoV-2   
(event)                   Not sure                  [facility]  
   
                                       Tobacco                   [facility]  
   
                                        Comment on above:   denies   
   
                                                    Start: 2021  
End: 2023     Sex Assigned At Birth Female              [facility]  
   
                                                            Tobacco smoking stat  
[facility]                                    Tobacco smoking   
consumption unknown                     [employer]  
Work Phone:   
[phone]  
   
                                                Tobacco smoking status No Smokin  
g Status   
Entered                                 [facility]  
   
                          Start: 1950 Sex Assigned At Birth Female       F  
[facility]  
   
                                                    Start: 2022  
End: 2023           Tobacco smoking status    Never smoked tobacco   
(finding)                               [facility]   
[facility]  
   
                                        Comment on above:   denies   
   
                                        Start: 2022   Tobacco use and   
exposure                                Smokeless tobacco   
non-user                                [facility]   
System  
   
                                                    Start: 2023  
End: 10-           Alcohol intake            Current drinker of   
alcohol (finding)                       [facility]   
System  
   
                                                    Start: 2021  
End: 2023                         History of Social   
function                                            [facility]   
System  
   
                                       Housing Instability Unknown      [facility]   
System  
   
                          Start: 2021 Alcohol Comment BEER ONE A WEEK [facility]   
System  
   
                          Start: 2015 Sex          Female (finding) [facility]   
System  
   
                                                            How often to you hav  
e a   
drink containing   
alcohol?                  2-4 times a month         [facility]  
   
                                                            How many standard   
drinks containing   
alcohol do you have on   
a typical day?            1 or 2                    [facility]  
  
  
  
Medical Equipment  
  
  
                                Procedure Code  Equipment Code  Equipment Origin  
al   
Text                      Equipment Identifier      Dates  
   
                                            Biv Icd-10/20/2016 [id number]  Start:  
   
10-  
  
  
  
Functional Status  
  
  
                          Date         Assessment   Result       Facility  
   
                          2023   Functional Status N/A          [facility]  
   
                          2022   Functional Status N/A          [facility]  
   
                          2022   Functional Status N/A          [facility]  
   
                          2022   Functional Status N/A          [facility]  
   
                          2022   Functional Status              [facility]  
   
                          2022   Functional Status N/A          [facility]  
  
  
  
Clinical Notes 2021 to 10-  
  Telephone Encounter - Mamta Gutierrez RN - 10/23/2024 3:43 AM EDTTelephone   
Encounter - Mamta Gutierrez RN - 10/23/2024 3:43 AM EDTTelephone Encounter - 
Cj Kennedy NP - 10/22/2024 12:17 PM EDT  
  
                                Note Date & Type Note            Facility  
   
                                                    10- Miscellaneous   
Notes                                   Formatting of this note might be   
different from the original.  
10/3/24 ov  
10/17/24 BMP  
9/3/24 CBC, CMP  
  
  
Electronically signed by Mamta Gutierrez RN at 10/23/2024 10:28   
AM EDT  
documented in this encounter            [facility]  
   
                                                    10- Telephone   
encounter Note                          Formatting of this note might be   
different from the original.  
10/3/24 ov  
10/17/24 BMP  
9/3/24 CBC, CMP  
  
  
Electronically signed by Mamta Gutierrez RN at 10/23/2024 10:28   
AM EDT  
                                        [facility]  
   
                                                    10- Miscellaneous   
Notes                                   Formatting of this note might be   
different from the original.  
noted  
Electronically signed by Cj Kennedy NP at 10/22/2024 12:17 PM   
EDT  
Formatting of this note might be   
different from the original.  
PATIENT CALLS TO INFORM YOU THAT   
SHE DOES NOT WISH TO PROCEED WITH   
THE CT SCAN AT THIS TIME. SHE DID   
SCHEDULED A FOLLOW UP APPT ON   
 AT 4:20  
Electronically signed by Marcella Harris at 10/22/2024 12:03 PM   
EDT  
documented in this encounter            [facility]  
   
                                                    10- Telephone   
encounter Note                          Formatting of this note might be   
different from the original.  
noted  
Electronically signed by Cj Kennedy NP at 10/22/2024 12:17 PM   
EDT  
                                        [facility]  
   
                                                    10- Telephone   
encounter Note                          Formatting of this note might be   
different from the original.  
PATIENT CALLS TO INFORM YOU THAT   
SHE DOES NOT WISH TO PROCEED WITH   
THE CT SCAN AT THIS TIME. SHE DID   
SCHEDULED A FOLLOW UP APPT ON   
 AT 4:20  
Electronically signed by Marcella Harris at 10/22/2024 12:03 PM   
EDT  
                                        [facility]  
   
                                                    10- History of   
Present illness Narrative               Formatting of this note is   
different from the original.  
Ping Chavez  
  
Date of visit: 10/3/2024 YOB: 1950  
Age: 74 y.o. MRN: [id number]  
_______________________  
Patient Active Problem List  
Diagnosis  
Automatic implantable   
cardioverter-defibrillator in   
situ [employer]  
Cancer (CMS-HCC)  
Gout  
Dyslipidemia  
Dyspnea  
Edema  
Dizziness  
Vertigo  
Left bundle branch block (LBBB)  
Benign essential hypertension  
Nonischemic congestive   
cardiomyopathy (CMS-[facility])  
Overweight  
Atrial fibrillation (CMS-[facility])  
CHF (congestive heart failure)   
(CMS-HCC)  
Hypertension  
Hyperlipemia  
  
_______________________  
Allergies  
Allergen Reactions  
Cefaclor  
Other reaction(s):   
Intolerance-unknown  
  
_______________________  
Current Outpatient Medications  
Medication Sig Dispense Refill  
acetaminophen (TYLENOL) 325 mg   
tablet Take 2 tablets (650 mg   
total) by mouth every 6 (six)   
hours as needed for pain.  
allopurinol (ZYLOPRIM) 100 mg   
tablet Take 1 tablet (100 mg   
total) by mouth in the morning.  
calcium carbonate (CALCIUM 500   
ORAL) Take by mouth daily.  
carvediloL (COREG) 6.25 mg tablet   
Take 1 tablet in the morning, 2   
tablets in the evening 90 tablet   
3  
cholecalciferol, vitamin D3,   
2,000 units tablet Take 1 tablet   
(2,000 Units total) by mouth in   
the morning.  
empagliflozin (JARDIANCE) 10 mg   
tablet tablet Take 1 tablet (10   
mg total) by mouth in the   
morning. 30 tablet 11  
esomeprazole (NexIUM) 20 mg   
capsule Take 1 capsule (20 mg   
total) by mouth in the evening.  
ferrous sulfate 325 (65 FE) mg EC   
tablet Take 1 tablet (325 mg   
total) by mouth every other day.  
furosemide (LASIX) 20 mg tablet   
Take 1 tablet (20 mg total) by   
mouth as needed (fluid wt gain).   
Take 1 tab if fluid wt gain of 3#   
or more in 1 day or 4-5# in 4   
days  
gabapentin (NEURONTIN) 100 mg   
capsule Take 1 capsule (100 mg   
total) by mouth 3 (three) times a   
day.  
levothyroxine (SYNTHROID,   
LEVOTHROID) 50 MCG tablet Take 1   
tablet (50 mcg total) by mouth in   
the morning.  
loratadine (CLARITIN) 10 mg   
tablet Take 1 tablet (10 mg   
total) by mouth as needed for   
allergies.  
losartan (COZAAR) 25 mg tablet   
Take 0.5 tablets (12.5 mg total)   
by mouth in the morning.  
magnesium oxide 500 mg tablet   
Take 1 tablet (500 mg total) by   
mouth in the morning.  
multivit-iron-FA-calcium &mins   
(THERAGRAN-M) 9 mg iron-400 mcg   
tablet Take 1 tablet by mouth in   
the morning.  
omega-3/dha/epa/dpa/fish oil   
(OMEGA-3 2100 ORAL) Take by mouth   
2 (two) times a day.  
  
sertraline (ZOLOFT) 25 mg tablet   
Take 1 tablet (25 mg total) by   
mouth in the morning.  
simvastatin (ZOCOR) 40 mg tablet   
Take 1 tablet (40 mg total) by   
mouth nightly.  
spironolactone (ALDACTONE) 25 mg   
tablet Take 1 tablet (25 mg   
total) by mouth in the morning.   
90 tablet 2  
timolol (TIMOPTIC) 0.5 %   
ophthalmic solution Administer 1   
drop to both eyes in the morning   
and 1 drop before bedtime. 15 mL   
3  
XARELTO 20 mg tablet tablet TAKE   
1 TABLET(20 MG) BY MOUTH IN THE   
MORNING 30 tablet 9  
L.acid/B.bifidum/B.animal/FOS   
(PROBIOTIC COMPLEX ORAL) Take by   
mouth daily. (Patient not taking:   
Reported on 10/3/2024)  
  
No current facility-administered   
medications for this visit.  
  
_______________________  
Chief Complaint  
Patient presents with  
Device Check  
Follow-up  
6MO W/DEVICE, SCHED W/PT  
  
_______________________  
History of Present Illness  
Ping was seen today for   
follow-up of her chronic systolic   
heart failure and nonischemic   
cardiomyopathy. She has   
underlying complete heart block   
and has a ICD device. She is here   
for device check.  
  
She has not any recent orthopnea   
or paroxysmal nocturnal dyspnea.   
She does get short of breath with   
increased activity.  
  
She has done well with   
goal-directed medical therapy,   
and takes furosemide on an   
as-needed basis.  
_______________________  
Past Medical History:  
Diagnosis Date  
Cancer ([facility])  
BREAST  
Cardiomyopathy  
CHF (congestive heart failure)   
(CMS-HCC)  
Dizziness  
Dyslipidemia  
Dyspnea  
Edema  
Glaucoma  
Gout  
History of YAG laser capsulotomy   
of lens of right eye-Dr. Dolan   
2018  
History of YAG laser capsulotomy   
of lens, left-Dr. Dolan   
2018  
Hypertension  
LBBB (left bundle branch block)  
PVD (posterior vitreous   
detachment)  
Vertigo  
  
No data recorded  
_______________________  
Past Surgical History:  
Procedure Laterality Date  
APPENDECTOMY  
BREAST SURGERY  
CARDIAC DEFIBRILLATOR PLACEMENT   
2013  
[employer] BiV ICD/ Generator   
replacement: 10/20/2016  
CATARACT EXTRACTION W/   
INTRAOCULAR LENS IMPLANT   
Bilateral   
CHOLECYSTECTOMY  
CHOLECYSTECTOMY  
TONSILLECTOMY  
W/ADENOIDS  
  
_______________________  
Family History  
Problem Relation Age of Onset  
Hypertension Mother  
Coronary artery disease Father  
  
_______________________  
Social History  
  
Socioeconomic History  
Marital status:   
Spouse name: Not on file  
Number of children: Not on file  
Years of education: Not on file  
Highest education level: Not on   
file  
Occupational History  
Not on file  
Tobacco Use  
Smoking status: Never  
Smokeless tobacco: Never  
Vaping Use  
Vaping status: Never Used  
Substance and Sexual Activity  
Alcohol use: Yes  
Comment: BEER ONE A WEEK  
Drug use: No  
Sexual activity: Defer  
Other Topics Concern  
Caffeine Use Yes  
Comment: COFFEE RARE  
Social History Narrative  
Not on file  
  
Social Determinants of Health  
  
Financial Resource Strain: Not on   
file  
Food Insecurity: No Food   
Insecurity (2023)  
Hunger Screening  
Food Insecurity - Worry: Never   
True  
Food Insecurity - Inability:   
Never True  
Transportation Needs: Not on file  
Physical Activity: Not on file  
Stress: Not on file  
Social Connections: Not on file  
Interpersonal Safety: Not on file  
Housing Instability: Not on file  
  
_______________________  
Review of Systems  
Review of Systems  
Constitutional: Negative for   
malaise/fatigue.  
HENT: Negative for nosebleeds.  
Respiratory: Positive for   
shortness of breath. Negative for   
cough and wheezing.  
Hematologic/Lymphatic: Does not   
bruise/bleed easily.  
Musculoskeletal: Positive for   
arthritis, joint pain and joint   
swelling. Negative for muscle   
cramps and muscle weakness.  
Gastrointestinal: Positive for   
diarrhea (occ). Negative for   
bloating, abdominal pain,   
constipation, heartburn,   
hematochezia, nausea and   
vomiting.  
Genitourinary: Negative for   
hematuria.  
Neurological: Negative for   
dizziness, headaches and   
light-headedness.  
Vascular: Negative for   
claudication and lower extremity   
wounds or ulcers.  
  
CARDIOVASCULAR: Please review   
HPI.  
_______________________  
Physical Examination  
General appearance: Alert,   
oriented and cooperative. In no   
acute distress.  
Skin: Warm and dry to touch.  
Head: Normocephalic, without   
obvious abnormality, atraumatic.  
Ears, Nose, Mouth, Throat: Throat   
clear without erythema or   
exudate. Dentition intact.  
Eyes: Conjunctivae unremarkable,   
EOM intact.  
Neck: No JVD, No carotid bruit.   
Neck supple, trachea midline.  
Respiratory: Clear to   
auscultation bilaterally, no use   
of accessory muscles.  
Cardiovascular: RRR with normal   
S1 and S2 with no murmurs.  
Gastrointestinal: Soft,   
non-tender. Bowel sounds normal.  
Musculoskeletal: No peripheral   
edema.  
Neurologic: Oriented to time,   
person and place, affect   
appropriate. No focal/major motor   
defects noted.  
Psychiatric: Appropriate mood,   
memory and judgement.  
_______________________  
VITAL SIGNS:  
/82   Pulse 70   Ht 175.3   
cm (5' 9.02 )   Wt 100.7 kg (222   
lb)   SpO2 94%   BMI 32.77 kg/m  
_______________________  
No orders of the defined types   
were placed in this encounter.  
  
Medications Discontinued During   
This Encounter  
Medication Reason  
TRELEGY ELLIPTA 200-62.5-25 mcg   
blister with device  
  
_______________________  
[id number] Score: 4  
4.8% Stroke risk per year, 6.7%   
risk of stroke/TIA/systemic   
embolism  
______________  
_________  
IMPRESSIONS/PLAN  
1. Persistent atrial fibrillation   
(CMS-HCC)  
  
2. Chronic combined systolic and   
diastolic congestive heart   
failure (CMS-HCC)  
  
3. Left bundle branch block   
(LBBB)  
  
Her device check reveals   
appropriate sensing and is 100%   
biventricular paced.  
  
She will continue current medical   
therapy. She is planning a trip   
to [place] in the near future   
and has Lasix as needed.  
  
Her battery longevity estimate is   
11 months, and she will establish   
with an electrophysiologist at   
next visit and device check in 6   
months.  
  
Follow-up is arranged  
_______________________  
TODAYS ORDERS  
No orders of the defined types   
were placed in this encounter.  
  
_______________________  
FOLLOW UP  
Return in about 6 months (around   
4/3/2025).  
  
PCP: Alexander Guerra MD  
Referring Physician: Alexander Guerra MD  
[address] [place] [address]  
  
  
Electronically signed by Angel Gerardo DO at 10/03/2024 10:56   
AM EDT  
documented in this encounter            [facility]  
   
                                                    10- History of   
Present illness Narrative               Formatting of this note might be   
different from the original.  
I agree with the findings in the   
scanned document.  
Electronically signed by James Vazquez MD at 10/04/2024 1:43 AM EDT  
documented in this encounter            [facility]  
   
                                                    2024 History of   
Present illness Narrative               Formatting of this note is   
different from the original.  
Ping Wood Scott  
  
Date of visit: 3/12/2024 YOB: 1950  
Age: 74 y.o. MRN: [id number]  
_______________________  
Patient Active Problem List  
Diagnosis  
Automatic implantable   
cardioverter-defibrillator in   
situ [facility].  
Cancer (CMS-HCC)  
Gout  
Dyslipidemia  
Dyspnea  
Edema  
Dizziness  
Vertigo  
Left bundle branch block (LBBB)  
Benign essential hypertension  
Nonischemic congestive   
cardiomyopathy (CMS-HCC)  
Overweight  
Atrial fibrillation (CMS-HCC)  
CHF (congestive heart failure)   
(CMS-HCC)  
  
_______________________  
Allergies  
Allergen Reactions  
Cefaclor  
Other reaction(s):   
Intolerance-unknown  
  
_______________________  
Current Outpatient Medications  
Medication Sig Dispense Refill  
acetaminophen (TYLENOL) 325 mg   
tablet Take 2 tablets (650 mg   
total) by mouth every 6 (six)   
hours as needed for pain.  
allopurinol (ZYLOPRIM) 100 mg   
tablet Take 1 tablet (100 mg   
total) by mouth in the morning.  
calcium carbonate (CALCIUM 500   
ORAL) Take by mouth daily.  
carvediloL (COREG) 6.25 mg tablet   
TAKE 1 TABLET BY MOUTH TWICE   
DAILY WITH MEALS 60 tablet 3  
cholecalciferol, vitamin D3,   
2,000 units tablet Take 1 tablet   
(2,000 Units total) by mouth in   
the morning.  
esomeprazole (NexIUM) 20 mg   
capsule Take 1 capsule (20 mg   
total) by mouth in the evening.  
ferrous sulfate 325 (65 FE) mg EC   
tablet Take 1 tablet (325 mg   
total) by mouth in the morning.  
fluvoxaMINE (LUVOX) 100 mg tablet   
Take 0.5 tablets (50 mg total) by   
mouth in the morning and 0.5   
tablets (50 mg total) before   
bedtime.  
furosemide (LASIX) 20 mg tablet   
Take 0.5 tablets (10 mg total) by   
mouth as needed (please have labs   
drawn for future refills.). 90   
tablet 2  
gabapentin (NEURONTIN) 100 mg   
capsule Take 1 capsule (100 mg   
total) by mouth 3 (three) times a   
day.  
L.acid/B.bifidum/B.animal/FOS   
(PROBIOTIC COMPLEX ORAL) Take by   
mouth daily.  
levothyroxine (SYNTHROID,   
LEVOTHROID) 50 MCG tablet Take 1   
tablet (50 mcg total) by mouth in   
the morning.  
loratadine (CLARITIN) 10 mg   
tablet Take 1 tablet (10 mg   
total) by mouth as needed for   
allergies.  
losartan (COZAAR) 25 mg tablet   
Take 1 tablet (25 mg total) by   
mouth in the morning.  
magnesium oxide 500 mg tablet   
Take 1 tablet (500 mg total) by   
mouth in the morning.  
multivit-iron-FA-calcium &mins   
(THERAGRAN-M) 9 mg iron-400 mcg   
tablet Take 1 tablet by mouth in   
the morning.  
omega-3/dha/epa/dpa/fish oil   
(OMEGA-3 2100 ORAL) Take by mouth   
2 (two) times a day.  
  
simvastatin (ZOCOR) 40 mg tablet   
Take 1 tablet (40 mg total) by   
mouth nightly.  
spironolactone (ALDACTONE) 25 mg   
tablet Take 0.5 tablets (12.5 mg   
total) by mouth in the morning.  
timolol (TIMOPTIC) 0.5 %   
ophthalmic solution Administer 1   
drop to both eyes in the morning   
and 1 drop before bedtime. 15 mL   
3  
XARELTO 20 mg tablet tablet TAKE   
1 TABLET(20 MG) BY MOUTH IN THE   
MORNING 30 tablet 9  
TRELEGY ELLIPTA 200-62.5-25 mcg   
blister with device INHALE 1 PUFF   
BY MOUTH ONCE DAILY (Patient not   
taking: Reported on 2023)  
  
No current facility-administered   
medications for this visit.  
  
_______________________  
Chief Complaint  
Patient presents with  
Follow-up  
Hypertension  
Atrial Fibrillation  
  
_______________________  
History of Present Illness  
Ping was seen today for   
follow-up of her nonischemic   
cardiomyopathy.  
  
She has a biventricular ICD in   
place. She has had no shocks.  
  
She denies any chest pain,   
pressure, or shortness of breath.   
She states that she feels   
somewhat foggy recently but has   
had difficulty with the vertigo   
as well. She has been compliant   
with her medications.  
_______________________  
Past Medical History:  
Diagnosis Date  
Cancer (CMS-HCC)  
BREAST  
Cardiomyopathy  
CHF (congestive heart failure)   
(CMS-[facility])  
Dizziness  
Dyslipidemia  
Dyspnea  
Edema  
Glaucoma  
Gout  
History of YAG laser capsulotomy   
of lens of right eye-Dr. Dolan   
2018  
History of YAG laser capsulotomy   
of lens, left-Dr. Dolan   
2018  
Hypertension  
LBBB (left bundle branch block)  
PVD (posterior vitreous   
detachment)  
Vertigo  
  
No data recorded  
_______________________  
Past Surgical History:  
Procedure Laterality Date  
APPENDECTOMY  
BREAST SURGERY  
CARDIAC DEFIBRILLATOR PLACEMENT   
2013  
[employer] Bi[employer] ICD/ Generator   
replacement: 10/20/2016  
CATARACT EXTRACTION W/   
INTRAOCULAR LENS IMPLANT   
Bilateral   
CHOLECYSTECTOMY  
CHOLECYSTECTOMY  
TONSILLECTOMY  
W/ADENOIDS  
  
_______________________  
Family History  
Problem Relation Age of Onset  
Hypertension Mother  
Coronary artery disease Father  
  
_______________________  
Social History  
  
Socioeconomic History  
Marital status:   
Spouse name: Not on file  
Number of children: Not on file  
Years of education: Not on file  
Highest education level: Not on   
file  
Occupational History  
Not on file  
Tobacco Use  
Smoking status: Never  
Smokeless tobacco: Never  
Vaping Use  
Vaping Use: Never used  
Substance and Sexual Activity  
Alcohol use: Yes  
Comment: BEER ONE A WEEK  
Drug use: No  
Sexual activity: Defer  
Other Topics Concern  
Caffeine Use Yes  
Comment: COFFEE RARE  
Social History Narrative  
Not on file  
  
Social Determinants of Health  
  
Financial Resource Strain: Not on   
file  
Food Insecurity: No Food   
Insecurity (2023)  
Hunger Screening  
Food Insecurity - Worry: Never   
True  
Food Insecurity - Inability:   
Never True  
Transportation Needs: Not on file  
Physical Activity: Not on file  
Stress: Not on file  
Social Connections: Not on file  
Interpersonal Safety: Not on file  
Housing Instability: Not on file  
  
_______________________  
Review of Systems  
Review of Systems  
Constitutional: Negative for   
chills, fever, weight gain and   
weight loss.  
HENT: Negative for nosebleeds.  
Eyes: Negative for blurred vision   
and double vision.  
Vascular: Negative for asymmetric   
leg edema, claudication, lower   
extremity wounds or ulcers and   
varicose veins.  
Respiratory: Negative for cough,   
shortness of breath and wheezing.  
Hematologic/Lymphatic: Negative   
for bleeding problem. Does not   
bruise/bleed easily.  
Skin: Negative for color change   
and rash.  
Musculoskeletal: Negative for   
joint swelling and muscle   
weakness.  
Gastrointestinal: Negative for   
change in bowel habit and   
hematochezia.  
Genitourinary: Negative for   
hematuria.  
Neurological: Negative for   
dizziness, headaches,   
light-headedness, loss of   
balance, numbness and tremors.  
Psychiatric/Behavioral: The   
patient is not nervous/anxious.  
Allergic/Immunologic: Negative   
for environmental allergies.  
  
CARDIOVASCULAR: Please review   
HPI.  
_______________________  
Physical Examination  
General appearance: Alert,   
oriented and cooperative. In no   
acute distress.  
Skin: Warm and dry to touch.  
Head: Normocephalic, without   
obvious abnormality, atraumatic.  
Ears, Nose, Mouth, Throat: Throat   
clear without erythema or   
exudate. Dentition intact.  
Eyes: Conjunctivae unremarkable,   
EOM intact.  
Neck: No JVD, No carotid bruit.   
Neck supple, trachea midline.  
Respiratory: Clear to   
auscultation bilaterally, no use   
of accessory muscles.  
Cardiovascular: RRR with normal   
S1 and S2 with no murmurs.  
Gastrointestinal: Soft,   
non-tender. Bowel sounds normal.  
Musculoskeletal: No peripheral   
edema.  
Neurologic: Oriented to time,   
person and place, affect   
appropriate. No focal/major motor   
defects noted.  
Psychiatric: Appropriate mood,   
memory and judgement.  
_______________________  
VITAL SIGNS:  
/90   Pulse 70   Ht 175.3   
cm (5' 9 )   Wt 103.9 kg (229 lb)   
  SpO2 100%   BMI 33.82 kg/m  
_______________________  
No orders of the defined types   
were placed in this encounter.  
  
There are no discontinued   
medications.  
  
_______________________  
[id number]-Holt Score: 4  
4.8% Stroke risk per year, 6.7%   
risk of stroke/TIA/systemic   
embolism  
______________  
_________  
IMPRESSIONS/PLAN  
1. Cardiomyopathy  
  
2. Persistent atrial fibrillation   
(CMS-HCC)  
  
3. Chronic systolic heart failure   
(CMS-HCC)  
  
4. Left bundle branch block   
(LBBB)  
  
Recent device check appear to be   
favorable.  
  
She will be due for a follow-up   
in 6 months with a device check   
at that time.  
  
She appears to be well   
compensated. She will be checking   
her blood pressure on a more   
regular basis as she had an   
elevated reading today. She   
states that generally is in the   
therapeutic range.  
  
Follow-up is arranged for 6   
months.  
_______________________  
TODAYS ORDERS  
No orders of the defined types   
were placed in this encounter.  
  
_______________________  
FOLLOW UP  
Return in about 6 months (around   
2024).  
  
PCP: Alexander Guerra MD  
Referring Physician: Alexander Guerra MD  
[address] [place] [address]  
  
  
Electronically signed by Angel Gerardo DO at 2024 2:15 PM   
EDT  
documented in this encounter            [facility]  
   
                                                    2024 Miscellaneous   
Notes                                   Formatting of this note might be   
different from the original.  
Called patient to remind them to   
bring their most current copy of   
their medication list with them   
to their appt. Patient verbalizes   
understanding.  
Electronically signed by Elba Ibrahim CMA at 2024 8:25 AM   
EDT  
documented in this encounter            [facility]  
   
                                                    2024 Telephone   
encounter Note                          Formatting of this note might be   
different from the original.  
Called patient to remind them to   
bring their most current copy of   
their medication list with them   
to their appt. Patient verbalizes   
understanding.  
Electronically signed by Elba Ibrahim CMA at 2024 8:25 AM   
EDT  
                                        [facility]  
   
                                        2024 Note     Right Eye  
Reliability was good. Progression   
has been stable. Foveal threshold   
was  
normal. Findings include normal   
observations.  
  
Left Eye  
Reliability was good. Progression   
has been stable. Foveal threshold   
was  
normal. Findings include normal   
observations.  
  
Notes  
Clean VF OU                             MANUALLY TRANSCRIBED   
RESULTS  
   
                                                    2024 Miscellaneous   
Notes                                   Formatting of this note might be   
different from the original.  
23 OV  
  
23 CBC, CMP  
Electronically signed by   
Nelda Palencia RN at   
2024 3:09 PM EST  
documented in this encounter            [facility]  
   
                                                    2024 Telephone   
encounter Note                          Formatting of this note might be   
different from the original.  
23 OV  
  
23 CBC, CMP  
Electronically signed by   
Nelda Palencia RN at   
2024 3:09 PM EST  
                                        [facility]  
   
                                                    2023 Hospital   
Discharge instructions                    
  
  
Patient Education  
  
  
2023 10:05:11  
  
  
Hematuria, Adult  
  
  
Hematuria, Adult  
Hematuria is blood in the urine.   
Blood may be visible in the   
urine, or it may be identified   
with a test. This condition can   
be caused by infections of the   
bladder, urethra, kidney, or   
prostate. Other possible causes   
include:  
Kidney stones.  
Cancer of the urinary tract.  
Too much calcium in the urine.  
Conditions that are passed from   
parent to child (inherited   
conditions).  
Exercise that requires a lot of   
energy.  
Infections can usually be treated   
with medicine, and a kidney stone   
usually will pass through your   
urine. If neither of these is the   
cause of your hematuria, more   
tests may be needed to identify   
the cause of your symptoms.  
It is very important to tell your   
health care provider about any   
blood in your urine, even if it   
is painless or the blood stops   
without treatment. Blood in the   
urine, when it happens and then   
stops and then happens again, can   
be a symptom of a very serious   
condition, including cancer.   
There is no pain in the initial   
stages of many urinary cancers.  
Follow these instructions at   
home:  
Medicines  
Take over-the-counter and   
prescription medicines only as   
told by your health care   
provider.  
If you were prescribed an   
antibiotic medicine, take it as   
told by your health care   
provider. Do not stop taking the   
antibiotic even if you start to   
feel better.  
Eating and drinking  
Drink enough fluid to keep your   
urine pale yellow. It is   
recommended that you drink 3 4   
quarts (2.8 3.8 L) a day. If you   
have been diagnosed with an   
infection, drinking cranberry   
juice in addition to large   
amounts of water is recommended.  
Avoid caffeine, tea, and   
carbonated beverages. These tend   
to irritate the bladder.  
Avoid alcohol because it may   
irritate the prostate (in males).  
General instructions  
If you have been diagnosed with a   
kidney stone, follow your health   
care provider's instructions   
about straining your urine to   
catch the stone.  
Empty your bladder often. Avoid   
holding urine for long periods of   
time.  
If you are female:  
?After a bowel movement, wipe   
from front to back and use each   
piece of toilet paper only once.  
?Empty your bladder before and   
after sex.  
Pay attention to any changes in   
your symptoms. Tell your health   
care provider about any changes   
or any new symptoms.  
It is up to you to get the   
results of any tests. Ask your   
health care provider, or the   
department that is doing the   
test, when your results will be   
ready.  
Keep all follow-up visits. This   
is important.  
Contact a health care provider   
if:  
You develop back pain.  
You have a fever or chills.  
You have nausea or vomiting.  
Your symptoms do not improve   
after 3 days.  
Your symptoms get worse.  
Get help right away if:  
You develop severe vomiting and   
are unable to take medicine   
without vomiting.  
You develop severe pain in your   
back or abdomen even though you   
are taking medicine.  
You pass a large amount of blood   
in your urine.  
You pass blood clots in your   
urine.  
You feel very weak or like you   
might faint.  
You faint.  
Summary  
Hematuria is blood in the urine.   
It has many possible causes.  
It is very important that you   
tell your health care provider   
about any blood in your urine,   
even if it is painless or the   
blood stops without treatment.  
Take over-the-counter and   
prescription medicines only as   
told by your health care   
provider.  
Drink enough fluid to keep your   
urine pale yellow.  
This information is not intended   
to replace advice given to you by   
your health care provider. Make   
sure you discuss any questions   
you have with your health care   
provider.  
Document Revised: 2021   
Document Reviewed: 2021  
[employer] Patient Education    
[employer]  
  
  
  
Follow Up Care  
  
  
2022 09:43:54  
  
  
With:DEBBI JORDAN, Salvador LAZARO, URL  
Address:  
[address] [place] [address]-  
[phone]  
  
When:  
Unknown                                 [facility]   
[facility]  
Work Phone: [phone]  
   
                                                    2022 Hospital   
Discharge instructions                    
  
  
Patient Education  
  
  
2022 09:42:28  
  
  
Hematuria, Adult  
  
  
Hematuria, Adult  
Hematuria is blood in the urine.   
Blood may be visible in the   
urine, or it may be identified   
with a test. This condition can   
be caused by infections of the   
bladder, urethra, kidney, or   
prostate. Other possible causes   
include:  
Kidney stones.  
Cancer of the urinary tract.  
Too much calcium in the urine.  
Conditions that are passed from   
parent to child (inherited   
conditions).  
Exercise that requires a lot of   
energy.  
Infections can usually be treated   
with medicine, and a kidney stone   
usually will pass through your   
urine. If neither of these is the   
cause of your hematuria, more   
tests may be needed to identify   
the cause of your symptoms.  
It is very important to tell your   
health care provider about any   
blood in your urine, even if it   
is painless or the blood stops   
without treatment. Blood in the   
urine, when it happens and then   
stops and then happens again, can   
be a symptom of a very serious   
condition, including cancer.   
There is no pain in the initial   
stages of many urinary cancers.  
Follow these instructions at   
home:  
Medicines  
Take over-the-counter and   
prescription medicines only as   
told by your health care   
provider.  
If you were prescribed an   
antibiotic medicine, take it as   
told by your health care   
provider. Do not stop taking the   
antibiotic even if you start to   
feel better.  
Eating and drinking  
Drink enough fluid to keep your   
urine clear or pale yellow. It is   
recommended that you drink 3 4   
quarts (2.8 3.8 L) a day. If you   
have been diagnosed with an   
infection, it is recommended that   
you drink cranberry juice in   
addition to large amounts of   
water.  
Avoid caffeine, tea, and   
carbonated beverages. These tend   
to irritate the bladder.  
Avoid alcohol because it may   
irritate the prostate (men).  
General instructions  
If you have been diagnosed with a   
kidney stone, follow your health   
care provider's instructions   
about straining your urine to   
catch the stone.  
Empty your bladder often. Avoid   
holding urine for long periods of   
time.  
If you are female:  
?After a bowel movement, wipe   
from front to back and use each   
piece of toilet paper only once.  
?Empty your bladder before and   
after sex.  
Pay attention to any changes in   
your symptoms. Tell your health   
care provider about any changes   
or any new symptoms.  
It is your responsibility to get   
your test results. Ask your   
health care provider, or the   
department performing the test,   
when your results will be ready.  
Keep all follow-up visits as told   
by your health care provider.   
This is important.  
Contact a health care provider   
if:  
You develop back pain.  
You have a fever.  
You have nausea or vomiting.  
Your symptoms do not improve   
after 3 days.  
Your symptoms get worse.  
Get help right away if:  
You develop severe vomiting and   
are unable take medicine without   
vomiting.  
You develop severe pain in your   
back or abdomen even though you   
are taking medicine.  
You pass a large amount of blood   
in your urine.  
You pass blood clots in your   
urine.  
You feel very weak or like you   
might faint.  
You faint.  
Summary  
Hematuria is blood in the urine.   
It has many possible causes.  
It is very important that you   
tell your health care provider   
about any blood in your urine,   
even if it is painless or the   
blood stops without treatment.  
Take over-the-counter and   
prescription medicines only as   
told by your health care   
provider.  
Drink enough fluid to keep your   
urine clear or pale yellow.  
This information is not intended   
to replace advice given to you by   
your health care provider. Make   
sure you discuss any questions   
you have with your health care   
provider.  
Document Released: 2006   
Document Revised: 2020   
Document Reviewed: 2018  
[employer] Patient Education 2020   
[employer]  
  
  
  
Follow Up Care  
  
  
2022 12:57:02  
  
  
With:DEBBI JORDAN, Salvador LAZARO, URL  
Address:  
[address] [place] [address] [address] [place] [address]  
[phone]  
  
When:6 months  
Comments:renal US                       [facility]   
[facility]  
Work Phone: [phone]  
   
                                2022 Note                 [facility]  
   
                                        Comment on above:   Result Comment: Elec  
tronically Signed By: Hilary NIEVES\.br\Date and Time Signed: 22 11:39   
EST\.br\Electronically Co-Signed By: Jan JORDAN, Babak RUIZ\.br\Date and Time Co-Signed: 22 12:00 EST   
  
  
  
                                        2022 Evaluation + Plan note Extrac  
elliot from:  
  
  
  
                                Title:Discharge Note Author:Sana NIEVES Date:22  
  
  
  
                                                    Hemodynamically stable condi  
tion  
  
  
  
Discharge To, Anticipated II -  
Home with responsible caregiver  
  
Discharged to -  
Home with family care  
  
Discharge Status: Improved  
  
Discharge Instructions Given: To patient  
  
Discharge disposition: Home  
  
Prescriptions reviewed with Patient  
  
47 minutes spent in discharge time with patient, collaborating MD, nursing 
staff, CRM  
  
Discharge Diet(s): Regular, Fat Modified- Low cholesterol, Low Sodium- 2000 mg   
(22 11:29:00)  
  
  
Prescriptions  
Levaquin 750 mg Tab, 750 mg= 1 tab(s), Oral, Daily  
Nexium 20 mg Cap-DR, 20 mg= 1 cap(s), Oral, Daily  
nystatin 100,000 units/mL Oral Susp, 251375 unit(s)= 5 mL, Oral, q6hrFT  
Pro-Air HFA CFC free 90 mcg/inh MDI, 2 puff(s), Inhalation, q4hr, PRN  
Zofran ODT 4 mg Tab-Dis, 4 mg= 1 tab(s), Oral, q6hr  
  
Home  
acetaminophen 325 mg Tab, 650 mg= 2 tab(s), Oral, q6hr, PRN  
allopurinol 100 mg Tab, 100 mg= 1 tab(s), Oral, Daily  
calcium (as carbonate) 600 mg oral tablet, 600 mg= 1 tab(s), Oral, Daily  
carvedilol 12.5 mg Tab, 12.5 mg= 1 tab(s), Oral, BID  
Flonase 0.05 mg/inh Spray, 0.1 mg= 2 spray(s), Nasal, Daily  
fluvoxamine 100 mg oral tablet, 50 mg= 0.5 tab(s), Oral, BID  
furosemide 20 mg Tab, See Instructions  
gabapentin 100 mg Cap, 100 mg= 1 cap(s), Oral, TID  
levothyroxine 50 mcg (0.05 mg) Tab, 50 mcg= 1 tab(s), Oral, Daily  
losartan 25 mg Tab, 25 mg= 1 tab(s), Oral, Daily  
magnesium oxide 500 mg oral tablet, 500 mg= 1 tab(s), Oral, Daily  
melatonin 3 mg Tab, 3 mg= 1 tab(s), Oral, Bedtime, PRN  
Mucinex 600 mg Tab-ER, 1200 mg= 2 tab(s), Oral, BID  
Multi-Day Plus Minerals, 1 tab(s), Oral, Daily, Not taking  
Nature's Bounty Probiotic, 1 tab(s), Oral, Daily, Not taking: Not on patient's 
home   
medication list  
simvastatin 40 mg Tab, 40 mg= 1 tab(s), Oral, Once a day (at bedtime)  
spironolactone 25 mg Tab, 12.5 mg= 0.5 tab(s), Oral, Daily  
timolol ophthalmic 0.5% solution, 1 drop(s), Eye-Right, Daily  
Trelegy Ellipta 200 mcg-62.5 mcg-25 mcg/inh inhalation powder, 1 puff(s), 
Inhalation,   
Daily  
Vitamin D3 2000 intl units oral Tab, 2000 International_Unit= 1 cap(s), Oral, 
Daily  
Xarelto 20 mg oral tablet, 20 mg= 1 tab(s), Oral, qPM  
  
  
  
  
With When Contact Information Marty Osborne Within 1 to 2 weeks  
[address] [place] [address]- [phone] Business (1)  
Additional Instructions:  
  
Alexei CROW Within 2 to 4 weeks  
[address] [facility]  
[address]  
[place] [address]-  
[phone] Business (1)  
Additional Instructions:  
  
ALEXANDER GUERRA In 0 days  
[address]  
[place] [address]-  
[phone] Business (1)  
Additional Instructions:  
  
  
  
  
Bacteremia, Adult  
Core Measures- Pneumonia [facility] [place]  
  
  
  
  
  
Extracted from:  
  
                                Title:APSO Note Author:OUMAR GONZALEZ-BC, Hilary REAGAN  
ate:22  
  
  
  
                                                    1. Bacteremia (R78.81: Bacte  
remia)  
Unclear caution - urine is neg, CT chest/abd./pelvis are non acute, ? early PNA 
that   
was treated prior to scans  
  
-CT chest, abd/pelvis - Hyperattenuating 5 mm right midpole renal cortical 
nodule   
finding may represent hemorrhagic cyst, cardiomegaly, right mastectomy, small   
bilateral pleural effusions, no mass identified, small hiatal hernia,   
cholecystectomy, stable left adrenal nodule  
  
-: Bl. cx. prelim - Streptococcus species Non Hemolytic  
-: Bl. cx. - prelim - neg day 1  
-Levaquin initiated in ED -> transitioned to -> IV vanco, IV cefepime - rx. to 
dose -   
  
  
-Consult ID - : case d/w Dr. Foss via phone w/ recs to transition IV   
vanco/cefepime to IV levaquin w/ transition to po at d/c for a total of 2wks.  
  
  
  
  
  
  
  
Ordered:  
[facility] Care/Day High 35 Minutes 00002  
  
2. Hypoxia (R09.02: Hypoxemia)  
Likely 2/2 b/l pleural effusions, ? bronchitis, no PNA on CT  
-Per chart review nrsng reported pulse ox of 84% but not documented  
-Denies home 02 use -> on RA   
-Flu A&B, COVID 19 - neg  
-CXR: No acute process  
-CT chest: as above  
-Procal level - 1.08  
-Sputum cx. - 1+ yeast, resp. cathi  
-Med nebs, mucinex, IS, supplemental 02  
-IV Azithromycin -   
  
  
  
  
  
  
  
  
  
  
3. Bilateral pleural effusion (J90: Pleural effusion, not elsewhere classified)  
Per CT chest  
-: On RA  
  
  
  
4. Urinary tract infection (N39.0: Urinary tract infection, site not specified)  
-Urine cx. - mixed skin contaminants  
  
  
  
5. Fever (R50.9: Fever, unspecified)  
2/2 above  
-Last fever   
-See above  
  
  
  
6. Elevated troponin (R77.8: Other specified abnormalities of plasma proteins)  
Hx. of non ischemic cardiomyopathy  
-Consult FT/HV - no further inpt. cardiac w/u planned -> cont. chronic meds  
-F/U w/ primary cardiologist as outpt.  
  
  
  
  
7. Nodule of kidney (N28.89: Other specified disorders of kidney and ureter)  
Per CT as above  
-Renal US -7 x 8 x 8 mm right mid pole cortical simple renal cyst - pt. states 
she   
was aware of this finding previously  
-F/U w/ urology as oupt.  
  
  
  
8. Open wound of right knee (S81.001A: Unspecified open wound, right knee, 
initial   
encounter)  
Sm. lac type area - no s/s of infection  
-Band aid  
  
  
  
9. Hyponatremia (E87.1: Hypo-osmolality and hyponatremia)  
Acute on chronic  
-Asymptomatic  
-Baseline Na level: 131  
  
  
  
10. Hypokalemia (E87.6: Hypokalemia)  
Replete K/Mag prn  
-Trend labs  
  
  
  
11. Chronic systolic heart failure (I50.22: Chronic systolic (congestive) heart   
failure)  
-EF 40%, normal RV, biatrial enlargement, mild to moderate MR, moderate to 
severe TR,   
mild PA hypertension, pseudo normal diastolic filling pattern, pacemaker RA/RV.  
  
-Consult FT/HV - euvolemic, stable for d/c from cardio standpoint on chronic 
home   
medications -Resume chronic cardiac meds  
- resume spironolactone  
-Hold furosemide - pt. states she takes on a prn wt. gain basis  
-F/U as above  
  
  
  
  
  
  
12. Presence of combination internal cardiac defibrillator (ICD) and pacemaker   
(Z95.810: Presence of automatic (implantable) cardiac defibrillator)  
Hx. of BiV ICD  
-In situ  
  
  
  
13. History of atrial fibrillation (Z86.79: Personal history of other diseases 
of the   
circulatory system)  
-Carvedilol, xarelto,  
  
  
  
14. Hypertension (I10: Essential (primary) hypertension)  
-Carvedilol, losartan,  
-Hold furosemide,  
-Spironolactone  
  
  
  
15. Hyperlipidemia (E78.5: Hyperlipidemia, unspecified)  
-Simvastatin  
  
  
16. Hypothyroidism (E03.9: Hypothyroidism, unspecified)  
-Levothyroxine  
  
  
  
17. Glaucoma (H40.9: Unspecified glaucoma)  
-Timolol  
  
  
  
18. Chronic GERD (K21.9: Gastro-esophageal reflux disease without esophagitis)  
-PPI  
  
  
19. Obesity (E66.9: Obesity, unspecified)  
BMI 33  
-Educated on need for lifestyle modifications with goal of weight loss as 
obesity has   
a negative impact on co-morbid conditions.  
  
  
  
  
20. History of gout (Z87.39: Personal history of other diseases of the   
musculoskeletal system and connective tissue)  
-Allopurinol, gabapentin  
  
  
  
21. History of breast cancer (Z85.3: Personal history of malignant neoplasm of   
breast)  
S/P radical mastectomy  
  
  
  
22. DVT prophylaxis (Z29.9: Encounter for prophylactic measures, unspecified)  
-Xarelto  
  
  
  
Orders:  
fluticasone nasal, 0.1 mg, 2 spray(s), Spray, Nasal, Daily, Routine, Start date   
22 9:00:00 EST   
gabapentin, 300 mg = 1 cap(s), Cap, Oral, Bedtime for 30 day(s), Stop date 
22   
20:59:00 EST, Start date 22 21:00:00 EST  
magnesium sulfate + Generic Diluent 50 mL, 2 gram = 50 mL, Soln-IV, IV 
Piggyback,   
Once, Stop date 22 9:00:00 EST, Routine, Start date 22 9:00:00 EST, 
25   
mL/hr, Infuse over 2 hour(s)  
polyethylene glycol 3350, 17 gram = 1 EA, Powder-Recon, Oral, Once, Stop date   
22 8:00:00 EST, Routine, Start date 22 8:00:00 EST, 22 7:58:00
 EST  
potassium chloride, 40 mEq = 2 tab(s), Tab-ER, Oral, Once, Stop date 22 
8:00:00   
EST, Routine, Start date 22 8:00:00 EST, 22 7:20:00 EST  
spironolactone, 12.5 mg = 0.5 tab(s), Tab, Oral, Daily, Routine, Start date 
22   
9:00:00 EST, 22 8:59:00 EST  
Basic Metabolic Panel  
Communication Order Physician to Nursing  
eGFR  
Magnesium Level  
Physical Therapy Evaluate Patient, Develop a Plan of Care and Implement Plan  
  
-Plan discussed w/ patient, nursing staff and CRM.  
  
This report was transcribed using voice recognition software. Every effort was 
made   
to ensure accuracy, however, inadvertently computerized transcription mistakes 
may be   
present.  
  
  
  
  
  
  
Extracted from:  
  
                                Title:APSO Note Author:Hilary NIEVES  
ate:22  
  
  
  
                                                    1. Bacteremia (R78.81: Bacte  
remia)  
Unclear caution - urine is neg, CT chest/abd./pelvis are non acute  
-CT chest, abd/pelvis - Hyperattenuating 5 mm right midpole renal cortical 
nodule   
finding may represent hemorrhagic cyst, cardiomegaly, right mastectomy, small   
bilateral pleural effusions, no mass identified, small hiatal hernia,   
cholecystectomy, stable left adrenal nodule  
  
-: Bl. cx. prelim - Streptococcus species Non Hemolytic  
-: Bl. cx. - prelim - pending  
-Levaquin initiated in ED -> transitioned to -> IV vanco, IV cefepime - rx. to 
dose -   
  
  
-Consult ID - : case d/w Dr. Foss via phone w/ recs to transition IV   
vanco/cefepime to IV levaquin w/ transition to po at d/c for a total of 2wks.  
  
  
  
  
  
  
  
Ordered:  
[facility]/Day High 35 Minutes [id number]  
  
2. Hypoxia (R09.02: Hypoxemia)  
Likely 2/2 b/l pleural effusions, ? bronchitis, no PNA on CT  
-Per chart review nrsng reported pulse ox of 84% but not documented  
-Denies home 02 use -> on RA today  
-Flu A&B, COVID 19 - neg  
-CXR: No acute process  
-CT chest: as above  
-Procal level - 1.08  
-Sputum cx. - pending  
-Med nebs, mucinex, IS, supplemental 02  
-IV Azithromycin -   
  
  
  
  
  
  
  
  
  
  
3. Bilateral pleural effusion (J90: Pleural effusion, not elsewhere classified)  
Per CT chest  
-: On RA  
  
  
  
4. Urinary tract infection (N39.0: Urinary tract infection, site not specified)  
-Urine cx. - mixed skin contaminants  
  
  
5. Fever (R50.9: Fever, unspecified)  
2/2 above  
-Last fever   
-See above  
  
  
  
6. Elevated troponin (R77.8: Other specified abnormalities of plasma proteins)  
Hx. of non ischemic cardiomyopathy  
-Consult FT/HV - no further inpt. cardiac w/u planned -> cont. chronic meds  
-F/U w/ primary cardiologist as outpt.  
  
  
  
  
7. Nodule of kidney (N28.89: Other specified disorders of kidney and ureter)  
Per CT as above  
-Renal US -7 x 8 x 8 mm right mid pole cortical simple renal cyst.  
-F/U w/ urology as oupt.  
  
  
  
  
8. Open wound of right knee (S81.001A: Unspecified open wound, right knee, 
initial   
encounter)  
Sm. lac type area - no s/s of infection  
-Band aid  
  
  
  
9. Hyponatremia (E87.1: Hypo-osmolality and hyponatremia)  
Acute on chronic  
-Asymptomatic  
-Baseline Na level: 131  
  
-Consult nephro - defer resuming diuretics to cardio, signed off  
-DC q6 Na levels - stable  
-TSH - wnl  
-Trend BMP  
  
  
  
  
  
  
  
  
  
  
10. Hypokalemia (E87.6: Hypokalemia)  
Replete K/Mag prn  
-Trend labs  
  
  
  
11. Chronic systolic heart failure (I50.22: Chronic systolic (congestive) heart   
failure)  
-Consult FT/HV - euvolemic, stable for d/c from cardio standpoint on chronic 
home   
medications  
-EF 40%, normal RV, biatrial enlargement, mild to moderate MR, moderate to 
severe TR,   
mild PA hypertension, pseudo normal diastolic filling pattern, pacemaker RA/RV.  
-Resume cardiac med per cardio  
- resume spironolactone  
-Hold furosemide - pt. states she takes on a prn wt. gain basis  
-F/U as above  
  
  
  
  
  
  
12. Presence of combination internal cardiac defibrillator (ICD) and pacemaker   
(Z95.810: Presence of automatic (implantable) cardiac defibrillator)  
Hx. of BiV ICD  
-In situ  
  
  
  
13. History of atrial fibrillation (Z86.79: Personal history of other diseases 
of the   
circulatory system)  
-Carvedilol, xarelto,  
  
  
14. Hypertension (I10: Essential (primary) hypertension)  
-Carvedilol, losartan,  
-Hold furosemide,  
-Spironolactone  
  
  
  
15. Hyperlipidemia (E78.5: Hyperlipidemia, unspecified)  
-Simvastatin  
  
  
16. Hypothyroidism (E03.9: Hypothyroidism, unspecified)  
-Levothyroxine  
  
  
17. Glaucoma (H40.9: Unspecified glaucoma)  
-Timolol  
  
  
18. Chronic GERD (K21.9: Gastro-esophageal reflux disease without esophagitis)  
-PPI  
  
  
19. Obesity (E66.9: Obesity, unspecified)  
BMI 33  
-Educated on need for lifestyle modifications with goal of weight loss as 
obesity has   
a negative impact on co-morbid conditions.  
  
  
  
20. History of gout (Z87.39: Personal history of other diseases of the   
musculoskeletal system and connective tissue)  
-Allopurinol, gabapentin  
  
  
21. History of breast cancer (Z85.3: Personal history of malignant neoplasm of   
breast)  
S/P radical mastectomy  
  
  
22. DVT prophylaxis (Z29.9: Encounter for prophylactic measures, unspecified)  
-Xarelto  
  
  
Orders:  
gabapentin, 100 mg = 1 cap(s), Cap, Oral, Daily for 30 day(s), Stop date 
22   
8:59:00 EST, Start date 22 9:00:00 EST  
gabapentin, 300 mg = 1 cap(s), Cap, Oral, Bedtime for 30 day(s), Stop date 
22   
20:59:00 EST, Start date 22 21:00:00 EST  
levofloxacin + Dextrose 5% in Water intravenous solution 150 mL, 750 mg = 150 
mL,   
Soln-IV, IV Piggyback, Daily, Routine, Start date 22 9:00:00 EST, 150 
mL/hr,   
Infuse over 1 hour(s)  
magnesium sulfate + Generic Diluent 50 mL, 2 gram = 50 mL, Soln-IV, IV 
Piggyback,   
Once, Stop date 22 9:00:00 EST, Routine, Start date 22 9:00:00 EST, 
25   
mL/hr, Infuse over 2 hour(s)  
potassium chloride, 40 mEq = 2 tab(s), Tab-ER, Oral, Once, Stop date 22 
8:00:00   
EST, Routine, Start date 22 8:00:00 EST, 22 7:20:00 EST  
spironolactone, 12.5 mg = 0.5 tab(s), Tab, Oral, Daily, Routine, Start date 
22   
9:00:00 EST, 22 8:59:00 EST  
Basic Metabolic Panel  
Cardiac Monitoring  
Communication Order Physician to Nursing  
Communication Order Physician to Nursing  
CT Abdomen/Pelvis w/o Contrast  
CT Chest w/o Contrast  
eGFR  
Extra Lav Tube  
Magnesium Level  
Physical Therapy Evaluate Patient, Develop a Plan of Care and Implement Plan  
Resuscitation Status - Full  
Thyroid Stimulating Hormone  
US Renal  
  
-Plan discussed w/ patient, nursing staff and CRM.  
  
This report was transcribed using voice recognition software. Every effort was 
made   
to ensure accuracy, however, inadvertently computerized transcription mistakes 
may be   
present.  
  
  
  
  
  
  
Extracted from:  
  
                                Title:Lakshmia    Author:Mahi JORDAN, [facility] Date:  
  
  
  
                                                    Impression and Plan  
1. Acute hypovolemic hyponatremia with normal baseline sodium: Likely from 
volume   
depletion. Sodium anthony at 129 and normalized to 135.  
2. Heart failure with EF 40% and moderate to severe TR status post AICD: She was
 on   
Lasix and spironolactone prior to admission; will defer to cardiology whether   
diuretics need to be resumed on outpatient basis.  
3. Acute kidney injury: Likely from volume depletion. Cr normalized  
Will sign off at this time; please reconsult if needed.  
  
  
Extracted from:  
  
                                Title:APSO Note Author:OUMAR GONZALEZ-Hilary RODRIGUEZ  
ate:22  
  
  
  
                                                    1. Bacteremia (R78.81: Bacte  
remia)  
Unclear caution - urine is neg  
-: Bl. cx. prelim - Streptococcus species Non Hemolytic  
-: Bl. cx. - prelim - pending  
-Levaquin initiated in ED -> transitioned to -> IV vanco, IV cefepime - rx. to 
dose -   
  
-Consult ID - pending  
-CT chest - pending  
-CT abd/pelvis - pending  
  
  
  
  
  
  
2. Hypoxia (R09.02: Hypoxemia)  
Per chart review nrsng reported pulse ox of 84% but not documented  
-Denies home 02 use -> Remains on NC - will wean down  
-Flu A&B, COVID 19 - neg  
-CXR: No acute process  
-CT chest: as above  
-Procal level - 1.08  
-Sputum cx. - pending  
-Med nebs, mucinex, IS, supplemental 02  
-IV Azithromycin -   
  
  
  
  
  
  
  
  
  
3. Urinary tract infection (N39.0: Urinary tract infection, site not specified)  
-Urine cx. - mixed skin contaminants  
  
  
4. Fever (R50.9: Fever, unspecified)  
2/2 above  
-See above  
  
  
5. Elevated troponin (R77.8: Other specified abnormalities of plasma proteins)  
Hx. of non ischemic cardiomyopathy  
-Consult FT/HV - no further inpt. cardiac w/u planned -> cont. chronic meds  
-F/U w/ primary cardiologist as outpt.  
  
  
  
6. Hyponatremia (E87.1: Hypo-osmolality and hyponatremia)  
Acute on chronic  
-Asymptomatic  
-Baseline Na level: 131  
  
-Consult nephro - hold spironolactone, IV bolus x 1, hold furosemide, 
spironolactone   
until reviewed w/ cardio - see cardio recs to resume home meds as prior  
  
-Na levels q6 per nephro  
-TSH - pending  
-Trend BMP  
  
  
  
  
  
  
  
  
7. Chronic systolic heart failure (I50.22: Chronic systolic (congestive) heart   
failure)  
-Consult FT/HV - euvolemic, stable for d/c from cardio standpoint on chronic 
home   
medications  
-Hold spironolactone per nephro  
-F/U as above  
  
  
  
8. Presence of combination internal cardiac defibrillator (ICD) and pacemaker   
(Z95.810: Presence of automatic (implantable) cardiac defibrillator)  
Hx. of BiV ICD  
-In situ  
  
  
9. History of atrial fibrillation (Z86.79: Personal history of other diseases of
 the   
circulatory system)  
-Carvedilol, xarelto,  
  
  
10. Hypertension (I10: Essential (primary) hypertension)  
-Carvedilol, losartan,  
-Hold furosemide, spironolactone - resume at d/c per cardio  
  
  
11. Hyperlipidemia (E78.5: Hyperlipidemia, unspecified)  
-Simvastatin  
  
  
12. Hypothyroidism (E03.9: Hypothyroidism, unspecified)  
-Levothyroxine,  
  
  
13. Glaucoma (H40.9: Unspecified glaucoma)  
-Timolol  
  
  
14. Chronic GERD (K21.9: Gastro-esophageal reflux disease without esophagitis)  
-PPI  
  
  
15. Obesity (E66.9: Obesity, unspecified)  
BMI 33  
-Educated on need for lifestyle modifications with goal of weight loss as 
obesity has   
a negative impact on co-morbid conditions.  
  
  
16. History of gout (Z87.39: Personal history of other diseases of the   
musculoskeletal system and connective tissue)  
-Allopurinol, gabapentin  
  
  
17. History of breast cancer (Z85.3: Personal history of malignant neoplasm of   
breast)  
S/P radical mastectomy  
  
  
18. DVT prophylaxis (Z29.9: Encounter for prophylactic measures, unspecified)  
-Xarelto  
  
  
Orders:  
gabapentin, 400 mg = 4 cap(s), Cap, Oral, BID, Routine, Start date 22 
21:00:00   
EST, 22 10:21:00 EST  
ipratropium, 2.5 mL, Soln-Inh, NEB, QID, Routine, Start date 22 12:00:00 
EST  
levalbuterol, 0.63 mg = 3 mL, Soln-Inh, Inhalation, QID, Routine, Start date 
22   
12:00:00 EST, 22 8:30:00 EST  
Cardiac Monitoring  
Communication Order Physician to Nursing  
Consult to Infectious Disease Physician  
CT Abdomen/Pelvis w/o Contrast  
CT Chest w/o Contrast  
Incentive Spirometry  
Resuscitation Status - Full  
Thyroid Stimulating Hormone  
  
-Plan discussed w/ patient, nursing staff and CRM.  
  
This report was transcribed using voice recognition software. Every effort was 
made   
to ensure accuracy, however, inadvertently computerized transcription mistakes 
may be   
present.  
  
  
  
  
  
  
Extracted from:  
  
                                Title:APSO Note Author:Tone JORDAN, Abeer Date:22  
  
  
  
                                                    1. Fever (R50.9: Fever, unsp  
ecified)  
Abnormal UA on admission, CXR with vage opacity  
2 bottle of blood Cx with gram positive cocci in chain  
F/U sputum/ urine Cx  
F/U final Blood Cx and repeated blood Cx  
IV antibiotics and reassess  
  
  
  
Ordered:  
  
2. Hypoxia (R09.02: Hypoxemia)  
Desat to 84% in the ER as per the notes, Not on home O2,  
On 2 L NC today , likely due to above  
  
  
3. Urinary tract infection (N39.0: Urinary tract infection, site not specified)  
See #1  
  
4. Elevated troponin (R77.8: Other specified abnormalities of plasma proteins)  
Slight elevation, pt presented with infection, likely type II  
telemetry  
  
  
  
5. Hyponatremia (E87.1: Hypo-osmolality and hyponatremia)  
Improving, Na level is trending up  
Received IVF on admission  
Hx of CHF, with with AICD,  
F/U Na level  
  
  
  
6. Chronic systolic heart failure (I50.22: Chronic systolic (congestive) heart   
failure)  
Not in acute exacerbation  
  
7. Presence of combination internal cardiac defibrillator (ICD) and pacemaker   
(Z95.810: Presence of automatic (implantable) cardiac defibrillator)  
No recent firing  
  
8. History of atrial fibrillation (Z86.79: Personal history of other diseases of
 the   
circulatory system)  
C/W BB and Xarelto  
  
9. Hypertension (I10: Essential (primary) hypertension)  
C/w home meds  
  
10. Hyperlipidemia (E78.5: Hyperlipidemia, unspecified)  
statin  
  
11. Chronic GERD (K21.9: Gastro-esophageal reflux disease without esophagitis)  
was on PPI at home  
  
12. History of gout (Z87.39: Personal history of other diseases of the   
musculoskeletal system and connective tissue)  
allopurinol  
  
13. History of breast cancer (Z85.3: Personal history of malignant neoplasm of   
breast)  
s/p right radical mastectomy in   
  
14. Obesity (E66.9: Obesity, unspecified)  
Lifestyle modification and outpatient follow up with PCP  
  
Orders:  
Basic Metabolic Panel  
Blood Culture Charcoal  
Blood Culture Charcoal  
eGFR  
Extra Lav Tube  
Sodium Level  
This report was transcribed using voice recognition software , Every effort was 
made   
to ensure accuracy , however, inadvertently computerized transcription mistakes 
may   
be present .  
  
  
  
Extracted from:  
  
                                Title:Progress/SOAP Note Author:Dylon REAGAN, Marty REAGAN. Date:22  
  
  
  
                                                    Stable for discharge from [facility] perspective. Follow-up with her outpatient   
cardiologist. Continue same medications as patient was admitted with for heart   
failure. We will sign off cardiology  
1. Fever (R50.9: Fever, unspecified)  
2. Hypoxia (R09.02: Hypoxemia)  
3. Urinary tract infection (N39.0: Urinary tract infection, site not specified)  
4. Elevated troponin (R77.8: Other specified abnormalities of plasma proteins)  
5. Hyponatremia (E87.1: Hypo-osmolality and hyponatremia)  
6. Chronic systolic heart failure (I50.22: Chronic systolic (congestive) heart   
failure)  
7. Presence of combination internal cardiac defibrillator (ICD) and pacemaker   
(Z95.810: Presence of automatic (implantable) cardiac defibrillator)  
8. History of atrial fibrillation (Z86.79: Personal history of other diseases of
 the   
circulatory system)  
9. Hypertension (I10: Essential (primary) hypertension)  
10. Hyperlipidemia (E78.5: Hyperlipidemia, unspecified)  
11. Chronic GERD (K21.9: Gastro-esophageal reflux disease without esophagitis)  
12. History of gout (Z87.39: Personal history of other diseases of the   
musculoskeletal system and connective tissue)  
13. History of breast cancer (Z85.3: Personal history of malignant neoplasm of   
breast)  
14. Obesity (E66.9: Obesity, unspecified)  
  
  
  
Extracted from:  
  
                                Title:Consult Note Author:Dylon JORDAN, Roverto BAIRD Date:22  
  
  
  
                                                    Patient with nonischemic car  
diomyopathy and chronic systolic heart failure comes  
 in   
with systemic complaints sounding more like infection possibly pneumonia or UTI 
then   
cardiac event. Elevated troponin probably represents congestive heart failure 
which   
is chronic and acute renal failure. Would not recommend any further intervention
 at   
this point in time. Acceptable to continue cardiac meds as you have done 
including   
Xarelto simvastatin carvedilol thank you for the consultation  
1. Fever (R50.9: Fever, unspecified)  
2. Hypoxia (R09.02: Hypoxemia)  
3. Urinary tract infection (N39.0: Urinary tract infection, site not specified)  
4. Elevated troponin (R77.8: Other specified abnormalities of plasma proteins)  
5. Hyponatremia (E87.1: Hypo-osmolality and hyponatremia)  
6. Chronic systolic heart failure (I50.22: Chronic systolic (congestive) heart   
failure)  
7. Presence of combination internal cardiac defibrillator (ICD) and pacemaker   
(Z95.810: Presence of automatic (implantable) cardiac defibrillator)  
8. History of atrial fibrillation (Z86.79: Personal history of other diseases of
 the   
circulatory system)  
9. Hypertension (I10: Essential (primary) hypertension)  
10. Hyperlipidemia (E78.5: Hyperlipidemia, unspecified)  
11. Chronic GERD (K21.9: Gastro-esophageal reflux disease without esophagitis)  
12. History of gout (Z87.39: Personal history of other diseases of the   
musculoskeletal system and connective tissue)  
13. History of breast cancer (Z85.3: Personal history of malignant neoplasm of   
breast)  
14. Obesity (E66.9: Obesity, unspecified)  
  
  
  
Extracted from:  
  
                                Title:HypoNa    Author:Po Champagne MD Date:  
  
  
  
                                                    Impression and Plan  
1. Acute hypovolemic hyponatremia with normal baseline sodium: Likely from 
volume   
depletion. Sodium anthony at 129. Recommend stopping spironolactone for today. We 
will   
give 500 cc of normal saline bolus. Please check sodiums every 6 hours.  
2. Heart failure with unknown EF status post AICD: Please update 2D echo. She 
was on   
Lasix and spironolactone prior to admission; will defer to cardiology whether   
diuretics need to be resumed on outpatient basis.  
3. Acute kidney injury: Likely from volume depletion. IV fluids as above.  
Thank you for involving us in the consultation of this patient. Please feel free
 to   
call with any questions.  
No in person nephrology service available tomorrow.  
  
  
Extracted from:  
  
                                Title:Admission H & P Author:J Luis VERDUGO DO Date:22  
  
  
  
                                                    1. Fever (R50.9: Fever, unsp  
ecified)  
Suspect urinary tract infection based on abnormal UA however await urine 
culture.   
Note below as well. Chest x-ray demonstrates vague opacity I suspect however 
this is   
overlying breast tissue on the left which she does not have from a prior 
mastectomy   
on the right responsible for the symmetry. Patient however does have a cough 
(see   
below) therefore differential also includes pneumonia versus bronchitis. Note 
patient   
was negative for influenza A&B and COVID 19. Differential also includes 
bronchitis of   
either bacterial or viral origin. Patient does have allergies to Ceclor 
therefore   
Rocephin has not been initiated. Agree with Levaquin pending further evaluation 
as it   
would cover both respiratory and urinary pathogens. Await urine culture. If 
patient   
develops a productive cough we will send sputum for analysis. Await blood 
cultures.   
We will check procalcitonin. Do not feel that patient at present is septic  
Ordered:  
levofloxacin + Dextrose 5% in Water intravenous solution 150 mL, 750 mg = 150 
mL, IV   
Piggyback, q24hr, Routine, Start date 22 1:42:00 EST, 150 mL/hr, Infuse 
over 1   
hour(s), 22 1:42:00 EST  
  
2. Hypoxia (R09.02: Hypoxemia)  
Note Per discussion with emergency department clinician nursing had witnessed 
patient   
on continuous monitor with sats as low as 84%. Curiously when she first 
presented she   
had recorded saturation of 93%. She denied any shortness of breath and her lungs
 were   
clear to auscultation. We will continue O2 for now pending above work-up. Do not
 feel   
that patient is volume overloaded which she would be at risk for with below. Per
   
review of the emergency department documents patient did receive some fluid in 
the   
emergency department. We will be cautious with additional fluid based on her 
history   
in light of a normal lactic acid, normal creatinine not prerenal and not 
hypotensive  
Ordered:  
Procalcitonin  
  
3. Urinary tract infection (N39.0: Urinary tract infection, site not specified)  
Await urine culture. Continue Levaquin  
  
4. Elevated troponin (R77.8: Other specified abnormalities of plasma proteins)  
Slight elevation. Please note patient has nonischemic cardiomyopathy. She denied
 any   
chest pain. She was denying any shortness of breath. EKG demonstrates paced   
ventricle. There were no subsequent troponins ordered we therefore reorder and 
if   
rising will complete trend and consider cardiology consult. Question if this is 
a   
type II in response to above  
Ordered:  
Troponin  
  
5. Hyponatremia (E87.1: Hypo-osmolality and hyponatremia)  
Patient had reportedly received IV fluids in the squad. We will be cautious 
about   
additional fluids with her history of cardiomyopathy. In regards to above 
diagnosis   
this could potentially further support pneumonia if this were secondary to 
SIADH.   
Could also be due to loop diuretic  
  
6. Chronic systolic heart failure (I50.22: Chronic systolic (congestive) heart   
failure)  
Patient states she had an echocardiogram at [place] ordered by her cardiologist 
in   
[place]. She indicates that there was  decreased function  I suspect she 
is   
referring to ejection fraction. Await confirmation of home meds she had been on   
Coreg, losartan, Lasix and Aldactone we will continue if confirmed while 
watching   
electrolytes. Do not feel the patient is volume overloaded despite reports that   
patient received fluid in route via the squad  
  
7. Presence of combination internal cardiac defibrillator (ICD) and pacemaker   
(Z95.810: Presence of automatic (implantable) cardiac defibrillator)  
No recent firing of the defibrillator. She denies any history of cardiac arrest 
or   
known history of ventricular tachycardia likely this was placed in response to   
diminished ejection fraction in the past  
  
8. History of atrial fibrillation (Z86.79: Personal history of other diseases of
 the   
circulatory system)  
Await confirmation of home meds if confirmed she is on Coreg we will continue 
this,   
continue Xarelto  
  
9. Hypertension (I10: Essential (primary) hypertension)  
Await confirmation of home meds if on Coreg and losartan will be continued  
  
10. Hyperlipidemia (E78.5: Hyperlipidemia, unspecified)  
Continue statin once confirmed  
  
11. Chronic GERD (K21.9: Gastro-esophageal reflux disease without esophagitis)  
Continue PPI once confirmed  
  
12. History of gout (Z87.39: Personal history of other diseases of the   
musculoskeletal system and connective tissue)  
Continue allopurinol once confirmed  
  
13. History of breast cancer (Z85.3: Personal history of malignant neoplasm of   
breast)  
Patient had a right radical mastectomy in  for breast cancer. She received 
chemo   
and radiation therapy with no recent recurrence  
  
14. Obesity (E66.9: Obesity, unspecified)  
With multiple comorbidities patient would benefit from weight loss  
  
Orders:  
acetaminophen, 650 mg = 2 tab(s), Tab, Oral, q6hr PRN Pain, Routine, Start date   
22 5:20:00 EST, 22 5:20:00 EST  
guaifenesin, 1,200 mg = 2 tab(s), Tab-ER, Oral, BID, Routine, Start date 
22   
9:00:00 EST, 22 5:20:00 EST  
hydrALAZINE, 10 mg = 0.5 mL, Injection, IV Push, q6hr PRN Other (see comment),   
Routine, Start date 22 5:20:00 EST, 22 5:20:00 EST  
Sodium Chloride 0.9% intravenous solution 1,000 mL, 1,000 mL, IV, 30 mL/hr, 
Routine,   
Start date 22 5:20:00 EST, 33.3 hour(s), Total volume (mL): 1,000, 103.5 
kg,   
2.23, m2  
Basic Metabolic Panel  
CBC w/ Auto Diff  
Elevate Head of Bed  
Legionella Antigen Urine  
Notify Provider Vital Signs  
Notify Provider Vital Signs  
Oxygen Protocol  
Place in Status  
Pneumonia Quality Measures  
Regular Diet  
Sputum Culture  
Vital Signs  
Weight  
Weight  
Admit patient as a general inpatient with hypoxia febrile illness anticipating 
she   
will require 2 midnight stay  
  
  
  
Extracted from:  
  
                                Title:ED Note   Author:Rama Long DO Date  
:22  
  
  
  
                                                    Elevated troponin (R77.8: Ot  
her specified abnormalities of plasma proteins)  
Hyponatremia (E87.1: Hypo-osmolality and hyponatremia)  
Pneumonia (J18.9: Pneumonia, unspecified organism)  
Orders:  
azithromycin + Sodium Chloride 0.9% intravenous solution 250 mL, 500 mg = 1 EA, 
IV   
Piggyback, Daily, STAT, Start date 22 1:42:00 EST, 250 mL/hr, Infuse over 
60   
minute(s), 22 1:42:00 EST  
levofloxacin + Dextrose 5% in Water intravenous solution 150 mL, 750 mg = 150 
mL, IV   
Piggyback, Once, Stop date 22 1:42:00 EST, STAT, Start date 22 
1:42:00   
EST, 150 mL/hr, Infuse over 1 hour(s), 22 1:42:00 EST  
ondansetron, 4 mg = 2 mL, Injection, IV Push, Once, Stop date 22 3:01:00 
EST,   
STAT, Start date 22 3:01:00 EST, 22 3:01:00 EST  
Sodium Chloride 0.9% intravenous solution, 1,000 mL, Soln-IV, IV, Once, Stop 
date   
22 0:45:00 EST, STAT, Start date 22 0:45:00 EST, Infuse over 61,   
minute(s)  
Automated Diff  
Blood Culture Charcoal  
Blood Culture Charcoal  
CBC w/ Auto Diff  
Comprehensive Metabolic Panel  
Continuous Pulse Oximetry  
ECG 12 Lead Adult  
ED Cardiac Monitoring  
ED Physician consult Hospitalist for continued care  
eGFR  
Influenza A&B Ag  
Lactic Acid  
Oxygen Therapy  
PT & PTT  
Rapid COVID Antigen ([facility]  
Troponin  
UA With Cult Reflex  
XR Chest Single View  
  
  
  
  
Future Appointments  
  
  
  
Appointment Date:2022 09:15:00 AM  
Scheduled Provider:Salvador CORCORAN MD  
Location:[facility]  
Appointment Type:URO Office Visit  
  
  
[facility]11- Hospital Discharge instructions  
  
Patient Education  
  
  
  
2022 11:32:23  
  
  
  
Bacteremia, Adult  
  
  
  
Bacteremia, Adult  
Bacteremia is the presence of bacteria in the blood. When bacteria enter the 
bloodstream, they can cause a life-threatening reaction called sepsis. Sepsis is
 a medical emergency.  
What are the causes?  
This condition is caused by bacteria that get into the blood. Bacteria can enter
 the blood from an infection, including:  
A skin infection or injury, such as a burn or a cut.  
A lung infection (pneumonia).  
An infection in the stomach or intestines.  
An infection in the bladder or urinary system (urinary tract infection).  
A bacterial infection in another part of the body that spreads to the blood.  
Bacteria can also enter the blood during a dental or medical procedure, from 
bleeding gums, or through use of an unclean needle.  
What increases the risk?  
This condition is more likely to develop in children, older adults, and people 
who have:  
A long-term (chronic) disease or condition like diabetes or chronic kidney 
failure.  
An artificial joint or heart valve, or heart valve disease.  
A tube inserted to treat a medical condition, such as a urinary catheter or IV.  
A weak disease-fighting system (immune system).  
Injected illegal drugs.  
Been hospitalized for more than 10 days in a row.  
What are the signs or symptoms?  
Symptoms of this condition include:  
Fever and chills.  
Fast heartbeat and shortness of breath.  
Dizziness, weakness, and low blood pressure.  
Confusion or anxiety.  
Pain in the abdomen, nausea, vomiting, and diarrhea.  
Bacteremia that has spread to other parts of the body may cause symptoms in 
those areas. In some cases, there are no symptoms.  
How is this diagnosed?  
This condition may be diagnosed with a physical exam and tests, such as:  
Blood tests to check for bacteria (cultures) or other signs of infection.  
Tests of any tubes that you have had inserted. These tests check for a source of
 infection.  
Urine tests to check for bacteria in the urine.  
Imaging tests, such as an X-ray, a CT scan, an MRI, or a heart ultrasound. These
 check for a sourceof infection in other parts of your body, such as your lungs,
 heart valves, or joints.  
How is this treated?  
This condition is usually treated in the hospital. If you are treated at home, 
you may need to return to the hospital for medicines, blood tests, and 
evaluation. Treatment may include:  
Antibiotic medicines. These may be given by mouth or directly into your blood 
through an IV. You may need antibiotics for several weeks. At first, you may be 
given an antibiotic to kill most types ofblood bacteria. If tests show that a 
certain kind of bacteria is causing the problem, you may be given a different 
antibiotic.  
IV fluids.  
Removing any catheter or device that could be a source of infection.  
Blood pressure and breathing support, if needed.  
Surgery to control the source or the spread of infection, such as surgery to 
remove an implanted device, abscess, or infected tissue.  
Follow these instructions at home:  
Medicines  
Take over-the-counter and prescription medicines only as told by your health 
care provider.  
If you were prescribed an antibiotic medicine, take it as told by your health 
care provider. Do notstop taking the antibiotic even if you start to feel 
better.  
General instructions  
Rest as needed. Ask your health care provider when you may return to normal 
activities.  
Drink enough fluid to keep your urine pale yellow.  
Do not use any products that contain nicotine or tobacco, such as cigarettes, e-
cigarettes, and chewing tobacco. If you need help quitting, ask your health care
 provider.  
Keep all follow-up visits as told by your health care provider. This is 
important.  
How is this prevented?  
Wash your hands regularly with soap and water. If soap and water are not 
available, use hand .  
You should wash your hands:  
?After using the toilet or changing a diaper.  
?Before preparing, cooking, serving, or eating food.  
?While caring for a sick person or while visiting someone in a hospital.  
?Before and after changing bandages (dressings) over wounds.  
Clean any scrapes or cuts with soap and water and cover them with a clean 
bandage.  
Get vaccinations as recommended by your health care provider.  
Practice good oral hygiene. Brush your teeth two times a day, and floss 
regularly.  
Take good care of your skin. This includes bathing and moisturizing on a regular
 basis.  
Contact a health care provider if:  
Your symptoms get worse, and medicines do not help.  
You have severe pain.  
Get help right away if you have:  
Pain.  
A fever or chills.  
Trouble breathing.  
A fast heart rate.  
Skin that is blotchy, pale, or clammy.  
Confusion.  
Weakness.  
Lack of energy or unusual sleepiness.  
New symptoms that develop after treatment has started.  
These symptoms may represent a serious problem that is an emergency. Do not wait
 to see if the symptoms will go away. Get medical help right away. Call your 
local emergency services (911 in the [place] Do not drive yourself to the 
hospital.  
Summary  
Bacteremia is the presence of bacteria in the blood. When bacteria enter the 
bloodstream, they can cause a life-threatening reaction called sepsis.  
Bacteremia is usually treated with antibiotic medicines in the hospital.  
If you were prescribed an antibiotic medicine, take it as told by your health 
care provider. Do notstop taking the antibiotic even if you start to feel 
better.  
Get help right away if you have any new symptoms that develop after treatment 
has started.  
This information is not intended to replace advice given to you by your health 
care provider. Make sure you discuss any questions you have with your health 
care provider.  
Document Released: 10/01/2007 Document Revised: 2020 Document Reviewed: 
2020  
[employer] Patient Education  [employer]  
  
  
  
  
2022 23:29:07  
  
  
  
Core Measures- Pneumonia [facility]  
  
  
  
Pneumonia  
  
Pneumonia is an infection of the lungs which may be viral or bacterial (with 
germs). Most forms of infectious (disease producing) pneumonias are bacterial. 
This means the are caused by a germ. Usually these infections are caused by 
breathing in infectious particles into the respiratory tract (lungs).  
  
SYMPTOMS  
The most common symptoms (problems) are:  
Cough.  
Fever.  
Chest pain.  
Increased rate of breathing.  
Wheezing.  
Sputum production.  
  
DIAGNOSIS  
Often these infections are diagnosed on exam by your caregiver. Sometimes the 
diagnosis (learning what is wrong) may require chest x-rays, blood analysis, and
 or cultures.  
Your caregiver may do tests (such as blood gasses or pulse oximetry) to see how 
well your lungs areworking.  
Blood cultures may be done to help find the cause of your pneumonia.  
  
TREATMENT  
The bacterial pneumonias generally respond well to antibiotics (medications 
which kill germs). Viral infections must run their course. These infections will
 not respond to antibiotics. A pneumococcalvaccine (shot) is available to 
prevent a common bacterial pneumonia. This is usually suggested for the elderly 
and for other groups of higher risk individuals, such as those on chemotherapy 
or those that have problems with their immune system.  
You will have pneumococcal screening or vaccination if you are over 65 years old
 and are not immunized.  
  
You have received a pneumococcal vaccination on: ____________________  
  
If you are a smoker, it is time to quit. You will receive instructions on how to
 best stop smoking.Your caregivers can provide medications and counseling to 
help you quit.  
  
HOME CARE INSTRUCTIONS  
Cough suppressants may be used if you are losing too much rest; however cough 
protects you by clearing your lungs. This is one reason for not using cough 
suppressants, if able, as they take away thisprotection.  
Your caregiver may have prescribed an antibiotic if she or he feels your cough 
is caused by a bacterial infection. Take all your medication until you are 
finished.  
Your caregiver may also prescribe an expectorant to loosen the mucous to be 
coughed up.  
Only take over-the-counter or prescription medicines for pain, discomfort, or 
fever as directed by your caregiver.  
Smoking is a common cause of bronchitis and can contribute to pneumonia. 
Stopping this habit is an important self help step.  
A cold steam vaporizer or humidifier in your room or home may help loosen 
mucous.  
Cough is most often worse at night. Sleeping in a semi-upright position in a 
recliner, or using a couple pillows under your head will help with this.  
Get rest as you feel it is needed. Your body will usually let you know when to 
rest.  
If you are a smoker and continue to smoke, your cough may last several weeks 
after your pneumonia has cleared.  
Exercise your lungs. Deep breathing helps to open the air passages in your 
lungs. Coughing helps tobring up sputum (mucus) from your lungs. Take a deep 
breath and hold the breath as long as you can.Then push the air out of your 
lungs with a deep, strong cough. Put any sputum that you have coughedup into a 
tissue and throw it away. Take 10 deep breaths in a row every hour that you are 
awake. Remember to follow each deep breath with a cough.  
  
SEEK IMMEDIATE MEDICAL CARE IF:  
You develop more purulent (pus like) sputum, have uncontrolled temperature, or 
your illness becomesworse. This is especially true if you are elderly or 
weakened from any other disease.  
You cannot control your cough with suppressants and are losing sleep.  
You begin coughing up blood.  
You develop pain which is getting worse or is uncontrolled with medications.  
You develop an oral temperature above ____________________, after being afebrile
 (not having a temperature for one or more days).  
Any of the symptoms which initially brought you in for treatment are getting 
worse rather than better, or you develop shortness of breath or chest pain. Dial
 911 for immediate emergency care.  
  
AGREEMENT BETWEEN PATIENT AND HEALTHCARE TEAM:  
Your signature on this document represents an understanding between you and the 
healthcare team that took care of you today. That means that you:  
Understand these discharge instructions.  
Will monitor your condition.  
Will seek immediate medical care as instructed.  
  
Document Released: 2006 Document Re-Released: 06/15/2009  
[facility] Patient Information 2009 [employer].  
  
  
  
  
Follow Up Care  
  
  
  
2022 00:44:11  
  
  
  
With:Alexei CROW  
Address:  
[address] [place] [address]  
[place] [address]  
[phone] Business (1)  
  
When:2022 09:15:00  
  
  
  
With:Marty Osborne  
Address:  
[address] [place] [address]-  
[phone] Business (1)  
  
When:1 to 2 weeks  
  
  
  
With:ALEXANDER GUERRA  
Address:  
[address] [facility] [place] [address]  
[phone] Business (1)  
  
When:  
Unknown  
  
[facility] 
on above:Result Comment: Electronically Signed By: J Luis VERDUGO DO\Date and Time Signed: 22 05:25 SOM42- Evaluation + Plan note
Extracted from:  
  
                                Title:ED Note   Author:Trae Garcia M.D.  
te:22  
  
  
  
                                                    1. Upper respiratory infecti  
on (J06.9: Acute upper respiratory infection,   
unspecified)  
2. Eloped from emergency department (Z53.21: Procedure and treatment not carried
 out   
due to patient leaving prior to being seen by health care provider)  
  
  
[facility]03- NoteHNO ID: [id number]  
Author: Gab TOBIN (Farshad Mcneill  
Service: ?  
Author Type: Nurse Practitioner  
Type: Progress Notes  
Filed: 3/30/2021 5:10 PM  
Note Text:  
Ms. Chavez is a 71 year old female who comes in for evaluation of sinus  
issues and sore throat.  
72yo F presents to clinic for chronic maxillary sinus issues and trouble  
swallowing for the last 6 months that has started to worsen. Patient  
reports that she thinks that the increase in saliva is due to her sinuses.  
Patient constantly feels like she needs to clear her throat. Previously  
worked up for acid reflux and was on PPIs then transitioned to Pepcid  
which the patient states has been helping with her symptoms. Trouble  
swallowing both liquids and solids, occasionally chokes when eating.  
Patient has tried smaller portions, more chewing between bites, and more  
liquid to help but still experiencing these issues. When she does  
swallow, she feels a thick mucus sensation, sometimes goes down the wrong  
pipe.  
Patient is currently experiencing sinus pressure with mild headache.  
Patient endorses PND. Never been treated with antibiotics in the past.  
Patient has a history of radiation for inflammatory breast cancer that  
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  
Past history :  
PAST MEDICAL HISTORY  
Diagnosis Date  
- CHF (congestive heart failure) (HCC)  
- History of bilateral breast cancer  
- Hypothyroidism  
- Neuropathy  
Current medication:  
Current Outpatient Medications  
Medication Sig  
- acetaminophen (TYLENOL) 325 mg tablet Take 650 mg by mouth every 6 hours  
as needed.  
- allopurinol (ZYLOPRIM) 100 mg tablet TAKE 1 TABLET BY MOUTH ONCE DAILY  
FOR 90 DAYS  
- carvedilol (COREG) 6.25 mg tablet Take 6.25 mg by mouth twice daily with  
meals.  
- cholecalciferol (VITAMIN D3) 50 mcg (2,000 unit) tablet Take 2,000 Units  
by mouth once daily.  
- dorzolamide-timolol (COSOPT) 22.3-6.8 mg/mL ophthalmic solution Use 1  
Drop in both eyes twice daily.  
- famotidine (PEPCID) 20 mg tablet Take 20 mg by mouth at bedtime as  
needed.  
- fluvoxaMINE (LUVOX) 100 mg tablet Take 100 mg by mouth twice daily.  
- furosemide (LASIX) 40 mg tablet Take 10 mg by mouth at bedtime as  
needed.  
- levothyroxine (SYNTHROID) 50 mcg tablet Take 50 mcg by mouth every  
morning. Take On an Empty Stomach  
- loratadine (CLARITIN) 10 mg tablet Take 10 mg by mouth at bedtime as  
needed.  
- losartan (COZAAR) 25 mg tablet TAKE 1 TABLET BY MOUTH ONCE DAILY FOR 90  
DAYS  
- Magnesium Oxide 500 mg tab Take 500 mg by mouth once daily.  
- therapeutic multivitamin-minerals (THERA-M PLUS) 9 mg iron-400 mcg  
tablet Take 1 tablet by mouth once daily.  
- rivaroxaban (XARELTO) 20 mg tablet TAKE 1 TABLET BY MOUTH ONCE DAILY  
WITH SUPPER  
- simvastatin (ZOCOR) 40 mg tablet Take 40 mg by mouth daily at bedtime.  
- spironolactone (ALDACTONE) 25 mg tablet TAKE 1 2 (ONE HALF) TABLET BY  
MOUTH ONCE DAILY FOR 90 DAYS  
- Lactobacillus acidophilus (PROBIOTIC ORAL) Take by mouth.  
- flaxseed oil (OMEGA 3 ORAL) Take by mouth.  
- lactase (ULTRA DAIRY DIGESTIVE ORAL) Take by mouth.  
No current facility-administered medications for this visit.  
Allergies:  
ALLERGIES  
Allergen Reactions  
- Ceclor [Cefaclor] Rash, Swelling  
And redness  
Social history:  
Social History  
Tobacco Use  
- Smoking status: Never Smoker  
- Smokeless tobacco: Never Used  
Substance Use Topics  
- Alcohol use: Yes  
- Drug use: Never  
Family history: No family history on file.  
There are no exam notes on file for this visit.  
Physical exam:  
General Appearance: 71 year old female is alert, oriented, not in acute  
distress. Hearing is grossly normal, voice is raspy. There is no  
tenderness with percussion over the paranasal sinuses.  
Eyes: PEERLA, extraocular movements are full.  
Nose: Clean, septum is straight. There are no polyps. There is no  
discharge.  
Oropharynx: Teeth are in good repair. Lips, gums, tongue and posterior  
pharynx are within normal limits. Gag reflex is intact.  
Nasopharynx: Visualization is limited.  
Hypopharynx: Visualization is limited.  
Neck: No masses palpated. Thyroid is not enlarged. Trachea is in the  
midline.  
Ears: Both ear canals are clean. Both TMs are intact and mobile.  
Impression: Throat discomfort, swallowing problem, and hoarseness of voice  
exact etiology unclear. GERD may be contributing.  
Plan of management: I will perform fiberoptic laryngoscopy and discuss  
further management options with her.  
Procedure: After the procedure was explained to the patient and patient  
agreed to have the procedure done 1% Yobani-Synephrine and 4% Xylocaine was  
sprayed to both nasal cavities. Fiberoptic scope was inserted through the  
right nasal airway. Right nasal airway was normal. Nasopharynx was normal.  
Hypopharyngeal exam revealed moderately severe mucosal thickening in the  
posterior commissure with mild hyperemia. There is pooling of saliva.  
Both vocal cords are mobile. Findings were explained to the patient.  
She will have EGD. We bert (more content not included)...[facility]03- NoteHNO ID: [id number]  
Author: Dago Lo  
Service: ?  
Author Type: Physician  
Type: Progress Notes  
Filed: 3/5/2021 8:53 AM  
Note Text:  
This document has been created with the use of voice recognition  
technology. It may contain inaccuracies: misspellings, inaccurate syntax  
or word sense that escaped review.  
CHIEF COMPLAINT: Ping Chavez is a 71 year old female who presents today  
for follow up of bilateral knee pain secondary to osteoarthritis.  
HISTORY OF PRESENT ILLNESS:  
PAIN EVALUATION  
3/5/2021  
0839  
Pain Level: 5  
Pain Location: ?  
bilateral knees  
Description: Aching  
Duration Units: Unknown  
Frequency: Intermittent  
Intervention: Reposition;Relaxation;Positioning;Medication;Heat  
HISTORY: Ping Chavez is here for follow up of complaints of bilateral  
knee pain right greater than left. She states that the injection she  
received in September of last year offered relief. She requested we  
inject both knees again today. No new injury. No hip or neurologic  
complaints.  
No other musculoskeletal complaints  
ROS:  
REVIEW OF SYMPTOMS:  
Constitutional: patient denies any recent fever or significant change in  
weight  
Gastrointestinal: patient denies any current abdominal discomfort  
Musculoskeletal: as noted in the HPI  
Neurologic: as noted in the HPI  
SOCIAL HISTORY:  
Tobacco Use: Not on file  
ALLERGIES:  
ALLERGIES  
Allergen Reactions  
- Ceclor [Cefaclor] Rash, Swelling  
And redness  
PAST MEDICAL HISTORY:  
PAST MEDICAL HISTORY  
Diagnosis Date  
- CHF (congestive heart failure) ([facility])  
- History of bilateral breast cancer  
- Hypothyroidism  
- Neuropathy  
SOCIAL HISTORY:  
Tobacco Use: Not on file  
EXAMINATION:  
GENERAL: Appears healthy, well-nourished, no deformities.  
ORIENTATION: Alert and oriented to person place and time  
HABITUS: Normal  
GAIT: Normal, the patient did not have trouble getting onto the exam  
table.  
bilateral knee exam:  
No effusion,  
Tenderness with patellar apprehension  
Right knee valgus left knee neutral alignment  
Active full extension, flexion 120. Good patellar tracking/mild patella  
femoral crepitation  
Mild pain with palpating medial compartment, mild pain with palpating  
lateral compartment.  
Stable to varus and valgus stresses.  
Lachman is negative  
Negative anterior/posterior drawer.  
Palpable dorsalis pedis pulse  
Calf soft and nontender.  
Hip exam negative  
Intact sensation to light touch distally.  
.  
RADIOGRAPHS: XR Obtained today and personally reviewed by myself  
demonstrating none today x-rays some 2020 reviewed with  
severe lateral compartment osteoarthritis on the right moderate on the  
left  
IMPRESSION:  
Encounter Diagnosis  
ICD-10-CM  
1. Primary osteoarthritis of both knees M17.0  
Large Joint Arthro/Inj: bilateral knee joints  
The risks, benefits and alternatives of the procedure were reviewed with  
the patient/surrogate, who agreed to proceed.  
Written Consent Obtained: Yes  
Sign In Communication: Completed  
Time Out: Time Out completed  
The  Time-Out  verifies the correct patient, procedure, side/site,  
position (if applicable) and completion and review of fire risk  
assessment/protocols (if appropriate):  
Affirmation of Time Out: Yes  
Signout Discussion: yes  
3/5/2021 8:52 AM  
The procedure site was prepped in the usual sterile fashion.  
Allergies were reviewed  
Site: bilateral knee joints  
Medications (Right): 40 mg triamcinolone acetonide 40 mg/mL  
Medications (Left): 40 mg triamcinolone acetonide 40 mg/mL  
Anesthetics (Right): 4 mL lidocaine (PF) 10 mg/mL (1 %)  
Anesthetics (Left): 4 mL lidocaine (PF) 10 mg/mL (1 %)  
Outcome: Tolerated well, no immediate complications  
Post-injection instructions were reviewed with the patient and the patient  
voiced understanding of these instructions.  
Plan: Patient with bilateral knee osteoarthritis. She requested we  
injected both knees today with cortisone. We again discussed surgical  
options especially with the right knee. We discussed intervals between  
injections.  
Follow-up as symptoms dictate  
[facility]  
  
Future Appointments  
  
  
  
Appointment Date:2022 09:15:00 AM  
Scheduled Provider:Salvador CORCORAN MD  
Location:[facility]  
Appointment Type:URO Office Visit  
  
  
[facility]  
  
Future Appointments  
  
  
  
Appointment Date:2023 09:45:00 AM  
Scheduled Provider:Salvador CORCORAN MD  
Location:[facility]  
Appointment Type:URO Office Visit  
  
  
[facility]  
Work Phone: [phone] noteNo assessment information available
[facility]  
Work Phone: [phone] note*   
  
                          Diagnosis    Onset Date   Resolution   Status  
   
                          Primary osteoarthritis of knees, bilateral              
               acute  
  
  
[facility]  
Work Phone: [phone] note*   
  
                                                    Diagnosis  
   
                                                      
  
  
Primary open angle glaucoma of right eye, mild stage  
  
documented in this encounter  
[facility]   
  
                                                    Diagnosis  
   
                                                      
  
  
Cardiomyopathy (CMS-HCC)- Primary  
   
                                                      
  
  
Persistent atrial fibrillation (CMS-HCC)  
  
  
Atrial fibrillation  
   
                                                      
  
  
Chronic systolic heart failure (CMS-HCC)  
  
  
Chronic systolic heart failure  
   
                                                      
  
  
Left bundle branch block (LBBB)  
  
documented in this encounter  
[facility]   
  
                                                    Diagnosis  
   
                                                      
  
  
Chronic systolic congestive heart failure (CMS-HCC)- Primary  
   
                                                      
  
  
Paroxysmal atrial fibrillation (CMS-HCC)  
  
  
Atrial fibrillation  
  
documented in this encounter  
[facility]   
  
                                                    Diagnosis  
   
                                                      
  
  
Persistent atrial fibrillation (CMS-HCC)- Primary  
  
  
Atrial fibrillation  
   
                                                      
  
  
Chronic combined systolic and diastolic congestive heart failure (CMS-HCC)  
   
                                                      
  
  
Left bundle branch block (LBBB)  
  
documented in this encounter  
[facility]   
  
                                                    Diagnosis  
   
                                                      
  
  
Automatic implantable cardioverter-defibrillator in situ [employer] 
Primary  
  
documented in this encounter  
[facility]   
  
                                                    Diagnosis  
   
                                                      
  
  
Pain  
  
  
Generalized pain  
   
                                                      
  
  
Pre-op evaluation- Primary  
  
  
Preoperative examination, unspecified  
   
                                                      
  
  
Dysphagia, unspecified type  
   
                                                      
  
  
ICD (implantable cardioverter-defibrillator) in place  
  
  
Automatic implantable cardiac defibrillator in situ  
   
                                                      
  
  
Congestive heart failure, unspecified HF chronicity, unspecified heart failure 
type   
(HCC)  
   
                                                      
  
  
Nonischemic congestive cardiomyopathy (HCC)  
  
  
Other primary cardiomyopathies  
   
                                                      
  
  
Hypothyroidism, unspecified type  
   
                                                      
  
  
Chronic gout of multiple sites, unspecified cause  
   
                                                      
  
  
Bilateral malignant neoplasm of overlapping sites of breast in female, 
unspecified   
estrogen receptor status (HCC)  
   
                                                      
  
  
Neuropathy  
  
  
Mononeuritis of unspecified site  
   
                                                      
  
  
Other hyperlipidemia  
  
documented in this encounter  
[facility]   
  
                                                    Diagnosis  
   
                                                      
  
  
Chronic combined systolic and diastolic congestive heart failure (CMS-HCC)  
   
                                                      
  
  
Persistent atrial fibrillation (CMS-HCC)  
  
  
Atrial fibrillation  
   
                                                      
  
  
Nonischemic congestive cardiomyopathy (CMS-HCC)  
   
                                                      
  
  
Benign essential hypertension  
  
  
Essential hypertension, benign  
   
                                                      
  
  
Left bundle branch block (LBBB)  
   
                                                      
  
  
Automatic implantable cardioverter-defibrillator in situ [employer]  
  
documented in this encounter  
[facility]  
  
No data available for this section  
  
[facility]  
  
No data available for this section  
  
[facility]ructions[facility] on filedocumented in this encounter
[facility]ructionsNot [facility] filedocumented in this [facility]  
  
No data available for this section  
  
[facility]  
  
Summary Purpose  
  
  
                                                      
  
  
  
Family History  
No Family History Records FoundNo Family History Records FoundNo Family History 
Records FoundNo Family History Records FoundNo Family History Records FoundNo 
Family History Records FoundNo Family History Records FoundNo Family History 
Records FoundNo Family History Records FoundNo Family History Records FoundNo 
Family History Records FoundNo Family History Records Found  
  
Advance Directives  
  
  
                                Advance Directive Response        Recorded Date/  
Time  
   
                                Advance Directives No              Jocelin 1st, 202  
3 9:26am  
  
  
  
                                Advance Directive Response        Recorded Date/  
Time  
   
                                Advance Directives No              Jocelin 1st, 202  
3 8:26am  
  
                                Documents on File  
  
                          Type         Date Recorded Patient Representative Expl  
anation  
   
                          Advance Directive(s) 2021 9:02 AM                
  
  
  
Assessments  
  
  
                                                    Diagnosis  
   
                                                      
  
  
Pain- Primary  
  
  
Generalized pain  
  
  
  
Chief Complaint and Reason for Visit  
  
  
                                        Chief Complaint     Renal cyst  
  
  
  
                                        Chief Complaint     NEW LT KNEE PAIN NX  
M25.562  
   
                                        Reason for Visit    Primary osteoarthrit  
is of knees, bilateral  
  
  
  
Reason for Referral  
  
  
                          Specialty    Diagnoses / Procedures Referred By Contac  
t Referred To Contact  
   
                                                              
  
  
Diagnoses  
  
  
Persistent atrial fibrillation   
(CMS-HCC)  
  
  
Chronic combined systolic and   
diastolic congestive heart   
failure (CMS-HCC)  
  
  
Left bundle branch block   
(LBBB)  
  
  
  
Procedures  
  
  
Device Interrogation                      
  
  
Angel Gerardo DO  
  
  
[address] [place] [address],   
[address]  
  
  
[place] [id number]  
  
  
Phone: [phone]  
  
  
Fax: [phone]                         
  
  
  
  
  
                    Referral ID Status    Reason    Start Date Expiration Date V  
isits   
Requested                               Visits   
Authorized  
   
                                        [id number]            Pending   
Review                    10/3/2024    10/3/2025    1            1  
  
  
  
Additional Source Comments  
  
  
  
                                                    INFORMATION SOURCE (unrecogn  
ized section and content)  
   
                                          
  
  
  
                                        DATE CREATED        AUTHOR  
   
                                2018                      [facility]  
  
  
  
                                DATE CREATED    AUTHOR          AUTHOR'S ORGANIZ  
ATION  
   
                                2021                      [facility]  
  
  
  
                                DATE CREATED    AUTHOR          AUTHOR'S ORGANIZ  
ATION  
   
                                2021                      [facility]  
  
  
  
                                DATE CREATED    AUTHOR          AUTHOR'S ORGANIZ  
ATION  
   
                                2022                      [facility]  
  
  
  
                                DATE CREATED    AUTHOR          AUTHOR'S ORGANIZ  
ATION  
   
                                2023                      [facility]  
  
  
  
                                DATE CREATED    AUTHOR          AUTHOR'S ORGANIZ  
ATION  
   
                                2023                      [facility]   
spital  
  
  
  
                                DATE CREATED    AUTHOR          AUTHOR'S ORGANIZ  
ATION  
   
                                10/21/2023                      [facility]  
ospital  
  
  
  
                                DATE CREATED    AUTHOR          AUTHOR'S ORGANIZ  
ATION  
   
                                2024                      [facility]  
  
  
  
                                DATE CREATED    AUTHOR          AUTHOR'S ORGANIZ  
ATION  
   
                                2024                      [facility]  
  
  
  
                                DATE CREATED    AUTHOR          AUTHOR'S ORGANIZ  
ATION  
   
                                2024                      [facility]  
[facility]  
  
  
  
                                DATE CREATED    AUTHOR          AUTHOR'S ORGANIZ  
ATION  
   
                                10/04/2024                      [facility]  
[facility]  
  
  
  
                                DATE CREATED    AUTHOR          AUTHOR'S ISAMAR  
ATION  
   
                                10/05/2024                      [facility]  
[facility]  
  
  
  
  
  
                                                    Source Comments (unrecognize  
d section and content)  
   
                                                    In the event this informatio  
n is protected by the Federal Confidentiality of   
Alcohol   
and Drug Abuse Patient Records regulations: This information has been disclosed 
to   
you from records protected by Federal confidentiality rules (42 CFR Part 2). The
   
Federal rules prohibit you from making any further disclosure of this 
information   
unless further disclosure is expressly permitted by the written consent of the 
person   
to whom it pertains or as otherwise permitted by 42 CFR Part 2. A general   
authorization for the release of medical or other information is NOT sufficient 
for   
this purpose. The Federal rules restrict any use of the information to 
criminally   
investigate or prosecute any alcohol or drug abuse patient.[facility] 
the   
event this information is protected by the Federal Confidentiality of Alcohol 
and   
Drug Abuse Patient Records regulations: This information has been disclosed to 
you   
from records protected by Federal confidentiality rules (42 CFR Part 2). The 
Federal   
rules prohibit you from making any further disclosure of this information unless
   
further disclosure is expressly permitted by the written consent of the person 
to   
whom it pertains or as otherwise permitted by 42 CFR Part 2. A general 
authorization   
for the release of medical or other information is NOT sufficient for this 
purpose.   
The Federal rules restrict any use of the information to criminally investigate 
or   
prosecute any alcohol or drug abuse patient.[facility]  
  
  
  
  
                                                    Care Team (unrecognized sect  
ion and content)  
   
                                          
  
  
  
                      Team Member Relationship Specialty  Start Date End Date  
   
                                                      
  
  
Alexander Guerra MD  
  
  
[address]  
  
  
[place], [place]  
  
  
[phone] (Work)  
  
  
[phone] (Fax) PCP - General   Internal Medicine 17          
  
  
  
                      Team Member Relationship Specialty  Start Date End Date  
   
                                                      
  
  
Alexander Guerra MD  
  
  
[address] [place]  
  
  
[phone] (Work)  
  
  
[phone] (Fax) PCP - General   Internal Medicine 17          
  
  
  
                                                    Team Status: Active   
   
                          Member       Role         Status       Dates  
   
                          Alexander Guerra MD Primary Care Provider Active       
    
  
  
  
                                                    Team Status: Inactive   
   
                          Member       Role         Status       Dates  
   
                          Salvador Corcoran MD Attending Provider Active         
   
                          Alexander Guerra MD Primary Care Provider Active       
    
  
  
  
                      Team Member Relationship Specialty  Start Date End Date  
   
                                                      
  
  
Alexander Guerra MD  
  
  
NPI: [id number]  
  
  
[address] [place] [address] [phone] (Work)  
  
  
[phone] (Fax) PCP - General                   14           
  
  
  
                                                    Team Status: Inactive   
   
                          Member       Role         Status       Dates  
   
                          Alexander Guerra MD Primary Care Provider Active       
  Start: 2024  
End: 2024  
   
                          Alexei Bartlett II, MD Attending Provider Active      
   Start: 2024  
End: 2024  
  
  
  
                      Team Member Relationship Specialty  Start Date End Date  
   
                                                      
  
  
Alexander Guerra MD  
  
  
NPI: [id number]  
  
  
[address] [place] [address]  
  
  
[phone] (Work)  
  
  
[phone] (Fax) PCP - General                   14           
  
  
  
                      Team Member Relationship Specialty  Start Date End Date  
   
                                                      
  
  
Alexander Guerra MD  
  
  
NPI: [id number]  
  
  
[address] [place] [address]  
  
  
[phone] (Work)  
  
  
[phone] (Fax) PCP - General                   14           
  
  
  
                      Team Member Relationship Specialty  Start Date End Date  
   
                                                      
  
  
Alexander Guerra MD  
  
  
NPI: [id number]  
  
  
[address] [place] [address]  
  
  
[phone] (Work)  
  
  
[phone] (Fax) PCP - General                   14           
  
  
  
                      Team Member Relationship Specialty  Start Date End Date  
   
                                                      
  
  
Alexander Guerra MD  
  
  
NPI: [id number]  
  
  
[address] [place] [address]  
  
  
[phone] (Work)  
  
  
[phone] (Fax) PCP - General                   14           
  
  
  
                      Team Member Relationship Specialty  Start Date End Date  
   
                                                      
  
  
Alexander Guerra MD  
  
  
NPI: [id number]  
  
  
[address] [place] [address]  
  
  
[phone] (Work)  
  
  
[phone] (Fax) PCP - General                   14           
  
  
  
                      Team Member Relationship Specialty  Start Date End Date  
   
                                                      
  
  
Alexander Guerra MD  
  
  
NPI: [id number]  
  
  
[address] [place] [address]  
  
  
[phone] (Work)  
  
  
[phone] (Fax) PCP - General                   14           
  
  
  
                      Team Member Relationship Specialty  Start Date End Date  
   
                                                      
  
  
Alexander Guerra MD  
  
  
NPI: [id number]  
  
  
[address] [facility]  
  
  
[place] [address]  
  
  
[phone] (Work)  
  
  
[phone] (Fax) PCP - General   Internal Medicine 24           
  
  
  
                      Team Member Relationship Specialty  Start Date End Date  
   
                                                      
  
  
Alexander Guerra MD  
  
  
NPI: [id number]  
  
  
[address] [facility]  
  
  
[place] [address]  
  
  
[phone] (Work)  
  
  
[phone] (Fax) PCP - General                   14           
  
  
  
  
  
                                                    Goals (unrecognized section   
and content)  
   
                                                    Goals may be documented in a  
n alternate section  
  
  
  
  
                                                    Reason for Visit (unrecogniz  
ed section and content)  
   
                                          
  
  
  
                                        Reason              Comments  
   
                                        Med Refill            
  
  
  
                                Reason          Onset Date      Comments  
   
                                Appointment     2024        
  
  
  
                                        Reason              Comments  
   
                                        Follow-up             
   
                                        Hypertension          
   
                                        Atrial Fibrillation   
  
  
  
                                        Reason              Comments  
   
                                        Device Check          
   
                                        Follow-up           6MO W/DEVICE, SCHED   
W/PT  
  
  
  
                                        Reason              Comments  
   
                                        Device Check          
  
  
  
                                        Reason              Comments  
   
                                        Radiology XR          
  
  
  
                                Reason          Onset Date      Comments  
   
                                CT SCAN FYI     10/22/2024        
  
  
FOR RECORDS PERTAINING TO PATIENTS WHO ARE OR HAVE BEEN ENROLLED IN A CHEMICAL 
DEPENDENCY/SUBSTANCEABUSE PROGRAM, SOME INFORMATION MAY BE OMITTED. This 
clinical summary was aggregated from multiple sources. Caution should be 
exercised in using it in the provision of clinical care. This summary normalizes
 information from multiple sources, and as a consequence, information in this 
document may materially change the coding, format and clinical context of 
patient data. In addition, data may be omitted in some cases. CLINICAL DECISIONS
 SHOULD BE BASED ON THE PRIMARY CLINICAL RECORDS. [employer] provides
 no warranty or guarantee of the accuracy or completeness of information in this
 document.

## 2025-01-15 ENCOUNTER — HOSPITAL ENCOUNTER
Dept: HOSPITAL 101 - LAB | Age: 75
Discharge: HOME | End: 2025-01-15
Payer: MEDICARE

## 2025-01-15 DIAGNOSIS — E03.9: Primary | ICD-10-CM

## 2025-01-15 LAB — THYROID STIMULATING HORMONE: 2.13 UIU/ML (ref 0.36–3.74)

## 2025-01-15 PROCEDURE — 36415 COLL VENOUS BLD VENIPUNCTURE: CPT

## 2025-01-15 PROCEDURE — 84443 ASSAY THYROID STIM HORMONE: CPT

## 2025-01-15 PROCEDURE — 84439 ASSAY OF FREE THYROXINE: CPT

## 2025-01-15 NOTE — XMS_ITS
Comprehensive CCD (C-CDA v2.1)  
  
                          Created on: January 15, 2025  
  
  
Ping Chavez  
External Reference #: CDR,PersonID:[id number]  
: [id number]  
Sex: Female  
  
Demographics  
  
  
                                        Address             [address] [place]  [address]  
   
                                        Home Phone          [phone]  
   
                                        Mobile Phone        [phone]  
   
                                        Preferred Language  en  
   
                                        Marital Status        
   
                                        Pentec[employer]al Affiliation Unknown  
   
                                        Race                White  
   
                                        Ethnic Group        Not  or Lati  
no  
  
  
Author  
  
  
                                        Organization        [facility]  
  
  
Care Team Providers  
  
  
                                Care Team Member Name Role            Phone  
   
                                GREG ROCHA   Unavailable     Unavailable  
   
                                GREG ROCHA   Unavailable     Unavailable  
   
                                KHAMOUSIA, NIDAA Unavailable     Unavailable  
   
                                Unavailable     Primary Care Provider UnavailDR JASMIN Wilks Consulting      Unavailable  
   
                                AYALA MOONEY    Attending       Unavailable  
   
                                AYALA MOONEY    Admitting       Unavailable  
   
                                AYALA MOONEY    Consulting      Unavailable  
   
                                KHAMOUSIA, NIDAA Primary Care Physician [phone]  
   
                                Alexander Guerra MD Primary Care Provider [phone]  
   
                                Hamman, Bryan H Unavailable     Unavailable  
   
                                Tram Odom Unavailable     Unavailable  
   
                                MD Salvador Corcoran Attending Provider [phone]  
   
                                MD Alexander Guerra Primary Care Provider [phone]  
   
                                AMELIA HENDRICKSON Attending       Unavailable  
   
                                Salvador CORCORAN Attending       Unavailable  
   
                                Salvador CORCORAN Attending       Unavailable  
   
                                Marty Osborne Attending       Unavaila  
Marty Acuna Admitting       Unavailrobin hughes  
   
                                NONE, XXXX      Referring       Unavailable  
   
                                AMELIA HENDRICKSON Referring       Unavailable  
   
                                AMELIA HENDRICKSON Attending       Unavailable  
   
                                AMELIA HENDRICKSON Admitting       Unavailable  
   
                                Babak Montoya Attending       Unavailable  
   
                                J Luis VERDUGO Admitting       Unavailable  
   
                                Blank, Dominguez S Consulting      Unavailable  
   
                                Blank, Dominguez S Consulting      Unavailable  
   
                                Blank, Dominguez S Consulting      Unavailable  
   
                                Blank, Dominguez S Consulting      Unavailable  
   
                                Blank, Dominguez S Consulting      Unavailable  
   
                                Blank, Dominguez S Consulting      Unavailable  
   
                                Blank, Dominguez S Consulting      Unavailable  
   
                                Blank, Dominguez S Consulting      Unavailable  
   
                                Blank, Dominguez S Consulting      Unavailable  
   
                                Blank, Dominguez S Consulting      Unavailable  
   
                                Trae Garcia Attending       Unavailable  
   
                                KHAMOUSIA, NIDAA Referring       Unavailable  
   
                                KHAMOUSIA, NIDAA Primary Care    Unavailable  
   
                                KHAMOUSIA, NIDAA Primary Care    Unavailable  
   
                                KHAMOUSIA, NIDAA Referring       Unavailable  
   
                                KHAMOUSIA, NIDAA Primary Care    Unavailable  
   
                                MARY RIVAS Attending       Unavailable  
   
                                ANGEL GERARDO Referring       Unavailable  
   
                                KHAMOUSIA, NIDAA Primary Care    Unavailable  
   
                                ZACK, GREG L   Admitting       Unavailable  
   
                                GREG ROCHA   Attending       Unavailable  
   
                                KHAMOUSIA, NIDAA Primary Care    Unavailable  
   
                                Khamousia Alexander JORDAN Primary Care Provider [phone]  
[phone]  
   
                                MD Alexander Guerra Primary Care Provider [phone]  
   
                                MD Alexei Bartlett II Attending Provider [phone]  
[phone]  
   
                                Khamousia, Nidaa Primary Care    Unavailable  
   
                                Salvador Corcoran Attending       Unavailable  
   
                                Salvador Corcoran Admitting       Unavailable  
   
                                Alexei Bartlett II Attending       Unavailabl  
e  
   
                                Alexei Bartlett II Admitting       Unavailabl  
e  
   
                                Khamousia, Nidaa Primary Care    Unavailable  
   
                                [facility] Referring       Unava  
ilable  
   
                                KHAMOUSIA, NIDAA O Primary Care    Unavailable  
   
                                CJ KENNEDY  Attending       Unavailable  
   
                                CJ KENNEDY  Attending       Unavailable  
   
                                CJ KENNEDY  Attending       Unavailable  
   
                                ANGEL GERARDO Attending       Unavailable  
   
                                KHAMOUSIA, NIDAA O Referring       Unavailable  
   
                                KHAMOUSIA, NIDAA O Primary Care    Unavailable  
   
                                KHAMOUSIA, NIDAA O Referring       Unavailable  
   
                                KHAMOUSIA, NIDAA O Primary Care    Unavailable  
   
                                CORRINE COTTER Attending       Unavailable  
   
                                KHAMOUSIA, NIDAA O Referring       Unavailable  
   
                                KHAMOUSIA, NIDAA O Primary Care    Unavailable  
   
                                SUSANA HUTCHINSON Attending       Unavailable  
   
                                CHAPARRO HOLLEY Attending       Unavailable  
   
                                KHAMOUSIA, NIDAA O Referring       Unavailable  
   
                                KHAMOUSIA, NIDAA O Primary Care    Unavailable  
   
                                CHAPARRO HOLLEY Referring       Unavailable  
   
                                KHAMOUSIA, NIDAA O Primary Care    Unavailable  
   
                                KHAMOUSIA, NIDAA O Referring       Unavailable  
   
                                KHAMOUSIA, NIDAA O Primary Care    Unavailable  
   
                                CHAPARRO HOLLEY Attending       Unavailable  
   
                                KHAMOUSIA, NIDAA O Referring       Unavailable  
   
                                KHAMOUSIA, NIDAA O Primary Care    Unavailable  
   
                                KHAMOUSIA, NIDAA O Referring       Unavailable  
   
                                KHAMOUSIA, NIDAA O Primary Care    Unavailable  
   
                                CHAPARRO HOLLEY Referring       Unavailable  
   
                                KHAMOUSIA, NIDAA O Primary Care    Unavailable  
   
                                KHAMOUSIA, NIDAA O Referring       Unavailable  
   
                                KHAMOUSIA, NIDAA O Primary Care    Unavailable  
   
                                KHAMOUSIA, NIDAA O Referring       Unavailable  
   
                                KHAMOUSIA, NIDAA O Primary Care    Unavailable  
   
                                SUSANA HUTCHINSON Attending       Unavailable  
   
                                KHAMOUSIA, NIDAA O Referring       Unavailable  
   
                                KHAMOUSIA, NIDAA O Primary Care    Unavailable  
   
                                ANGEL GERARDO Attending       Unavailable  
   
                                KHAMOUSIA, NIDAA O Referring       Unavailable  
   
                                KHAMOUSIA, NIDAA O Primary Care    Unavailable  
   
                                Unavailable     Primary Care Provider Unavailkannan  
e  
   
                                Khamousia MD, Nidaa Primary Care Provider [phone]  
   
                                CJ KENNEDY  Attending       Unavailable  
   
                                CJ KENNEDY  Attending       Unavailable  
   
                                Unavailable     Primary Care Provider Unavailabl  
e  
  
  
  
Allergies  
  
  
                                                    Allergy   
Classification                          Reported   
Allergen(s)               Allergy Type              Date of   
Onset                     Reaction(s)               Facility  
   
                                                      
(20 sources)                            Cefaclor;   
Translations:   
[cefaclor]                Drug Allergy              10-  
6                                       Eruption of   
skin   
(disorder),   
Itching, Rash,   
Swelling                                [facility]  
   
                                                      
(1 source)          Cefaclor            Drug Allergy          
4                                                   [facility]   
Repository  
  
  
  
Medications  
Current Medications  
  
  
  
                      Medication Drug Class(es) Dates      Sig (Normalized) Sig   
(Original)  
   
                                                    acetaminophen 325 mg   
oral capsule  
(20 sources)                                        Start:   
2024                              take 325 mg by   
mouth every six   
hours                                   Acetaminophen   
Active 325 MG PO   
Every 6 hours   
2024   
12:00am  
  
  
  
                                        Start: 2018   take 2 tablets by mo  
uth every   
six hours as needed for pain            acetaminophen 325 mg Tab 650 mg = 2   
tab(s), Oral, q6hr, PRN for pain   
Start Date: 18 Status: Ordered  
   
                                        Start: 2018   take 2 tablets by mo  
uth every   
four hours as needed for pain           acetaminophen 325 mg Tab 650 mg = 2   
tab(s), Oral, q4hr, PRN for pain   
Start Date: 18 Status: Ordered  
   
                                                            take 1 capsule by mo  
uth every   
six hours as needed for pain            acetaminophen (Tylenol) 325 MG   
capsule Take 325 mg by mouth every 6   
(six) hours if needed for mild pain   
2 PO Active  
  
  
  
                                                    acetaminophen 325 mg   
/ HYDROcodone   
bitartrate 5 mg oral   
tablet  
(2 sources)               Opioid Agonist            Start:   
10-                                          Norco 325 mg-5 mg   
oral tablet 1   
tab(s), Oral,   
q4hr for pain, 12   
tab(s), Refill(s)   
0 Start Date:   
10/9/19 Status:   
Ordered  
   
                                                    [id number] 200 actuat   
albuterol 0.09   
mg/actuat metered   
dose inhaler  
(4 sources)                             beta2-Adrenergic   
Agonist                                 Start:   
2022                              take 2 puff(s)   
by inhalation   
every four hours   
for wheezing                            Pro-Air HFA CFC   
free 90 mcg/inh   
MDI 2 puff(s),   
Inhalation, q4hr   
for wheezing, 8.5   
gram, Refill(s)   
0, [facility],   
172.7, cm,   
22 0:48:00   
EST,   
Height/Length   
Dosing, 103.5,   
kg, 22   
0:48:00 EST,   
Weight Dosing   
Start Date:   
22 Status:   
Ordered  
   
                                                    Allopurinol  
(20 sources)                            Xanthine Oxidase   
Inhibitor                               Start:   
2024                              take 1 mg by   
mouth once daily                        Allopurinol   
Active MG PO   
Daily 2024   
12:00am  
  
  
  
                                        Start: 2018   take 1 tablet by dawn  
th once   
daily                                   allopurinol (ZYLOPRIM) 100 mg tablet   
TAKE 1 TABLET BY MOUTH ONCE DAILY   
FOR 90 DAYS 2020 Active  
   
                                        Start: 2018   take 1 tablet by dawn  
th twice   
daily                                   allopurinol 100 mg Tab 100 mg = 1   
tab(s), Oral, BID, Refills(s) 0   
Start Date: 18 Status: Ordered  
  
  
  
                                                    bifidobacterium animalis   
[id number] unt /   
lactobacillus acidophilus   
[id number] unt oral capsule  
(12 sources)                                                take 1 capsule by   
mouth once daily                        Probiotic Product   
(Probiotic Complex   
Acidophilus) capsule   
Take 1 capsule by mouth   
Daily Active  
   
                                                    calcium carbonate 1500 mg   
oral tablet  
(20 sources)                                        Start:   
2021                              take 1 tablet by   
mouth once daily                        calcium (as carbonate)   
600 mg oral tablet 600   
mg = 1 tab(s), Oral,   
Daily, Refills(s) 0   
Start Date: 21   
Status: Ordered  
  
  
  
                                                                calcium carbonat  
e (CALCIUM 500 ORAL) Take by   
mouth daily. Active  
   
                                                take 1 tablet by mouth once cj  
y Calcium Carbonate (CALCIUM 500 PO) Take 500   
mg by   
mouth Daily G Tablet 1 PO Active  
   
                                                                calcium carbonat  
e (CALCIUM 500 ORAL) Take by   
mouth daily. 0 Active  
  
  
  
                                                    carvedilol 6.25   
mg oral tablet  
(20 sources)                            alpha-Adrenergic   
Blocker,   
beta-Adrenergic Blocker   Start: 2024         take 1 mg by   
mouth every   
twelve hours                            Carvedilol   
Active MG PO   
Every 12 hours   
2024 12:00am  
  
  
  
                                        Start: 2022   take 1 tablet by dawn  
th twice   
daily                                   carvedilol 12.5 mg Tab 12.5 mg = 1   
tab(s), Oral, BID, Refills(s) 0   
Start Date: 22 Status: Ordered  
   
                                                    Start: 2020  
End: 10-                                     carvediloL (COREG) 6.25 mg t  
ablet   
Indications: Paroxysmal atrial   
fibrillation (CMS-HCC) Take 1 tablet   
in the morning, 2 tablets in the   
evening 90 tablet 3 10/01/2024   
Active  
   
                                        Start: 2018   take 1 tablet by dawn  
th twice   
daily                                   carvedilol 12.5 mg Tab 12.5 mg = 1   
tab(s), Oral, BID, Refills(s) 0   
Start Date: 18 Status: Ordered  
  
  
  
                                                    cholecalciferol 0.05 mg   
oral capsule  
(20 sources)        Vitamin D                               take 1 capsule by   
mouth once daily                        cholecalciferol (Vitamin   
D-3) 50 MCG (2000 UT)   
capsule Take 50 mcg by   
mouth Daily 1 po Active  
  
  
  
                                                take 1 tablet by mouth in the mo  
rning cholecalciferol, vitamin D3, 2,000 units   
tablet Take 1 tablet (2,000 Units total) by   
mouth in the morning. Active  
   
                                                take 1 tablet by mouth once cj  
y cholecalciferol (VITAMIN D3) 50 mcg (2,000   
unit) tablet Take 2,000 Units by mouth once   
daily. Active  
   
                                                                Cholecalciferol   
(VITAMIN D3) 2000 units CAPS   
Take by mouth 0 Active  
  
  
  
                                                    dorzolamide 20   
mg/ml / timolol 5   
mg/ml ophthalmic   
solution  
(13 sources)                            Carbonic Anhydrase   
Inhibitor,   
beta-Adrenergic Blocker                             take 1 drop(s)   
into the eye(s)   
in the morning,   
then take 1   
drop(s) into   
the eye(s)   
twice daily                             dorzolamide-timolol   
(Cosopt) 2-0.5 %   
ophthalmic solution   
Administer 1 drop into   
both eyes in the morning   
and 1 drop before bedtime.   
22.3- 6.8 MG/ML Solution 1   
DROP OPHT BID . Active  
  
  
  
                                                            take 1 drop(s) into   
the eye(s) twice   
daily                                   dorzolamide-timolol (COSOPT) 22.3-6.8 mg  
/mL   
ophthalmic solution Use 1 Drop in both eyes   
twice daily. Active  
  
  
  
                                                    empagliflozin 10 mg   
oral tablet  
(16 sources)                            Sodium-Glucose   
Cotransporter 2   
Inhibitor                               Start:   
2024                              take 1   
tablet by   
mouth in the   
morning                                 empagliflozin   
(JARDIANCE) 10 mg   
tablet tablet Take   
1 tablet (10 mg   
total) by mouth in   
the morning. 30   
tablet 11   
2024 Active  
   
                                                    esomeprazole 20 mg   
delayed release   
oral capsule  
(20 sources)                            Proton Pump   
Inhibitor                               Start:   
2021                              take 1   
capsule by   
mouth in the   
evening                                 esomeprazole   
(NexIUM) 20 mg   
capsule Take 1   
capsule (20 mg   
total) by mouth in   
the evening.   
2021 Active  
   
                                                    ferrous sulfate 325   
mg oral tablet  
(20 sources)                                        Start:   
2024                              take 1   
tablet by   
mouth once   
daily                                   Ferrous Sulfate   
(Ferosul) 325 mg   
(65 mg iron)   
tablet Active MG   
PO Daily 2024 12:00am  
  
  
  
                                                take 1 tablet by mouth every oth  
er day ferrous sulfate 325 (65 FE) mg EC tablet  
   
Take 1 tablet (325 mg total) by mouth every   
other day. Active  
  
  
  
                                                    Fish Oils  
(12 sources)                                                take 1 capsule   
by mouth in the   
morning                                 omega-3 (fish oil)   
1200 MG capsule   
Take 1,200 mg by   
mouth in the   
morning and 1,200   
mg before bedtime.   
1 PO. Active  
   
                                                    flaxseed oil (OMEGA   
3 ORAL)  
(1 source)                                                      flaxseed oil (OM  
EGA   
3 ORAL) Take by   
mouth. Active  
   
                                                    fluticasone   
propionate 0.05   
mg/actuat metered   
dose nasal spray  
(4 sources)               Corticosteroid            Start:   
20                                                  Flonase 0.05 mg/inh   
Spray 0.1 mg, 2   
spray(s), Nasal,   
Daily, Refill(s) 0   
Start Date:   
22 Status:   
Ordered  
   
                                                    fluvoxaMINE maleate   
100 mg oral tablet  
(11 sources)                            Serotonin Reuptake   
Inhibitor                               Start:   
20                                                  fluvoxamine 100 mg   
oral tablet 50 mg =   
0.5 tab(s), Oral,   
BID, # 180 tab(s),   
Refills(s) 0 Start   
Date: 22   
Status: Ordered  
  
  
  
                                        Start: 2020   take 1 tablet by dawn  
th twice   
daily                                   fluvoxaMINE (LUVOX) 100 mg tablet   
Take 100 mg by mouth twice daily.   
2020 Active  
   
                                                            take 0.5 tablet by m  
outh in the   
morning, then take 0.5 tablet by   
mouth at bedtime                        fluvoxaMINE (LUVOX) 100 mg tablet   
Take 0.5 tablets (50 mg total) by   
mouth in the morning and 0.5   
tablets (50 mg total) before   
bedtime. 0 Active  
   
                                                            take 1 capsule by mo  
uth once   
daily                                   fluvoxaMINE (LUVOX CR) 100 MG CP24   
extended release capsule Take 100   
mg by mouth nightly 0 Active  
  
  
  
                                                    furosemide 20 mg   
oral tablet  
(20 sources)        Loop Diuretic       Start: 02-   take 10 mg by   
mouth once   
daily                                   Furosemide Active   
10 MG PO Daily   
2024   
12:00am  
  
  
  
                                Start: 2021                 furosemide (LA  
SIX) 20 mg tablet Take   
0.5 tablets (10 mg total) by mouth   
as needed (please have labs drawn   
for future refills.). 90 tablet 2   
2021 Active  
   
                                Start: 2018                 furosemide (LA  
SIX) 20 mg tablet   
Indications: Chronic combined   
systolic and diastolic congestive   
heart failure (CMS-HCC) , Persistent   
atrial fibrillation (CMS-HCC) ,   
Nonischemic congestive   
cardiomyopathy (CMS-HCC) , Benign   
essential hypertension , Left bundle   
branch block (LBBB) , Automatic   
implantable   
cardioverter-defibrillator in situ   
Take 1 tablet (20 mg total) by mouth   
as needed (fluid wt gain). Take 1   
tab if fluid wt gain of 3# or more   
in 1 day or 4-5# in 4 days   
2024 Active  
   
                                                                furosemide (LASI  
X) 40 mg tablet Take   
10 mg by mouth as needed. Active  
   
                                                            take 1 tablet by dawn  
 twice   
daily                                   furosemide (LASIX) 40 MG tablet Take   
40 mg by mouth 2 times daily 0   
Active  
  
  
  
                                                    gabapentin 100 mg   
oral capsule  
(20 sources)              Anti-epileptic Agent      Start: 2024  
End: 2024                                     gabapentin (Neurontin)   
100 MG capsule   
Indications: Neuropathy   
TAKE ONE CAPSULE BY   
MOUTH EVERY MORNING, 2   
CAPSULEs AT MIDDAY,   
THEN TAKE 3 CAPSULES BY   
MOUTH EVERY EVENING 180   
capsule 1 2024   
Active  
  
  
  
                                Start: 2024                 Gabapentin Act  
skyler MG PO 2024 12:00am  
   
                                        Start: 2022   take 1 capsule by mo  
uth three   
times daily                             gabapentin 100 mg Cap 100 mg = 1   
cap(s), Oral, TID, Refills(s) 0   
Start Date: 22 Status: Ordered  
  
  
  
                                                    12 hr guaiFENesin 600 mg   
extended release oral   
tablet  
(1 source)                                          Start: 2022  
End: 2022                         take 2 tablets by   
mouth twice daily                       Mucinex 600 mg Tab-ER   
1,200 mg = 2 tab(s),   
Oral, BID, X 7 day(s),   
Refills(s) 0 Start   
Date: 22 Stop   
Date: 22 Status:   
Ordered  
   
                                                    L.acid/B.bifidum/B.anima  
l/FOS (PROBIOTIC COMPLEX   
ORAL)  
(9 sources)                                                     L.acid/B.bifidum  
/B.ani  
mal/FOS (PROBIOTIC   
COMPLEX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   
   
                                                    Urea nitrogen   
[Mass/Vol]      20 mg/dL        Normal          5-21            [facility]  
   
                                        Comment on above:   Performed By: #### 2  
249223, 59510676, 6928920, 4290732   
####[facility] [address] [place] [address]   
   
                                                    Urea   
nitrogen/Creatinine   
[Mass ratio]    20 No Units     Normal          10-20           [facility]  
   
                                        Comment on above:   Performed By: #### 2  
529754, 52498737, 0067146, 5056634   
####[facility] [address] [place] [address]   
   
                                                    CHEMISTRYOrdered By: SYSTEM   
SYSTEM on 2022   
   
                          Anion gap [Moles/Vol] 8 mmol/L     Normal       6 - 16  
   
mEq/L                                   [facility]   
Remisol  
   
                          Calcium [Mass/Vol] 9.8 mg/dL    Normal       8.9 - 11.  
1   
mg/dL                                   [facility]   
Remisol  
   
                          Chloride [Moles/Vol] 108 mmol/L   Normal       101 - 1  
11   
mmol/L                                  [facility]   
Remisol  
   
                          CO2 [Moles/Vol] 21 mmol/L    Normal       21 - 31   
mmol/L                                  [facility]   
Remisol  
   
                          Creatinine [Mass/Vol] 1.0 mg/dL    Normal       0.5 -   
1.3   
mg/dL                                   [facility]   
Remisol  
   
                                                    GFR/1.73 sq M.predicted   
among blacks MDRD   
(S/P/Bld) [Vol   
rate/Area]          mL/min/1.73 m2      Normal              >=59mL/min/  
1.73 m2                                 [facility]  
   
                                                    GFR/1.73 sq M.predicted   
among non-blacks MDRD   
(S/P/Bld) [Vol   
rate/Area]          54 mL/min/1.73 m2   Low                 >=59mL/min/  
1.73 m2                                 [facility]  
   
                          Glucose [Mass/Vol] 111 mg/dL    Normal       55 - 199   
mg/dL                                   [facility]   
Remisol  
   
                          Magnesium [Mass/Vol] 1.5 mg/dL    Normal       1.3 - 2  
.4   
mg/dL                                   [facility]   
Remisol  
   
                          Potassium [Moles/Vol] 3.2 mmol/L   Low          3.5 -   
5.3   
mmol/L                                  [facility]   
Remisol  
   
                          Sodium [Moles/Vol] 134 mmol/L   Low          135 - 145  
   
mmol/L                                  FT   
Remisol  
   
                          TSH Qn       2.52 m[IU]/L Normal       0.34 - 5.60   
mcIU/mL                                 [facility]   
Remisol  
   
                                                    Urea nitrogen   
[Mass/Vol]          20 mg/dL            Normal              5 - 21   
mg/dL                                   [facility]   
Remisol  
   
                                                    Urea   
nitrogen/Creatinine   
[Mass ratio]    20 mg/mg        Normal          10 - 20         [facility]   
Remisol  
   
                                                    EMS Documentationon 20  
22   
   
                      EMS Documentation            Normal                [facility]  
   
                      EMS Documentation            Normal                [facility]  
   
                                                    Magnesiumon 2022   
   
                      Magnesium [Mass/Vol] 1.5 mg/dL  Normal     1.3-2.4    [facility]  
   
                                        Comment on above:   Performed By: #### 2  
271082, 01130465, 6424506, 6074527   
####[facility] [address] [place]   
[place] [address]   
   
                                                    Monitor Recordon 2022   
   
                                        Monitor Record      [id number]  
1062  
[id number]#[id number]  
:127                Normal                                  [facility]  
   
                                        Monitor Record      [id number]  
1062  
[id number]#[id number]  
:127                Normal                                  [facility]  
   
                                                    Progress Note-Physicianon    
   
                      Progress Note-Physician            Normal                F  
[facility]  
   
                                        Comment on above:   Result Comment: Elec  
tronically Signed By: OUMAR OWENS,   
Hilary\.br\Date and Time Signed: 22 09:07   
EST\.br\Electronically Co-Signed By: Jan JORDAN, Babak RUIZ\.br\Date and Time Co-Signed: 22 09:48 EST   
   
                                                    Sodiumon 2022   
   
                      Sodium [Moles/Vol] 135 mmol/L Normal     135-145    [facility]  
   
                                        Comment on above:   Performed By: #### 2  
246323 ####[facility]   
[address] [place] [address]   
   
                                                    TSHon 2022   
   
                      TSH Qn     2.52 m[IU]/L Normal     0.34-5.60  [facility]  
   
                                        Comment on above:   Performed By: #### 2  
856126, 19173976, 3361359, 2595745   
####[facility] [address] [place] [address]   
   
                                                    eGFRon 2022   
   
                                                    GFR/1.73 sq M.predicted   
among blacks MDRD   
(S/P/Bld) [Vol   
rate/Area]      mL/min/{1.73_m2} Normal          >=59            [facility]  
   
                                        Comment on above:   Order Comment: Order  
 added by Discern Expert.   
   
                                                            Result Comment: eGFR  
 is race adjusted. AA=.   
   
                                                            Performed By: #### 2  
724788, 12905027, 1864923, 3725430   
####[facility] [address] [place] [address]   
   
                                                    GFR/1.73 sq M.predicted   
among non-blacks MDRD   
(S/P/Bld) [Vol   
rate/Area]      54 mL/min/1.73 m2 Low             >=59            [facility]  
   
                                        Comment on above:   Order Comment: Order  
 added by Discern Expert.   
   
                                                            Result Comment:   
deion kidney disease could be indicated at   
eGFR's of less than 60 mL/min/1.73m2. Kidney failure is   
indicated at less than 15 mL/min/1.73m2.   
   
                                                            Performed By: #### 2  
373158, 11627549, 1247375, 5676843   
####[facility] [address] [place]   
[place] [address]   
   
                                                    BMPon 2022   
   
                      Anion gap [Moles/Vol] 11 mmol/L  Normal     6-16       [facility]  
   
                                        Comment on above:   Performed By: #### 2  
106596, 11767847 ####[facility] [address] [place] [address]   
   
                      Calcium [Mass/Vol] 9.7 mg/dL  Normal     8.9-11.1   [facility]  
   
                                        Comment on above:   Performed By: #### 2  
827631, 68556946 ####[facility] [address] [place] [address]   
   
                      Chloride [Moles/Vol] 104 mmol/L Normal     101-111    [facility]  
   
                                        Comment on above:   Performed By: #### 2  
776909, 29117322 ####[facility] [address] [place] [address]   
   
                      CO2 [Moles/Vol] 22 mmol/L  Normal     21-31      [facility]  
   
                                        Comment on above:   Performed By: #### 2  
008343, 56676031 ####[facility] [address] [place] [address]   
   
                      Creatinine [Mass/Vol] 0.9 mg/dL  Normal     0.5-1.3    [facility]  
   
                                        Comment on above:   Performed By: #### 2  
358742, 36924357 ####[facility] [address] [place] [address]   
   
                      Glucose [Mass/Vol] 98 mg/dL   Normal          [facility]  
   
                                        Comment on above:   Result Comment: If t  
his glucose result represents a fasting   
glucose, interpretation should refer to the following   
reference range: 55-99 mg/dL   
   
                                                            Performed By: #### 2  
660604, [id number] ####[facility] [address] [place] [address]   
   
                      Potassium [Moles/Vol] 3.7 mmol/L Normal     3.5-5.3    [facility]  
   
                                        Comment on above:   Performed By: #### 2  
717762, 45296972 ####[facility] [address] [place] [address]   
   
                      Sodium [Moles/Vol] 133 mmol/L Low        135-145    [facility]  
   
                                        Comment on above:   Performed By: #### 2  
576096, 77460301 ####[facility] [address] [place] [address]   
   
                                                    Urea nitrogen   
[Mass/Vol]      20 mg/dL        Normal          5-21            [facility]  
   
                                        Comment on above:   Performed By: #### 2  
899245, 06030884 ####[facility] [address] [place] [address]   
   
                                                    Urea   
nitrogen/Creatinine   
[Mass ratio]    22 No Units     High            10-20           [facility]  
   
                                        Comment on above:   Performed By: #### 2  
494641, 96443049 ####[facility] [address] [place] [address]   
   
                                                    C Blood Charcoalon    
   
                      Blood Culture Charcoal            Normal                [facility]  
   
                                        Comment on above:   Performed By: #### 1  
[id number] ####[facility]   
[address] [place] [address]   
   
                      Blood Culture Charcoal            Normal                [facility]  
   
                                        Comment on above:   Performed By: #### 1  
[id number] ####[facility]   
[address] [place] [address]   
   
                                                    C Urineon 2022   
   
                                                    Bacteria identified Cx   
Nom (U)                         Normal                          [facility]  
   
                                        Comment on above:   Performed By: #### 2  
880026, [id number] ####[facility] [address] [place] [address]   
   
                                                    CHEMISTRYOrdered By: SYSTEM   
SYSTEM on 2022   
   
                          Calcium [Mass/Vol] 9.7 mg/dL    Normal       8.9 - 11.  
1   
mg/dL                                   [facility]   
Remisol  
   
                          Creatinine [Mass/Vol] 0.9 mg/dL    Normal       0.5 -   
1.3   
mg/dL                                   [facility]   
Remisol  
   
                                                    GFR/1.73 sq M.predicted   
among blacks MDRD   
(S/P/Bld) [Vol   
rate/Area]          mL/min/1.73 m2      Normal              >=59mL/min/  
1.73 m2                                 [facility]  
   
                                                    GFR/1.73 sq M.predicted   
among non-blacks MDRD   
(S/P/Bld) [Vol   
rate/Area]          mL/min/1.73 m2      Normal              >=59mL/min/  
1.73 m2                                 [facility]  
   
                          Glucose [Mass/Vol] 98 mg/dL     Normal       55 - 199   
mg/dL                                   [facility]   
Remisol  
   
                                                    Urea nitrogen   
[Mass/Vol]          20 mg/dL            Normal              5 - 21   
mg/dL                                   [facility]   
Remisol  
   
                                                    Urea   
nitrogen/Creatinine   
[Mass ratio]    22 mg/mg        High            10 - 20         [facility]   
Remisol  
   
                                                    CT Abdomen/Pelvis w/o Contra  
ston 2022   
   
                                                    CT Abdomen/Pelvis w/o   
Contrast                        Normal                          [facility]  
   
                                                    CT Chest w/o Contraston    
   
                      CT Chest w/o Contrast            Normal                [facility]  
   
                                                    Interdisciplinary Note - Jeff  
e Manageron 2022   
   
                                                    Interdisciplinary Note   
-                   Normal                          [facility]  
   
                                        Comment on above:   Result Comment: Elec  
tronically Signed By: Martha Arciniega\.br\Date and Time Signed: 22 12:34 EST   
   
                                                    Laboratory - Microbiology an  
d Antimicrobial susceptibilityOrdered By: Carol Seip  
on   
2022   
   
                                                    Bacteria identified   
Respiratory culture Nom   
(Sput)                                  1+ Candida albicans   
Presumptive  
Scant growth of Normal   
upper respiratory cathi   
isolated                                                    [facility]  
   
                                                    Monitor Recordon 2022   
   
                                        Monitor Record      [id number]  
[id number]  
[id number]#[id number]  
:127                Normal                                  [facility]  
   
                                                    No Panel InformationOrdered   
By: Carol Seip on 2022   
   
                                        GS                  Rare epithelial cell  
s  
No organisms seen.                                          [facility]  
   
                                                    Outside Recordson 2022  
   
   
                                        Outside Records     [id number]  
[id number]  
[id number]#[id number]  
:127                Normal                                  [facility]  
   
                                                    Progress Note-Physicianon    
   
                      Progress Note-Physician            Normal                F  
[facility]  
   
                                        Comment on above:   Result Comment: Elec  
tronically Signed By: Po Champagne MD\.br\Date and Time Signed: 22 15:46 EST   
   
                      Progress Note-Physician            Normal                F  
[facility]  
   
                                        Comment on above:   Result Comment: Elec  
tronically Signed By: Hilary NIEVES\.br\Date and Time Signed: 22 10:27   
EST\.br\Electronically Co-Signed By: Jan JORDAN, Babak RUIZ\.br\Date and Time Co-Signed: 22 11:15 EST   
   
                      Progress Note-Physician            Normal                F  
[facility]  
   
                                        Comment on above:   Result Comment: Elec  
tronically Signed By: Tone JORDAN,   
[place]\.br\Date and Time Signed: 22 07:27 EST   
   
                                                    Sodiumon 2022   
   
                      Sodium [Moles/Vol] 135 mmol/L Normal     135-145    [facility]  
   
                                        Comment on above:   Performed By: #### 2  
[id number] ####[facility]   
[address] [place] [address]   
   
                      Sodium [Moles/Vol] 135 mmol/L Normal     135-145    [facility]  
   
                                        Comment on above:   Performed By: #### 2  
751394 ####[facility]   
[address] [place] [address]   
   
                      Sodium [Moles/Vol] 132 mmol/L Low        135-145    [facility]  
   
                                        Comment on above:   Performed By: #### 2  
336387 ####[facility]   
[address] [place] [address]   
   
                                                    U Legi Agon 2022   
   
                                                    L. pneumophila 1 Ag IA   
Ql (U)                    Negative                  Invalid   
Interpretation   
Code                      Negative                  [facility]  
   
                                        Comment on above:   Result Comment: Pres  
umptive negative for L. pneumophila   
serogroup 1 antigen in urine,suggesting no recent or current   
infection. Legionnaires' diseasecannot be ruled out since   
other serogroups and species may also causedisease.Performed   
at:  [facility]   
[id number] MD Juwan Butt   
   
                                                            Performed By: #### 2  
806449 ####[facility]   
[address] [place] [address]   
   
                                                    US Renalon 2022   
   
                      US Renal              Normal                [facility]  
   
                                                    eGFRon 2022   
   
                                                    GFR/1.73 sq M.predicted   
among blacks MDRD   
(S/P/Bld) [Vol   
rate/Area]      mL/min/{1.73_m2} Normal          >=59            [facility]  
   
                                        Comment on above:   Order Comment: Order  
 added by Discern Expert.   
   
                                                            Result Comment: eGFR  
 is race adjusted. AA=.   
   
                                                            Performed By: #### 2  
046539, [id number] ####[facility] [address] [place] [address]   
   
                                                    GFR/1.73 sq M.predicted   
among non-blacks MDRD   
(S/P/Bld) [Vol   
rate/Area]      mL/min/{1.73_m2} Normal          >=59            [facility]  
   
                                        Comment on above:   Order Comment: Order  
 added by Lenin Expert.   
   
                                                            Result Comment:   
deion kidney disease could be indicated at   
eGFR's of less than 60 mL/min/1.73m2. Kidney failure is   
indicated at less than 15 mL/min/1.73m2.   
   
                                                            Performed By: #### 2  
443209, [id number] ####[facility] [address] [place] [address]   
   
                                                    Auto Diffon 2022   
   
                      Basophils/100 WBC (Bld) 0.3 %      Normal     0.0-2.0    [facility]  
   
                                        Comment on above:   Order Comment: Order  
 Added by Lenin Expert.   
   
                                                            Performed By: #### 2  
948001, 02082560, 3256577, 6883651   
####[facility] [address] [place] [address]   
   
                                                    Basophils/Leukocytes   
Auto (Bld) [Pure #   
fraction]       0.0 E9/L        Normal          0.0-0.2         [facility]  
   
                                        Comment on above:   Order Comment: Order  
 Added by Discern Expert.   
   
                                                            Performed By: #### 2  
237857, 37118144, 9466470, 6227741   
####[facility] [address] [place] [address]   
   
                                                    Eosinophils/100 WBC   
(Bld)           0.5 %           Normal          0.0-8.0         [facility]  
   
                                        Comment on above:   Order Comment: Order  
 Added by Discern Expert.   
   
                                                            Performed By: #### 2  
564582, 95035234, 2312413, 9279586   
####[address] [place] [address]   
   
                                                    Eosinophils/Leukocytes   
Auto (Bld) [Pure #   
fraction]       0.0 E9/L        Normal          0.0-0.5         [facility]  
   
                                        Comment on above:   Order Comment: Order  
 Added by Discern Expert.   
   
                                                            Performed By: #### 2  
261827, 52753652, 9738462, 6002628   
####[address] [place] [address]   
   
                                                    Lymphocytes/100 WBC   
(Bld)           5.3 %           Low             14.0-50.0       [facility]  
   
                                        Comment on above:   Order Comment: Order  
 Added by Lenin Expert.   
   
                                                            Performed By: #### 2  
548311, 86774344, 3924478, 3901840   
####[address] [address]   
   
                                                    Lymphocytes/Leukocytes   
Auto (Bld) [Pure #   
fraction]       0.5 E9/L        Low             1.0-4.0         [facility]  
   
                                        Comment on above:   Order Comment: Order  
 Added by Lenin Expert.   
   
                                                            Performed By: #### 2  
195685, 74990722, 4586354, 5871456   
####[address] [address]   
   
                      Monocytes/100 WBC (Bld) 8.5 %      Normal     4.0-14.0   [facility]  
   
                                        Comment on above:   Order Comment: Order  
 Added by Lenin Expert.   
   
                                                            Performed By: #### 2  
021045, 92238578, 4728911, 3499227   
####[address] [place] [address]   
   
                                                    Monocytes/Leukocytes   
Auto (Bld) [Pure #   
fraction]       0.8 E9/L        Normal          0.2-1.0         [facility]  
   
                                        Comment on above:   Order Comment: Order  
 Added by Lenin Expert.   
   
                                                            Performed By: #### 2  
510913, 49776804, 4378797, 7778673   
####[address] [place] [address]   
   
                                                    Neutrophils/100 WBC   
(Bld)           85.4 %          High            36.0-75.0       [facility]  
   
                                        Comment on above:   Order Comment: Order  
 Added by Discern Expert.   
   
                                                            Performed By: #### 2  
203733, 79454593, 7019615, 6444151   
####[facility] [address]   
[place] [address]   
   
                                                    Neutrophils/Leukocytes   
Auto (Bld) [Pure #   
fraction]       8.5 E9/L        High            2.0-7.5         [facility]  
   
                                        Comment on above:   Order Comment: Order  
 Added by Discern Expert.   
   
                                                            Performed By: #### 2  
341625, 29081180, 4299485, 1215309   
####[facility] [address] [place] [address]   
   
                                                    BMPon 2022   
   
                      Anion gap [Moles/Vol] 9 mmol/L   Normal     6-16       [facility]  
   
                                        Comment on above:   Performed By: #### 2  
648408, 03065956, 6529601, 8691493   
####[facility] [address] [place] [address]   
   
                      Calcium [Mass/Vol] 9.2 mg/dL  Normal     8.9-11.1   [facility]  
   
                                        Comment on above:   Performed By: #### 2  
624576, 57990646, 9704397, 1215419   
####[facility] [address] [place] [address]   
   
                      Chloride [Moles/Vol] 106 mmol/L Normal     101-111    [facility]  
   
                                        Comment on above:   Performed By: #### 2  
708628, 19766082, 5533958, 3245844   
####[facility] [address] [place] [address]   
   
                      CO2 [Moles/Vol] 21 mmol/L  Normal     21-31      [facility]  
   
                                        Comment on above:   Performed By: #### 2  
702794, 72666604, 5140270, 0380298   
####[facility] [address] [place] [address]   
   
                      Creatinine [Mass/Vol] 1.0 mg/dL  Normal     0.5-1.3    [facility]  
   
                                        Comment on above:   Performed By: #### 2  
058450, 06018810, 0936827, 4794714   
####[facility] [address] [place] [address]   
   
                      Glucose [Mass/Vol] 95 mg/dL   Normal          [facility]  
   
                                        Comment on above:   Result Comment: If t  
his glucose result represents a fasting   
glucose, interpretation should refer to the following   
reference range: 55-99 mg/dL   
   
                                                            Performed By: #### 2  
103365, 98941093, 9299458, 6378255   
####[facility] [address] [place] [address]   
   
                      Potassium [Moles/Vol] 3.9 mmol/L Normal     3.5-5.3    [facility]  
   
                                        Comment on above:   Performed By: #### 2  
436503, 25558360, 6454401, 4880838   
####[facility] [address] [place] [address]   
   
                      Sodium [Moles/Vol] 132 mmol/L Low        135-145    [facility]  
   
                                        Comment on above:   Performed By: #### 2  
659089, 61112332, 2792613, 0698981   
####[facility] [address] [place] [address]   
   
                                                    Urea nitrogen   
[Mass/Vol]      15 mg/dL        Normal          5-21            [facility]  
   
                                        Comment on above:   Performed By: #### 2  
645361, 76022224, 0348427, 0671732   
####[facility] [address] [place] [address]   
   
                                                    Urea   
nitrogen/Creatinine   
[Mass ratio]    15 No Units     Normal          10-20           [facility]  
   
                                        Comment on above:   Performed By: #### 2  
849149, 99020492, 3376510, 7440387   
####[facility] [address] [place] [address]   
   
                                                    CBC w/ Auto Diffon   
2   
   
                                                    Erythrocyte   
distribution width   
(RBC) [Ratio]   13.6 %          Normal          10.9-14.2       [facility]  
   
                                        Comment on above:   Performed By: #### 2  
767362, 95779976, 5144337, 6647804   
####[facility] [address]   
   
                                                    Hematocrit (Bld)   
[Volume fraction] 35.8 %          Normal          34.0-46.0       [facility]  
   
                                        Comment on above:   Performed By: #### 2  
149433, 29679292, 8115943, 3534722   
####[facility] [address]   
   
                                                    Hemoglobin (Bld)   
[Mass/Vol]      12.2 g/dL       Normal          12.0-16.0       [facility]  
   
                                        Comment on above:   Performed By: #### 2  
908004, 75649772, 1851124, 9411909   
####[address]   
   
                                                    MCH (RBC) [Entitic   
mass]           30.3 pg         Normal          27.0-34.0       [facility]  
   
                                        Comment on above:   Performed By: #### 2  
403011, 24225060, 5682997, 6651522   
####[address]   
   
                      MCHC (RBC) [Mass/Vol] 34.1 g/dL  Normal     31.4-36.0  [facility]  
   
                                        Comment on above:   Performed By: #### 2  
371033, 85847373, 3458117, 6328120   
####[address]   
   
                      MCV (RBC) [Entitic vol] 89.0 fL    Normal     80.0-100.0 [facility]  
   
                                        Comment on above:   Performed By: #### 2  
942690, 13146200, 3475127, 2642867   
####[address]   
   
                                                    Platelet mean volume   
(Bld) [Entitic vol] 8.1 fL          Normal          6.4-10.8        [facility]  
   
                                        Comment on above:   Performed By: #### 2  
971374, 92234017, 2406852, 9298277   
####[address]   
   
                      Platelets (Bld) [#/Vol] 180.0 E9/L Normal     150.0-500.0   
[facility]  
   
                                        Comment on above:   Performed By: #### 2  
644361, 96443643, 1836348, 8231747   
####[facility] [address] [place] [address]   
   
                      RBC (Bld) [#/Vol] 4.0 E12/L  Low        4.3-5.9    [facility]  
   
                                        Comment on above:   Performed By: #### 2  
779369, 58897043, 0575123, 2940997   
####[facility] [address] [place] [address]   
   
                                                    WBC corrected for nucl   
RBC Auto (Bld) [#/Vol] 9.9 E9/L        Normal          4.0-11.0        [facility]  
   
                                        Comment on above:   Performed By: #### 2  
467415, 44948393, 4984164, 4293503   
####[facility] [address] [place] [address]   
   
                                                    HEMATOLOGYOrdered By: SYSTEM  
 SYSTEM on 2022   
   
                      Basophils/100 WBC (Bld) 0.3 %      Normal     0.0 - 2.0 %   
[facility]   
HemeAutoSS  
   
                                                    Basophils/Leukocytes   
Auto (Bld) [Pure #   
fraction]           0.0 E9/L            Normal              0.0 - 0.2   
E9/L                                    [facility]   
[facility]emeAuto[facility]  
   
                                                    Eosinophils/100 WBC   
(Bld)           0.5 %           Normal          0.0 - 8.0 %     [facility]   
[facility]emeAuto[facility]  
   
                                                    Eosinophils/Leukocytes   
Auto (Bld) [Pure #   
fraction]           0.0 E9/L            Normal              0.0 - 0.5   
E9/L                                    [facility]   
HemeAutoSS  
   
                                                    Lymphocytes/100 WBC   
(Bld)               5.3 %               Low                 14.0 - 50.0   
%                                       [facility]   
HemeAutoSS  
   
                                                    Lymphocytes/Leukocytes   
Auto (Bld) [Pure #   
fraction]           0.5 E9/L            Low                 1.0 - 4.0   
E9/L                                    [facility]   
[facility]emeAuto[facility]  
   
                          Monocytes/100 WBC (Bld) 8.5 %        Normal       4.0   
- 14.0   
%                                       [facility]   
HemeAutoSS  
   
                                                    Monocytes/Leukocytes   
Auto (Bld) [Pure #   
fraction]           0.8 E9/L            Normal              0.2 - 1.0   
E9/L                                    [facility]   
[facility]emeAuto[facility]  
   
                                                    Neutrophils/100 WBC   
(Bld)               85.4 %              High                36.0 - 75.0   
%                                       [facility]   
[facility]emeAuto[facility]  
   
                                                    Neutrophils/Leukocytes   
Auto (Bld) [Pure #   
fraction]           8.5 E9/L            High                2.0 - 7.5   
E9/L                                    [facility]   
HemeAuto[facility]  
   
                                                    HEMATOLOGYOrdered By: Akiko davis on 2022   
   
                                                    Erythrocyte   
distribution width   
(RBC) [Ratio]       13.6 %              Normal              10.9 - 14.2   
%                                       [facility]   
HemeAutoSS  
   
                                                    Hematocrit (Bld)   
[Volume fraction]   35.8 %              Normal              34.0 - 46.0   
%                                       [facility]   
HemeAutoSS  
   
                                                    Hemoglobin (Bld)   
[Mass/Vol]          12.2 g/dL           Normal              12.0 - 16.0   
gm/dL                                   [facility]   
HemeAutoSS  
   
                                                    MCH (RBC) [Entitic   
mass]               30.3 pg             Normal              27.0 - 34.0   
pg                                      [facility]   
HemeAutoSS  
   
                          MCHC (RBC) [Mass/Vol] 34.1 g/dL    Normal       31.4 -  
 36.0   
gm/dL                                   [facility]   
HemeAutoSS  
   
                          MCV (RBC) [Entitic vol] 89.0 fL      Normal       80.0  
 -   
100.0 fL                                [facility]   
HemeAutoSS  
   
                                                    Platelet mean volume   
(Bld) [Entitic vol] 8.1 fL              Normal              6.4 - 10.8   
fL                                      [facility]   
HemeAutoSS  
   
                          Platelets (Bld) [#/Vol] 180.0 E9/L   Normal       150.  
0 -   
500.0 E9/L                              [facility]   
HemeAutoSS  
   
                          RBC (Bld) [#/Vol] 4.0 E12/L    Low          4.3 - 5.9   
E12/L                                   [facility]   
HemeAutoSS  
   
                                                    WBC corrected for nucl   
RBC Auto (Bld) [#/Vol] 9.9 E9/L            Normal              4.0 - 11.0   
E9/L                                    [facility]   
HemeAutoSS  
   
                                                    No Panel InformationOrdered   
By: ANGPROCESSSERVER MICROBIOLOGY on 2022   
   
                                        Blood Culture Charcoal No growth at 4 da  
ys.   
Final to follow at 7   
days.                                                       [facility]  
   
                                                    Progress Note-Physicianon    
   
                      Progress Note-Physician            Normal                F  
[facility]  
   
                                        Comment on above:   Result Comment: Elec  
tronically Signed By: Dylon JORDAN,   
Marty BAIRD\.br\Date and Time Signed: 22 10:36 EST   
   
                                                    Sodiumon 2022   
   
                      Sodium [Moles/Vol] 133 mmol/L Low        135-145    [facility]  
   
                                        Comment on above:   Performed By: #### 2  
947461 ####[facility]   
[address] [place] [address]   
   
                                                    eGFRon 2022   
   
                                                    GFR/1.73 sq M.predicted   
among blacks MDRD   
(S/P/Bld) [Vol   
rate/Area]      mL/min/{1.73_m2} Normal          >=59            [facility]  
   
                                        Comment on above:   Order Comment: Order  
 added by Discern Expert.   
   
                                                            Result Comment: eGFR  
 is race adjusted. AA=.   
   
                                                            Performed By: #### 2  
179481, 05802611, 1648383, 6829012   
####[facility] [address] [place] [address]   
   
                                                    GFR/1.73 sq M.predicted   
among non-blacks MDRD   
(S/P/Bld) [Vol   
rate/Area]      54 mL/min/1.73 m2 Low             >=59            [facility]  
   
                                        Comment on above:   Order Comment: Order  
 added by Discern Expert.   
   
                                                            Result Comment:   
deion kidney disease could be indicated at   
eGFR's of less than 60 mL/min/1.73m2. Kidney failure is   
indicated at less than 15 mL/min/1.73m2.   
   
                                                            Performed By: #### 2  
305621, 81875550, 1108161, 3934952   
####[facility] [address] [place] [address]   
   
                                                    Auto Diffon 2022   
   
                      Basophils/100 WBC (Bld) 0.9 %      Normal     0.0-2.0    F  
[facility]  
   
                                        Comment on above:   Order Comment: Order  
 Added by Discern Expert.   
   
                                                            Performed By: #### 2  
568094, 64519267, 4811995, [id number],   
5261851, 1437455 ####[facility] [address]   
[place] [address]   
   
                                                    Basophils/Leukocytes   
Auto (Bld) [Pure #   
fraction]       0.1 E9/L        Normal          0.0-0.2         [facility]  
   
                                        Comment on above:   Order Comment: Order  
 Added by Discern Expert.   
   
                                                            Performed By: #### 2  
816218, 02399824, 3914066, 2209873716,   
7039528, 1306633 ####[address]   
[place] [address]   
   
                                                    Eosinophils/100 WBC   
(Bld)           0.1 %           Normal          0.0-8.0         [facility]  
   
                                        Comment on above:   Order Comment: Order  
 Added by Discern Expert.   
   
                                                            Performed By: #### 2  
848928, 36427895, 4361484, 5091938992,   
4344221, 5946058 ####[address]   
[place] [address]   
   
                                                    Eosinophils/Leukocytes   
Auto (Bld) [Pure #   
fraction]       0.0 E9/L        Normal          0.0-0.5         [facility]  
   
                                        Comment on above:   Order Comment: Order  
 Added by Discern Expert.   
   
                                                            Performed By: #### 2  
686115, 11393819, 9358415, 1411634044,   
1738778, 5320956 ####[address] [address]   
   
                                                    Lymphocytes/100 WBC   
(Bld)           4.7 %           Low             14.0-50.0       [facility]  
   
                                        Comment on above:   Order Comment: Order  
 Added by Discern Expert.   
   
                                                            Performed By: #### 2  
090903, 85341280, 0804960, 2552784819,   
9153283, 2691947 ####[address] [address]   
   
                                                    Lymphocytes/Leukocytes   
Auto (Bld) [Pure #   
fraction]       0.4 E9/L        Low             1.0-4.0         [facility]  
   
                                        Comment on above:   Order Comment: Order  
 Added by Discern Expert.   
   
                                                            Performed By: #### 2  
380389, 80649842, 6357220, 0123456466,   
9099177, 0867165 ####[address] [address]   
   
                      Monocytes/100 WBC (Bld) 7.2 %      Normal     4.0-14.0   [facility]  
   
                                        Comment on above:   Order Comment: Order  
 Added by Discern Expert.   
   
                                                            Performed By: #### 2  
795113, 29663334, 1001098, 6299131814,   
2346656, 2659427 ####[address]   
[place] [address]   
   
                                                    Monocytes/Leukocytes   
Auto (Bld) [Pure #   
fraction]       0.6 E9/L        Normal          0.2-1.0         [facility]  
   
                                        Comment on above:   Order Comment: Order  
 Added by Discern Expert.   
   
                                                            Performed By: #### 2  
714290, 78341067, 0046317, 5775123147,   
4246448, 9840710 ####[address]   
[place] [address]   
   
                                                    Neutrophils/100 WBC   
(Bld)           87.1 %          High            36.0-75.0       [facility]  
   
                                        Comment on above:   Order Comment: Order  
 Added by Discern Expert.   
   
                                                            Performed By: #### 2  
467929, 56442521, 4949690, 7295735392,   
7318240, 1926407 ####[address] [address]   
   
                                                    Neutrophils/Leukocytes   
Auto (Bld) [Pure #   
fraction]       7.0 E9/L        Normal          2.0-7.5         [facility]  
   
                                        Comment on above:   Order Comment: Order  
 Added by Discern Expert.   
   
                                                            Performed By: #### 2  
883826, 72899170, 7427479, [id number],   
3587247, 5456637 ####[address] [address]   
   
                      Basophils/100 WBC (Bld) 0.5 %      Normal     0.0-2.0    [facility]  
   
                                        Comment on above:   Order Comment: Order  
 Added by Discern Expert.   
   
                                                            Performed By: #### 2  
471989, 8971790, 74501906, 8989351,   
56139135, 2924279, 0133996 ####[address] [address]   
   
                                                    Basophils/Leukocytes   
Auto (Bld) [Pure #   
fraction]       0.0 E9/L        Normal          0.0-0.2         [facility]  
   
                                        Comment on above:   Order Comment: Order  
 Added by Discern Expert.   
   
                                                            Performed By: #### 2  
416813, 3519868, 98424532, 2048598,   
05152989, 5291679, 1022580 ####[address] [place] [address]   
   
                                                    Eosinophils/100 WBC   
(Bld)           0.3 %           Normal          0.0-8.0         [facility]  
   
                                        Comment on above:   Order Comment: Order  
 Added by Discern Expert.   
   
                                                            Performed By: #### 2  
894195, 8352800, 28250792, 0825246,   
67608196, 6272820, 8384515 ####[address] [place] [address]   
   
                                                    Eosinophils/Leukocytes   
Auto (Bld) [Pure #   
fraction]       0.0 E9/L        Normal          0.0-0.5         [facility]  
   
                                        Comment on above:   Order Comment: Order  
 Added by Discern Expert.   
   
                                                            Performed By: #### 2  
604512, 4926257, 98762762, 3070002,   
21868974, 3628103, 5790102 ####[address] [address]   
   
                                                    Lymphocytes/100 WBC   
(Bld)           3.6 %           Low             14.0-50.0       [facility]  
   
                                        Comment on above:   Order Comment: Order  
 Added by Discern Expert.   
   
                                                            Performed By: #### 2  
995364, 9833237, 18154941, 6484400,   
56454105, 3969921, 0183857 ####[address] [address]   
   
                                                    Lymphocytes/Leukocytes   
Auto (Bld) [Pure #   
fraction]       0.3 E9/L        Low             1.0-4.0         [facility]  
   
                                        Comment on above:   Order Comment: Order  
 Added by Discern Expert.   
   
                                                            Performed By: #### 2  
058358, 7405811, 34606320, 7132865,   
98794503, 5811198, 8177286 ####[address] [address]   
   
                      Monocytes/100 WBC (Bld) 6.2 %      Normal     4.0-14.0   [facility]  
   
                                        Comment on above:   Order Comment: Order  
 Added by Discern Expert.   
   
                                                            Performed By: #### 2  
415868, 4308952, 98205100, 2263731,   
88985801, 7424433, 5307536 ####[facility]   
[address] [place] [address]   
   
                                                    Monocytes/Leukocytes   
Auto (Bld) [Pure #   
fraction]       0.5 E9/L        Normal          0.2-1.0         [facility]  
   
                                        Comment on above:   Order Comment: Order  
 Added by Discern Expert.   
   
                                                            Performed By: #### 2  
048976, 5664697, 05051198, 9104579,   
48129362, 3369441, 8186608 ####[address] [place] [address]   
   
                                                    Neutrophils/100 WBC   
(Bld)           89.4 %          High            36.0-75.0       [facility]  
   
                                        Comment on above:   Order Comment: Order  
 Added by Discern Expert.   
   
                                                            Performed By: #### 2  
362660, 6461822, 41825151, 3370123,   
49540976, 7311798, 0788490 ####[facility]   
[address] [place] [address]   
   
                                                    Neutrophils/Leukocytes   
Auto (Bld) [Pure #   
fraction]       7.9 E9/L        High            2.0-7.5         [facility]  
   
                                        Comment on above:   Order Comment: Order  
 Added by Discern Expert.   
   
                                                            Performed By: #### 2  
974000, 4210197, 08422262, 2119996,   
43517819, 0890319, 4819900 ####[facility]   
[address] [place] [address]   
   
                                                    BMPon 2022   
   
                      Anion gap [Moles/Vol] 7 mmol/L   Normal     6-16       [facility]  
   
                                        Comment on above:   Performed By: #### 2  
802933, 81201227, 0938154, 0144288256,   
3895693, 7761199 ####[address]   
[place] [address]   
   
                      Calcium [Mass/Vol] 9.0 mg/dL  Normal     8.9-11.1   [facility]  
   
                                        Comment on above:   Performed By: #### 2  
975409, 45194060, 1847698, 8830093014,   
0683611, 5659051 ####[facility] [address]   
[place] [address]   
   
                      Chloride [Moles/Vol] 105 mmol/L Normal     101-111    [facility]  
   
                                        Comment on above:   Performed By: #### 2  
533266, 35204709, 7921737, 9644326476,   
4163009, 5885725 ####[facility] [address]   
[place] [address]   
   
                      CO2 [Moles/Vol] 21 mmol/L  Normal     21-31      [facility]  
   
                                        Comment on above:   Performed By: #### 2  
557341, 11197983, 1220971, 2082359406,   
1149626, 2917799 ####[facility] [address]   
[place] [address]   
   
                      Creatinine [Mass/Vol] 1.0 mg/dL  Normal     0.5-1.3    [facility]  
   
                                        Comment on above:   Performed By: #### 2  
482872, 90918398, 2348309, 1078046466,   
1270507, 0863963 ####[facility] [address]   
[place] [address]   
   
                      Glucose [Mass/Vol] 100 mg/dL  Normal          [facility]  
   
                                        Comment on above:   Result Comment: If t  
his glucose result represents a fasting   
glucose, interpretation should refer to the following   
reference range: 55-99 mg/dL   
   
                                                            Performed By: #### 2  
640953, 02959326, 2356280, 2234389406,   
2946501, 7011686 ####[facility] [address]   
[place] [address]   
   
                      Potassium [Moles/Vol] 3.7 mmol/L Normal     3.5-5.3    [facility]  
   
                                        Comment on above:   Performed By: #### 2  
542953, 45503519, 3619964, 2839536569,   
1082114, 3267552 ####[facility] [address]   
[place] [address]   
   
                      Sodium [Moles/Vol] 129 mmol/L Low        135-145    [facility]  
   
                                        Comment on above:   Performed By: #### 2  
543434, 12473583, 3027085, 1017225126,   
1637409, 7769343 ####[facility] [address]   
[place] [address]   
   
                                                    Urea nitrogen   
[Mass/Vol]      15 mg/dL        Normal          5-21            [facility]  
   
                                        Comment on above:   Performed By: #### 2  
005312, 10055355, 8177103, 6521481770,   
9412398, 3536699 ####[facility] [address]   
[place] [address]   
   
                                                    Urea   
nitrogen/Creatinine   
[Mass ratio]    15 No Units     Normal          10-20           [facility]  
   
                                        Comment on above:   Performed By: #### 2  
945956, 04383310, 7007894, 5546265412,   
7280402, 7493407 ####[facility] [address]   
[place] [address]   
   
                                                    CBC w/ Auto Diffon    
   
                                                    Erythrocyte   
distribution width   
(RBC) [Ratio]   13.8 %          Normal          10.9-14.2       [facility]  
   
                                        Comment on above:   Performed By: #### 2  
840964, 25327504, 5051299, 9140704797,   
9065334, 8254990 ####[facility] [address]   
[place] [address]   
   
                                                    Hematocrit (Bld)   
[Volume fraction] 36.5 %          Normal          34.0-46.0       [facility]  
   
                                        Comment on above:   Performed By: #### 2  
404721, 69351779, 3803457, 9752357220,   
7749059, 6676285 ####[facility] [address]   
[place] [address]   
   
                                                    Hemoglobin (Bld)   
[Mass/Vol]      12.5 g/dL       Normal          12.0-16.0       [facility]  
   
                                        Comment on above:   Performed By: #### 2  
829400, 99953603, 4389584, 3178084854,   
2873400, 4603298 ####[facility] [address]   
[place] [address]   
   
                                                    MCH (RBC) [Entitic   
mass]           29.7 pg         Normal          27.0-34.0       [facility]  
   
                                        Comment on above:   Performed By: #### 2  
647000, 95263298, 4724337, 1859730342,   
2634354, 0621750 ####[facility] [address]   
[place] [address]   
   
                      MCHC (RBC) [Mass/Vol] 34.2 g/dL  Normal     31.4-36.0  [facility]  
her   
[facility]  
   
                                        Comment on above:   Performed By: #### 2  
177748, 47984613, 4998305, 8102981452,   
0055641, 1225082 ####[facility] [address]   
[place] [address]   
   
                      MCV (RBC) [Entitic vol] 86.7 fL    Normal     80.0-100.0 F  
[facility]  
   
                                        Comment on above:   Performed By: #### 2  
508361, 32831138, 3943376, 3628905601,   
7771982, 9584117 ####[address] [address]   
   
                                                    Platelet mean volume   
(Bld) [Entitic vol] 7.9 fL          Normal          6.4-10.8        [facility]  
   
                                        Comment on above:   Performed By: #### 2  
918632, 98674141, 7577327, 5498007344,   
2240660, 8355326 ####[address] [address]   
   
                      Platelets (Bld) [#/Vol] 193.0 E9/L Normal     150.0-500.0   
[facility]  
   
                                        Comment on above:   Performed By: #### 2  
262744, 68828374, 6087193, 0699930861,   
8598014, 7981939 ####[address] [address]   
   
                      RBC (Bld) [#/Vol] 4.2 E12/L  Low        4.3-5.9    [facility]  
   
                                        Comment on above:   Performed By: #### 2  
662773, 27686542, 6914627, 1607129591,   
9247309, 7190831 ####[address] [place] [address]   
   
                                                    WBC corrected for nucl   
RBC Auto (Bld) [#/Vol] 8.0 E9/L        Normal          4.0-11.0        [facility]  
   
                                        Comment on above:   Performed By: #### 2  
247742, 34345415, 0698673, 2170572735,   
8069793, 6007206 ####[address]   
[place] [id number]   
   
                                                    Erythrocyte   
distribution width   
(RBC) [Ratio]   13.4 %          Normal          10.9-14.2       [facility]  
   
                                        Comment on above:   Performed By: #### 2  
372924, 7901057, 35688800, 9212895,   
66293034, 8713219, 9969428 ####[address]   
   
                                                    Hematocrit (Bld)   
[Volume fraction] 36.6 %          Normal          34.0-46.0       [facility]  
   
                                        Comment on above:   Performed By: #### 2  
492087, 0393871, 29187089, 8653284,   
27528487, 6198342, 2455846 ####[facility]   
[address] [place] [address]   
   
                                                    Hemoglobin (Bld)   
[Mass/Vol]      12.5 g/dL       Normal          12.0-16.0       [facility]  
   
                                        Comment on above:   Performed By: #### 2  
301943, 7817648, 47465091, 0337788,   
37973362, 7681730, 3314907 ####[facility]   
[address] [place] [address]   
   
                                                    MCH (RBC) [Entitic   
mass]           29.5 pg         Normal          27.0-34.0       [facility]  
   
                                        Comment on above:   Performed By: #### 2  
356598, 3628451, 01791670, 9926766,   
45405088, 3277976, 4245723 ####[facility]   
[address] [place] [address]   
   
                      MCHC (RBC) [Mass/Vol] 34.1 g/dL  Normal     31.4-36.0  [facility]  
   
                                        Comment on above:   Performed By: #### 2  
714470, 4160658, 42344057, 6176203,   
93294544, 3946683, 0068975 ####[facility]   
[address] [place] [address]   
   
                      MCV (RBC) [Entitic vol] 86.5 fL    Normal     80.0-100.0 F  
[facility]  
   
                                        Comment on above:   Performed By: #### 2  
829226, 5438844, 92977250, 9507781,   
19824832, 4984900, 9136709 ####[facility]   
[address] [place] [address]   
   
                                                    Platelet mean volume   
(Bld) [Entitic vol] 7.5 fL          Normal          6.4-10.8        [facility]  
   
                                        Comment on above:   Performed By: #### 2  
314920, 8205287, 35640121, 3701440,   
44403110, 8360678, 6994652 ####[address] [address]   
   
                      Platelets (Bld) [#/Vol] 191.0 E9/L Normal     150.0-500.0   
[facility]  
   
                                        Comment on above:   Performed By: #### 2  
186186, 9481592, 47118103, 7430029,   
26589737, 7355507, 3334442 ####[address] [address]   
   
                      RBC (Bld) [#/Vol] 4.2 E12/L  Low        4.3-5.9    [facility]  
   
                                        Comment on above:   Performed By: #### 2  
997114, 8598454, 54487993, 2723515,   
67800627, 8562483, 2564291 ####[address] [address]   
   
                                                    WBC corrected for nucl   
RBC Auto (Bld) [#/Vol] 8.8 E9/L        Normal          4.0-11.0        [facility]  
   
                                        Comment on above:   Performed By: #### 2  
888486, 1617438, 77493056, 6545214,   
38943739, 6291064, 5570080 ####[facility]   
[address] [place] [address]   
   
                                                    CEFTRIAXONE:SUSC:PT:ISOLATE:  
ORDQN:MICOrdered By: Richelle Sarkar on 2022   
   
                                        cefTRIAXone KAMRAN [Susc] Streptococcus sal  
ivarius  
In 2 of 2 blood culture   
bottles drawn. Isolated   
from aerobic and   
anaerobic bottles  
Preliminary gram stain   
result of gram positive   
cocci in chains Result   
called to Dr. Wayne   
by [facility] and results read   
back for confirmation on   
2022 16:10:37                                         [facility]  
   
                                                    CHEMISTRYOrdered By: SYSTEM   
SYSTEM on 2022   
   
                          Procalcitonin 1.08 ng/mL   High         0.00 - 0.50   
ng/mL                                   [facility]   
Remisol  
   
                                                    Troponin I.cardiac   
[Mass/Vol]          30.40 pg/mL         High                10.10 -   
27.10 pg/mL                             [facility]   
Remisol  
   
                          Albumin [Mass/Vol] 3.5 g/dL     Normal       3.3 - 5.0  
   
gm/dL                                   [facility]   
Remisol  
   
                                                    Albumin/Globulin [Mass   
ratio]          1.1 {ratio}     Normal          1.1 - 2.2       [facility]   
Remisol  
   
                                                    ALP [Catalytic   
activity/Vol]       67 [iU]/d           Normal              21 - 98   
Int._Unit/L                             [facility]   
Remisol  
   
                                                    ALT No additional   
P-5'-P [Catalytic   
activity/Vol]       33 [iU]/d           Normal              6 - 46   
Int._Unit/L                             [facility]   
Remisol  
   
                                                    AST [Catalytic   
activity/Vol]       38 [iU]/d           Normal              5 - 43   
Int._Unit/L                             [facility]   
Remisol  
   
                          Bilirubin [Mass/Vol] 1.3 mg/dL    High         0.0 - 1  
.1   
mg/dL                                   [facility]   
Remisol  
   
                          Globulin (S) [Mass/Vol] 3.3 g/dL     Normal       1.4   
- 4.0   
gm/dL                                   [facility]   
Remisol  
   
                          Lactate [Mass/Vol] 0.8 mmol/L   Normal       0.5 - 2.2  
   
mmol/L                                  [facility]   
Remisol  
   
                          Protein [Mass/Vol] 6.8 g/dL     Normal       6.0 - 7.8  
   
gm/dL                                   [facility]   
Remisol  
   
                                                    Troponin I.cardiac   
[Mass/Vol]          31.00 pg/mL         High                10.10 -   
27.10 pg/mL                             [facility]   
Remisol  
   
                                                    CHEMISTRYOrdered By: Lab ROP  
User on 2022   
   
                          Glucose [Mass/Vol] 112 mg/dL    High         55 - 99   
mg/dL                                   [facility]   
Subsection  
   
                                        Comment on above:   Result Comment: Catarina limon RN/MD   
   
                                POC Device SN   [id number]    Invalid   
Interpretation   
Code                                                [facility]   
Subsection  
   
                                POC User ID     [id number]       Invalid   
Interpretation   
Code                                                [facility]   
Subsection  
   
                                POC Username    SARMAD PERDOMO   Invalid   
Interpretation   
Code                                                [facility]   
Subsection  
   
                                                    CMPon 2022   
   
                      Albumin [Mass/Vol] 3.5 g/dL   Normal     3.3-5.0    [facility]  
   
                                        Comment on above:   Performed By: #### 2  
429911, 7945573, 95863933, 7093734,   
07206279, 9241572, 9476688 ####[facility]   
[address] [place] [address]   
   
                                                    Albumin/Globulin (S)   
[Mass conc ratio] 1.1             Normal          1.1-2.2         [facility]  
   
                                        Comment on above:   Performed By: #### 2  
331884, 2172418, 96048866, 9192877,   
05328400, 5142842, 5143908 ####[facility]   
[address] [place] [address]   
   
                                                    ALP [Catalytic   
activity/Vol]   67 Int._Unit/L  Normal          21-98           [facility]  
   
                                        Comment on above:   Performed By: #### 2  
619837, 5340571, 21764755, 1437799,   
74874093, 3429109, 0771793 ####[facility]   
[address] [place] [address]   
   
                                                    ALT No additional   
P-5'-P [Catalytic   
activity/Vol]   33 Int._Unit/L  Normal          6-46            [facility]  
   
                                        Comment on above:   Performed By: #### 2  
133697, 2578455, 91313931, 5973955,   
89356121, 6466513, 9304061 ####[facility]   
[address] [place] [address]   
   
                                                    AST [Catalytic   
activity/Vol]   38 Int._Unit/L  Normal          5-43            [facility]  
   
                                        Comment on above:   Performed By: #### 2  
858988, 0859260, 81613502, 4370484,   
58991182, 7905290, 4105252 ####[facility]   
[address] [place] [address]   
   
                      Bilirubin [Mass/Vol] 1.3 mg/dL  High       0.0-1.1    [facility]  
[facility]  
   
                                        Comment on above:   Performed By: #### 2  
206857, 6615790, 70731218, 3192098,   
69303187, 5322836, 9364489 ####[facility]   
[address] [place] [address]   
   
                      Creatinine [Mass/Vol] 0.9 mg/dL  Normal     0.5-1.3    [facility]  
[facility]  
   
                                        Comment on above:   Performed By: #### 2  
466929, 6981246, 79504832, 6896993,   
69710982, 0220260, 2026391 ####[facility]   
[address] [place] [address]   
   
                      Globulin (S) [Mass/Vol] 3.3 g/dL   Normal     1.4-4.0    F  
[facility]  
   
                                        Comment on above:   Performed By: #### 2  
047498, 7616733, 82507804, 1364650,   
12489569, 2219670, 8332858 ####[facility]   
[address] [place] [address]   
   
                      Protein [Mass/Vol] 6.8 g/dL   Normal     6.0-7.8    [facility]  
   
                                        Comment on above:   Performed By: #### 2  
161737, 7178924, 49604053, 6391763,   
87501896, 0274648, 8104002 ####[facility]   
[address] [place] [address]   
   
                                                    Urea nitrogen   
[Mass/Vol]      16 mg/dL        Normal          5-21            [facility]  
   
                                        Comment on above:   Performed By: #### 2  
692183, 5937905, 03361078, 9851227,   
25556113, 2220358, 2920640 ####[facility]   
[address] [place] [address]   
   
                                                    Urea   
nitrogen/Creatinine   
[Mass ratio]    18 No Units     Normal          10-20           [facility]  
   
                                        Comment on above:   Performed By: #### 2  
447919, 8548464, 17971930, 3884220,   
73653001, 8032443, 9133860 ####[facility]   
[address] [place] [address]   
   
                      Anion gap [Moles/Vol] 13 mmol/L  Normal     6-16       [facility]  
   
                                        Comment on above:   Performed By: #### 2  
942747, 6768075, 32592037, 8986521,   
73759686, 1596654, 9221293 ####[facility]   
[address] [place] [address]   
   
                      Calcium [Mass/Vol] 9.1 mg/dL  Normal     8.9-11.1   [facility]  
   
                                        Comment on above:   Performed By: #### 2  
628280, 7341287, 53631028, 9503286,   
26816256, 4957798, 4814584 ####[facility]   
[address] [place] [address]   
   
                      Chloride [Moles/Vol] 99 mmol/L  Low        101-111    [facility]  
[facility]  
   
                                        Comment on above:   Performed By: #### 2  
048344, 2239376, 63021298, 2998361,   
46560394, 5688542, 2335476 ####[facility]   
[address] [place] [address]   
   
                      CO2 [Moles/Vol] 21 mmol/L  Normal     21-31      [facility]  
   
                                        Comment on above:   Performed By: #### 2  
921746, 9427044, 30941438, 8538808,   
87077350, 5424089, 1277283 ####[facility]   
[address] [place] [address]   
   
                      Glucose [Mass/Vol] 113 mg/dL  Normal          [facility]  
   
                                        Comment on above:   Result Comment: If t  
his glucose result represents a fasting   
glucose, interpretation should refer to the following   
reference range: 55-99 mg/dL   
   
                                                            Performed By: #### 2  
127698, 4233418, 18091509, 2525698,   
59786302, 4900785, 9903250 ####[facility]   
[address] [place] [address]   
   
                      Potassium [Moles/Vol] 3.5 mmol/L Normal     3.5-5.3    [facility]  
   
                                        Comment on above:   Performed By: #### 2  
[id number], [id number], [id number], [id number],   
[id number], [id number], [id number] ####[facility]   
[address] [place] [address]   
   
                      Sodium [Moles/Vol] 129 mmol/L Low        135-145    [facility]  
   
                                        Comment on above:   Performed By: #### 2  
[id number], [id number], [id number], [id number],   
[id number], [id number], [id number] ####[facility]   
[address] [place] [address]   
   
                                                    COAGULATIONOrdered By: Stacie Patricia on 2022   
   
                          aPTT Coag (PPP) [Time] 34.5 s       Normal       25.1   
- 36.5   
second(s)                               [facility]   
Coag  
   
                                                    INR Coag (PPP)   
[Relative time]           2.7 {INR}                 Invalid   
Interpretation   
Code                                                [facility]   
Coag  
   
                          PT Coag (PPP) [Time] 31.2 s       High         9.4 - 1  
2.5   
second(s)                               [facility]   
Coag  
   
                                                    Capillary Glucose POCon 11-2  
3-2022   
   
                      Glucose [Mass/Vol] 112 mg/dL  High       55-99      [facility]  
   
                                        Comment on above:   Result Comment: Catarina limon RN/MD   
   
                                                            Performed By: #### 2  
[id number] ####[facility]   
[id number] [place] [address]   
   
                                                    Cardiology Progress Noteon 1  
2022   
   
                                                    Cardiology Progress   
Note                            Normal                          [facility]  
   
                                        Comment on above:   Result Comment: Elec  
tronically Signed By: Meenu HAWKINS CNP\.br\Date and Time Signed: 22 15:44   
EST\.br\Electronically Co-Signed By: Dylon JORDAN, Marty BAIRD\.br\Date and Time Co-Signed: 22 17:08 EST   
   
                                                    Consent for Treatmenton 11-2  
3-2022   
   
                                        Consent for Treatment [id number]  
[id number]  
[id number]:  
127                 Normal                                  [facility]  
   
                                                    Consultation Noteon 20   
   
                      Consultation Note            Normal                [facility]  
   
                                        Comment on above:   Result Comment: Elec  
tronically Signed By: Dylon JORDAN,   
Marty BAIRD\.br\Date and Time Signed: 22 17:07 EST   
   
                      Consultation Note            Normal                [facility]  
   
                                        Comment on above:   Result Comment: Elec  
tronically Signed By: Po Champagne MD\.br\Date and Time Signed: 22 13:20 EST   
   
                                                    ED Clinical Summaryon 2022   
   
                      ED Clinical Summary            Normal                [facility]  
   
                                                    ED Note-Nursingon 2022  
   
   
                      ED Note-Nursing            Normal                [facility]  
   
                                                    ED Note-Physicianon 20   
   
                      ED Note-Physician            Normal                [facility]  
   
                                        Comment on above:   Result Comment: Elec  
tronically Signed By: Rama Long DO\.br\Date and Time Signed: 22 03:18 EST   
   
                                                    ED Patient Education Noteon   
2022   
   
                                                    ED Patient Education   
Note                            Normal                          [facility]  
   
                                                    ED Patient Summaryon   
022   
   
                      ED Patient Summary            Normal                [facility]  
   
                                                    Echo Transthoracic Completeo  
n 2022   
   
                                                    Echo Transthoracic   
Complete                        Normal                          [facility]  
   
                                                    HEMATOLOGYOrdered By: SYSTEM  
 SYSTEM on 2022   
   
                      Basophils/100 WBC (Bld) 0.9 %      Normal     0.0 - 2.0 %   
[facility]   
[facility]  
   
                                                    Basophils/Leukocytes   
Auto (Bld) [Pure #   
fraction]           0.1 E9/L            Normal              0.0 - 0.2   
E9/L                                    [facility]   
[facility]  
   
                                                    Eosinophils/100 WBC   
(Bld)           0.1 %           Normal          0.0 - 8.0 %     [facility]   
[facility]  
   
                                                    Eosinophils/Leukocytes   
Auto (Bld) [Pure #   
fraction]           0.0 E9/L            Normal              0.0 - 0.5   
E9/L                                    [facility]   
[facility]  
   
                                                    Lymphocytes/100 WBC   
(Bld)               4.7 %               Low                 14.0 - 50.0   
%                                       [facility]   
[facility]  
   
                                                    Lymphocytes/Leukocytes   
Auto (Bld) [Pure #   
fraction]           0.4 E9/L            Low                 1.0 - 4.0   
E9/L                                    [facility]   
[facility]  
   
                          Monocytes/100 WBC (Bld) 7.2 %        Normal       4.0   
- 14.0   
%                                       [facility]   
[facility]  
   
                                                    Monocytes/Leukocytes   
Auto (Bld) [Pure #   
fraction]           0.6 E9/L            Normal              0.2 - 1.0   
E9/L                                    [facility]   
[facility]  
   
                                                    Neutrophils/100 WBC   
(Bld)               87.1 %              High                36.0 - 75.0   
%                                       [facility]   
[facility]  
   
                                                    Neutrophils/Leukocytes   
Auto (Bld) [Pure #   
fraction]           7.0 E9/L            Normal              2.0 - 7.5   
E9/L                                    [facility]   
[facility]  
   
                      Basophils/100 WBC (Bld) 0.5 %      Normal     0.0 - 2.0 %   
[facility]   
[facility]  
   
                                                    Basophils/Leukocytes   
Auto (Bld) [Pure #   
fraction]           0.0 E9/L            Normal              0.0 - 0.2   
E9/L                                    [facility]   
[facility]  
   
                                                    Eosinophils/100 WBC   
(Bld)           0.3 %           Normal          0.0 - 8.0 %     [facility]   
[facility]  
   
                                                    Eosinophils/Leukocytes   
Auto (Bld) [Pure #   
fraction]           0.0 E9/L            Normal              0.0 - 0.5   
E9/L                                    [facility]   
[facility]  
   
                                                    Lymphocytes/100 WBC   
(Bld)               3.6 %               Low                 14.0 - 50.0   
%                                       [facility]   
[facility]  
   
                                                    Lymphocytes/Leukocytes   
Auto (Bld) [Pure #   
fraction]           0.3 E9/L            Low                 1.0 - 4.0   
E9/L                                    [facility]   
[facility]  
   
                          Monocytes/100 WBC (Bld) 6.2 %        Normal       4.0   
- 14.0   
%                                       [facility]   
[facility]  
   
                                                    Monocytes/Leukocytes   
Auto (Bld) [Pure #   
fraction]           0.5 E9/L            Normal              0.2 - 1.0   
E9/L                                    [facility]   
[facility]  
   
                                                    Neutrophils/100 WBC   
(Bld)               89.4 %              High                36.0 - 75.0   
%                                       [facility]   
[facility]  
   
                                                    Neutrophils/Leukocytes   
Auto (Bld) [Pure #   
fraction]           7.9 E9/L            High                2.0 - 7.5   
E9/L                                    [facility]   
[facility]  
   
                                                    HEMATOLOGYOrdered By: Sagar Adams on 2022   
   
                                                    Erythrocyte   
distribution width   
(RBC) [Ratio]       13.8 %              Normal              10.9 - 14.2   
%                                       [facility]   
[facility]  
   
                                                    Hematocrit (Bld)   
[Volume fraction]   36.5 %              Normal              34.0 - 46.0   
%                                       [facility]   
[facility]  
   
                                                    Hemoglobin (Bld)   
[Mass/Vol]          12.5 g/dL           Normal              12.0 - 16.0   
gm/dL                                   [facility]   
[facility]  
   
                                                    MCH (RBC) [Entitic   
mass]               29.7 pg             Normal              27.0 - 34.0   
pg                                      [facility]   
[facility]  
   
                          MCHC (RBC) [Mass/Vol] 34.2 g/dL    Normal       31.4 -  
 36.0   
gm/dL                                   [facility]   
[facility]  
   
                          MCV (RBC) [Entitic vol] 86.7 fL      Normal       80.0  
 -   
100.0 fL                                [facility]   
[facility]  
   
                                                    Platelet mean volume   
(Bld) [Entitic vol] 7.9 fL              Normal              6.4 - 10.8   
fL                                      [facility]   
[facility]  
   
                          Platelets (Bld) [#/Vol] 193.0 E9/L   Normal       150.  
0 -   
500.0 E9/L                              [facility]   
[facility]  
   
                          RBC (Bld) [#/Vol] 4.2 E12/L    Low          4.3 - 5.9   
E12/L                                   [facility]   
[facility]  
   
                                                    WBC corrected for nucl   
RBC Auto (Bld) [#/Vol] 8.0 E9/L            Normal              4.0 - 11.0   
E9/L                                    [facility]   
[facility]  
   
                                                    HEMATOLOGYOrdered By: Prashant Patricia on 2022   
   
                                                    Erythrocyte   
distribution width   
(RBC) [Ratio]       13.4 %              Normal              10.9 - 14.2   
%                                       [facility]   
[facility]  
   
                                                    Hematocrit (Bld)   
[Volume fraction]   36.6 %              Normal              34.0 - 46.0   
%                                       [facility]   
[facility]  
   
                                                    Hemoglobin (Bld)   
[Mass/Vol]          12.5 g/dL           Normal              12.0 - 16.0   
gm/dL                                   [facility]   
[facility]  
   
                                                    MCH (RBC) [Entitic   
mass]               29.5 pg             Normal              27.0 - 34.0   
pg                                      [facility]   
[facility]  
   
                          MCHC (RBC) [Mass/Vol] 34.1 g/dL    Normal       31.4 -  
 36.0   
gm/dL                                   [facility]   
[facility]  
   
                          MCV (RBC) [Entitic vol] 86.5 fL      Normal       80.0  
 -   
100.0 fL                                [facility]   
[facility]  
   
                                                    Platelet mean volume   
(Bld) [Entitic vol] 7.5 fL              Normal              6.4 - 10.8   
fL                                      [facility]   
[facility]  
   
                          Platelets (Bld) [#/Vol] 191.0 E9/L   Normal       150.  
0 -   
500.0 E9/L                              [facility]   
[facility]  
   
                          RBC (Bld) [#/Vol] 4.2 E12/L    Low          4.3 - 5.9   
E12/L                                   [facility]  
   
                                                    WBC corrected for nucl   
RBC Auto (Bld) [#/Vol] 8.8 E9/L            Normal              4.0 - 11.0   
E9/L                                    [facility]   
[facility]  
   
                                                    Influenza A&B Agon    
   
                      Influenzae A Ag Negative   Normal     Negative   [facility]  
   
                                        Comment on above:   Performed By: #### 1  
[id number], [id number] ####[facility] [address] [place] [address]   
   
                      Influenzae B Ag Negative   Normal     Negative   [facility]  
   
                                        Comment on above:   Result Comment: Test  
 sensitivity and specificity vary for age   
group, specimen type, antigen types, and prevalence of   
disease. Test results must be evaluated in conjunction with   
other clinical data available to the physician. Individuals   
who received nasally administered Influenza A vaccine may have   
positive test results up to 3 days after vaccination.   
   
                                                            Performed By: #### 1  
[id number], [id number] ####[facility] [address] [place] [address]   
   
                                                    Interdisciplinary Note - Jeff  
e Manageron 2022   
   
                                                    Interdisciplinary Note   
-                   Normal                          [facility]  
   
                                        Comment on above:   Result Comment: Elec  
tronically Signed By: Martha Arciniega.erum\Date and Time Signed: 22 09:47 EST   
   
                                                    Laboratory - Microbiology an  
d Antimicrobial susceptibilityOrdered By: Richelle Sarkar   
on 2022   
   
                                                    Bacteria identified Cx   
Nom (U)                                 5,000 cfu/ml Mixed skin   
contaminants                                                [facility]  
   
                                                    Lactic Acidon 2022   
   
                      Lactate [Mass/Vol] 0.8 mmol/L Normal     0.5-2.2    [facility]  
   
                                        Comment on above:   Performed By: #### 2  
[id number], 1172595, [id number], [id number],   
[id number], [id number], [id number] ####[facility]   
[address] [place] [address]   
   
                                                    MICRO OTHER TESTSOrdered By:  
 Ana Patricia on 2022   
   
                                        Influenzae A Ag     Negative  
(22 12:48 AM) Normal              Negative            [facility]   
Sero  
   
                                        Influenzae B Ag     Negative  
(22 12:48 AM) Normal              Negative            [facility]   
Sero  
   
                                        Rapid COV Int NEG Ctl Pass  
(22 12:48 AM) Normal                                  [facility]   
Sero  
   
                                        Rapid COV Int POS Ctl Pass  
(22 12:48 AM) Normal                                  [facility]   
Sero  
   
                                                    SARS-CoV+SARS-CoV-2   
(COVID-19) Ag IA.rapid   
Ql (Resp)                               Not Detected  
(22 12:48 AM)       Normal                    Not   
Detected                                [facility]   
Sero  
   
                                                    Message from Medicareon 11-2  
3-2022   
   
                                        Message from Medicare [id number].  
[id number]  
[id number]  
:127                Normal                                  [facility]  
   
                                                    Monitor Recordon 2022   
   
                                        Monitor Record      [id number]  
1032  
[id number]  
:127                Normal                                  [facility]  
   
                                                    No Panel InformationOrdered   
By: Richelle Sarkar on 2022   
   
                                        Blood Culture Charcoal Streptococcus khalif  
ivarius   
Presumptive refer to   
blood culture from   
22 at 0124 for   
complete susceptibility  
In 2 of 2 blood culture   
bottles drawn. Isolated   
from aerobic and   
anaerobic bottles  
Preliminary gram stain   
result of gram positive   
cocci in chains Result   
called to Dr. Wayne   
by [facility] and results read   
back for confirmation on   
2022 16:11:53                                         [facility]  
   
                                                    PT & PTTon 2022   
   
                      aPTT Coag (PPP) [Time] 34.5 second(s) Normal     25.1-36.5  
  [facility]  
   
                                        Comment on above:   Result Comment: Para  
meter 15 days - 4 weeks 1 - 5 months 6 -   
11 months 1 - 5 years 6 - 10 years 11 - 17 years PTT Mean:   
35.4 (27.6-45.6) Mean: 33.5 (24.8-40.7) Mean: 32.4 (25.1-40.7)   
Mean: 31.6 (24.0-39.2) Mean: 31.6 (26.9-38.7) Mean: 31.0   
(24.6-38.4) Pediatric Reference ranges were obtained from a   
study by Edgar Wilkes et al. prepared from 1437 samples   
obtained at 7 different centers using the same coagulation   
reagent and instrumentation as [facility]. Currently there are no   
coagulation studies available worldwide for children birth to   
14 days, and no normal ranges. Heparin therapeutic range   
(represented by Anti-Factor Xa activity of 0.2 - 0.4 U/mL)   
corresponds to PTT of 56.6 - 109.0 sec.   
   
                                                            Performed By: #### 2  
[id number], 0743545, [id number], 8982471,   
[id number], 9261081, [id number] ####[facility]   
[id number] [place] [address]   
   
                                                    INR Coag (PPP)   
[Relative time]           2.7 {INR}                 Invalid   
Interpretation   
Code                                                [facility]  
   
                                        Comment on above:   Result Comment: INR   
results are specifically intended to   
assess patients stabilized on long-term Anticoagulation   
therapy suggested INR?s ?Less Intensive Anticoagulation? 2.0 ?   
3.0Conventional Range 3.0 ? 4.5   
   
                                                            Performed By: #### 2  
[id number], 2191608, [id number], 6587147,   
56626253, 8333167, 5177865 ####[facility]   
[id number] [place] [address]   
   
                      PT Coag (PPP) [Time] 31.2 second(s) High       9.4-12.5     
[facility]  
   
                                        Comment on above:   Result Comment: 15 d  
ays - 4 weeks 1 - 5 months 6 -11 months 1   
? 5 years 6 ? 10 years 11 -17 years Mean: 11.2 (9.5 ? 12.6)   
Mean: 11.0 (9.7 ? 12.8) Mean: 11.0 (9.8 ? 13.0) Mean: 11.3   
(9.9 ? 13.4) Mean: 11.7 (10.0 ? 14.6) Mean: 11.8 (10.0 - 14.1)   
Pediatric Reference ranges were obtained from a study by   
Edgar Wilkes et al. prepared from 1437 samples obtained at 7   
different centers using the same coagulation reagent and   
instrumentation as [facility]. Currently there are no coagulation   
studies available worldwide for children birth to 14 days, and   
no normal ranges.   
   
                                                            Performed By: #### 2  
[id number], 9778190, 71530914, 9849025,   
[id number], 5786724, 7157611 ####[facility] [address] [address]   
   
                                                    Procalcitoninon 2022   
   
                      Procalcitonin 1.08 ng/mL High       .00-.50    [facility]  
   
                                        Comment on above:   Result Comment: <0.5  
 ng/mL Low risk of severe sepsis and/or   
shock>2.0 ng/mL High risk of severe sepsis and/or   
shockConcentrations under 0.5 ng/mL do not exclude local   
infections or systemic infections in their initial stages   
(e.g.. under six hours from onset of illness). PCT   
concentrations between 0.5 and 2.0 ng/mL should be interpreted   
with consideration of the patient's history. In this range, it   
is recommended to retest PCT within 6 to 24 hours.   
   
                                                            Performed By: #### 2  
[id number], [id number], 3349563, [id number],   
6552205, 5585935 ####[facility] [address] [place] [address]   
   
                                                    Progress Note - Pharmacyon 1  
2022   
   
                                                    Progress Note -   
Pharmacy                        Normal                          [facility]  
   
                                                    Rapid COVID Antigen ([facility])on  
 2022   
   
                      Rapid COV Int NEG Ctl Pass       Normal                [facility]  
   
                                        Comment on above:   Performed By: #### 1  
[id number], [id number] ####[address] [address]   
   
                      Rapid COV Int POS Ctl Pass       Normal                [facility]  
   
                                        Comment on above:   Performed By: #### 1  
[id number], [id number] ####[address] [address]   
   
                                                    SARS-CoV+SARS-CoV-2   
(COVID-19) Ag IA.rapid   
Ql (Resp)           Not detected        Normal              Not   
Detected                                [facility]  
   
                                        Comment on above:   Result Comment: The   
[employer] System for Rapid Detection of   
SARS-CoV-2 is a chromatographic digital immunoassay intended   
for the direct and qualitative detection of SARS-CoV-2   
nucleocapsid antigens in nasal swabs from individuals who are   
suspected of COVID-19 by their healthcare provider within the   
first five days of the onset of symptoms. Negative results   
should be treated as presumptive, do not rule out SARS-CoV-2   
infection and should not be used as the sole basis for   
treatment or patient management decisions, including infection   
control decisions. Negative results should be considered in   
the context of a patient?s recent exposures, history and the   
presence of clinical signs and symptoms consistent with   
COVID-19, and confirmed with a molecular assay, if necessary,   
for patient management. For in vitro diagnostic use. In the   
[place], only for use under an Emergency Use Authorization. In the   
[place], this test has not been FDA cleared or approved; this test   
has been authorized by FDA under an EUA for use by authorized   
laboratories; use by laboratories certified under the CLIA, 42   
U.S.C. ?263a, that meet requirements to perform moderate,   
high, or waived complexity tests and at the Point of Care   
(POC), i.e., in patient care settings operating under a CLIA   
Certificate of Waiver, Certificate of Compliance, or   
Certificate of Accreditation.This test has been authorized   
only for the detection of proteins from SARS-CoV-2, not for   
any other viruses or pathogens; and, in the [place], this test is   
only authorized for the duration of the declaration that   
circumstances exist justifying the authorization of emergency   
use of in vitro diagnostics for detection and/or diagnosis of   
the virus that causes COVID-19 under Section 564(b)(1) of the   
Act, 21 U.S.C. ? 360bbb-3(b)(1), unless the authorization is   
terminated or revoked sooner.   
   
                                                            Performed By: #### 1  
[id number], [id number] ####[place] [address]   
   
                                                    ADMITTED TO INTENSIVE   
CARE UNIT FOR CONDITION   
OF INTEREST:FIND:PT: NO              Normal                          [facility]  
   
                                        Comment on above:   Performed By: #### 1  
[id number], [id number] ####[place] [address]   
   
                                                    EMPLOYED IN A   
HEALTHCARE   
SETTING:FIND:PT: NO              Normal                          [facility]  
   
                                        Comment on above:   Performed By: #### 1  
[id number], [id number] ####[place] [address]   
   
                                                    FIRST TEST FOR   
CONDITION OF   
INTEREST:FIND:PT: YES             Normal                          [facility]  
   
                                        Comment on above:   Performed By: #### 1  
[id number], [id number] ####[facility] [address] [place] [address]   
   
                                                    HAS SYMPTOMS RELATED TO   
CONDITION OF   
INTEREST:FIND:PT: YES             Normal                          [facility]  
   
                                        Comment on above:   Performed By: #### 1  
[id number], [id number] ####[facility] [address] [place] [address]   
   
                                                    HOSPITALIZED FOR   
CONDITION OF   
INTEREST:FIND:PT: NO              Normal                          [facility]  
   
                                        Comment on above:   Performed By: #### 1  
[id number], [id number] ####[facility] [address] [place] [address]   
   
                                                    PREGNANCY   
STATUS:FIND:PT: NO              Normal                          [facility]  
   
                                        Comment on above:   Performed By: #### 1  
[id number], [id number] ####[facility] [address] [place] [address]   
   
                                                    [facility]   
CARE SETTING:FIND:PT: NO              Normal                          [facility]  
   
                                        Comment on above:   Performed By: #### 1  
[id number], [id number] ####[facility] [address] [place] [address]   
   
                                                    Reference Laboratory Testing  
Ordered By: Generated DomainUser on 2022   
   
                                                    L. pneumophila 1 Ag IA   
Ql (U)                    Negative                  Invalid   
Interpretation   
Code                      Negative                  [facility]   
[facility]  
   
                                        Comment on above:   Result Comment: Pres  
umptive negative for L. pneumophila   
serogroup 1 antigen in urine,  
suggesting no recent or current infection. Legionnaires'   
disease  
cannot be ruled out since other serogroups and species may   
also cause  
disease.  
Performed at:  [facility] [id number]  
[id number] MD Juwan Butt   
   
                                                    Troponinon 2022   
   
                                                    Troponin I.cardiac   
[Mass/Vol]      30.40 pg/mL     High            10.10-27.10     [facility]  
   
                                        Comment on above:   Result Comment: The   
95% CI (Confidence Interval) PPV (Positive  
   
Predictive Value) for myocardial infarction in females is 38   
pg/mL, in males 51 pg/mL. The results should be used in   
conjunction with clinical conditions of myocardial   
infarction.(Access High Sensitivity Troponin I Instructions   
For Use, Estefany Elysia, 2018)   
   
                                                            Performed By: #### 2  
[id number], [id number], [id number], [id number],   
0846734, [id number] ####[address]   
[place] [address]   
   
                                                    Troponin I.cardiac   
[Mass/Vol]      31.00 pg/mL     High            10.10-27.10     [facility]  
   
                                        Comment on above:   Result Comment: The   
95% CI (Confidence Interval) PPV (Positive  
   
Predictive Value) for myocardial infarction in females is 38   
pg/mL, in males 51 pg/mL. The results should be used in   
conjunction with clinical conditions of myocardial   
infarction.(Access High Sensitivity Troponin I Instructions   
For Use, Estefany Elysia, 2018)   
   
                                                            Performed By: #### 2  
957558, 2875332, [id number], 5667190,   
01901857, 5624198, [id number] ####[address] [address]   
   
                                                    UA With Cult Reflexon 2022   
   
                                                    Bacteria LM Ql (Urine   
sed)            TRACE           Normal          Trace           [facility]  
   
                                        Comment on above:   Performed By: #### 2  
029902, [id number] ####[address] [address]   
   
                      Bilirubin Ql (U) Negative   Normal     Negative   [facility]  
   
                                        Comment on above:   Performed By: #### 2  
545223, [id number] ####[address] [address]   
   
                      Clarity (U) CLEAR      Normal     Clear      [facility]  
   
                                        Comment on above:   Performed By: #### 2  
968066, [id number] ####[address] [address]   
   
                      Color (U)  YELLOW     Normal     Yellow     [facility]  
   
                                        Comment on above:   Performed By: #### 2  
101228, [id number] ####[address] [address]   
   
                                                    Epithelial   
cells.squamous LM.HPF   
(Urine sed) [#/Area] 3-4             Normal          0-2             [facility]  
   
                                        Comment on above:   Performed By: #### 2  
892756, [id number] ####[address] [place] [address]   
   
                                                    Glucose Test strip (U)   
[Mass/Vol]      Negative        Normal          Negative        [facility]  
   
                                        Comment on above:   Performed By: #### 2  
278328, [id number] ####[address] [address]   
   
                      Hemoglobin Ql (U) 1+         Abnormal   Negative   [facility]  
   
                                        Comment on above:   Performed By: #### 2  
330239, [id number] ####[address] [address]   
   
                      Ketones (U) [Mass/Vol] 1+         Abnormal   Negative   [facility]  
   
                                        Comment on above:   Performed By: #### 2  
606034, [id number] ####[address] [address]   
   
                                                    Lithium.plasma/Lithium.  
RBC (Bld) [Mass ratio] 4-20            Normal          0-3             [facility]  
   
                                        Comment on above:   Performed By: #### 2  
661145, [id number] ####[address] [address]   
   
                      Nitrite Ql (U) Negative   Normal     Negative   [facility]  
   
                                        Comment on above:   Performed By: #### 2  
678500, [id number] ####[address] [address]   
   
                                pH (U)          6.0 [pH]        Invalid   
Interpretation   
Code                      5.0-9.0                   [facility]  
   
                                        Comment on above:   Performed By: #### 2  
514878, [id number] ####[address] [address]   
   
                      Protein (U) [Mass/Vol] 2+         Abnormal   Negative   [facility]  
   
                                        Comment on above:   Performed By: #### 2  
595229, 55003337 ####[address] [address]   
   
                                                    Specific gravity (U)   
[Rel density]             1.015                     Invalid   
Interpretation   
Code                      1.005-1.030               [facility]  
   
                                        Comment on above:   Performed By: #### 2  
726981, [id number] ####[address] [place] [address]   
   
                                                    Type of Urine   
collection method Clean Catch     Normal                          [facility]  
   
                                        Comment on above:   Performed By: #### 2  
150306, 71022160 ####[place] [address]   
   
                      Urobilinogen Qn (U) 0.2 {Miguel'U}/dL Normal     0.0-1.0   
   [facility]  
   
                                        Comment on above:   Performed By: #### 2  
082265, [id number] ####[facility] [address] [place], [place] [address]   
   
                      WBC Auto Ql (U) TRACE      Abnormal   Negative   [facility]  
   
                                        Comment on above:   Performed By: #### 2  
678230, [id number] ####[address] [place] [address]   
   
                                                    WBC LM.HPF (Urine sed)   
[#/Area]        6-15            Abnormal        0-5             [facility]  
   
                                        Comment on above:   Performed By: #### 2  
660252, 23200239 ####[address] [place] [address]   
   
                                                    URINALYSISOrdered By: Prashant Patricia on 2022   
   
                                                    Bacteria LM Ql (Urine   
sed)            Trace /HPF      Normal          Trace/HPF       [facility]   
Auto SS  
   
                                        Bilirubin Ql (U)    Negative  
(22 2:58 AM)  Normal              Negative            [facility]   
Auto SS  
   
                                        Clarity (U)         Clear  
(22 2:58 AM)  Normal              Clear               [facility]   
Auto SS  
   
                                        Color (U)           Yellow  
(22 2:58 AM)  Normal              Yellow              [facility]   
Auto SS  
   
                                                    Epithelial   
cells.squamous LM.HPF   
(Urine sed) [#/Area] 3-4 /HPF        Normal          0-2/HPF         [facility]   
Auto SS  
   
                                                    Glucose Test strip (U)   
[Mass/Vol]                              Negative  
(22 2:58 AM)  Normal              Negative            [facility]   
Auto SS  
   
                                        Hemoglobin Ql (U)   1+  
*ABN*  
(22 2:58 AM)                      Invalid   
Interpretation   
Code                      Negative                  [facility]   
Auto SS  
   
                                        Ketones (U) [Mass/Vol] 1+  
*ABN*  
(22 2:58 AM)                      Invalid   
Interpretation   
Code                      Negative                  [facility]   
Auto SS  
   
                                                    Lithium.plasma/Lithium.  
RBC (Bld) [Mass ratio] 4-20 /HPF       Normal          0-3/HPF         [facility]   
Auto SS  
   
                                        Nitrite Ql (U)      Negative  
(22 2:58 AM)  Normal              Negative            [facility]   
Auto SS  
   
                                        pH (U)              6.0  
*NA*  
(22 2:58 AM)                      Invalid   
Interpretation   
Code                      5.0 - 9.0                 [facility]   
Auto SS  
   
                                        Protein (U) [Mass/Vol] 2+  
*ABN*  
(22 2:58 AM)                      Invalid   
Interpretation   
Code                      Negative                  [facility]   
Auto SS  
   
                                                    Specific gravity (U)   
[Rel density]                           1.015  
*NA*  
(22 2:58 AM)                      Invalid   
Interpretation   
Code                                    1.005 -   
1.030                                   [facility]   
Auto SS  
   
                                        UA Spec Desc        Clean Catch  
(22 2:58 AM)  Normal                                  [facility]   
Auto SS  
   
                          Urobilinogen Qn (U) 0.1112391 {Miguel'U}/dL Normal     
    0.0 - 1.0   
EU/dL                                   [facility]   
Auto SS  
   
                                        WBC Auto Ql (U)     Trace  
*ABN*  
(22 2:58 AM)                      Invalid   
Interpretation   
Code                      Negative                  [facility]   
Auto SS  
   
                                                    WBC LM.HPF (Urine sed)   
[#/Area]                  6-15 /HPF                 Invalid   
Interpretation   
Code                      0-5/HPF                   [facility]   
Auto SS  
   
                                                    XR Chest Single Viewon    
   
                      XR Chest Single View            Normal                [facility]  
[facility]  
   
                                                    cefTRIAXone KAMRAN [Susc]Ordere  
d By: Richelle Sarkar on 2022   
   
                                                    Streptococcus   
salivarius      Streptococcus salivarius                                 [facility]  
   
                                                    eGFRon 2022   
   
                                                    GFR/1.73 sq M.predicted   
among blacks MDRD   
(S/P/Bld) [Vol   
rate/Area]      mL/min/{1.73_m2} Normal          >=59            [facility]  
   
                                        Comment on above:   Order Comment: Order  
 added by Discern Expert.   
   
                                                            Result Comment: eGFR  
 is race adjusted. AA=.   
   
                                                            Performed By: #### 2  
340458, [id number], 8857263, [id number],   
7169496, 3422401 ####[facility] [id number]   
[place] [address]   
   
                                                    GFR/1.73 sq M.predicted   
among non-blacks MDRD   
(S/P/Bld) [Vol   
rate/Area]      54 mL/min/1.73 m2 Low             >=59            [facility]  
   
                                        Comment on above:   Order Comment: Order  
 added by Discern Expert.   
   
                                                            Result Comment:   
deion kidney disease could be indicated at   
eGFR's of less than 60 mL/min/1.73m2. Kidney failure is   
indicated at less than 15 mL/min/1.73m2.   
   
                                                            Performed By: #### 2  
092158, 35167181, 7450153, [id number],   
2792787, 5522353 ####[facility] [address]   
[place] [address]   
   
                                                    GFR/1.73 sq M.predicted   
among blacks MDRD   
(S/P/Bld) [Vol   
rate/Area]      mL/min/{1.73_m2} Normal          >=59            [facility]  
   
                                        Comment on above:   Order Comment: Order  
 added by Discern Expert.   
   
                                                            Result Comment: eGFR  
 is race adjusted. AA=.   
   
                                                            Performed By: #### 2  
678055, 2983539, 54622543, 2191861,   
08842823, 2464909, 5682207 ####[facility]   
[address] [place] [address]   
   
                                                    GFR/1.73 sq M.predicted   
among non-blacks MDRD   
(S/P/Bld) [Vol   
rate/Area]      mL/min/{1.73_m2} Normal          >=59            [facility]  
   
                                        Comment on above:   Order Comment: Order  
 added by Discern Expert.   
   
                                                            Result Comment:   
deion kidney disease could be indicated at   
eGFR's of less than 60 mL/min/1.73m2. Kidney failure is   
indicated at less than 15 mL/min/1.73m2.   
   
                                                            Performed By: #### 2  
272016, 5996317, 78731237, 8188560,   
38111377, 3121353, 9279502 ####[address] [place] [address]   
   
                                                    Prot. Electroph, Blon 2022   
   
                                        Pathologist Review: ELECTRONICALLY SUDHIR MCWILLIAMS M.D. [facility]  
   
                                        Comment on above:   Performed By: #### P  
SHAR, VD25, PE ####  
[address] [place] [address]  
[phone]  
: Tae Mccallum MD  
#### BMP ####  
[facility]  
[address] [place] [address]  
[phone]  
: Greg Paredes MD   
   
                                        Prot. Elect-Interp  NORMAL ELECTROPHORET  
IC   
PATTERN             Normal                                  [facility]  
   
                                        Comment on above:   Performed By: #### P  
THNCA, VD25, PE ####  
[address] [address]  
[phone]  
: Tae Mccallum MD  
#### BMP ####  
[facility]  
[address] [place] [address]  
[phone]  
: Greg Paredes MD   
   
                                                    Prot. Electroph, Blon 2022   
   
                      Albumin [Mass/Vol] 4.3 g/dL   Normal     3.2-5.2    [facility]  
   
                                        Comment on above:   Performed By: #### P  
THNCA, VD25, PE ####  
[address] [address]  
[phone]  
: Tae Mccallum MD  
#### BMP ####  
[facility]  
[address] [place] [address]  
[phone]  
: Greg Paredes MD   
   
                      Albumin, % 61 %       Normal     45-65      [facility]  
   
                                        Comment on above:   Performed By: #### P  
THNCA, VD25, PE ####  
[address] [address]  
[phone]  
: Tae Mccallum MD  
#### BMP ####  
[facility]  
[address] [place] [address]  
[phone]  
: Greg Paredes MD   
   
                      Alpha-1-globulins 0.2 g/dL   Normal     0.1-0.4    [facility]  
   
                                        Comment on above:   Performed By: #### P  
THNCA, VD25, PE ####  
[address] [address]  
[phone]  
: Tae Mccallum MD  
#### BMP ####  
[facility]  
[address] [place] [address]  
[phone]  
: Greg Paredes MD   
   
                      Alpha-1-globulins,% 2 %        Low        3-6        [facility]  
   
                                        Comment on above:   Performed By: #### P  
THNCA, VD25, PE ####  
[address] [address]  
[phone]  
: Tae Mccallum MD  
#### BMP ####  
[facility]  
[address] [place], [place] [address] [phone]  
: Greg Paredes MD   
   
                      Alpha-2-globulins 0.8 g/dL   Normal     0.5-0.9    [facility]  
   
                                        Comment on above:   Performed By: #### P  
THNCA, VD25, PE ####  
[address] [address]  
[phone]  
: Tae Mccallum MD  
#### BMP ####  
[facility]  
[address] [place] [address]  
[phone]  
: Greg Paredes MD   
   
                      Alpha-2-globulins,% 12 %       Normal     6-13       [facility]  
   
                                        Comment on above:   Performed By: #### P  
THNCA, VD25, PE ####  
[address] [address]  
[phone]  
: Tae Mccallum MD  
#### BMP ####  
[facility]  
[address] [place], [place] [address] [phone]  
: Greg Paredes MD   
   
                      Beta-globulins 0.7 g/dL   Normal     0.5-1.1    [facility]  
   
                                        Comment on above:   Performed By: #### P  
THNCA, VD25, PE ####  
[address] [address] [phone]  
: Tae Mccallum MD  
#### BMP ####  
[facility]  
[address] [place], [place] [address] [phone]  
: Greg Paredes MD   
   
                      Beta-globulins,% 10 %       Low        11-19      [facility]  
   
                                        Comment on above:   Performed By: #### P  
THNCA, VD25, PE ####  
[address] [address] [phone]  
: Tae Mccallum MD  
#### BMP ####  
[facility]  
[address] [place], [place] [address] [phone]  
: Greg Paredes MD   
   
                      Gamma-globulins 1.1 g/dL   Normal     0.5-1.5    [facility]  
   
                                        Comment on above:   Performed By: #### P  
THNCA, VD25, PE ####  
[address] [phone]  
: Tae Mccallum MD  
#### BMP ####  
[facility]  
[address] [place], [place] [address] [phone]  
: Greg Paredes MD   
   
                      Gamma-globulins,% 15 %       Normal     9-20       [facility]  
   
                                        Comment on above:   Performed By: #### P  
THNCA, VD25, PE ####  
[address] [address] [phone]  
: Tae Mccallum MD  
#### BMP ####  
[facility]  
[address] [place], [place] [address] [phone]  
: Greg Paredes MD   
   
                      Total Prot. Sum 7.1 g/dL   Normal     6.3-8.2    [facility]  
   
                                        Comment on above:   Performed By: #### P  
THNCA, VD25, PE ####  
[address] [address] [phone]  
: Tae Mccallum MD  
#### BMP ####  
[facility]  
[address] [place] [address] [phone]  
: Greg Paredes MD   
   
                      Total Prot. Sum,% 100 %      Normal          [facility]  
   
                                        Comment on above:   Performed By: #### P  
SHAR, VD25, PE ####  
[facility]  
[address] [place] [address] [phone]  
: Tae Mccallum MD  
#### BMP ####  
[facility]  
[address] [place] [address]  
[phone]  
: Greg Paredes MD   
   
                                                    Basic Metabolic Panelon -0  
   
   
                          Anion gap [Moles/Vol] 12 mmol/L                 9 - 17  
   
mmol/L                                  [facility]  
   
                          Calcium [Mass/Vol] 9.7 mg/dL                 8.6 - 10.  
4   
mg/dL                                   [facility]  
   
                          Chloride [Moles/Vol] 102 mmol/L                98 - 10  
7   
mmol/L                                  [facility]  
   
                          CO2 [Moles/Vol] 22 mmol/L                 20 - 31   
mmol/L                                  [facility]  
   
                          Creatinine [Mass/Vol] 1.01 mg/dL   High         0.5 -   
0.9   
mg/dL                                   [facility]  
   
                          GFR  >60                       60 - PI  
NF   
mL/min                                  [facility]  
   
                                                    GFR Non-   
American            54 mL/min           Low                 60 - PINF   
mL/min                                  [facility]  
   
                                                    GFR/1.73 sq M.predicted   
MDRD (S/P/Bld) [Vol   
rate/Area]                                                      [facility]  
   
                                        Comment on above:   Average GFR for 70 o  
r more years old:  
75 mL/min/1.73sq m  
Chronic Kidney Disease:  
<60 mL/min/1.73sq m  
Kidney failure:  
<15 mL/min/1.73sq m  
  
  
eGFR calculated using average adult body mass. Additional eGFR   
calculator available at:  
  
http://www.[employer]/multiple_crcl_2012.htm  
  
  
   
   
                          Glucose [Mass/Vol] 130 mg/dL    High         70 - 99   
mg/dL                                   [facility]  
   
                                                    Interpretation and   
review of laboratory   
results         Abnormal                                        [facility]  
   
                          Potassium [Moles/Vol] 4.3 mmol/L                3.7 -   
5.3   
mmol/L                                  [facility]  
   
                          Sodium [Moles/Vol] 136 mmol/L                135 - 144  
   
mmol/L                                  [facility]  
   
                                                    Urea nitrogen (BldV)   
[Mass/Vol]          20 mg/dL                                8 - 23   
mg/dL                                   [facility]  
   
                                                    Urea   
nitrogen/Creatinine   
(Bld) [Mass ratio] 20                              9 - 20          [facility]  
   
                                                    Basic Metabolic Profon    
   
                      (cont.)               Normal                [facility]  
   
                                        Comment on above:   Result Comment: Aver  
age GFR for 70 or more years old:  
75 mL/min/1.73sq m  
Chronic Kidney Disease:  
<60 mL/min/1.73sq m  
Kidney failure:  
<15 mL/min/1.73sq m  
eGFR calculated using average adult body mass. Additional eGFR   
calculator  
available at:  
http://www.[employer]/multiple_crcl_2012.htm   
   
                                                            Performed By: #### P  
SHAR VD25, PE ####  
[facility] [employer]  
[address] [place], [place] [address] [phone]  
: Tae Mccallum MD  
#### BMP ####  
[facility]  
[address] [address] [phone]  
: Greg Paredes MD   
   
                      Anion gap [Moles/Vol] 12 mmol/L  Normal     -       [facility]  
   
                                        Comment on above:   Performed By: #### P  
SHAR VD25, PE ####  
[facility]  
[address] [place], [place] [address] [phone]  
: Tae Mccallum MD  
#### BMP ####  
[facility]  
[address] [place] [address] [phone]  
: Greg Paredes MD   
   
                      BUN/CRE Ratio 20         Normal     -       [facility]  
   
                                        Comment on above:   Performed By: #### P  
THJAME VD25, PE ####  
[facility] [employer]  
[address] [place] [id number] [phone]  
: Tae Mccallum MD  
#### BMP ####  
[facility]  
[address] [place], [place] [address] [phone]  
: Greg Paredes MD   
   
                      Calcium [Mass/Vol] 9.7 mg/dL  Normal     8.6-10.4   [facility]  
   
                                        Comment on above:   Performed By: #### P  
THNCA, VD25, PE ####  
[address] [id number] [phone]  
: Tae Mccallum MD  
#### BMP ####  
[facility]  
[address] [place], [place] [address] [phone]  
: Greg Paredes MD   
   
                      Chloride [Moles/Vol] 102 mmol/L Normal          [facility]  
   
                                        Comment on above:   Performed By: #### P  
THNCA, VD25, PE ####  
[address] [address] [phone]  
: Tae Mccallum MD  
#### BMP ####  
[facility]  
[address] [place], [place] [address] [phone]  
: Greg Paredes MD   
   
                      CO2 [Moles/Vol] 22 mmol/L  Normal     20-31      [facility]  
   
                                        Comment on above:   Performed By: #### P  
THNCA, VD25, PE ####  
[address] [address] [phone]  
: Tae Mccallum MD  
#### BMP ####  
[facility]  
[address] [place], [place] [address] [phone]  
: Greg Paredes MD   
   
                      Creatinine [Mass/Vol] 1.01 mg/dL High       0.50-0.90  [facility]  
   
                                        Comment on above:   Performed By: #### P  
THNCA, VD25, PE ####  
[address] [address]  
[phone]  
: Tae Mccallum MD  
#### BMP ####  
[facility]  
[address] [place], [place] [address] [phone]  
: Greg Paredes MD   
   
                      GFR, Amer >60        Normal     >60        [facility]  
   
                                        Comment on above:   Performed By: #### P  
THNCA, VD25, PE ####  
[address] [place] [address]  
[phone]  
: Tae Mccallum MD  
#### BMP ####  
[facility]  
[address] [place] [address]  
[phone]  
: Greg Paredes MD   
   
                      GFR,non African Amer 54 mL/min  Low        >60        [facility]  
   
                                        Comment on above:   Performed By: #### P  
THNCA, VD25, PE ####  
[address] [address] [phone]  
: Tae Mccallum MD  
#### BMP ####  
[facility]  
[address] [place] [address]  
[phone]  
: Greg Paredes MD   
   
                      Glucose [Mass/Vol] 130 mg/dL  High       70-99      [facility]  
   
                                        Comment on above:   Performed By: #### P  
THNCA, VD25, PE ####  
[address] [address]  
[phone]  
: Tae Mccallum MD  
#### BMP ####  
[facility]  
[address] [place] [address]  
[phone]  
: Greg Paredes MD   
   
                      Potassium [Moles/Vol] 4.3 mmol/L Normal     3.7-5.3    [facility]  
   
                                        Comment on above:   Performed By: #### P  
THNCA, VD25, PE ####  
[address] [address]  
[phone]  
: Tae Mccallum MD  
#### BMP ####  
[facility]  
[address] [place], [place] [address] [phone]  
: Greg Paredes MD   
   
                      Sodium [Moles/Vol] 136 mmol/L Normal     135-144    [facility]  
   
                                        Comment on above:   Performed By: #### P  
SHAR, VD25, PE ####  
[address] [place], [place] [address] [phone]  
: Tae Mccallum MD  
#### BMP ####  
[facility]  
[address] [place], [place] [address] [phone]  
: Greg Paredes MD   
   
                                                    Urea nitrogen   
[Mass/Vol]      20 mg/dL        Normal          8-23            [facility]  
   
                                        Comment on above:   Performed By: #### P  
SHAR, VD25, PE ####  
[address] [address] [phone]  
: Tae Mccallum MD  
#### BMP ####  
[facility]  
[address] [place], [place] [address] [phone]  
: Greg Paredes MD   
   
                                                    PTH, Intacton 2022   
   
                      PTH, Intact 78.3 pg/mL High       14.0-72.0  [facility]  
   
                                        Comment on above:   Result Comment: SAMP  
LES FROM PATIENTS ROUTINELY RECEIVING HIGH  
   
DOSE BIOTIN THERAPY MAY SHOW  
FALSELY DEPRESSED RESULTS. ADDITIONAL INFORMATION MAY BE   
REQUIRED FOR  
DIAGNOSIS.   
   
                                                            Performed By: #### TEJAS MYLES VD25, PE ####  
[address] [address] [phone]  
: Tae Mccallum MD  
#### BMP ####  
[facility]  
[address] [place], [place] [address] [phone]  
: Greg Paredes MD   
   
                                                    Interpretation and   
review of laboratory   
results         Abnormal                                        [facility]  
   
                          Pth Intact   78.3 pg/mL   High         14 - 72   
pg/mL                                   [facility]  
   
                                        Comment on above:   SAMPLES FROM PATIENT  
S ROUTINELY RECEIVING HIGH DOSE BIOTIN   
THERAPY MAY SHOW FALSELY  
DEPRESSED RESULTS. ADDITIONAL INFORMATION MAY BE REQUIRED FOR   
DIAGNOSIS.  
  
   
   
                                                                  [facility]  
   
                                                    Prot. Electroph, Blon 2022   
   
                      Protein [Mass/Vol] 7.1 g/dL   Normal     6.4-8.3    [facility]  
   
                                        Comment on above:   Performed By: #### P  
THNCA, VD25, PE ####  
[facility]  
[address] [place], [place] [address] [phone]  
: Tae Mccallum MD  
#### BMP ####  
[facility]  
[address] [place], [place] [address] [phone]  
: Greg Paredes MD   
   
                                                    Vitamin D 25 Hydroxyon    
   
                          Vit D, 25-Hydroxy 58.1 ng/mL                29.9 - PIN  
F   
ng/mL                                   [facility]  
   
                                        Comment on above:     
Reference Range:  
Vitamin D status Range  
Deficiency <20 ng/mL  
Mild Deficiency 20-30 ng/mL  
Sufficiency  ng/mL  
Toxicity >100 ng/mL  
  
   
   
                                                                  [facility]  
   
                                                    Vitamin D 25 OHon 2022  
   
   
                      Vitamin D 25 OH 58.1 ng/mL Normal     >29.9      [facility]  
   
                                        Comment on above:   Result Comment:  
Reference Range:  
Vitamin D status Range  
Deficiency <20 ng/mL  
Mild Deficiency 20-30 ng/mL  
Sufficiency  ng/mL  
Toxicity >100 ng/mL   
   
                                                            Performed By: #### TEJAS MYLES, VD25, PE ####  
[facility]  
[address] [place], [place] [address] [phone]  
: Tae Mccallum MD  
#### BMP ####  
[facility]  
[address] [place] [address] [phone]  
: Greg Paredes MD   
   
                                                    Basic Metabolic Panelon    
   
                          Anion gap [Moles/Vol] 8 mmol/L     Low          9 - 17  
   
mmol/L                                  [facility]  
   
                          Calcium [Mass/Vol] 9.9 mg/dL                 8.6 - 10.  
4   
mg/dL                                   [facility]  
   
                          Chloride [Moles/Vol] 103 mmol/L                98 - 10  
7   
mmol/L                                  [facility]  
   
                          CO2 [Moles/Vol] 26 mmol/L                 20 - 31   
mmol/L                                  [facility]  
   
                          Creatinine [Mass/Vol] 1.15 mg/dL   High         0.5 -   
0.9   
mg/dL                                   [facility]  
   
                          GFR  56 mL/min    Low          60 - PI  
NF   
mL/min                                  [facility]  
   
                                                    GFR Non-   
American            46 mL/min           Low                 60 - PINF   
mL/min                                  [facility]  
   
                                                    GFR/1.73 sq M.predicted   
MDRD (S/P/Bld) [Vol   
rate/Area]                                                      [facility]  
   
                                        Comment on above:   Average GFR for 70 o  
r more years old:  
75 mL/min/1.73sq m  
Chronic Kidney Disease:  
<60 mL/min/1.73sq m  
Kidney failure:  
<15 mL/min/1.73sq m  
  
  
eGFR calculated using average adult body mass. Additional eGFR   
calculator available at:  
  
http://www.[employer]/multiple_crcl_.htm  
  
  
   
   
                          Glucose [Mass/Vol] 114 mg/dL    High         70 - 99   
mg/dL                                   [facility]  
   
                                                    Interpretation and   
review of laboratory   
results         Abnormal                                        [facility]  
   
                          Potassium [Moles/Vol] 4.3 mmol/L                3.7 -   
5.3   
mmol/L                                  [facility]  
   
                          Sodium [Moles/Vol] 137 mmol/L                135 - 144  
   
mmol/L                                  [facility]  
   
                                                    Urea nitrogen (BldV)   
[Mass/Vol]          20 mg/dL                                8 - 23   
mg/dL                                   [facility]  
   
                                                    Urea   
nitrogen/Creatinine   
(Bld) [Mass ratio] 17                              9 - 20          [facility]  
   
                                                    Basic Metabolic Profon    
   
                      (cont.)               Normal                [facility]  
   
                                        Comment on above:   Result Comment: Aver  
age GFR for 70 or more years old:  
75 mL/min/1.73sq m  
Chronic Kidney Disease:  
<60 mL/min/1.73sq m  
Kidney failure:  
<15 mL/min/1.73sq m  
eGFR calculated using average adult body mass. Additional eGFR   
calculator  
available at:  
http://www.[employer]/multiple_crcl_.htm   
   
                                                            Performed By: #### B  
MP ####  
[facility]  
[address]  
[place] [address]  
[phone]  
: Greg Paredes MD   
   
                      Anion gap [Moles/Vol] 8 mmol/L   Low        9-17       [facility]  
   
                                        Comment on above:   Performed By: #### B  
MP ####  
[facility]  
[address] [place] [address]  
[phone]  
: Greg Paredes MD   
   
                      BUN/CRE Ratio 17         Normal     9-20       [facility]  
   
                                        Comment on above:   Performed By: #### B  
MP ####  
[facility]  
[address] [place], [place] [address] [phone]  
: Greg Paredes MD   
   
                      Calcium [Mass/Vol] 9.9 mg/dL  Normal     8.6-10.4   [facility]  
   
                                        Comment on above:   Performed By: #### B  
MP ####  
[facility]  
[address] [place] [address]  
[phone]  
: Greg Paredes MD   
   
                      Chloride [Moles/Vol] 103 mmol/L Normal          [facility]  
   
                                        Comment on above:   Performed By: #### B  
MP ####  
[facility]  
[address] [place] [address]  
[phone]  
: Greg Paredes MD   
   
                      CO2 [Moles/Vol] 26 mmol/L  Normal     20-31      [facility]  
   
                                        Comment on above:   Performed By: #### B  
MP ####  
[facility]  
[address] [place] [address]  
[phone]  
: Greg Paredes MD   
   
                      Creatinine [Mass/Vol] 1.15 mg/dL High       0.50-0.90  [facility]  
   
                                        Comment on above:   Performed By: #### B  
MP ####  
[facility]  
[address] [place] [address]  
[phone]  
: Greg Paredes MD   
   
                      GFR, Amer 56 mL/min  Low        >60        [facility]  
   
                                        Comment on above:   Performed By: #### B  
MP ####  
[facility]  
[address] [place] [address]  
[phone]  
: Greg Paredes MD   
   
                      GFR,non African Amer 46 mL/min  Low        >60        [facility]  
   
                                        Comment on above:   Performed By: #### B  
MP ####  
[facility]  
[address] [place], [place] [address] [phone]  
: Greg Paredes MD   
   
                      Glucose [Mass/Vol] 114 mg/dL  High       70-99      [facility]  
   
                                        Comment on above:   Performed By: #### B  
MP ####  
[facility]  
[address] [place] [address]  
[phone]  
: Greg Paredes MD   
   
                      Potassium [Moles/Vol] 4.3 mmol/L Normal     3.7-5.3    [facility]  
   
                                        Comment on above:   Performed By: #### B  
MP ####  
[facility]  
[address] [place], [place] [address] [phone]  
: Greg Paredes MD   
   
                      Sodium [Moles/Vol] 137 mmol/L Normal     135-144    [facility]  
   
                                        Comment on above:   Performed By: #### B  
MP ####  
[facility]  
[address] [place], [place] [address] [phone]  
: Greg Paredes MD   
   
                                                    Urea nitrogen   
[Mass/Vol]      20 mg/dL        Normal          8-23            [facility]  
   
                                        Comment on above:   Performed By: #### B  
MP ####  
[facility]  
[address] [place], [place] [address] [phone]  
: Greg Paredes MD   
   
                                                    Coding Summary.on 2022  
   
   
                      Coding Summary.            Normal                [facility]  
   
                                                    Coding Summary.on 2022  
   
   
                      Coding Summary.            Normal                [facility]  
   
                                                    Auto Diffon 2022   
   
                      Basophils/100 WBC (Bld) 0.5 %      Normal     0.0-2.0    F  
[facility]  
   
                                        Comment on above:   Order Comment: Order  
 Added by Discern Expert.   
   
                                                            Performed By: #### 2  
[id number], [id number], [id number], [id number],   
[id number] ####[facility] [id number]   
[place] [address]   
   
                                                    Basophils/Leukocytes   
Auto (Bld) [Pure #   
fraction]       0.1 E9/L        Normal          0.0-0.2         [facility]  
   
                                        Comment on above:   Order Comment: Order  
 Added by Discern Expert.   
   
                                                            Performed By: #### 2  
[id number], [id number], [id number], [id number],   
[id number] ####[facility] [id number]   
[place] [address]   
   
                                                    Eosinophils/100 WBC   
(Bld)           1.8 %           Normal          0.0-8.0         [facility]  
   
                                        Comment on above:   Order Comment: Order  
 Added by Discern Expert.   
   
                                                            Performed By: #### 2  
[id number], [id number], [id number], [id number],   
[id number] ####[address] [address]   
   
                                                    Eosinophils/Leukocytes   
Auto (Bld) [Pure #   
fraction]       0.2 E9/L        Normal          0.0-0.5         [facility]  
   
                                        Comment on above:   Order Comment: Order  
 Added by Discern Expert.   
   
                                                            Performed By: #### 2  
127394, [id number], [id number], [id number],   
[id number] ####[facility] [id number] [place] [address]   
   
                                                    Lymphocytes/100 WBC   
(Bld)           9.1 %           Low             14.0-50.0       [facility]  
   
                                        Comment on above:   Order Comment: Order  
 Added by Discern Expert.   
   
                                                            Performed By: #### 2  
804484, 9531327, 1289116, [id number],   
[id number] ####[address] [address]   
   
                                                    Lymphocytes/Leukocytes   
Auto (Bld) [Pure #   
fraction]       1.2 E9/L        Normal          1.0-4.0         [facility]  
   
                                        Comment on above:   Order Comment: Order  
 Added by Discern Expert.   
   
                                                            Performed By: #### 2  
042542, 8553629, [id number], [id number],   
[id number] ####[facility] [id number]   
[place] [address]   
   
                      Monocytes/100 WBC (Bld) 11.3 %     Normal     4.0-14.0   [facility]  
   
                                        Comment on above:   Order Comment: Order  
 Added by Discern Expert.   
   
                                                            Performed By: #### 2  
778841, 2102072, 8000512, [id number],   
[id number] ####[address]   
[place] [address]   
   
                                                    Monocytes/Leukocytes   
Auto (Bld) [Pure #   
fraction]       1.4 E9/L        High            0.2-1.0         [facility]  
   
                                        Comment on above:   Order Comment: Order  
 Added by Lenin Expert.   
   
                                                            Performed By: #### 2  
559781, 2490248, 7876453, [id number],   
[id number] ####[address]   
[place] [address]   
   
                                                    Neutrophils/100 WBC   
(Bld)           77.3 %          High            36.0-75.0       [facility]  
   
                                        Comment on above:   Order Comment: Order  
 Added by Lenin Expert.   
   
                                                            Performed By: #### 2  
248734, 6424189, 3725490, [id number],   
[id number] ####[address] [address]   
   
                                                    Neutrophils/Leukocytes   
Auto (Bld) [Pure #   
fraction]       9.9 E9/L        High            2.0-7.5         [facility]  
   
                                        Comment on above:   Order Comment: Order  
 Added by Lenin Expert.   
   
                                                            Performed By: #### 2  
647138, 7547099, 5672132, 54861962,   
[id number] ####[address]   
[place] [address]   
   
                                                    BMPon 2022   
   
                      Creatinine [Mass/Vol] 0.9 mg/dL  Normal     0.5-1.3    [facility]  
   
                                        Comment on above:   Performed By: #### 2  
484452, 0185946, 9423201, 98494788,   
[id number] ####[address]   
[place] [address]   
   
                                                    Urea nitrogen   
[Mass/Vol]      19 mg/dL        Normal          5-21            [facility]  
   
                                        Comment on above:   Performed By: #### 2  
809811, 6116076, 1497700, 19029621,   
[id number] ####[address] [address]   
   
                                                    Urea   
nitrogen/Creatinine   
[Mass ratio]    21 No Units     High            10-20           [facility]  
   
                                        Comment on above:   Performed By: #### 2  
162737, 5707829, 4037709, [id number],   
[id number] ####[facility] [address]   
[place] [address]   
   
                      Anion gap [Moles/Vol] 15 mmol/L  Normal     6-16       [facility]  
   
                                        Comment on above:   Performed By: #### 2  
746674, 0974205, 5397689, 99724144,   
[id number] ####[facility] [address]   
[place] [address]   
   
                      Calcium [Mass/Vol] 9.9 mg/dL  Normal     8.9-11.1   [facility]  
   
                                        Comment on above:   Performed By: #### 2  
095495, 5723701, 3863917, 83823710,   
[id number] ####[facility] [address]   
[place] [address]   
   
                      Chloride [Moles/Vol] 99 mmol/L  Low        101-111    [facility]  
[facility]  
   
                                        Comment on above:   Performed By: #### 2  
480254, 0222093, 3631948, 41593070,   
[id number] ####[facility] [address]   
[place] [address]   
   
                      CO2 [Moles/Vol] 22 mmol/L  Normal     21-31      [facility]  
   
                                        Comment on above:   Performed By: #### 2  
337044, 2905866, 9206181, 20806255,   
[id number] ####[facility] [address]   
[place] [address]   
   
                      Glucose [Mass/Vol] 101 mg/dL  Normal          [facility]  
   
                                        Comment on above:   Result Comment: If t  
his glucose result represents a fasting   
glucose, interpretation should refer to the following   
reference range: 55-99 mg/dL   
   
                                                            Performed By: #### 2  
350946, 1899031, 3779169, 64582498,   
[id number] ####[facility] [address]   
[place] [address]   
   
                      Potassium [Moles/Vol] 4.5 mmol/L Normal     3.5-5.3    [facility]  
   
                                        Comment on above:   Performed By: #### 2  
731287, 7350507, 2660169, 73521662,   
[id number] ####[facility] [address]   
[place] [address]   
   
                      Sodium [Moles/Vol] 131 mmol/L Low        135-145    [facility]  
   
                                        Comment on above:   Performed By: #### 2  
724028, 7258017, 4088557, 40792735,   
[id number] ####[facility] [address]   
[place] [address]   
   
                                                    CBC w/ Auto Diffon   
2   
   
                                                    Erythrocyte   
distribution width   
(RBC) [Ratio]   13.1 %          Normal          10.9-14.2       [facility]  
   
                                        Comment on above:   Performed By: #### 2  
112251, 2491326, 8530982, 39915496,   
[id number] ####[facility] [address] [place] [address]   
   
                                                    Hematocrit (Bld)   
[Volume fraction] 36.1 %          Normal          34.0-46.0       [facility]  
   
                                        Comment on above:   Performed By: #### 2  
475428, 9691425, 4433034, 51619742,   
[id number] ####[address] [address]   
   
                                                    Hemoglobin (Bld)   
[Mass/Vol]      11.8 g/dL       Low             12.0-16.0       [facility]  
   
                                        Comment on above:   Performed By: #### 2  
630904, 1847564, 3632755, 02962823,   
41803628 ####[facility] [address]   
[place] [address]   
   
                                                    MCH (RBC) [Entitic   
mass]           29.4 pg         Normal          27.0-34.0       [facility]  
   
                                        Comment on above:   Performed By: #### 2  
369444, 3102850, 5768367, 80768276,   
93389335 ####[facility] [address]   
[place] [address]   
   
                      MCHC (RBC) [Mass/Vol] 32.7 g/dL  Normal     31.4-36.0  [facility]  
   
                                        Comment on above:   Performed By: #### 2  
594235, 7453131, 1246867, 08675092,   
[id number] ####[address]   
[place] [address]   
   
                      MCV (RBC) [Entitic vol] 89.7 fL    Normal     80.0-100.0 F  
[facility]  
   
                                        Comment on above:   Performed By: #### 2  
999437, 9547678, 5082963, 80831634,   
[id number] ####[address] [address]   
   
                                                    Platelet mean volume   
(Bld) [Entitic vol] 7.5 fL          Normal          6.4-10.8        [facility]  
   
                                        Comment on above:   Performed By: #### 2  
787251, 0222169, 8835277, 45614600,   
[id number] ####[address] [address]   
   
                      Platelets (Bld) [#/Vol] 340.0 E9/L Normal     150.0-500.0   
[facility]  
   
                                        Comment on above:   Performed By: #### 2  
878005, 9437867, 5582073, 22503527,   
[id number] ####[address] [address]   
   
                      RBC (Bld) [#/Vol] 4.0 E12/L  Low        4.3-5.9    [facility]  
   
                                        Comment on above:   Performed By: #### 2  
348506, 7654794, 7079867, 50797397,   
13704452 ####[address] [address]   
   
                                                    WBC corrected for nucl   
RBC Auto (Bld) [#/Vol] 12.8 E9/L       High            4.0-11.0        [facility]  
   
                                        Comment on above:   Performed By: #### 2  
767472, 5398106, 4274528, 55923202,   
[id number] ####[address] [place] [address]   
   
                                                    CHEMISTRYOrdered By: SYSTEM   
SYSTEM on 2022   
   
                          Anion gap [Moles/Vol] 15 mmol/L    Normal       6 - 16  
   
mEq/L                                   FT   
[facility]  
   
                          Calcium [Mass/Vol] 9.9 mg/dL    Normal       8.9 - 11.  
1   
mg/dL                                   FT   
[facility]  
   
                          Chloride [Moles/Vol] 99 mmol/L    Low          101 - 1  
11   
mmol/L                                  FT   
[facility]  
   
                          CO2 [Moles/Vol] 22 mmol/L    Normal       21 - 31   
mmol/L                                  FT   
[facility]  
   
                          Creatinine [Mass/Vol] 0.9 mg/dL    Normal       0.5 -   
1.3   
mg/dL                                   FT   
[facility]  
   
                                                    GFR/1.73 sq M.predicted   
among blacks MDRD   
(S/P/Bld) [Vol   
rate/Area]          mL/min/1.73 m2      Normal              >=59mL/min/  
1.73 m2                                 [facility]  
   
                                                    GFR/1.73 sq M.predicted   
among non-blacks MDRD   
(S/P/Bld) [Vol   
rate/Area]          mL/min/1.73 m2      Normal              >=59mL/min/  
1.73 m2                                 [facility]  
   
                          Glucose [Mass/Vol] 101 mg/dL    Normal       55 - 199   
mg/dL                                   FT   
[facility]  
   
                          Potassium [Moles/Vol] 4.5 mmol/L   Normal       3.5 -   
5.3   
mmol/L                                  FT   
[facility]  
   
                          Sodium [Moles/Vol] 131 mmol/L   Low          135 - 145  
   
mmol/L                                  FT   
[facility]  
   
                                                    Troponin I.cardiac   
[Mass/Vol]          12.30 pg/mL         Normal              10.10 -   
27.10 pg/mL                             [facility]   
[facility]  
   
                                                    Urea nitrogen   
[Mass/Vol]          19 mg/dL            Normal              5 - 21   
mg/dL                                   [facility]   
[facility]  
   
                                                    Urea   
nitrogen/Creatinine   
[Mass ratio]    21 mg/mg        High            10 - 20         [facility]   
[facility]  
   
                                                    Consent for Treatmenton    
   
                                        Consent for Treatment [id number]  
[id number]  
[id number]:  
127                 Normal                                  [facility]   
[facility]  
   
                                                    ED Clinical Summaryon 2022   
   
                      ED Clinical Summary            Normal                [facility] house   
[facility]  
   
                                                    ED Note-Nursingon 2022  
   
   
                                        ED Note-Nursing     pt came up to nurses  
   
station and stated she   
wanted to go home. pt   
was asked to stay to   
sign paperwork and pt   
ignored the nurse and   
walked out of the ED   
with her . ED    
made aware.         Normal                                  [facility]  
   
                                        ED Note-Nursing     pt arrived to ed fro  
m   
home with her    
c/o fever of 99.5F and   
persistent cough since   
tuesday. pt denies any   
SOB or CP. pt has been   
taking robitussin and   
musinex at home. pt has   
a non productive cough   
with clear lung sounds   
throughout. VSS     Normal                                  [facility]  
   
                                                    ED Note-Physicianon 20   
   
                      ED Note-Physician            Normal                [facility]  
   
                                        Comment on above:   Result Comment: Elec  
tronically Signed By: Jose TREJO, Trae RIVERA\.br\Date and Time Signed: 22 06:46 EDT   
   
                                                    ED Patient Education Noteon   
2022   
   
                                                    ED Patient Education   
Note                            Normal                          [facility]  
   
                                                    ED Patient Summaryon   
022   
   
                      ED Patient Summary            Normal                [facility]  
   
                                                    HEMATOLOGYOrdered By: SYSTEM  
 SYSTEM on 2022   
   
                      Basophils/100 WBC (Bld) 0.5 %      Normal     0.0 - 2.0 %   
[facility]   
[facility]  
   
                                                    Basophils/Leukocytes   
Auto (Bld) [Pure #   
fraction]           0.1 E9/L            Normal              0.0 - 0.2   
E9/L                                    [facility]   
[facility]  
   
                                                    Eosinophils/100 WBC   
(Bld)           1.8 %           Normal          0.0 - 8.0 %     [facility]   
[facility]  
   
                                                    Eosinophils/Leukocytes   
Auto (Bld) [Pure #   
fraction]           0.2 E9/L            Normal              0.0 - 0.5   
E9/L                                    [facility]   
[facility]  
   
                                                    Lymphocytes/100 WBC   
(Bld)               9.1 %               Low                 14.0 - 50.0   
%                                       [facility]   
[facility]  
   
                                                    Lymphocytes/Leukocytes   
Auto (Bld) [Pure #   
fraction]           1.2 E9/L            Normal              1.0 - 4.0   
E9/L                                    [facility]   
[facility]  
   
                          Monocytes/100 WBC (Bld) 11.3 %       Normal       4.0   
- 14.0   
%                                       [facility]   
[facility]  
   
                                                    Monocytes/Leukocytes   
Auto (Bld) [Pure #   
fraction]           1.4 E9/L            High                0.2 - 1.0   
E9/L                                    [facility]   
[facility]  
   
                                                    Neutrophils/100 WBC   
(Bld)               77.3 %              High                36.0 - 75.0   
%                                       [facility]   
[facility]  
   
                                                    Neutrophils/Leukocytes   
Auto (Bld) [Pure #   
fraction]           9.9 E9/L            High                2.0 - 7.5   
E9/L                                    [facility]   
[facility]  
   
                                                    HEMATOLOGYOrdered By: Papo Valdes on 2022   
   
                                                    Erythrocyte   
distribution width   
(RBC) [Ratio]       13.1 %              Normal              10.9 - 14.2   
%                                       [facility]   
[facility]  
   
                                                    Hematocrit (Bld)   
[Volume fraction]   36.1 %              Normal              34.0 - 46.0   
%                                       [facility]   
[facility]  
   
                                                    Hemoglobin (Bld)   
[Mass/Vol]          11.8 g/dL           Low                 12.0 - 16.0   
gm/dL                                   [facility]   
[facility]  
   
                                                    MCH (RBC) [Entitic   
mass]               29.4 pg             Normal              27.0 - 34.0   
pg                                      [facility]   
[facility]  
   
                          MCHC (RBC) [Mass/Vol] 32.7 g/dL    Normal       31.4 -  
 36.0   
gm/dL                                   [facility]   
[facility]  
   
                          MCV (RBC) [Entitic vol] 89.7 fL      Normal       80.0  
 -   
100.0 fL                                [facility]   
[facility]  
   
                                                    Platelet mean volume   
(Bld) [Entitic vol] 7.5 fL              Normal              6.4 - 10.8   
fL                                      [facility]   
[facility]  
   
                          Platelets (Bld) [#/Vol] 340.0 E9/L   Normal       150.  
0 -   
500.0 E9/L                              [facility]   
[facility]  
   
                          RBC (Bld) [#/Vol] 4.0 E12/L    Low          4.3 - 5.9   
E12/L                                   [facility]   
[facility]  
   
                                                    WBC corrected for nucl   
RBC Auto (Bld) [#/Vol] 12.8 E9/L           High                4.0 - 11.0   
E9/L                                    [facility]   
[facility]  
   
                                                    Troponin 0 Hr.on 2022   
   
                                                    Troponin I.cardiac   
[Mass/Vol]      12.30 pg/mL     Normal          10.10-27.10     [facility]  
   
                                        Comment on above:   Result Comment: The   
95% CI (Confidence Interval) PPV (Positive  
   
Predictive Value) for myocardial infarction in females is 38   
pg/mL, in males 51 pg/mL. The results should be used in   
conjunction with clinical conditions of myocardial   
infarction.(Access High Sensitivity Troponin I Instructions   
For Use, Estefany Elysia, 2018)   
   
                                                            Performed By: #### 2  
958126, 5832021, 0847489, 07768094,   
[id number] ####[facility] [address]   
[place] [address]   
   
                                                    XR Chest Single Viewon    
   
                      XR Chest Single View            Normal                [facility]  
br[facility]on   
[facility]  
   
                                                    eGFRon 2022   
   
                                                    GFR/1.73 sq M.predicted   
among blacks MDRD   
(S/P/Bld) [Vol   
rate/Area]      mL/min/{1.73_m2} Normal          >=59            [facility]  
   
                                        Comment on above:   Order Comment: Order  
 added by Discern Expert.   
   
                                                            Result Comment: eGFR  
 is race adjusted. AA=.   
   
                                                            Performed By: #### 2  
191132, 4376781, 8505764, 99057567,   
[id number] ####[facility] [address]   
[place] [address]   
   
                                                    GFR/1.73 sq M.predicted   
among non-blacks MDRD   
(S/P/Bld) [Vol   
rate/Area]      mL/min/{1.73_m2} Normal          >=59            [facility]  
   
                                        Comment on above:   Order Comment: Order  
 added by Discern Expert.   
   
                                                            Result Comment:   
deion kidney disease could be indicated at   
eGFR's of less than 60 mL/min/1.73m2. Kidney failure is   
indicated at less than 15 mL/min/1.73m2.   
   
                                                            Performed By: #### 2  
714942, 9444644, 0356307, 90744256,   
[id number] ####[facility] [address]   
[place] [address]   
   
                                                    Ambulatory Visit Summaryon 0  
2022   
   
                                                    Ambulatory Visit   
Summary                         Normal                          [facility]  
   
                                                    Consent for Treatmenton 07-1  
3-2022   
   
                                        Consent for Treatment [id number]  
[id number]  
[id number]  
:127                Normal                                  [facility]  
   
                                                    Family Medicine Office/Clini  
c Noteon 2022   
   
                                                    Family Medicine   
Office/Clinic Note                 Normal                          [facility]  
   
                                        Comment on above:   Result Comment: Elec  
tronically Signed By: Consuelo HENDRICKSON CNP\Date and Time Signed: 22 14:31 EDT   
   
                                                    MICRO OTHER TESTSOrdered By:  
 Randy Delarosa on 2022   
   
                                        Rapid COV Int NEG Ctl Pass  
(22 2:56 PM)   Normal                                  [facility]   
SS  
   
                                        Rapid COV Int POS Ctl Pass  
(22 2:56 PM)   Normal                                  [facility]   
SS  
   
                                                    SARS-CoV-2 (COVID-19)   
RNA JOSÉ ANTONIO+probe Ql (Unsp   
spec)                                   Not Detected  
(22 2:56 PM)         Normal                    Not   
Detected                                [facility]   
SS  
   
                                                    Patient Educationon 20   
   
                      Patient Education            Normal                [facility]  
   
                                                    Rapid COVID Antigen ([facility])on  
 2022   
   
                      Rapid COV Int NEG Ctl Pass       Normal                [facility]  
   
                                        Comment on above:   Performed By: #### 2  
[id number] ####[facility]   
[address] [place] [address]   
   
                      Rapid COV Int POS Ctl Pass       Normal                [facility]  
   
                                        Comment on above:   Performed By: #### 2  
[id number] ####[facility]   
[address] [place] [address]   
   
                                                    SARS-CoV-2 (COVID-19)   
RNA JOSÉ ANTONIO+probe Ql (Unsp   
spec)               Not detected        Normal              Not   
Detected                                [facility]  
   
                                        Comment on above:   Result Comment: The   
[employer]? System for Rapid Detection of   
SARS-CoV-2 is a chromatographic digital immunoassay intended   
for the direct and qualitative detection of SARS-CoV-2   
nucleocapsid antigens in nasal swabs from individuals who are   
suspected of COVID-19 by their healthcare provider within the   
first five days of the onset of symptoms. Negative results   
should be treated as presumptive, do not rule out SARS-CoV-2   
infection and should not be used as the sole basis for   
treatment or patient management decisions, including infection   
control decisions. Negative results should be considered in   
the context of a patient?s recent exposures, history and the   
presence of clinical signs and symptoms consistent with   
COVID-19, and confirmed with a molecular assay, if necessary,   
for patient management. For in vitro diagnostic use. In the   
[place], only for use under an Emergency Use Authorization. In the   
[place], this test has not been FDA cleared or approved; this test   
has been authorized by FDA under an EUA for use by authorized   
laboratories; use by laboratories certified under the CLIA, 42   
[place]C. ?263a, that meet requirements to perform moderate,   
high, or waived complexity tests and at the Point of Care   
(POC), i.e., in patient care settings operating under a CLIA   
Certificate of Waiver, Certificate of Compliance, or   
Certificate of Accreditation.This test has been authorized   
only for the detection of proteins from SARS-CoV-2, not for   
any other viruses or pathogens; and, in the [place], this test is   
only authorized for the duration of the declaration that   
circumstances exist justifying the authorization of emergency   
use of in vitro diagnostics for detection and/or diagnosis of   
the virus that causes COVID-19 under Section 564(b)(1) of the   
Act, 21 U.S.C. ? 360bbb-3(b)(1), unless the authorization is   
terminated or revoked sooner.   
   
                                                            Performed By: #### 2  
[id number] ####[address] [address]   
   
                      Employed in Healthcare NO         Normal                [facility]  
   
                                        Comment on above:   Performed By: #### 2  
[id number] ####[address] [place] [address]   
   
                      First Test Unknown    [facility]  
   
                                        Comment on above:   Performed By: #### 2  
[id number] ####[address] [facility] [place] [address]   
   
                      Hospitalized? NO         [facility]  
   
                                        Comment on above:   Performed By: #### 2  
[id number] ####[address] [place] [address]   
   
                      ICU        NO         [facility]  
   
                                        Comment on above:   Performed By: #### 2  
[id number] ####[address] [place] [address]   
   
                      Pregnant?  NO         [facility]  
   
                                        Comment on above:   Performed By: #### 2  
[id number] ####[address] [place] [address]   
   
                                                    Resides in a Congregate   
Care Setting    NO              [facility]  
   
                                        Comment on above:   Performed By: #### 2  
[id number] ####[address] [place] [address]   
   
                                                    Symptomatic as defined   
by [facility]          YES             [facility]  
   
                                        Comment on above:   Performed By: #### 2  
[id number] ####[facility] [place] [address]   
   
                                                    BUNon 2022   
   
                                                    Urea nitrogen   
[Mass/Vol]      24.0 mg/dL      Critically high 7.0-18.0        [facility]  
   
                                        Comment on above:   Performed By: #### B  
UN, CREA ####  
[facility]  
[address] [address]  
Dr. Jayson Jennings   
   
                                                    CREATININEon 2022   
   
                      Creatinine [Mass/Vol] 1.15 mg/dL Critically high 0.52-1.04  
  [facility]  
   
                                        Comment on above:   Performed By: #### B  
UN, CREA ####  
[facility]  
[address] [address]  
Dr. Jayson Jennings   
   
                      EGFR-AF AMERICAN 56 mL/min/1.73m2 Critically low >=60       
  [facility]  
   
                                        Comment on above:   Performed By: #### B  
UN, CREA ####  
[facility]  
[address] [address]  
Dr. Jayson Jennings   
   
                      EGFR-NON AF AMERICAN 46 mL/min/1.73m2 Critically low >=60   
      [facility]  
   
                                        Comment on above:   Performed By: #### B  
UN, CREA ####  
[facility]  
[address] [place] [address]  
Dr. Jayson Jennings   
   
                                                    CT LSPINE WO W CONon   
022   
   
                                        CT LSPINE WO W CON  EXAMINATION: CT TSPI  
NE   
WO W CON, CT LSPINE WO W   
CON  
HISTORY: Secondary   
malignant neoplasm of   
bone  
COMPARISON: No relevant   
comparison available.  
FINDINGS:  
BONES: T5-6: Prominent   
sclerosis and anterior   
margin of the contiguous   
endplates  
suspected secondary to   
bone-on-bone contact and   
degenerative changes.  
T7: Complete loss of   
height and destruction   
with anterior and   
posterior  
displacement of the   
walls resulting in   
marked central canal   
narrowing.  
T8, T9, T11: Mild   
compression fractures   
suspected to be chronic.  
L2, 3, 4, 5: Small areas   
of sonolucency which may   
represent metastasis. A  
larger 17 mm area within   
L3 may represent a   
hemangioma.  
Incidental Schmorl's   
nodes at multiple levels   
projecting into the   
inferior  
endplates of thoracic   
and lumbar vertebral   
bodies. Multilevel   
mild-moderate  
degenerative facet   
arthropathy. Moderate   
left convex curvature of   
the lumbar  
spine.  
DISC SPACES: Marked   
narrowing C6-7, T7-8,   
L2-3, L3-4, L5-S1.   
Multilevel moderate  
and mild narrowing.  
PARASPINOUS: Right renal   
cyst, nonspecific but   
favoring benign   
etiology.  
OTHER: Negative.  
IMPRESSION:  
1. T7 marked compression   
fracture with complete   
loss of height; post   
traumatic  
versus pathologic.   
Retropulsion of the   
posterior wall causing   
marked central  
canal narrowing.  
2. Several areas within   
L2-L5 vertebral bodies   
suspicious for   
metastatic  
disease. Consider   
nuclear medicine whole   
body bone scan for   
evaluation of all  
skeletal structures.  
3. Multilevel marked   
degenerative disc   
disease, degenerative   
facet arthropathy,  
and what is suspected to   
be mild, old compression   
fractures at multiple   
levels.  
Electronically   
authenticated by: JASMIN LINDO Date: 2022   
10:49               Normal                                  [facility]  
   
                                                    ANES POSTPROC EVALon   
021   
   
                                        ANES POSTPROC EVAL  HNO ID: [id number]  
Author: Linda Rushing MD  
Service: Anesthesiology  
Author Type:   
Anesthesiologist  
Type: Anesthesia   
Postprocedure Evaluation  
Filed: 2021 11:29 AM  
Note Text:  
POST ANESTHESIA   
EVALUATION NOTE  
: 1950  
Procedure Summary  
Date: 21 Room /   
Location:  ENDO 01 /   
AV ENDO  
Anesthesia Start: 1005   
Anesthesia Stop: 1015  
Procedure: EGD (N/A   
Throat) Diagnosis:   
Dysphagia, unspecified   
type  
Surgeons: Roberto Carlos Caledron MD   
Responsible Provider:   
Linda Rushing MD  
Anesthesia Type: MAC ASA   
Status: 3  
Anesthesia Type: MAC  
Last vitals  
Vitals Value Taken Time  
/69 21 1040  
Temp 36.9 ?C (98.4 ?F)   
21 1017  
Pulse 64 21 1040  
Resp 16 21 1040  
SpO2 95 % 21 1040  
Post Anesthesia Patient   
Status  
Patient Evaluation:   
bedside.  
Anticipated Disposition:   
phase 2 then home.  
Neurological Status:   
aware and responsive.  
Pulmonary Status:   
breathing comfortably on   
room air  
Airway Control: returned   
to baseline unsupported.  
Cardiovascular Status:   
stable.  
Pain Management:   
clinically adequate  
- multimodal analgesia   
pain management approach  
Postoperative Hydration:   
acceptable.  
Intraoperative Events:  
no significant   
anesthesia events  
Recommendation: continue   
current plan of care and   
further care per  
PACU/ICU/floor team.  
No complications   
documented.  
SIGNATURE: Linda Rushing MD PATIENT NAME: Ping Chavez  
DATE: 2021   
MRN: [id number]  
TIME: 11:29 AM CSN:   
[id number]           [facility]  
   
                                                    ANES PRE-OPon 2021   
   
                                        ANES PRE-OP         HNO ID: [id number]  
Author: Linda Rushing MD  
Service: Anesthesiology  
Author Type:   
Anesthesiologist  
Type: Anesthesia   
Preprocedure Evaluation  
Filed: 2021 10:09 AM  
Note Text:  
ANESTHESIOLOGY DAY OF   
SURGERY NOTE  
: 1950  
Procedure(s) (LRB):  
EGD (N/A)  
Surgeon(s):  
Roberto Carlos Calderon MD  
Estimated body mass   
index is 34.19 kg/m? as   
calculated from the   
following:  
Height as of 21: 174   
cm (5' 8.5 ).  
Weight as of 21:   
103.5 kg (228 lb 3.2   
oz).  
Most recent hematocrit   
and potassium results:  
Potassium 4.8 2021  
Relevant Problems  
CARDIO  
(+) CHF (congestive   
heart failure) (HCC)  
ENDO  
(+) Hypothyroidism  
Other  
(+) Chronic gout of   
multiple sites  
I - PHYSICAL EVALUATION  
AIRWAY  
Patient intubated: No.  
Tracheostomy tube not   
present  
Mallampati: II.  
TM distance: >3 FB.  
Neck ROM: full ROM   
without neurological   
symptoms.  
Mouth opening: adequate.  
Short neck: no.  
Thick neck: no  
DENTAL  
Normal dental   
observations.  
Dental findings: teeth   
intact.  
Additional exam   
findings: no  
II - ANESTHESIA PLAN  
ASA Score: 3  
Anesthetic Plan: MAC  
NPO Status: adequate  
Monitoring plan:   
Standard ASA.   
Postoperative analgesic   
plan: parenteral or  
oral opioids and   
multimodal analgesia.  
Anesthetic Risks,   
Benefits, Alternatives,   
Personnel Discussed.   
Consent  
obtained from:   
patient.Patient /   
Surrogate agrees to   
blood products: blood  
products not planned  
DNR status not reviewed   
with patient and/or   
family prior to surgery.  
Significant changes in   
the patient condition   
since the History and  
Physical, not otherwise   
documented in primary   
service progress note:   
no.  
Potential Anesthesia   
issues that may suggest   
increased risk of  
complications or   
contraindication to   
planned procedure: none.  
Vitals Value Taken Time  
/89 21 0950  
Pulse  
Resp 16 21 0950  
Temp 35.7 ?C (96.3 ?F)   
21 0950  
SpO2 99 % 21 0950  
Facility-Administered   
Medications as of   
2021  
Medication Dose Route   
Frequency  
- NaCl 0.9% iv infusion   
50 mL/hr INTRAVENOUS   
CONTINUOUS  
Outpatient Medications   
as of 2021  
Medication Sig  
- Omeprazole Magnesium   
(PRILOSEC OTC) 20 mg   
tablet Take 1 tablet by   
mouth  
once daily.  
- acetaminophen   
(TYLENOL) 325 mg tablet   
Take 650 mg by mouth   
every 6 hours  
as needed.  
- allopurinol (ZYLOPRIM)   
100 mg tablet TAKE 1   
TABLET BY MOUTH ONCE   
DAILY  
FOR 90 DAYS  
- carvedilol (COREG)   
6.25 mg tablet Take 6.25   
mg by mouth twice daily   
with  
meals.  
- cholecalciferol   
(VITAMIN D3) 50 mcg   
(2,000 unit) tablet Take   
2,000 Units  
by mouth once daily.  
- dorzolamide-timolol   
(COSOPT) 22.3-6.8 mg/mL   
ophthalmic solution Use   
1  
Drop in both eyes twice   
daily.  
- furosemide (LASIX) 40   
mg tablet Take 10 mg by   
mouth as needed.  
- levothyroxine   
(SYNTHROID) 50 mcg   
tablet Take 50 mcg by   
mouth every  
morning. Take On an   
Empty Stomach  
- loratadine (CLARITIN)   
10 mg tablet Take 10 mg   
by mouth at bedtime as  
needed.  
- losartan (COZAAR) 25   
mg tablet TAKE 1 TABLET   
BY MOUTH ONCE DAILY FOR   
90  
DAYS  
- Magnesium Oxide 500 mg   
tab Take 500 mg by mouth   
once daily.  
- therapeutic   
multivitamin-minerals   
(THERA-M PLUS) 9 mg   
iron-400 mcg  
tablet Take 1 tablet by   
mouth once daily.  
- Lactobacillus   
acidophilus (PROBIOTIC   
ORAL) Take by mouth.  
- flaxseed oil (OMEGA 3   
ORAL) Take by mouth.  
- lactase (ULTRA DAIRY   
DIGESTIVE ORAL) Take by   
mouth.  
- fluvoxaMINE (LUVOX)   
100 mg tablet Take 100   
mg by mouth twice daily.  
- rivaroxaban (XARELTO)   
20 mg tablet TAKE 1   
TABLET BY MOUTH ONCE   
DAILY  
WITH SUPPER  
- simvastatin (ZOCOR) 40   
mg tablet Take 40 mg by   
mouth daily at bedtime.  
- spironolactone   
(ALDACTONE) 25 mg tablet   
TAKE 1 2 (ONE HALF)   
TABLET BY  
MOUTH ONCE DAILY FOR 90   
DAYS  
I have interviewed and   
examined the patient. I   
have reviewed the   
medical  
record and/or the   
pre-anesthesia   
evaluation, pertinent   
labs, and test  
results.  
This contains updated   
information obtained   
within 48 hours of  
Surgery/Procedure.  
SIGNATURE: Linda Rushing MD PATIENT NAME: Ping Chavez  
DATE: 2021   
MRN: [id number]  
TIME: 10:09 AM CSN:   
[id number]           Normal                                  [facility]  
   
                                                    HISTORY PHYSICALon    
   
                                        HISTORY PHYSICAL    HNO ID: [id number]  
Author: Roberto Carlos Calderon MD  
Service: General Surgery  
Author Type: Physician  
Type: HANDP  
Filed: 2021 9:13 AM  
Note Text:  
Here for EGD  
PE:  
HEENT: PERRLA  
Neck: supple  
Chest: Clear  
Heart:RRR  
Abdomen: Benign  
Neuro: wnl  
Back, spine,   
extremities: wnl    Normal                                  [facility]  
   
                                                    Basic Metabolic Panlon    
   
                      Anion gap [Moles/Vol] 10 mmol/L  Normal     9-18       [facility]  
   
                      Calcium [Mass/Vol] 10.2 mg/dL Normal     8.5-10.2   [facility]  
   
                      Chloride [Moles/Vol] 103 mmol/L Normal          [facility]  
   
                      CO2 [Moles/Vol] 25 mmol/L  Normal     22-30      [facility]  
   
                      Creatinine [Mass/Vol] 0.88 mg/dL Normal     0.58-0.96  [facility]  
   
                      eGFR- Amer. >60        Normal                [facility]  
   
                      eGFR-All Other Races >60        Normal                [facility]  
   
                                        Comment on above:   Result Comment: eGFR  
 (Estimated GFR) Units of measure:   
mL/min/1.73 meters squared  
eGFR is derived from the reexpressed MDRD Study equation using   
the following parameters: serum creatinine, age, gender and   
race. The creatinine assay has been calibrated to be traceable   
to IDMS.  
An eGFR <60 mL/min/1.73m2 for >3 months is consistent with   
chronic kidney disease. Refer to KDOQI guidelines for clinical   
interpretation.  
In patients with unstable renal function, e.g. those with   
acute kidney injury, the eGFR may not accurately reflect   
actual GFR.   
   
                      Glucose [Mass/Vol] 85 mg/dL   Normal     74-99      [facility]  
   
                                        Comment on above:   Result Comment: The   
American Diabetes Association (ADA)   
provides guidance for cutoff values for fasting glucose and   
random glucose. The ADA defines fasting as no caloric intake   
for at least 8 hours. Fasting plasma glucose results between   
100 to 125 mg/dL indicate increased risk for diabetes   
(prediabetes).  
Fasting plasma glucose results greater than or equal to 126   
mg/dL meet the criteria for diagnosis of diabetes. In the   
absence of unequivocal hyperglycemia, results should be   
confirmed by repeat testing. In a patient with classic   
symptoms of hyperglycemia or hyperglycemic crisis, random   
plasma glucose results greater than or equal to 200 mg/dL meet   
the criteria for diagnosis of diabetes.  
Reference: Standards of Medical Care in Diabetes 2016,   
American Diabetes Association. Diabetes Care. 2016.39(Suppl   
1).   
   
                      Potassium [Moles/Vol] 4.8 mmol/L Normal     3.7-5.1    [facility]  
   
                      Sodium [Moles/Vol] 138 mmol/L Normal     136-144    [facility]  
   
                                                    Urea nitrogen   
[Mass/Vol]      24 mg/dL        High            7-21            [facility]  
   
                                                    HISTORY PHYSICALon    
   
                                        HISTORY PHYSICAL    HNO ID: [id number]  
Author: GERRI Martinez.CNS  
Service: ?  
Author Type: Nurse   
Specialist  
Type: HANDP  
Filed: 2021 12:21 PM  
Note Text:  
HISTORY AND PHYSICAL   
EXAMINATION  
SERVICE DATE: 2021  
SERVICE TIME: 12:19 PM  
PRIMARY CARE PHYSICIAN:   
Alexander Guerra MD  
REASON FOR VISIT: Ping Chavez is a 71 year old   
female who is scheduled  
for : EGD at the request   
of Dr. Roberto Carlos Calderon for consultation.  
My final recommendation   
will be communicated   
back to the requesting  
physician by way of   
shared medical record or   
letter.  
Subjective  
The patient has the   
following:  
ACTIVE PROBLEM LIST  
Primary Osteoarthritis   
of Both Knees  
Dysphagia  
Chf (Congestive Heart   
Failure) (Hcc)  
Hypothyroidism  
Neuropathy  
Icd (Implantable   
Cardioverter-Defibrillat  
or) in Place  
Chronic Gout of Multiple   
Sites  
Bilateral Malignant   
Neoplasm of Overlapping   
Sites of Breast in   
Female  
(Hcc)  
Other Hyperlipidemia  
Nonischemic Congestive   
Cardiomyopathy (Hcc)  
CHIEF COMPLAINT: Patient   
stated  I have   
difficulty with   
swallowing food so  
will have EGD   
HPI: H/O 71 year old   
female stated she has   
had difficulty with   
swallowing  
food. She has had   
occasional N/V. No mid   
abdominal pain.   
Consulted Dr. Calderon. After exam, Dx   
with Dysphagia   
Unspecified Type.  
REVIEW OF SYSTEMS:  
General: BMI 34.1  
Neurological: H/O   
neuropathy has tingling   
both hands occasionaly.   
No  
history of TIA's,   
stroke, CNS tumor,   
impaired sensorium,   
hemiplegia,  
paraplegia or   
quadraplegia. No   
neurological symptoms or   
problems.  
Respiratory: No history   
of current cough or   
dyspnea, or pneumonia in   
the  
past 6 weeks. No history   
of respiratory/pulmonary   
symptoms or problems.  
Cardiovascular: H/O   
Nonischemic Congestive   
Cardiomyopathy  
Positive for: AICD/PPM   
([employer]   
placed originally 2013   
recent  
check scanned 21),   
CHF (in the past ) and   
hyperlipidemia (takes  
med)  
GI: Recent dysphagia  
: H/O Gout  
GYN: Negative for   
abnormal vaginal   
bleeding, abnormal   
vaginal discharge.  
Endocrine:  
Positive for:   
hypothyroidism (takes   
med daily).  
Hematology: No history   
of bleeding or clotting   
disorder. Patient is not  
taking anti-coagulation   
or platelet medications.   
No history of  
hematological symptoms   
or problems.  
Oncology: H/O Bilateral   
Breast CA had   
rigttMastectomy  had   
radiation  
and chemotherapy  
Psych: No history of   
psychiatric symptoms or   
problems.  
Musculoskeletal:  
Positive for: joint pain   
(generalized ,both   
knees,neck).  
Skin: Negative for   
lesions, rash and   
itching.  
PAST MEDICAL HISTORY  
Diagnosis Date  
- Bilateral malignant   
neoplasm of overlapping   
sites of breast in   
female  
(HCC)  
right  
- CHF (congestive heart   
failure) (HCC)  
- Chronic gout of   
multiple sites  
- Dysphagia  
- History of bilateral   
breast cancer  
- Hypothyroidism  
- ICD (implantable   
cardioverter-defibrillat  
or) in place   
[employer]  
- Neuropathy  
- Nonischemic congestive   
cardiomyopathy (HCC)  
- Other hyperlipidemia  
PAST SURGICAL HISTORY  
Procedure Laterality   
Date  
- CHOLECYSTECTOMY  
- COLONOSCOPY  
- EGD  
- FOOT SURGERY HX Left  
some excess bones   
removed  
- KNEE ARTHROSCOPY Right  
- MASTECTOMY HX Right  
FAMILY HISTORY  
Problem Relation Age of   
Onset  
- Diabetes Mother  
- Hypertension Mother  
- other (MI) Father  
- Cancer Sister  
Social History  
Tobacco Use  
- Smoking status: Never   
Smoker  
- Smokeless tobacco:   
Never Used  
Substance Use Topics  
- Alcohol use: Yes  
Comment: OCC  
- Drug use: Never  
Prior to Admission   
medications as of 21   
1025  
Medication Sig Last Dose   
Taking  
esomeprazole (NEXIUM   
24HR) 20 mg capsule Take   
20 mg by mouth DAILY (6   
AM).  
Yes  
acetaminophen (TYLENOL)   
325 mg tablet Take 650   
mg by mouth every 6   
hours  
as needed. Yes  
allopurinol (ZYLOPRIM)   
100 mg tablet TAKE 1   
TABLET BY MOUTH ONCE   
DAILY FOR  
90 DAYS Yes  
carvedilol (COREG) 6.25   
mg tablet Take 6.25 mg   
by mouth twice daily   
with  
meals. Yes  
dorzolamide-timolol   
(COSOPT) 22.3-6.8 mg/mL   
ophthalmic solution Use   
1 Drop  
in both eyes twice   
daily.  
Yes  
furosemide (LASIX) 40 mg   
tablet Take 10 mg by   
mouth as needed.  
Yes  
levothyroxine   
(SYNTHROID) 50 mcg   
tablet Take 50 mcg by   
mouth every  
morning. Take On an   
Empty Stomach Yes  
loratadine (CLARITIN) 10   
mg tablet Take 10 mg by   
mouth at bedtime as  
needed. Yes  
losartan (COZAAR) 25 mg   
tablet TAKE 1 TABLET BY   
MOUTH ONCE DAILY FOR 90  
DAYS Yes  
Magnesium Oxide 500 mg   
tab Take 500 mg by mouth   
once daily.  
Yes  
simvastatin (ZOCOR) 40   
mg tablet Take 40 mg by   
mouth daily at bedtime.  
Yes  
spironolactone   
(ALDACTONE) 25 mg tablet   
TAKE 1 2 (ONE HALF)   
TABLET BY  
MOUTH ONCE DAILY FOR 90   
DAYS Yes  
Lactobacillus   
acidophilus (PROBIOTIC   
ORAL) Take by mouth. Yes  
calcium carbonate   
(CALCIUM 600 ORAL) Take   
1 Dose by mouth once   
daily.  
8/3/21  
Omeprazole Magnesium   
(PRILOSEC OTC) 20 mg   
tablet Take 1 tablet by   
mouth  
once daily.  
cholecalciferol (VITAMIN   
D3) 50 mcg (2,000 unit)   
tablet Take 2,000 Units  
by mouth once daily.  
8/3/21  
fluvoxaMINE (BULMARO (more   
content not included)... Normal                                  [facility]   
   
                                        [facility]                Telephone (GENSAV)  
------------------------  
--------  
PING CHAVEZ ([id number])   
1950 [address]  
Date Time Provider   
Department  
21 ROBERTO CARLOS CALDERON  
During your visit today,   
we recorded the   
following information   
about you:  
Loreto Gandhi RN   
2021 2:12 PM Signed  
Date/Provider 21   
Gerard  
Procedure:EGD  
Facility:[facility]  
Prep ordered:None  
Knowledge of prep   
instructions:yes  
Diabetic:no  
Blood Thinners:Xarelto-   
pt stated that she has   
permission from her   
cardiologist  
to hold this medication   
prior to her procedure  
Pacemaker with   
defibrillator:no  
Patient aware of date,   
[facility] will call with   
arrival time, and   
verbalized  
understanding.  
Allergies As of Date:   
2021 Noted Allergy   
Reaction  
CECLOR (CEFACLOR)   
2020 2 - Rash  
7 - Swelling  
Comments: And redness  
Date Reviewed:   
2021  
Reviewed by: Lydia Bassett LPN - Fully   
Assessed  
Reason for Visit:  
Pre-Op Teaching [134]  
Prescriptions as of   
2021  
- Omeprazole Magnesium   
(PRILOSEC OTC) 20 mg   
tablet  
Take 1 tablet by mouth   
once daily.  
- acetaminophen   
(TYLENOL) 325 mg tablet  
Take 650 mg by mouth   
every 6 hours as needed.  
- allopurinol (ZYLOPRIM)   
100 mg tablet  
TAKE 1 TABLET BY MOUTH   
ONCE DAILY FOR 90 DAYS  
- carvedilol (COREG)   
6.25 mg tablet  
Take 6.25 mg by mouth   
twice daily with meals.  
- cholecalciferol   
(VITAMIN D3) 50 mcg   
(2,000 unit) tablet  
Take 2,000 Units by   
mouth once daily.  
- dorzolamide-timolol   
(COSOPT) 22.3-6.8 mg/mL   
ophthalmic solution  
Use 1 Drop in both eyes   
twice daily.  
- fluvoxaMINE (LUVOX)   
100 mg tablet  
Take 100 mg by mouth   
twice daily.  
- furosemide (LASIX) 40   
mg tablet  
Take 10 mg by mouth at   
bedtime as needed.  
- levothyroxine   
(SYNTHROID) 50 mcg   
tablet  
Take 50 mcg by mouth   
every morning. Take On   
an Empty Stomach  
- loratadine (CLARITIN)   
10 mg tablet  
Take 10 mg by mouth at   
bedtime as needed.  
- losartan (COZAAR) 25   
mg tablet  
TAKE 1 TABLET BY MOUTH   
ONCE DAILY FOR 90 DAYS  
- Magnesium Oxide 500 mg   
tab  
Take 500 mg by mouth   
once daily.  
- therapeutic   
multivitamin-minerals   
(THERA-M PLUS) 9 mg   
iron-400 mcg tablet  
Take 1 tablet by mouth   
once daily.  
- rivaroxaban (XARELTO)   
20 mg tablet  
TAKE 1 TABLET BY MOUTH   
ONCE DAILY WITH SUPPER  
- simvastatin (ZOCOR) 40   
mg tablet  
Take 40 mg by mouth   
daily at bedtime.  
- spironolactone   
(ALDACTONE) 25 mg tablet  
TAKE 1 2 (ONE HALF)   
TABLET BY MOUTH ONCE   
DAILY FOR 90 DAYS  
- Lactobacillus   
acidophilus (PROBIOTIC   
ORAL)  
Take by mouth.  
- flaxseed oil (OMEGA 3   
ORAL)  
Take by mouth.  
- lactase (ULTRA DAIRY   
DIGESTIVE ORAL)  
Take by mouth.  
Problem List As Of Date   
2021 Noted   
Resolved  
Primary osteoarthritis   
of both knees [M17.0]   
2021  
Encounter Number:   
[id number]  
Encounter Status:Closed   
by LORETO GANDHI RN on   
21             [facility]  
   
                                                    CNOVon 2021   
   
                                        CNOV                Office Visit (OTOLCC  
)  
------------------------  
--------  
PING CHAVEZ ([id number])   
1950 F  
Date Time Provider   
Department  
3/30/21 4:00 PM GAB MCNEILL (CNP) OTOL  
During your visit today,   
we recorded the   
following information   
about you:  
Temperature Pulse   
Respiration  
98.4 degrees 60/minute   
16/minute  
Gab Mcneill APRN.CNP 3/30/2021 5:10   
PM Signed  
Ms. Chavez is a 71 year   
old female who comes in   
for evaluation of sinus   
issues  
and sore throat.  
72yo F presents to   
clinic for chronic   
maxillary sinus issues   
and trouble  
swallowing for the last   
6 months that has   
started to worsen.   
Patient reports  
that she thinks that the   
increase in saliva is   
due to her sinuses.   
Patient  
constantly feels like   
she needs to clear her   
throat. Previously   
worked up for  
acid reflux and was on   
PPIs then transitioned   
to Pepcid which the   
patient  
states has been helping   
with her symptoms.   
Trouble swallowing both   
liquids and  
solids, occasionally   
chokes when eating.   
Patient has tried   
smaller portions,  
more chewing between   
bites, and more liquid   
to help but still   
experiencing  
these issues. When she   
does swallow, she feels   
a thick mucus sensation,  
sometimes goes down the   
wrong pipe.  
Patient is currently   
experiencing sinus   
pressure with mild   
headache. Patient  
endorses PND. Never been   
treated with antibiotics   
in the past.  
Patient has a history of   
radiation for   
inflammatory breast   
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  
her spine.  
Past history :  
PAST MEDICAL HISTORY  
Diagnosis Date  
- CHF (congestive heart   
failure) (HCC)  
- History of bilateral   
breast cancer  
- Hypothyroidism  
- Neuropathy  
Current medication:  
Current Outpatient   
Medications  
Medication Sig  
- acetaminophen   
(TYLENOL) 325 mg tablet   
Take 650 mg by mouth   
every 6 hours as  
needed.  
- allopurinol (ZYLOPRIM)   
100 mg tablet TAKE 1   
TABLET BY MOUTH ONCE   
DAILY FOR 90  
DAYS  
- carvedilol (COREG)   
6.25 mg tablet Take 6.25   
mg by mouth twice daily   
with  
meals.  
- cholecalciferol   
(VITAMIN D3) 50 mcg   
(2,000 unit) tablet Take   
2,000 Units by  
mouth once daily.  
- dorzolamide-timolol   
(COSOPT) 22.3-6.8 mg/mL   
ophthalmic solution Use   
1 Drop in  
both eyes twice daily.  
- famotidine (PEPCID) 20   
mg tablet Take 20 mg by   
mouth at bedtime as   
needed.  
- fluvoxaMINE (LUVOX)   
100 mg tablet Take 100   
mg by mouth twice daily.  
- furosemide (LASIX) 40   
mg tablet Take 10 mg by   
mouth at bedtime as   
needed.  
- levothyroxine   
(SYNTHROID) 50 mcg   
tablet Take 50 mcg by   
mouth every morning.  
Take On an Empty Stomach  
- loratadine (CLARITIN)   
10 mg tablet Take 10 mg   
by mouth at bedtime as   
needed.  
- losartan (COZAAR) 25   
mg tablet TAKE 1 TABLET   
BY MOUTH ONCE DAILY FOR   
90 DAYS  
- Magnesium Oxide 500 mg   
tab Take 500 mg by mouth   
once daily.  
- therapeutic   
multivitamin-minerals   
(THERA-M PLUS) 9 mg   
iron-400 mcg tablet  
Take 1 tablet by mouth   
once daily.  
- rivaroxaban (XARELTO)   
20 mg tablet TAKE 1   
TABLET BY MOUTH ONCE   
DAILY WITH  
SUPPER  
- simvastatin (ZOCOR) 40   
mg tablet Take 40 mg by   
mouth daily at bedtime.  
- spironolactone   
(ALDACTONE) 25 mg tablet   
TAKE 1 2 (ONE HALF)   
TABLET BY MOUTH  
ONCE DAILY FOR 90 DAYS  
- Lactobacillus   
acidophilus (PROBIOTIC   
ORAL) Take by mouth.  
- flaxseed oil (OMEGA 3   
ORAL) Take by mouth.  
- lactase (ULTRA DAIRY   
DIGESTIVE ORAL) Take by   
mouth.  
No current   
facility-administered   
medications for this   
visit.  
Allergies:  
ALLERGIES  
Allergen Reactions  
- Ceclor [Cefaclor]   
Rash, Swelling  
And redness  
Social history:  
Social History  
Tobacco Use  
- Smoking status: Never   
Smoker  
- Smokeless tobacco:   
Never Used  
Substance Use Topics  
- Alcohol use: Yes  
- Drug use: Never  
Family history: No   
family history on file.  
There are no exam notes   
on file for this visit.  
Physical exam:  
General Appearance: 71   
year old female is   
alert, oriented, not in   
acute  
distress. Hearing is   
grossly normal, voice is   
raspy. There is no   
tenderness  
with percussion over the   
paranasal sinuses.  
Eyes: PEERLA,   
extraocular movements   
are full.  
Nose: Clean, septum is   
straight. There are no   
polyps. There is no   
discharge.  
Oropharynx: Teeth are in   
good repair. Lips, gums,   
tongue and posterior   
pharynx  
are within normal   
limits. Gag reflex is   
intact.  
Nasopharynx:   
Visualization is   
limited.  
Hypopharynx:   
Visualization is   
limited.  
Neck: No masses   
palpated. Thyroid is not   
enlarged. Trachea is in   
the midline.  
Ears: Both ear canals   
are clean. Both TMs are   
intact and mobile.  
Impression: Throat   
discomfort, swallowing   
problem, and hoarseness   
of voice  
exact etiology unclear.   
GERD may be   
contributing.  
Plan of management: I   
will perform fiberoptic   
laryngoscopy and discuss   
further  
management options with   
her.  
Procedure: After the   
procedure was explained   
to the patient and   
patient agreed  
to have the procedure   
done 1% Yobani-Synephrine   
and 4% Xylocaine was   
sprayed to  
both nasal cavities.   
Fiberoptic scope was   
inserted through the   
right nasal  
airway. Right nasal   
airway was normal.   
Nasopharynx was normal.   
(more content not   
included)...        Normal                                  [facility]  
   
                                                    CNOVon 2021   
   
                                        CNOV                Office Visit (LOORRM  
)  
------------------------  
--------  
GISELPING ([id number])   
1950 F  
Date Time Provider   
Department  
3/5/21 9:20 AM DAGO LO  
During your visit today,   
we recorded the   
following information   
about you:  
Dago Lo MD   
3/5/2021 8:53 AM Signed  
This document has been   
created with the use of   
voice recognition   
technology. It  
may contain   
inaccuracies:   
misspellings, inaccurate   
syntax or word sense   
that  
escaped review.  
CHIEF COMPLAINT: Ping Chavez is a 71 year old   
female who presents   
today for  
follow up of bilateral   
knee pain secondary to   
osteoarthritis.  
HISTORY OF PRESENT   
ILLNESS:  
PAIN EVALUATION  
3/5/2021  
0839  
Pain Level: 5  
Pain Location: ?  
bilateral knees  
Description: Aching  
Duration Units: Unknown  
Frequency: Intermittent  
Intervention:   
Reposition;Relaxation;Po  
sitioning;Medication;Hea  
t  
HISTORY: Ping Chavez is   
here for follow up of   
complaints of bilateral   
knee  
pain right greater than   
left. She states that   
the injection she   
received in  
September of last year   
offered relief. She   
requested we inject both   
knees  
again today. No new   
injury. No hip or   
neurologic complaints.  
No other musculoskeletal   
complaints  
ROS:  
REVIEW OF SYMPTOMS:  
Constitutional: patient   
denies any recent fever   
or significant change in   
weight  
Gastrointestinal:   
patient denies any   
current abdominal   
discomfort  
Musculoskeletal: as   
noted in the HPI  
Neurologic: as noted in   
the HPI  
SOCIAL HISTORY:  
Tobacco Use: Not on file  
ALLERGIES:  
ALLERGIES  
Allergen Reactions  
- Ceclor [Cefaclor]   
Rash, Swelling  
And redness  
PAST MEDICAL HISTORY:  
PAST MEDICAL HISTORY  
Diagnosis Date  
- CHF (congestive heart   
failure) (HCC)  
- History of bilateral   
breast cancer  
- Hypothyroidism  
- Neuropathy  
SOCIAL HISTORY:  
Tobacco Use: Not on file  
EXAMINATION:  
GENERAL: Appears   
healthy, well-nourished,   
no deformities.  
ORIENTATION: Alert and   
oriented to person place   
and time  
HABITUS: Normal  
GAIT: Normal, the   
patient did not have   
trouble getting onto the   
exam table.  
bilateral knee exam:  
No effusion,  
Tenderness with patellar   
apprehension  
Right knee valgus left   
knee neutral alignment  
Active full extension,   
flexion 120. Good   
patellar tracking/mild   
patella  
femoral crepitation  
Mild pain with palpating   
medial compartment, mild   
pain with palpating   
lateral  
compartment.  
Stable to varus and   
valgus stresses.  
Lachman is negative  
Negative   
anterior/posterior   
drawer.  
Palpable dorsalis pedis   
pulse  
Calf soft and nontender.  
Hip exam negative  
Intact sensation to   
light touch distally.  
.  
RADIOGRAPHS: XR Obtained   
today and personally   
reviewed by myself   
demonstrating  
none today x-rays some   
2020   
reviewed with severe   
lateral  
compartment   
osteoarthritis on the   
right moderate on the   
left  
IMPRESSION:  
Encounter Diagnosis  
ICD-10-CM  
1. Primary   
osteoarthritis of both   
knees M17.0  
Large Joint Arthro/Inj:   
bilateral knee joints  
The risks, benefits and   
alternatives of the   
procedure were reviewed   
with the  
patient/surrogate, who   
agreed to proceed.  
Written Consent   
Obtained: Yes  
Sign In Communication:   
Completed  
Time Out: Time Out   
completed  
The  Time-Out  verifies   
the correct patient,   
procedure, side/site,   
position (if  
applicable) and   
completion and review of   
fire risk   
assessment/protocols (if  
appropriate):  
Affirmation of Time Out:   
Yes  
Signout Discussion: yes  
3/5/2021 8:52 AM  
The procedure site was   
prepped in the usual   
sterile fashion.  
Allergies were reviewed  
Site: bilateral knee   
joints  
Medications (Right): 40   
mg triamcinolone   
acetonide 40 mg/mL  
Medications (Left): 40   
mg triamcinolone   
acetonide 40 mg/mL  
Anesthetics (Right): 4   
mL lidocaine (PF) 10   
mg/mL (1 %)  
Anesthetics (Left): 4 mL   
lidocaine (PF) 10 mg/mL   
(1 %)  
Outcome: Tolerated well,   
no immediate   
complications  
Post-injection   
instructions were   
reviewed with the   
patient and the patient  
voiced understanding of   
these instructions.  
Plan: Patient with   
bilateral knee   
osteoarthritis. She   
requested we injected  
both knees today with   
cortisone. We again   
discussed surgical   
options  
especially with the   
right knee. We discussed   
intervals between   
injections.  
Follow-up as symptoms   
dictate  
Dago Lo MD  
Referring Provider: SELF   
[200]  
Allergies As of Date:   
2021 Noted Allergy   
Reaction  
CECLOR (CEFACLOR)   
2020 2 - Rash  
7 - Swelling  
Comments: And redness  
Date Reviewed:   
2021  
Reviewed by: Kemi Cabrera)   
Demetrio - Fully   
Assessed  
Reason for Visit:  
Follow Up [171]  
Established Patient   
[175]  
Knee Pain [132]  
Injections [199]  
Established Patient   
[175]  
Follow Up [171]  
Knee Pain [132]  
Injections [199]  
Primary Visit   
Diagnosis:Primary   
osteoarthritis of both   
knees [M17.0]  
Order(s):Large Joint   
Arthro/Inj: bilateral   
knee joints [RQT804]   
Order #:  
[id number]  
[] lidocaine (PF)   
10 mg/mL (1 %) 4 mL   
injection  
(XYLOCAINE)Disp: Rfl:  
[] triamcinolone   
acetonide 40 mg (more   
content not included)... Normal                                  [facility]  
   
                                                    XR Knee - bilateral 4 Viewso  
n 2020   
   
                                                            IMPRESSION:  
  
Osteoarthritis right   
knee, severe in the   
lateral compartment.  
  
Osteoarthritis left   
knee, moderate in the   
lateral compartment.  
  
  
  
  
  
: PSCB  
Transcribe Date/Time:   
Sep 9 2020 9:46A  
  
Dictated by : THERON ALVAREZ MD  
  
This examination was   
interpreted and the   
report reviewed and  
electronically signed   
by:  
THERON ALVAREZ MD   
on Sep 9 2020 9:47AM EST  
  
                                                            DIVISION OF   
RADIOLOGY  
   
                                                            * * *Final Report* *  
 *  
  
DATE OF EXAM: Sep 9 2020   
9:20AM  
  
LZX 5618 - XR KNEE 4V   
AP/PA/LAT/[facility] PRISCILA /   
ACCESSION # [id number]  
  
PROCEDURE REASON: Pain  
  
* * * * Physician   
Interpretation * * * *  
  
X-RAYS BILATERAL KNEES  
  
HISTORY: bilateral knee   
pain. Pain  
  
TECHNIQUE: 4 views of   
each knee.  
  
COMPARISON: None  
  
RESULT:  
Tricompartmental   
osteoarthritis bilateral   
knees and   
chondrocalcinosis.  
Findings are severe in   
the lateral compartment   
of the right knee and  
moderate in the lateral   
compartment of left   
knee. No fracture or  
significant joint   
effusion involving   
either knee.  
  
                                                            DIVISION OF   
RADIOLOGY  
   
                                                            Provider, Kiara vásquez   
[place] - 2020   
Formatting of this note   
might be different from   
the original.  
* * *Final Report* * *  
  
DATE OF EXAM: Sep 9 2020   
9:20AM  
  
LZX 5618 - XR KNEE 4V   
AP/PA/LAT/[facility] PRISCILA /   
ACCESSION # [id number]  
  
PROCEDURE REASON: Pain  
  
* * * * Physician   
Interpretation * * * *  
  
X-RAYS BILATERAL KNEES  
  
HISTORY: bilateral knee   
pain. Pain  
  
TECHNIQUE: 4 views of   
each knee.  
  
COMPARISON: None  
  
RESULT:  
Tricompartmental   
osteoarthritis bilateral   
knees and   
chondrocalcinosis.  
Findings are severe in   
the lateral compartment   
of the right knee and  
moderate in the lateral   
compartment of left   
knee. No fracture or  
significant joint   
effusion involving   
either knee.  
  
  
IMPRESSION  
IMPRESSION:  
  
Osteoarthritis right   
knee, severe in the   
lateral compartment.  
  
Osteoarthritis left   
knee, moderate in the   
lateral compartment.  
  
  
  
  
  
: PSCB  
Transcribe Date/Time:   
Sep 9 2020 9:46A  
  
Dictated by : THERON ALVAREZ MD  
  
This examination was   
interpreted and the   
report reviewed and  
electronically signed   
by:  
THERON ALVAREZ MD   
on Sep 9 2020 9:47AM EST  
  
  
                                                            [facility]  
   
                                                    Radiology Study   
observation (narrative)                                                 Kareen reagan   
[facility]  
   
                                                    XR Knee - bilateral 4 ViewsO  
rdered By: [facility] Provider on 2020   
   
                                                                  [facility]  
   
                                                    History and Physicalon    
   
                                                    HIM IP Note OR   
Transcription                   Normal                          [facility] [place]  
   
                                                    Surgical Pathologyon   
017   
   
                                        Surgical Pathology  (NOTE)[id number]  
Y   
LABORATORIESCONSULTING   
PATHOLOGISTS   
[facility]   
[id number] [place] [address]. [place], [place]   
[phone]: [phone]   
PATHOLOGY   
CONSULTATIONPatient   
Name: Skyler CHAVEZ   
Rec: [id number] Number:   
[id number]:   
2017Received:   
2017Reported:   
2017 09:51--   
Diagnosis --1. COLON,   
RANDOM BIOPSIES:- NORMAL   
COLONIC MUCOSA.2. COLON,   
BIOPSIES:- ADENOMATOUS   
POLYP.John Momin,**Electronically   
Signed Out**   
slsClinical   
InformationPre-op   
Diagnosis: FECAL OCCULT   
BLOOD Operative   
Findings: RANDOM COLON   
BX'S; COLON   
POLYPOperation   
Performed: COLONOSCOPY   
WITH BIOPSY Source of   
Specimen1: RANDOM COLON   
BX'S2: COLON POLYPGross   
Description1.  PING   
GISEL, RANDOM COLON   
BX'S  Multiple tan-white   
tissuefragments from 0.2   
to 0.3 cm and are 2.4 x   
0.2 x 0.1 cm in   
aggregate. Entirely 1cs.   
2.  PING GISEL, COLON   
POLYP  Two tan tissue   
fragments, 0.3 to 0.4cm   
and are 0.7 x 0.3 x 0.2   
cm in aggregate.   
Entirely 1cs. rh   
tmMicroscopic   
Description1. The   
mucosal architecture is   
normal. There is no   
evidence ofactive   
inflammation,   
granulomatous   
inflammation, ulcer,   
dysplasia   
ormalignancy.2. The   
biopsies demonstrate   
adenomatous polyp.  Normal                                  [facility] [place]  
  
  
  
Vital Signs  
  
  
                      Date Time  Vital Sign Value      Performing Clinician Faci  
lity  
   
                                                    2024   
16:                              Body mass index   
(BMI) [Ratio]             33.6 kg/m2                Cj Kennedy NP  
Work Phone:   
[phone]                          [facility]  
   
                                                    2024   
16:          Body weight         100.25 kg           Cj Kennedy NP  
Work Phone:   
[phone]                          [facility]  
   
                                                    2024   
16:                              Diastolic blood   
pressure                  76 mm[Hg]                 Cj Kennedy NP  
Work Phone:   
[phone]                          [facility]  
   
                                                    2024   
16:          Heart rate          67 /min             Cj Kennedy NP  
Work Phone:   
[phone]                          [facility]  
   
                                                    2024   
16:                              Systolic blood   
pressure                  137 mm[Hg]                Cj Kennedy NP  
Work Phone:   
[phone]                          [facility]  
   
                                                    10-   
10:          Body height         175.3 cm            Angel Gerardo DO  
Work Phone:   
[phone]                          [facility]  
   
                                                    10-   
10:                              Body mass index   
(BMI) [Ratio]             32.77 kg/m2               Angel Sydnie DO  
Work Phone:   
[phone]                          [facility]  
   
                                                    10-   
10:          Body weight         100.7 kg            Angel Sydnie DO  
Work Phone:   
[phone]                          [facility]  
   
                                                    10-   
10:                              Diastolic blood   
pressure                  82 mm[Hg]                 Angel Sydnie DO  
Work Phone:   
[phone]                          [facility]  
   
                                                    10-   
10:          Heart rate          70 /min             Angel Sydnie DO  
Work Phone:   
[phone]                          [facility]  
   
                                                    10-   
10:                              SaO2% (BldA) [Mass   
fraction]                 94 %                      Angel Sydnie DO  
Work Phone:   
[phone]                          [facility]  
   
                                                    10-   
10:                              Systolic blood   
pressure                  110 mm[Hg]                Angel Sydnie DO  
Work Phone:   
[phone]                          [facility]  
   
                                                    2024   
13:                              Body mass index   
(BMI) [Ratio]             31.47 kg/m2               Cj Kennedy NP  
Work Phone:   
[phone]                          [facility]  
   
                                                    2024   
13:          Body weight         93.89 kg            Cj Kennedy NP  
Work Phone:   
[phone]                          [facility]  
   
                                                    2024   
13:                              Diastolic blood   
pressure                  80 mm[Hg]                 Cj Kennedy NP  
Work Phone:   
[phone]                          [facility]  
   
                                                    2024   
13:          Heart rate          81 /min             Cj Kennedy NP  
Work Phone:   
[phone]                          [facility]  
   
                                                    2024   
13:                              Systolic blood   
pressure                  127 mm[Hg]                Cj Kennedy NP  
Work Phone:   
[phone]                          [facility]  
   
                                                    2024   
09:          Body height         172.7 cm            Cj Kennedy NP  
Work Phone:   
[phone]                          [facility]  
   
                                                    2024   
09:                              Body mass index   
(BMI) [Ratio]             34.21 kg/m2               Cj Kennedy NP  
Work Phone:   
[phone]                          [facility]  
   
                                                    2024   
09:          Body weight         102.06 kg           Cj Kennedy NP  
Work Phone:   
[phone]                          [facility]  
   
                                                    2024   
09:                              Diastolic blood   
pressure                  78 mm[Hg]                 Cj Kennedy NP  
Work Phone:   
[phone]                          [facility]  
   
                                                    2024   
09:          Heart rate          74 /min             Cj Kennedy NP  
Work Phone:   
[phone]                          [facility]  
   
                                                    2024   
09:                              Systolic blood   
pressure                  140 mm[Hg]                Cj Kennedy NP  
Work Phone:   
[phone]                          [facility]  
   
                                                    2024   
13:          Body height         175.3 cm            Angel Sydnie DO  
Work Phone:   
[phone]                          [facility]  
   
                                                    2024   
13:                              Body mass index   
(BMI) [Ratio]             33.82 kg/m2               Angel Sydnie DO  
Work Phone:   
[phone]                          [facility]  
   
                                                    2024   
13:          Body weight         103.87 kg           Angel Sydnie DO  
Work Phone:   
[phone]                          [facility]  
   
                                                    2024   
13:                              Diastolic blood   
pressure                  90 mm[Hg]                 Angel Sydnie DO  
Work Phone:   
[phone]                          [facility]  
   
                                                    2024   
13:          Heart rate          70 /min             Angel Sydnie DO  
Work Phone:   
[phone]                          [facility]  
   
                                                    2024   
13:                              SaO2% (BldA) [Mass   
fraction]                 100 %                     Angel Sydnie DO  
Work Phone:   
[phone]                          [facility]  
   
                                                    2024   
13:                              Systolic blood   
pressure                  152 mm[Hg]                Angel Sydnie DO  
Work Phone:   
[phone]                          [facility]  
   
                                                    2024   
11:          Body height         173.99 cm           MD Alexander Guerra  
Work Phone:   
[phone]                          [facility]  
   
                                                    2024   
11:                              Body mass index   
(BMI) [Ratio]             34.4 kg/m2                MD Alexander Guerra  
Work Phone:   
[phone]                          [facility]  
   
                                                    2024   
11:          Body weight         104.32 kg           MD Alexander Guerra  
Work Phone:   
[phone]                          [facility]  
   
                                                    2023   
09:                              Blood Pressure   
Location                                            Salvador COOK  
Work Phone:   
[phone]                           [facility]  
   
                                                    2023   
09:                              Diastolic blood   
pressure                  81 mm[Hg]                 Salvador COOK  
Work Phone:   
[phone]                           [facility]  
   
                                                    2023   
09:          Heart rate          65 /min             Salvador COOK  
Work Phone:   
[phone]                           [facility]  
   
                                                    2023   
09:                              Systolic blood   
pressure                  184 mm[Hg]                Salvador COOK  
Work Phone:   
[phone]                           [facility]  
   
                                                    2022   
09:                              Blood Pressure   
Location                                            Salvador COOK  
Work Phone:   
[phone]                           [facility]  
   
                                                    2022   
09:                              Diastolic blood   
pressure                  82 mm[Hg]                 Salvador COOK  
Work Phone:   
[phone]                           [facility]  
   
                                                    2022   
09:          Heart rate          77 /min             Salvador COOK  
Work Phone:   
[phone]                           [facility]  
   
                                                    2022   
09:                              Systolic blood   
pressure                  132 mm[Hg]                Salvador COOK  
Work Phone:   
[phone]                           [facility]  
   
                                                    2022   
14:                              Blood Pressure   
Location                                            Marty Osborne  
Work Phone:   
[phone]                           [facility]  
   
                                                    2022   
14:                              Diastolic blood   
pressure                  59 mm[Hg]                 Marty Christofferson  
Work Phone:   
[phone]                           [facility]  
   
                                                    2022   
14:          Heart rate          71 /min             Marty Christofferson  
Work Phone:   
[phone]                           [facility]  
   
                                                    2022   
14:          Respiratory rate    18 /min             Marty Castanedaofferson  
Work Phone:   
[phone]                           [facility]  
   
                                                    2022   
14:                              SaO2% (BldA) [Mass   
fraction]                 96 %                      Marty Castanedaoffwiliam  
Work Phone:   
[phone]                           [facility]  
   
                                                    2022   
14:                              Systolic blood   
pressure                  119 mm[Hg]                Marty Christofferson  
Work Phone:   
[phone]                           [facility]  
   
                                                    2022   
15:          Hourly Rounding                         J Luis KARTIK  
Work Phone:   
[phone]                           [facility]  
   
                                                    2022   
15:          Promise to Return                       J Luis KARTIK  
Work Phone:   
[phone]                           [facility]  
   
                                                    2022   
14:          Hourly Rounding                         J Luis KARTIK  
Work Phone:   
[phone]                           [facility]  
   
                                                    2022   
14:          Promise to Return                       J Luis KARTIK  
Work Phone:   
[phone]                           [facility]  
   
                                                    2022   
13:          Hourly Rounding                         J Luis KARTIK  
Work Phone:   
[phone]                           [facility]  
   
                                                    2022   
13:          Promise to Return                       J Luis KARTIK  
Work Phone:   
[phone]                           [facility]  
   
                                                    2022   
11:                              Diastolic blood   
pressure                  88 mm[Hg]                 J Luis KARTIK  
Work Phone:   
[phone]                           [facility]  
   
                                                    2022   
11:                              Systolic blood   
pressure                  174 mm[Hg]                J Luis KARTIK  
Work Phone:   
[phone]                           [facility]  
   
                                                    2022   
11:          Body temperature    97.52 [degF]        J Luis KARTIK  
Work Phone:   
[phone]                           [facility]  
   
                                                    2022   
11:                              Diastolic blood   
pressure                  88 mm[Hg]                 J Luis KARTIK  
Work Phone:   
[phone]                           [facility]  
   
                                                    2022   
11:          Heart rate          66 /min             J Luis KARTIK  
Work Phone:   
[phone]                           [facility]  
   
                                                    2022   
11:          Mean blood pressure 117 mm[Hg]          J Luis KARTIK  
Work Phone:   
[phone]                           [facility]  
   
                                                    2022   
11:                              SaO2% (BldA) [Mass   
fraction]                 96 %                      J Luis KARTIK  
Work Phone:   
[phone]                           [facility]  
   
                                                    2022   
11:                              Systolic blood   
pressure                  174 mm[Hg]                J Luis KARTIK  
Work Phone:   
[phone]                           [facility]  
   
                                                    2022   
07:          Body temperature    98.06 [degF]        J Luis KARTIK  
Work Phone:   
[phone]                           [facility]  
   
                                                    2022   
07:                              Diastolic blood   
pressure                  81 mm[Hg]                 J Luis KARTIK  
Work Phone:   
[phone]                           [facility]  
   
                                                    2022   
07:          Heart rate          79 /min             J Luis KARTIK  
Work Phone:   
[phone]                           [facility]  
   
                                                    2022   
07:          Mean blood pressure 104 mm[Hg]          J Luis KARTIK  
Work Phone:   
[phone]                           [facility]  
   
                                                    2022   
07:                              SaO2% (BldA) [Mass   
fraction]                 95 %                      J Luis KARTIK  
Work Phone:   
[phone]                           [facility]  
   
                                                    2022   
07:                              Systolic blood   
pressure                  151 mm[Hg]                J Luis KARTIK  
Work Phone:   
[phone]                           [facility]  
   
                                                    2022   
07:          Heart rate          80 /min             J Luissavannah LANDASLIN  
Work Phone:   
[phone]                           [facility]  
   
                                                    2022   
07:                              SaO2% (BldA) [Mass   
fraction]                 96 %                      J Luissavannah LANDASLIN  
Work Phone:   
[phone]                           [facility]  
   
                                                    2022   
01:          Body temperature    98.06 [degF]        J Luissavannah LANDASLIN  
Work Phone:   
[phone]                           [facility]  
   
                                                    2022   
01:          Mean blood pressure 105 mm[Hg]          J Luissavannah LANDASLIN  
Work Phone:   
[phone]                           [facility]  
   
                                                    2022   
01:          Respiratory rate    17 /min             J Luissavannah LANDASLIN  
Work Phone:   
[phone]                           [facility]  
   
                                                    2022   
23:          FIO2                40 %                J Luissavannah VERDUGO  
Work Phone:   
[phone]                           [facility]  
   
                                                    2022   
21:          Mean blood pressure 103 mm[Hg]          J Luissavannah LANDASLIN  
Work Phone:   
[phone]                           [facility]  
   
                                                    2022   
21:          Respiratory rate    18 /min             J Luis LANDASLIN  
Work Phone:   
[phone]                           [facility]  
   
                                                    2022   
20:          Mean blood pressure 110 mm[Hg]          J Luissavannah LANDASLIN  
Work Phone:   
[phone]                           [facility]  
   
                                                    2022   
17:                              Blood Pressure   
Location                                            J Luissavannah LANDASLIN  
Work Phone:   
[phone]                           [facility]  
   
                                                    2022   
17:                              BP/Pulse Patient   
Position                                            J Luissavannah LANDASLIN  
Work Phone:   
[phone]                           [facility]  
   
                                                    2022   
17:          Mean blood pressure 101 mm[Hg]          J Luis KARTIK  
Work Phone:   
[phone]                           [facility]  
   
                                                    2022   
15:          Respiratory rate    16 /min             J Luis KARTIK  
Work Phone:   
[phone]                           [facility]  
   
                                                    2022   
14:          Respiratory rate    19 /min             J Luis KARTIK  
Work Phone:   
[phone]                           [facility]  
   
                                                    2022   
04:          Heart rate          71 /min             J Luis KARTIK  
Work Phone:   
[phone]                           [facility]  
   
                                                    2022   
04:          Respiratory rate    11 /min             J Luis KARTIK  
Work Phone:   
[phone]                           [facility]  
   
                                                    2022   
03:          Respiratory rate    18 /min             J Luis KARTIK  
Work Phone:   
[phone]                           [facility]  
   
                                                    2022   
00:          gluc                112 mg/dL           J Luis KARTIK  
Work Phone:   
[phone]                           [facility]  
   
                                                    2022   
00:          gluc                                    J Luissavannah LANDASLIN  
Work Phone:   
[phone]                           [facility]  
   
                                                    2022   
00:          Heart rate          83 /min             J Luissavannah LANDASLIN  
Work Phone:   
[phone]                           [facility]  
   
                                                    2022   
01:      Body temperature 98.42 [degF]    [facility]  
   
                                                    2022   
01:                              Diastolic blood   
pressure            70 mm[Hg]           [facility]  
   
                                                    2022   
01:      Heart rate      73 /min         [facility]  
   
                                                    2022   
01:      Mean blood pressure 92 mm[Hg]       [facility]  
   
                                                    2022   
01:      Respiratory rate 19 /min         [facility]  
   
                                                    2022   
01:                              SaO2% (BldA) [Mass   
fraction]           99 %                [facility]  
   
                                                    2022   
01:                              Systolic blood   
pressure            135 mm[Hg]          [facility]  
   
                                                    2022   
00:      Body temperature 98.6 [degF]     [facility]  
   
                                                    2022   
00:                              Diastolic blood   
pressure            67 mm[Hg]           [facility]  
   
                                                    2022   
00:      Heart rate      72 /min         [facility]  
   
                                                    2022   
00:      Mean blood pressure 86 mm[Hg]       [facility]  
   
                                                    2022   
00:                              Systolic blood   
pressure            123 mm[Hg]          [facility]  
   
                                                    2022   
23:                              Diastolic blood   
pressure            78 mm[Hg]           [facility]  
   
                                                    2022   
23:      Heart rate      70 /min         [facility]  
   
                                                    2022   
23:      Respiratory rate 18 /min         [facility]  
   
                                                    2022   
23:                              SaO2% (BldA) [Mass   
fraction]           98 %                [facility]  
   
                                                    2022   
23:                              Systolic blood   
pressure            120 mm[Hg]          [facility]  
  
  
  
Encounters  
  
  
                          Encounter Date Encounter Type Care Provider Facility  
   
                                                    Start: 2024  
End: 2024                         Office outpatient visit   
25 minutes                              Cj Kennedy NP  
Work Phone:   
[phone]                          [facility]  
   
                                        Comment on above:   Lumbar radiculopathy  
 (Primary Dx);  
RLS (restless legs syndrome);  
Neck pain;  
Metastasis to spinal column (CMS/HCC)   
   
                                                    Start: 2024  
End: 2024     ambulatory          [facility]J KENNEDY       Not Available  
   
                                                    Start: 2024  
End: 2024           Bamboo elroy Kennedy NP  
Work Phone:   
[phone]                          [facility]  
   
                                                    Start: 2024  
End: 2024           Bamboo flowsheet          Cj Kennedy NP  
Work Phone:   
[phone]                          [facility]  
   
                                                    Start: 10-  
End: 10-           Refill                    Khang Mina   
APRN-CNP  
Work Phone:   
[phone]                          [facility]   
Cardiology  
   
                                        Comment on above:   Med Refill   
   
                                                    Start: 10-  
End: 10-           Telephone encounter       Cj Kennedy NP  
Work Phone:   
[phone]                          [facility]   
[facility]  
   
                                        Comment on above:   CT SCAN FYI   
   
                                                    Start: 10-  
End: 10-           Orders Only               Susana Tidwelljeff APRN-CNP  
Work Phone:   
[phone]                          [facility]   
Cardiology  
   
                                        Comment on above:   Chronic combined sys  
tolic and diastolic congestive heart   
failure   
(CMS-HCC);  
Persistent atrial fibrillation (CMS-HCC);  
Nonischemic congestive cardiomyopathy (CMS-HCC);  
Benign essential hypertension;  
Left bundle branch block (LBBB);  
Automatic implantable cardioverter-defibrillator in situ [employer]   
[employer]   
   
                                                    Start: 10-  
End: 10-                         Office outpatient visit   
25 minutes                              Angel Gerardo DO  
Work Phone:   
[phone]                          [facility]   
Cardiology  
   
                                        Comment on above:   Persistent atrial fi  
brillation (CMS-HCC) (Primary Dx);  
Chronic combined systolic and diastolic congestive heart failure   
(CMS-HCC);  
Left bundle branch block (LBBB)   
   
                                                    Start: 10-  
End: 10-     ambulatory          [facility]   
System  
   
                                        Comment on above:   Automatic implantabl  
e cardioverter-defibrillator in situ   
[employer] (Primary Dx)   
   
                                                    Start: 10-  
End: 10-           Orders Only               Susana Tidwelljfef APRN-CNP  
Work Phone:   
[phone]                          [facility]   
Cardiology  
   
                                        Comment on above:   Chronic systolic con  
gestive heart failure (CMS-HCC) (Primary   
Dx);  
Paroxysmal atrial fibrillation (CMS-HCC)   
   
                                                    Start: 2024  
End: 2024     Refill              Robert MIN     [facility]  
   
                                        Comment on above:   Neuropathy   
   
                                                    Start: 2024  
End: 2024           Bamboo flowsheet          Cj Kennedy NP  
Work Phone:   
[phone]                          [facility]  
   
                                                    Start: 2024  
End: 2024           Bamboo flowsheet          Cj Kennedy NP  
Work Phone:   
[phone]                          [facility]  
   
                                                    Start: 2024  
End: 2024                         Office outpatient visit   
25 minutes                              Cj Kennedy NP  
Work Phone:   
[phone]                          [facility]  
   
                                        Comment on above:   Lumbar radiculopathy  
 (Primary Dx);  
Neck pain;  
Metastasis to spinal column (CMS/HCC);  
RLS (restless legs syndrome);  
Neuropathy   
   
                                                    Start: 2024  
End: 2024     ambulatory          [facility]J BRENT       Not Available  
   
                                                    Start: 2024  
End: 2024                         Clinisync Result   
Encounter                               Cj Kennedy NP  
Work Phone:   
[phone]                          [facility]   
[facility]   
Unsolicited  
   
                                                    Start: 2024  
End: 2024                         Clinisync Result   
Encounter                               Cj Kennedy NP  
Work Phone:   
[phone]                          [facility]   
[facility]   
Unsolicited  
   
                                                    Start: 2024  
End: 2024     ambulatory          [facility]   
[facility]  
   
                                                    Start: 2024  
End: 2024           Bamboo flowsheet          Cj Kennedy NP  
Work Phone:   
[phone]                          [facility]  
   
                                                    Start: 2024  
End: 2024           Bamboo flowsheet          Cj Kennedy NP  
Work Phone:   
[phone]                          [facility]  
   
                                                    Start: 2024  
End: 2024                         Office outpatient visit   
25 minutes                              Cj Kennedy NP  
Work Phone:   
[phone]                          [facility]  
   
                                        Comment on above:   Lumbar radiculopathy  
 (Primary Dx);  
Neck pain;  
Metastasis to spinal column (CMS/HCC);  
History of compression fracture of spine;  
RLS (restless legs syndrome);  
Neuropathy;  
Medication monitoring encounter   
   
                                                    Start: 2024  
End: 2024     ambulatory          CJ KENNEDY       Not Available  
   
                          Start: 2024 ambulatory   [facility]   
[facility]  
   
                                                    Start: 2024  
End: 2024     ambulatory          [facility]   
Ambulatory [facility]  
   
                                                    Start: 2024  
End: 2024     ambulatory          CHAPARRO MAHAD SIRI    [facility]   
Ambulatory [facility]  
   
                                                    Start: 2024  
End: 2024     ambulatory          [facility]   
Ambulatory [facility]  
   
                                                    Start: 2024  
End: 2024     ambulatory          CJ KENNEDY       Not Available  
   
                                                    Start: 2024  
End: 2024     ambulatory          [facility]   
Ambulatory [facility]  
   
                                                    Start: 2024  
End: 2024           ambulatory                NEO DAMON   
[facility]  
   
                                                    Start: 2024  
End: 2024     ambulatory          CJ KENNEDY       Not Available  
   
                                                    Start: 2024  
End: 2024                         Office outpatient visit   
25 minutes                              Angel Gerardo DO  
Work Phone:   
[phone]                          [facility]   
Cardiology  
   
                                        Comment on above:   Cardiomyopathy (Prim  
any Dx);  
Persistent atrial fibrillation (CMS-HCC);  
Chronic systolic heart failure (CMS-HCC);  
Left bundle branch block (LBBB)   
   
                                                    Start: 2024  
End: 2024     ambulatory          ANGEL GERARDO     [facility]   
Ambulatory [facility]  
   
                          Start: 2024 Telephone encounter Elba Ibrahim CMA  
 [facility]   
Cardiology  
   
                                        Comment on above:   Appointment   
   
                                                    Start: 2024  
End: 2024     ambulatory          [facility]  [facility]  
   
[facility]  
   
                                        Comment on above:   Primary open angle g  
laucoma of right eye, mild stage   
   
                                                    Start: 2024  
End: 2024     ambulatory          Alexei Bartlett II Facility:[facility]  
   
                                                    Start: 2024  
End: 2024           ambulatory                MD Alexander Guerra  
Work Phone:   
[phone]                          [facility]  
Work Phone:   
[phone]  
   
                                                    Start: 2024  
End: 2024                         Patient encounter   
procedure                               MD Alexander Guerra  
Work Phone:   
[phone]                          [facility]   
[facility]   
Orthopedics  
Work Phone:   
[phone]  
   
                                Start: 2024 Refill          Butch Sifuentes  
sser   
APRN-CNP  
Work Phone:   
[phone]                          [facility]   
Cardiology  
   
                                        Comment on above:   Med Refill   
   
                                                    Start: 10-  
End: 10-     ambulatory          GREG DELGADILLO TODD        [facility]  
   
                                                    Start: 08-  
End: 08-                         Emergency department   
patient visit             ALEXANDER GUERRA           [facility]  
   
                                                    Start: 2023  
End: 2023     ambulatory          Salvador CORCORAN      Facility:[facility]  
   
                                                    Start: 2023  
End: 2023                         Patient encounter   
procedure                               Salvador CORCORAN  
Work Phone:   
[phone]                           [facility]   
[facility]  
Work Phone:   
[phone]  
   
                          Start: 2023 ambulatory   CNP Consuelo Munoz  
ity:[facility]  
   
                                                    Start: 2023  
End: 2023     ambulatory          Alexander Guerra     Facility:[facility]  
   
                                                    Start: 2023  
End: 2023           ambulatory                MD Alexander Guerra  
Work Phone:   
[phone]                          [facility]  
Work Phone:   
[phone]  
   
                                                    Start: 2023  
End: 2023                         Patient encounter   
procedure                               MD Alexander Guerra  
Work Phone:   
[phone]                          [facility]   
[facility]   
[place]  
Work Phone:   
[phone]  
   
                                                    Start: 2022  
End: 2022     ambulatory          Salvador CORCORAN      [facility]  
   
                                                    Start: 2022  
End: 2022                         Patient encounter   
procedure                               Salvador CORCORAN  
Work Phone:   
[phone]                           [facility]   
[facility]  
Work Phone:   
[phone]  
   
                                                    Start: 2022  
End: 2022     ambulatory          Marty Osborne [facility]  
   
                                                    Start: 2022  
End: 2022                         Patient encounter   
procedure                               Marty Osborne  
Work Phone:   
[phone]                           [facility]  
Work Phone:   
[phone]  
   
                                                    Start: 2022  
End: 2022                         Evaluation and   
management of inpatient   Babak Montoya          Facility[facility]  
   
                                                    Start: 2022  
End: 2022                         Evaluation and   
management of inpatient                 J Luis VERDUGO  
Work Phone:   
[phone]                           [facility]  
Work Phone:   
[phone]  
   
                                                    Start: 2022  
End: 09-     ambulatory          [facility]  
   
                                                    Start: 2022  
End: 2022                         Subsequent hospital   
visit by physician                      Alexander Guerra MD  
Work Phone:   
[phone]                          [facility]  
   
                                                    Start: 2022  
End: 2022     ambulatory          [facility]  
   
                                                    Start: 2022  
End: 2022                         Subsequent hospital   
visit by physician                      Alexander Guerra MD  
Work Phone:   
[phone]                          [facility]  
   
                                                    Start: 2022  
End: 2022                         Emergency department   
patient visit             Trae RIVERA Jose          Fac[facility]  
   
                                                    Start: 2022  
End: 2022                         Emergency department   
patient visit             [facility] NICOLE Crespoisidro          [facility]  
Work Phone:   
[phone]  
   
                                                    Start: 2022  
End: 10-     ambulatory          AMELIA HENDRICKSON [facility]  
   
                                                    Start: 2022  
End: 10-           Recurring                 Consuelo HENDRICKSON  
Work Phone:   
[phone]                           [facility]  
Work Phone:   
[phone]  
   
                                                    Start: 2022  
End: 2022     ambulatory          DR JASMIN LINDO  Facility:  
   
                                                    Start: 2020  
End: 2020                         Subsequent hospital   
visit by physician                      Jessie Trujillo  
Work Phone:   
[phone]                          Radiology  
   
                                        Comment on above:   Pain [R52]   
   
                                                    Start: 2020  
End: 2020           Orders Only               Dago Lo  
Work Phone:   
[phone]                          Orthopaedics  
   
                                        Comment on above:   Pain (Primary Dx)   
   
                                                    Start: 2017  
End: 2017     Ambulatory          GREG ROHCA        [facility]  
  
  
  
Procedures  
  
  
                          Date         Procedure    Procedure Detail Performing   
Clinician  
   
                          Start: 2024 ALL BUN                   Cj hayes NP  
Work Phone:   
[phone]  
   
                          Start: 2024 TBH CREATININE              Cj nguyen NP  
Work Phone:   
[phone]  
   
                          Start: 2024 Follow-up visit Follow-up    ANGEL GERARDO  
   
                                        Start: 2024   Visual field xm uni/  
bi   
w/interp extended exam                              Chaparro Holley OD  
Work Phone:   
[phone]  
   
                          Start: 2024 Pelvis X-ray              MD Alexander coleman  
Work Phone:   
[phone]  
   
                          Start: 2024 X-ray of both knees              MD Alexander Guerra  
Work Phone:   
[phone]  
   
                                        Start: 2023   Ultrasonography of b  
ilateral   
kidneys                                             MD Alexander Guerra  
Work Phone:   
[phone]  
   
                                        Start: 2022   Basic metabolic pane  
l   
calcium total                                       Alexander Guerra MD  
Work Phone:   
[phone]  
   
                                        Start: 2022   Basic metabolic pane  
l   
calcium total                                       Alexander Guerra MD  
Work Phone:   
[phone]  
   
                                        Start: 2020   Radiologic exam knee  
   
complete 4/more views                               Dago Lo MD  
Work Phone:   
[phone]  
   
                          Start: 2017 SURGICAL PATHOLOGY              ETHAN ROCHA  
   
                          Start: 2017 DISCHARGE PATIENT              GREG ROCHA  
   
                          Start: 2017 SURGICAL PATHOLOGY              ETHAN  
TEZ ZACK  
   
                          Start: 2017 Colonoscopy               Alexander wyatt MD  
Work Phone:   
[phone]  
   
                                       Simple mastectomy              Trae armas  
  
  
  
Plan of Treatment  
  
  
                          Date         Care Activity Detail       Author  
   
                                        Start: 2027   Screening for malign  
ant   
neoplasm of colon                                   [facility]  
   
                          Start: 10- Adult BMI Screening Adult BMI Screen  
ing [facility]  
   
                          Start: 10- Tobacco Screening Tobacco Screening   
[facility]  
   
                          Start: 2025 Adult BMI Screening Adult BMI Screen  
ing [facility]  
   
                          Start: 2025 Tobacco Screening Tobacco Screening   
[facility]  
   
                          Start: 2025 Adult BMI Screening Adult BMI Screen  
ing [facility]  
   
                          Start: 2025 Tobacco Screening Tobacco Screening   
[facility]  
   
                          Start: 2025 Tobacco Screening Tobacco Screening   
[facility]  
   
                                                    Start: 2025  
End: 2025                         Patient encounter   
procedure                               2025 11:10 AM EST   
Office Visit [facility]   
[facility] [address]   
[address] [place] [id number] [phone] Chaparro Holley, CHARI [address] [place] [address]   
[phone] (Work)   
[phone] (Fax)                      [facility]   
[facility]  
   
                                                    Start: 2025  
End: 2025           ambulatory                2025 10:40 AM EST   
Ophthalmology Imaging   
[facility]   
[facility] [address] [place] [id number] [phone]                            [facility]   
[facility]  
   
                                                    Start: 02-  
End: 02-                         Patient encounter   
procedure                               02/10/2025 9:40 AM EST   
Office Visit [facility]   
[facility]UR[facility] [address] [place]   
[address] [phone]   
Cj Kennedy NP [address]   
[address] [address] [phone] (Work)   
[phone] (Fax)                      [facility]  
   
                                                    Start: 2024  
End: 2024                         Patient encounter   
procedure                                           [facility]  
   
                                        Comment on above:   RLS (restless legs s  
yndrome);  
Neck pain;  
Lumbar radiculopathy;  
Metastasis to spinal column (CMS/HCC)   
   
                                                    Start: 10-  
End: 10-                         Potassium [Moles/volume]   
in Serum or Plasma                      Potassium Lab Routine   
Chronic combined   
systolic and diastolic   
congestive heart failure   
(CMS-HCC) Expected:   
10/25/2024   
(Approximate), Expires:   
10/18/2025                              [facility]  
Work Phone:   
[phone]  
   
                                        Comment on above:   Expected: 10/25/2024  
 (Approximate), Expires: 10/18/2025   
   
                                                    Start: 10-  
End: 10-                         Basic metabolic 2000   
panel - Serum or Plasma                 Basic Metabolic Panel   
Lab Routine Paroxysmal   
atrial fibrillation   
(CMS-HCC) Chronic   
systolic congestive   
heart failure (CMS-HCC)   
Expected: 10/08/2024   
(Approximate), Expires:   
10/01/2025                              [facility]  
Work Phone:   
[phone]  
   
                                        Comment on above:   Expected: 10/08/2024  
 (Approximate), Expires: 10/01/2025   
   
                                                    Start: 10-  
End: 10-           Device Interrogation      Device Interrogation   
Cardiac Services Routine   
Persistent atrial   
fibrillation (CMS-HCC)   
Chronic combined   
systolic and diastolic   
congestive heart failure   
(CMS-HCC) Left bundle   
branch block (LBBB)   
Expected: 10/03/2024,   
Expires: 10/03/2025                     [facility]  
Work Phone:   
[phone]  
   
                                        Comment on above:   Expected: 10/03/2024  
, Expires: 10/03/2025   
   
                                                    Start: 10-  
End: 10-     Clinical Support                        [facility]   
[facility]icians   
Cardiology  
   
                                                    Start: 2024  
End: 2024                         Patient encounter   
procedure                                           [facility]  
   
                                        Comment on above:   Arrived   
   
                          Start: 2024 Adult BMI Screening Adult BMI Screen  
ing [facility]  
   
                          Start: 2024 Tobacco Screening Tobacco Screening   
[facility] [employer]   
[facility]  
   
                                        Start: 2024   Covid-19 Vaccine (3   
-   
2024-25 season)                         Covid-19 Vaccine (3 -   
2024-25 season)                         [facility]  
   
                                        Start: 2024   COVID-19 Vaccine ( season)                         COVID-19 Vaccine ( season)                         [facility] [employer]   
[facility]  
   
                          Start: 2024 Influenza vaccination              [facility]  
   
                                                    Start: 2024  
End: 2025                         Creatinine [Mass/volume]   
in Serum or Plasma                      Creatinine, Serum Lab   
Routine Medication   
monitoring encounter   
Expected: 2024   
(Approximate), Expires:   
2025                              [facility]  
   
                                        Comment on above:   Expected: 2024  
 (Approximate), Expires: 2025   
   
                                                    Start: 2024  
End: 2024                         Patient encounter   
procedure                               2024 9:20 AM EDT   
Office Visit [facility]   
NEURO [address] [place]   
[address] [phone]   
Cj Kennedy, NP [address] [place] [address] [phone] (Work)   
[phone] (Fax)   
Arrived                                 [facility]  
   
                                        Comment on above:   Arrived   
   
                                                    Start: 2024  
End: 2024                         Patient encounter   
procedure                               2024 11:10 AM EDT   
Office Visit [facility] [address]   
[address] [place] [address]   
[phone] Chaparro Holley, OD [address] [place]   
[address] [phone]   
(Work) [phone]   
(Fax)                                   [facility]  
   
                                                    Start: 2024  
End: 2024           ambulatory                2024 10:40 AM EDT   
Ophthalmology Imaging   
[facility] [address] [place] [id number] [phone]                            [facility]  
   
                          Start: 2024 Diabetes Screening Diabetes Screenin  
[facility]  
   
                                                    Start: 2024  
End: 2024                         Patient encounter   
procedure                               2024 1:45 PM EDT   
Office Visit [facility]   
[facility]   
[address] [facility] [address]   
[place]   
[address] [phone]   
Angel Gerardo,    
[address] [facility]   
[address] [place]   
[address] [phone]   
(Work) [phone]   
(Fax)                                   [facility]   
[facility]  
   
                                                    Start: 01-  
End: 01-                         Patient encounter   
procedure                               01/15/2024 11:10 AM EST   
Office Visit [facility]   
[facility] [address] [place] [address] [phone] Chaparro Holley, OD [address] [place]   
[address] [phone]   
(Work) [phone]   
(Fax)                                   [facility]  
   
                                                    Start: 01-  
End: 01-           ambulatory                01/15/2024 10:40 AM EST   
Ophthalmology Imaging   
[facility] [address] [place] [address] [phone]                            [facility]   
[facility]  
   
                                        Start: 2024   Advance Directive   
Discussion                              Advance Directive   
Discussion                              [facility]  
   
                                        Start: 2023   COVID-19 Vaccine ( season)                         COVID-19 Vaccine (4 -   
2023-24 season)                         [facility]  
   
                          Start: 2023 Lipid panel  Lipids       [facility]  
   
                                        Start: 2023   RSV Vaccine (1 - 1-d  
ose   
75+ series)                             RSV Vaccine (1 - 1-dose   
75+ series)                             [facility]  
   
                          Start: 2022 Influenza vaccination Flu vaccine (#  
1) [facility]  
   
                          Start: 2020 Influenza vaccination INFLUENZA (#1)  
 [facility]  
   
                                        Start: 2013   ADVANCE DIRECTIVE   
DISCUSSION                              ADVANCE DIRECTIVE   
DISCUSSION                              [facility]  
   
                          Start: 2013 BONE DENSITY BONE DENSITY [facility]  
   
                          Start: 2013 Fall Risk Screening Fall Risk Screen  
[facility]  
   
                                        Start: 2013   PNEUMOVAX AGE 65 AND  
 OVER   
WITH 5YR LOOKBACK (#1)                  PNEUMOVAX AGE 65 AND   
OVER WITH 5YR LOOKBACK   
(#1)                                    [facility]  
   
                                        Start: 2013   Screening for   
osteoporosis              Bone Density Screening    [facility]  
   
                                        Start: 12-   DTaP,Tdap and Td Vac  
cines   
(1 - Tdap)                              DTaP,Tdap and Td   
Vaccines (1 - Tdap)                     [facility]  
   
                                        Start: 12-   DTaP/Tdap/Td vaccine  
 (1 -   
Tdap)                                   DTaP/Tdap/Td vaccine (1   
- Tdap)                                 [facility]  
   
                                        Start: 12-   Urine microalbumin   
profile                                 DTaP,Tdap,Td Vaccine (1   
- Tdap)                                 [facility]  
   
                                        Start: 2003   Screening for   
osteoporosis                            DEXA (modify frequency   
per FRAX score)                         [facility]  
   
                                        Start: 1998   Screening for malign  
ant   
neoplasm of breast        Breast cancer screen      [facility]  
   
                                Start: 1998 SHINGRIX VACCINE (1 of 2) SHIN  
GRIX VACCINE (1 of   
2)                                      [facility]  
   
                                Start: 1998 Tuberculosis screening COLOREC  
COLLINS CANCER   
SCREENING,SEE MODIFIER                  [facility]  
   
                          Start: 1993 DIABETES SCREEN DIABETES SCREEN [facility]  
[facility]  
   
                          Start: 1993 LIPID SCREEN LIPID SCREEN [facility]  
   
                                        Start: 1993   Screening for malign  
ant   
neoplasm of colon                                   [facility]  
   
                          Start: 1988 Mammography  MAMMOGRAM    [facility]  
   
                                        Start: 1967   DTaP/Tdap/Td vaccine  
 (1 -   
Tdap)                                   DTaP/Tdap/Td vaccine (1   
- Tdap)                                 [facility]  
   
                                        Start: 1967   Urine microalbumin   
profile                   DTAP,TDAP,TD (1 - Tdap)   [facility]  
   
                          Start: 1966 Adult BMI Follow Up Plan Adult BMI F  
ollow Up Plan [facility]  
   
                          Start: 1966 Anxiety Screening Anxiety Screening   
[facility]  
   
                          Start: 1966 Depression Screening Depression Scre  
ening [facility]  
   
                          Start: 1966 HEPATITIS C SCREENING HEPATITIS C SC  
REENING [facility]  
   
                          Start: 1966 Hepatitis C screening              B  
ON [facility]  
   
                          Start: 1960 Depression Screen Depression Screen   
[facility]  
   
                          Start: 1960 Depression Screening Depression Scre  
ening [facility]  
   
                          Start: 1948 COVID-19 Vaccine (#1) COVID-19 Vacci  
ne (#1) [facility]  
   
                                        Start: 1948   Medicare Annual Well  
ness   
Visit                                   Medicare Annual Wellness   
Visit                                   [facility]  
   
                                                            CT Cervical spine WO  
 and   
W contrast IV                           CT cervical spine w and   
wo IV contrast Imaging   
Routine Neck pain   
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   
column (CMS/HCC)   
Ordered: 2024                     [facility] [employer]  
Work Phone:   
[phone]  
   
                                        Comment on above:   Ordered: 2024   
   
                                                            CT Lumbar spine WO a  
nd W   
contrast IV                             CT lumbar spine w and wo   
IV contrast Imaging   
Routine Lumbar   
radiculopathy Metastasis   
to spinal column   
(CMS/HCC) Ordered:   
2024                              [facility] [employer]  
   
                                        Comment on above:   Ordered: 2024   
   
                                                            CT Thoracic spine WO  
 and   
W contrast IV                           CT thoracic spine w and   
wo IV contrast Imaging   
Routine Metastasis to   
spinal column (CMS/HCC)   
History of compression   
fracture of spine   
Ordered: 2024                     [facility] [employer]  
   
                                        Comment on above:   Ordered: 2024   
   
                                                            Electrophoresis Prot  
ein,   
Serum                                   Electrophoresis Protein,   
Serum Lab Routine   
2022 11:01 AM EDT                 PADMINI REYES [employer]  
Work Phone:   
[phone]  
   
                                                      
End: 10-                         Radiologic exam knee   
complete 4/more views                   XR KNEE GENERAL 4V AP   
BOTH/PA BOTH/LAT/MERC   
BILAT Radiology Routine   
Pain 1 Occurrences   
starting 2020   
until 10/08/2021                        [facility]  
   
                                        Comment on above:   1 Occurrences starti  
ng 2020 until 10/08/2021   
   
                                                                 [facility]  
   
                                                                 [facility]  
c  
  
  
  
Immunizations  
  
  
                      Immunization Date Immunization Notes      Care Provider Fa  
[facility]  
   
                                        10-          influenza virus vacc  
ine,   
unspecified formulation                             Cj Kennedy NP  
Work Phone:   
[phone]                          [facility]  
   
                                        10-          influenza virus vacc  
ine,   
unspecified formulation                             [facility]ownty  
Work Phone:   
[phone]                           [facility]  
   
                                        2022          pneumococcal conjuga  
te   
vaccine, 13 valent                                  Cj Kennedy NP  
Work Phone:   
[phone]                          [facility]  
   
                                        10-          SARS-CoV-2 (COVID-19  
)   
mRNA-1273 vaccine                                   [employer]  
Work Phone:   
[phone]                           [facility]  
   
                                        2021          influenza virus vacc  
ine,   
unspecified formulation                             [facility]  
Work Phone:   
[phone]                           [facility]  
   
                                        2021          SARS-CoV-2 (COVID-19  
)   
mRNA-1273 vaccine                                   [facility]  
Work Phone:   
[phone]                           [facility]  
   
                                        2021          SARS-CoV-2 (COVID-19  
)   
mRNA-1273 vaccine                                   [facility]  
Work Phone:   
[phone]                           [facility]  
   
                                        Comment on above:   Result Comment: 2022: [id number]   
   
                                        10-          influenza virus vacc  
ine,   
unspecified formulation                             [facility]  
Work Phone:   
[phone]                           [facility]  
   
                                        2019          pneumococcal   
polysaccharide vaccine,   
23 valent                                           [facility]  
Work Phone:   
[phone]                           [facility]  
   
                                        10-          influenza virus vacc  
ine,   
unspecified formulation                             [facility]  
Work Phone:   
[phone]                           [facility]  
   
                                        2018          zoster vaccine   
recombinant                                         [employer]  
Work Phone:   
[phone]                           [facility]  
   
                                        2018          zoster vaccine   
recombinant                                         [employer]  
Work Phone:   
[phone]                           [facility]  
   
                                        10-          influenza virus vacc  
ine,   
unspecified formulation                             [employer]  
Work Phone:   
[phone]                           [facility]  
   
                                        2017          pneumococcal conjuga  
te   
vaccine, 13 valent                                  [employer]  
Work Phone:   
[phone]                           [facility]  
   
                                        10-          influenza virus vacc  
ine,   
unspecified formulation                             [employer]  
Work Phone:   
[phone]                           [facility]  
   
                                        09-          influenza virus vacc  
ine,   
unspecified formulation                             [employer]  
Work Phone:   
[phone]                           [facility]  
   
                                        2015          pneumococcal conjuga  
te   
vaccine, 13 valent                                  [employer]  
Work Phone:   
[phone]                           [facility]  
   
                                        2014          influenza virus vacc  
ine,   
unspecified formulation                             [employer]  
Work Phone:   
[phone]                           [facility]  
   
                                        2013          influenza virus vacc  
ine,   
unspecified formulation                             [employer]  
Work Phone:   
[phone]                           [facility]  
   
                                        2012          Td(adult) unspecifie  
d   
formulation                                         [employer]  
Work Phone:   
[phone]                           [facility]  
   
                                        10-          influenza virus vacc  
ine,   
unspecified formulation                             [employer]  
Work Phone:   
[phone]                           [facility]  
   
                                        1999          influenza virus vacc  
ine,   
unspecified formulation                             Salvador CORCORAN  
Work Phone:   
[phone]                           [facility]  
  
  
  
Payers  
  
  
                          Date         Payer Category Payer        Policy ID  
   
                          08-   Unknown                   [id number]  
   
                          2023   Self-pay                    
   
                                2022      Private Health Insurance MEDICAL  
 MUTUAL Member   
Subscriber Plan / Payer   
(Effective   
2022-Present)   
Name: Ping Chavez   
Member ID: [id number]   
Relation to Subscriber:   
Self Name: Ping Chavez Subscriber ID:   
[id number] Payer ID:   
Not on file Group ID:   
[id number] Type: Not on   
file Address:  [address] [place]   
[address]                              [id number]  
[id number]  
   
                                2020      Unknown         MMO MMO MEDICARE  
   
SUPPLEMENT [id number]   
2020-Present   
Indemnity                               [id number]   
[id number]  
[id number]  
   
                          2017   Medicare                  [id number]  
   
                                2016      Commercial Indemnity MEDICAL MUT  
UAL Member   
Subscriber Plan / Payer   
(Effective   
2016-Present)   
Name: Ping Chavez   
Member ID: [id number]   
Relation to Subscriber:   
Self Name: Ping Chavez Subscriber ID:   
[id number] Payer ID:   
Not on file Group ID:   
[id number] Type: Not on   
file Phone: [phone]   
Address: [address]   
[place] [address]                [id number]  
[id number]  
   
                          2016   Unknown                   [id number]  
[id number]  
[id number]  
   
                                2013      Medicare        MEDICARE MEDICAR  
E A AND   
B [id number]   
2013-Present   
[place]                  [id number]   
[id number]  
[id number]  
   
                          2013   Medicare                  [id number]  
[id number]  
[id number]  
   
                          1960   Medicare                  [id number]  
   
                          1960   Unknown                   [id number]  
   
                          1948   Unknown                   [id number]   
[id number]  
593  
   
                          1948   Unknown                   [id number]   
[id number]  
[id number]  
   
                          1948   Unknown                   [id number]   
[id number]  
727  
   
                          1948   Unknown                   [id number]   
[id number]  
727  
   
                          1948   Unknown                   [id number]   
[id number]  
[id number]  
   
                          1948   Unknown                   [id number]   
[id number]  
727  
   
                          1948   Unknown                   [id number]   
[id number]  
727  
   
                          1948   Unknown                   [id number]   
[id number]  
727  
   
                          1948   Unknown                   [id number]   
[id number]  
727  
   
                          1948   Unknown                   [id number]   
[id number]  
174  
   
                          1948   Unknown                   [id number]   
[id number]  
174  
   
                          1948   Unknown                   [id number]   
[id number]  
174  
   
                          1948   Unknown                   [id number]   
[id number]  
174  
   
                          1948   Unknown                   [id number]   
[id number]  
177  
   
                          1948   Unknown                   [id number]   
[id number]  
[id number]  
   
                          1948   Unknown                   [id number]   
[id number]  
1259  
   
                          1948   Unknown                   [id number]   
[id number]  
1259  
   
                          1948   Unknown                   [id number]   
[id number]  
1259  
   
                          1948   Unknown                   [id number]   
[id number]  
[id number]  
   
                          1948   Unknown                   [id number]   
[id number]  
[id number]   Unknown                   [id number]   
[id number]  
[id number]  
   
                          1948   Unknown                   [id number]   
[id number]  
1286  
   
                          1948   Unknown                   [id number]   
[id number]  
[id number]  
   
                          1948   Unknown                   [id number]   
[id number]  
1286  
   
                          1948   Unknown                   [id number]   
[id number]  
1286  
   
                          1948   Unknown                   [id number]   
[id number]  
[id number]  
   
                          1948   Unknown                   [id number]   
[id number]  
[id number]  
   
                          1948   Unknown                   [id number]   
[id number]  
1286  
   
                          1948   Unknown                   [id number]   
[id number]  
[id number]  
   
                          1948   Unknown                   [id number]   
[id number]  
[id number]  
   
                          1948   Unknown                   [id number]   
[id number]  
[id number]  
   
                          1948   Unknown                   [id number]   
[id number]  
1259  
   
                          1948   Unknown                   [id number]   
[id number]  
1259  
   
                                       Unknown                   [id number]   
[id number]  
531  
   
                                       Unknown                   [id number]   
[id number]  
531  
  
  
  
Social History  
  
  
                          Date         Type         Detail       Facility  
   
                                                            Tobacco smoking stat  
[facility]                      Unknown if ever smoked    [facility]  
   
                          Start: 1950 Sex Assigned At Birth Not on file  C  
[facility]  
   
                                                    Start: 2020  
End: 2020                         Exposure to SARS-CoV-2   
(event)                   Not sure                  [facility]  
   
                                       Tobacco                   [facility]  
   
                                        Comment on above:   denies   
   
                                                    Start: 2023  
End: 2024     Sex Assigned At Birth Female              [facility]  
   
                                                            Tobacco smoking stat  
[facility]                                    Tobacco smoking   
consumption unknown                     [facility] [employer] Phone:   
[phone]  
   
                                                Tobacco smoking status No Smokin  
g Status   
Entered                                 [facility]  
   
                          Start: 1950 Sex Assigned At Birth Female       [facility]  
   
                                                    Start: 2023  
End: 2023           Tobacco smoking status    Never smoked tobacco   
(finding)                               [facility]   
[facility]  
   
                                        Comment on above:   denies   
   
                                        Start: 2022   Tobacco use and   
exposure                                Smokeless tobacco   
non-user                                [facility]   
System  
   
                                                    Start: 2023  
End: 2024           Alcohol intake            Current drinker of   
alcohol (finding)                       [facility]   
System  
   
                                                    Start: 2023  
End: 2024                         History of Social   
function                                            [facility]  
   
                                       Housing Instability Unknown      [facility]  
a Health   
System  
   
                          Start: 2021 Alcohol Comment BEER ONE A WEEK [facility]   
System  
   
                          Start: 2015 Sex          Female (finding) [facility]   
System  
   
                                                            How often to you hav  
e a   
drink containing   
alcohol?                  2-4 times a month         [facility]  
   
                                                            How many standard   
drinks containing   
alcohol do you have on   
a typical day?            1 or 2                    [facility]  
  
  
  
Medical Equipment  
  
  
                                Procedure Code  Equipment Code  Equipment Origin  
al   
Text                      Equipment Identifier      Dates  
   
                                            Biv Icd-10/20/2016 [id number]  Start:  
   
10-  
  
  
  
Functional Status  
  
  
                          Date         Assessment   Result       Facility  
   
                          2023   Functional Status N/A          [facility]  
   
                          2022   Functional Status N/A          [facility]  
   
                          2022   Functional Status N/A          [facility]  
   
                          2022   Functional Status N/A          [facility]  
   
                          2022   Functional Status              [facility]  
   
                          2022   Functional Status N/A          [facility]  
  
  
  
Clinical Notes 2021 to 2024  
Cj Kennedy NP - 2024 4:20 PM ESTTelephone Encounter - Mamta Gutierrez RN - 10/23/2024 3:43 AM EDTTelephone Encounter - Mamta Gutierrez RN - 
10/23/2024 3:43 AM EDT  
  
                                Note Date & Type Note            Facility  
   
                                                    2024 History of   
Present illness Narrative               Formatting of this note is   
different from the original.  
Images from the original note   
were not included.  
  
  
Chief Complaint  
Patient presents with  
Back Pain  
Neck Pain  
Restless Legs  
  
Subjective  
Ping Chavez, 74 y.o., female  
Patient is here for follow up to   
RLS and neck/ back pain. Patient   
declined to complete CT's of   
thoracic and cervical spine. She   
admits that she has been seen by   
oncology, Dr. Mowat since last   
seen here. She states that she   
had a nuclear bone scan with them   
and is awaiting the results.   
States she was told it may be 3-4   
weeks before she hears back. She   
admits sciatica pain has not been   
too bothersome lately. States   
symptoms are worse on the left   
but can be bilateral. She   
continues on Gabapentin   
100-200-300 mg daily and states   
that she feels this is   
beneficial. She denies any saddle   
anesthesias or loss of bowel or   
bladder control. Denies any   
falls. She denies RLS being   
bothersome recently. Iron level   
is being monitored by PCP.   
Increased activity can induce   
neck pain. She denies any   
radiating symptoms with this or   
accompanying weakness. The   
tingling in her fingertips has   
not been significant. She denies   
dropping things. Denies any other   
concerns.  
  
Review of Systems  
Constitutional: Negative for   
appetite change, fatigue and   
fever.  
Respiratory: Negative for cough,   
shortness of breath and wheezing.  
Cardiovascular: Negative for   
chest pain, palpitations and leg   
swelling.  
Gastrointestinal: Negative for   
abdominal pain, constipation,   
diarrhea and nausea.  
Musculoskeletal: Positive for   
arthralgias, back pain, myalgias   
and neck pain. Negative for gait   
problem.  
Neurological: Negative for   
dizziness, tremors, numbness and   
headaches.  
Positive for tingling in the   
distal extremities.  
  
  
  
Past Medical History:  
Diagnosis Date  
A-fib (CMS/HCC)  
Blood clotting tendency (CMS/HCC)  
Breast cancer (CMS/HCC)  
Cancer (CMS/HCC)  
GERD (gastroesophageal reflux   
disease)  
Gout  
Heart disease  
HTN (hypertension) (CMS/HCC)  
Hypothyroidism (CMS/HCC)  
Neck pain  
Osteoarthritis  
Thyroid disease (CMS/HCC)  
  
Past Surgical History:  
Procedure Laterality Date  
APPENDECTOMY  
CARDIAC DEVICE CHECK -   
IMPLANT/POST-PROCEDURE  
pacemaker  
CHOLECYSTECTOMY  
INSERT / REPLACE / REMOVE   
PACEMAKER  
KNEE SURGERY  
MASTECTOMY  
OTHER SURGICAL HISTORY  
Dermatopathic Lymphadentitis   
under left arm  
NH ARTHROTOMY,OPEN REPAIR   
MENISCUS  
meniscus repair  
TONSILLECTOMY  
  
Family History  
Problem Relation Name Age of   
Onset  
Stroke Mother  
Hypertension Mother  
Heart disease Mother  
Diabetes Mother  
Hypertension Father  
Heart disease Father  
Stroke Father  
Cancer Sibling  
  
Social History  
  
Tobacco Use  
Smoking status: Never  
Smokeless tobacco: Not on file  
Substance Use Topics  
Alcohol use: Yes  
  
Allergies: Cefaclor  
  
Vitals:  
24 1611  
BP: 137/76  
Pulse: 67  
  
Body mass index is 33.6 kg/m .   
weight: 221 lb  
  
Neurologic exam:  
Mental status:  
Well nourished, well developed   
and in no acute distress.  
Grossly oriented to person, place   
and time.  
Recent and remote memory are   
intact.  
Language is fluent without   
aphasia.  
Attention and concentration are   
normal.  
Fund of knowledge is appropriate   
for level of education.  
  
Cranial nerves:  
CN II: Visual acuity is normal.   
Visual fields full to   
confrontation.  
CN III, IV, VI: pupils equal   
round and reactive to light.   
Extraocular movements intact. No   
ptosis present.  
CN V: Facial sensation is normal.  
CN VII: Full and symmetric facial   
movement.  
CN VIII: Hearing is intact.  
CN IX and X: Palate elevates   
symmetrically.  
CN XI: Shoulder shrug is normal   
bilaterally.  
CN XII: Tongue is midline without   
atrophy or fasciculation.  
  
Motor:  
RUE Strength deltoid, , biceps ,   
triceps , wrist extensors , wrist   
flexor ,  strength 5/5.  
LUE Strength deltoid , biceps ,   
triceps , wrist extensors , wrist   
flexor ,  strength 5/5.  
RLE Strength illopsoas,   
quadriceps, tibialis anterior,   
and gastrocnemius strength 5/5.  
LLE Strength illopsoas,   
quadriceps, tibialis anterior,   
and gastrocnemius strength 5/5.  
Normal tone x4 extremities. Bulk   
is normal.  
  
Sensory:  
Sensation is intact to light   
touch throughout distal   
extremities.  
Vibratory sensation diminished in   
distal extremities.  
  
Reflexes:  
RUE biceps reflex 1+ ,   
brachioradialis reflex 1+.  
LUE biceps reflex 1+ ,   
brachioradialis reflex 1+.  
RLE knee reflex 2+.  
LLE knee reflex 2+.  
Lazaro's Sign negative  
  
Coordination:  
Finger-to-nose testing is normal  
Rapid alternating movements are   
normal  
  
Gait:  
Normal  
  
Review and summary of old   
records:  
Labs on 24: Creatinine 1.15,   
GFR 46, BUN 28  
CT of the lumbar spine with and   
without contrast on 24:   
lumbar level scoliosis without   
segmental anomaly is unchanged,   
measuring 23 degrees.   
Discovertebral degenerative   
changes from L2 to S1 without   
central spinal stenosis or   
significant neural foraminal   
stenosis. Mild to moderate facet   
arthopathy from L3 to S1. Several   
small radiolucencies from L2-L5   
were unchanged. Radiologist   
comment: I am doubtful that these   
radiolucencies results for   
metastatic disease  
Past oncology note dated 22:   
Bone scan whole body 5/10/22.   
Normal  
LABS on 23: Ferritin 65  
CT thoracic spine with and   
without contrast from 22: T7   
marked compression fracture with   
complete loss of height; post   
traumatic versus pathologic.   
Retropulsion of the posterior   
wall causing marked central canal   
narrowing. There are a lot of   
degenerative changes from T5-6.   
There are also chronic and mild   
compression fractures suspected   
in T8, T9, T11.  
CT lumbar spine with and without   
contrast from 22: Several   
areas with an L2-L5 vertebral   
body suspicious for metastatic   
disease. Consider nuclear   
medicine whole body bone scan for   
evaluation of all skeletal   
structures. Multiple market   
degenerative disc disease and   
degenerative facet arthropathy.   
(metastatic concerns reviewed by   
Dr. Mowat in [place])  
CT cervical spine without   
contrast from September 15, 2021   
showed multilevel degenerative   
changes most notable at C5-6,   
remote traumatic injury of the C2   
vertebral body with stable   
configuration of the C2 vertebral   
body compared to ,   
heterogeneous marrow but no   
aggressive osseous lesions   
identified.  
  
Assessment/Plan  
Diagnoses and all orders for this   
visit:  
Lumbar radiculopathy  
The patient does have lumbar   
degenerative disc disease evident   
on previous lumbar CT and has   
symptoms consistent with   
consistent with an L5   
radiculopathy on the left. She is   
unable to have MRI due to   
incompatible AICD. Lumbar CT in   
 with findings concerning for   
metastatic disease. She was   
evaluated for this by oncology   
following this (Dr. Mowat in   
[place]) and we did recommend she   
reach out to them for additional   
evaluation given her reports of   
worsening symptoms. Initially she   
did not do this so we did update   
lumbar imaging. CT on 24   
redemonstrates degenerative   
findings without evidence of   
significant stenosis or   
neuroforaminal narrowing and   
radiology felt that   
radiolucencies were not   
consistent with metastatic   
disease. Per patient radicular   
symptoms have improved since last   
visit and she is again following   
with oncology.  
PLAN:  
- The patient admits to recent   
nuclear bone scan and recent   
follow up with oncology, Dr. Mowat. Per patient results are   
pending.  
- We have discussed possible   
further increase in gabapentin   
versus possible epidural   
injection in the future should   
symptoms again worsen following   
oncology evaluation. Overall, she   
feels symptoms have improved so   
we will continue to monitor and   
continue gabapentin as per below  
  
Metastasis to spinal column   
(CMS/HCC)  
History of breast cancer with   
metastases to spine s/p cervical   
spine radiation (and   
chemotherapy) circa . The   
patient does admit to various   
locations of pain throughout the   
spine. As such we did order   
repeat imaging of the cervical,   
thoracic and lumbar spine.   
However the patient only   
completed lumbar CT. She is again   
following with oncology and   
admits to recent nuclear bone   
scan.  
PLAN:  
- Will request oncology note with   
Dr. Mowat for review at follow   
up.  
  
RLS (restless legs syndrome)  
Patient reports symptoms   
consistent with nighttime   
restless legs. Could be related   
to lumbar radiculopathy and   
neuropathy. Though ferritin level   
on 23 was below goal of 75.   
Gabapentin does seem to help at   
least in part. The patient is   
inconsistent with taking iron and   
again admits to stopping this due   
to improvement in RLS symptoms.  
PLAN:  
- Recommend close follow up with   
PCP for ongoing monitoring of   
iron levels.  
- Continue gabapentin  
  
Neuropathy  
History of breast cancer (and   
chemotherapy) circa . Likely   
causative for neuropathy and   
neuropathic pain. Pain seems to   
be worsening primarily to the   
tops of her bilateral feet,   
burning in quality.  
PLAN:  
- Continue gabapentin 100 mg in   
the am, 200 mg in the evening,   
and 300 mg at bedtime. OARRs   
reviewed. Currently tolerating   
without side effects.  
  
Neck pain  
Chronic neck pain at   
cervicothoracic junction, which   
radiates slightly outward. She   
has had evaluation by oncology   
for the concerning findings on   
previous imaging in . She   
does have significant muscle   
tension affecting the bilateral   
trapezius muscles as well.   
However trigger injections were   
not beneficial for a meaningful   
duration and muscle relaxers were   
not tolerated. Given her ongoing   
complaints, significant history   
and lack of repeat imaging in 2   
years we did order updated   
imaging but the patient opted not   
to proceed with this.  
PLAN:  
- See plan as per above  
  
History of compression fracture   
of spine  
History of compression fracture   
at T7; she was seen by [facility] and   
they didn't think surgery was   
indicated.  
  
Follow up in 2-3 months or sooner   
if symptoms worsen, fail to   
improve, or should a new   
neurological concern arise.  
  
Pt has been fully educated on   
their diagnosis, treatment   
options, follow up plan, and   
return instructions  
  
HPI  
Electronically signed by Cj Kennedy NP at 2024 4:50 PM   
EST  
documented in this encounter            [facility]  
   
                                                    10- Miscellaneous   
Notes                                   Formatting of this note might be   
different from the original.  
10/3/24 ov  
10/17/24 BMP  
9/3/24 TONE PIPER  
  
  
Electronically signed by Mamta Gutierrez RN at 10/23/2024 10:28   
AM EDT  
documented in this encounter            [facility]  
   
                                                    10- Telephone   
encounter Note                          Formatting of this note might be   
different from the original.  
10/3/24 ov  
10/17/24 BMP  
9/3/24 TONE PIPER  
  
  
Electronically signed by Mamta Gutierrez RN at 10/23/2024 10:28   
AM EDT  
                                        [facility]  
   
                                                    10- Miscellaneous   
Notes                                   Formatting of this note might be   
different from the original.  
noted  
Electronically signed by Cj Kennedy NP at 10/22/2024 12:17 PM   
EDT  
Formatting of this note might be   
different from the original.  
PATIENT CALLS TO INFORM YOU THAT   
SHE DOES NOT WISH TO PROCEED WITH   
THE CT SCAN AT THIS TIME. SHE DID   
SCHEDULED A FOLLOW UP APPT ON   
 AT 4:20  
Electronically signed by Marcella Harris at 10/22/2024 12:03 PM   
EDT  
documented in this encounter            [facility]  
   
                                                    10- Telephone   
encounter Note                          Formatting of this note might be   
different from the original.  
noted  
Electronically signed by Cj Kennedy NP at 10/22/2024 12:17 PM   
EDT  
                                        [facility]  
   
                                                    10- Telephone   
encounter Note                          Formatting of this note might be   
different from the original.  
PATIENT CALLS TO INFORM YOU THAT   
SHE DOES NOT WISH TO PROCEED WITH   
THE CT SCAN AT THIS TIME. SHE DID   
SCHEDULED A FOLLOW UP APPT ON   
 AT 4:20  
Electronically signed by Marcella Harris at 10/22/2024 12:03 PM   
EDT  
                                        [facility]  
   
                                                    10- History of   
Present illness Narrative               Formatting of this note is   
different from the original.  
Pnig Chavez  
  
Date of visit: 10/3/2024 YOB: 1950  
Age: 74 y.o. MRN: [id number]  
_______________________  
Patient Active Problem List  
Diagnosis  
Automatic implantable   
cardioverter-defibrillator in   
situ [facility].  
Cancer (CMS-HCC)  
Gout  
Dyslipidemia  
Dyspnea  
Edema  
Dizziness  
Vertigo  
Left bundle branch block (LBBB)  
Benign essential hypertension  
Nonischemic congestive   
cardiomyopathy (CMS-HCC)  
Overweight  
Atrial fibrillation (CMS-HCC)  
CHF (congestive heart failure)   
(CMS-[facility])  
Hypertension  
Hyperlipemia  
  
_______________________  
Allergies  
Allergen Reactions  
Cefaclor  
Other reaction(s):   
Intolerance-unknown  
  
_______________________  
Current Outpatient Medications  
Medication Sig Dispense Refill  
acetaminophen (TYLENOL) 325 mg   
tablet Take 2 tablets (650 mg   
total) by mouth every 6 (six)   
hours as needed for pain.  
allopurinol (ZYLOPRIM) 100 mg   
tablet Take 1 tablet (100 mg   
total) by mouth in the morning.  
calcium carbonate (CALCIUM 500   
ORAL) Take by mouth daily.  
carvediloL (COREG) 6.25 mg tablet   
Take 1 tablet in the morning, 2   
tablets in the evening 90 tablet   
3  
cholecalciferol, vitamin D3,   
2,000 units tablet Take 1 tablet   
(2,000 Units total) by mouth in   
the morning.  
empagliflozin (JARDIANCE) 10 mg   
tablet tablet Take 1 tablet (10   
mg total) by mouth in the   
morning. 30 tablet 11  
esomeprazole (NexIUM) 20 mg   
capsule Take 1 capsule (20 mg   
total) by mouth in the evening.  
ferrous sulfate 325 (65 FE) mg EC   
tablet Take 1 tablet (325 mg   
total) by mouth every other day.  
furosemide (LASIX) 20 mg tablet   
Take 1 tablet (20 mg total) by   
mouth as needed (fluid wt gain).   
Take 1 tab if fluid wt gain of 3#   
or more in 1 day or 4-5# in 4   
days  
gabapentin (NEURONTIN) 100 mg   
capsule Take 1 capsule (100 mg   
total) by mouth 3 (three) times a   
day.  
levothyroxine (SYNTHROID,   
LEVOTHROID) 50 MCG tablet Take 1   
tablet (50 mcg total) by mouth in   
the morning.  
loratadine (CLARITIN) 10 mg   
tablet Take 1 tablet (10 mg   
total) by mouth as needed for   
allergies.  
losartan (COZAAR) 25 mg tablet   
Take 0.5 tablets (12.5 mg total)   
by mouth in the morning.  
magnesium oxide 500 mg tablet   
Take 1 tablet (500 mg total) by   
mouth in the morning.  
multivit-iron-FA-calcium &mins   
(THERAGRAN-M) 9 mg iron-400 mcg   
tablet Take 1 tablet by mouth in   
the morning.  
omega-3/dha/epa/dpa/fish oil   
(OMEGA-3 2100 ORAL) Take by mouth   
2 (two) times a day.  
  
sertraline (ZOLOFT) 25 mg tablet   
Take 1 tablet (25 mg total) by   
mouth in the morning.  
simvastatin (ZOCOR) 40 mg tablet   
Take 1 tablet (40 mg total) by   
mouth nightly.  
spironolactone (ALDACTONE) 25 mg   
tablet Take 1 tablet (25 mg   
total) by mouth in the morning.   
90 tablet 2  
timolol (TIMOPTIC) 0.5 %   
ophthalmic solution Administer 1   
drop to both eyes in the morning   
and 1 drop before bedtime. 15 mL   
3  
XARELTO 20 mg tablet tablet TAKE   
1 TABLET(20 MG) BY MOUTH IN THE   
MORNING 30 tablet 9  
L.acid/B.bifidum/B.animal/FOS   
(PROBIOTIC COMPLEX ORAL) Take by   
mouth daily. (Patient not taking:   
Reported on 10/3/2024)  
  
No current facility-administered   
medications for this visit.  
  
_______________________  
Chief Complaint  
Patient presents with  
Device Check  
Follow-up  
6MO W/DEVICE, SCHED W/PT  
  
_______________________  
History of Present Illness  
Ping was seen today for   
follow-up of her chronic systolic   
heart failure and nonischemic   
cardiomyopathy. She has   
underlying complete heart block   
and has a ICD device. She is here   
for device check.  
  
She has not any recent orthopnea   
or paroxysmal nocturnal dyspnea.   
She does get short of breath with   
increased activity.  
  
She has done well with   
goal-directed medical therapy,   
and takes furosemide on an   
as-needed basis.  
_______________________  
Past Medical History:  
Diagnosis Date  
Cancer (CMS-[facility])  
BREAST  
Cardiomyopathy  
CHF (congestive heart failure)   
(CMS-[facility])  
Dizziness  
Dyslipidemia  
Dyspnea  
Edema  
Glaucoma  
Gout  
History of YAG laser capsulotomy   
of lens of right eye-Dr. Dolan   
2018  
History of YAG laser capsulotomy   
of lens, left-Dr. Dolan   
2018  
Hypertension  
LBBB (left bundle branch block)  
PVD (posterior vitreous   
detachment)  
Vertigo  
  
No data recorded  
_______________________  
Past Surgical History:  
Procedure Laterality Date  
APPENDECTOMY  
BREAST SURGERY  
CARDIAC DEFIBRILLATOR PLACEMENT   
2013  
[place] Bi[facility] ICD/ Generator   
replacement: 10/20/2016  
CATARACT EXTRACTION W/   
INTRAOCULAR LENS IMPLANT   
Bilateral   
CHOLECYSTECTOMY  
CHOLECYSTECTOMY  
TONSILLECTOMY  
W/ADENOIDS  
  
_______________________  
Family History  
Problem Relation Age of Onset  
Hypertension Mother  
Coronary artery disease Father  
  
_______________________  
Social History  
  
Socioeconomic History  
Marital status:   
Spouse name: Not on file  
Number of children: Not on file  
Years of education: Not on file  
Highest education level: Not on   
file  
Occupational History  
Not on file  
Tobacco Use  
Smoking status: Never  
Smokeless tobacco: Never  
Vaping Use  
Vaping status: Never Used  
Substance and Sexual Activity  
Alcohol use: Yes  
Comment: BEER ONE A WEEK  
Drug use: No  
Sexual activity: Defer  
Other Topics Concern  
Caffeine Use Yes  
Comment: COFFEE RARE  
Social History Narrative  
Not on file  
  
Social Determinants of Health  
  
Financial Resource Strain: Not on   
file  
Food Insecurity: No Food   
Insecurity (2023)  
Hunger Screening  
Food Insecurity - Worry: Never   
True  
Food Insecurity - Inability:   
Never True  
Transportation Needs: Not on file  
Physical Activity: Not on file  
Stress: Not on file  
Social Connections: Not on file  
Interpersonal Safety: Not on file  
Housing Instability: Not on file  
  
_______________________  
Review of Systems  
Review of Systems  
Constitutional: Negative for   
malaise/fatigue.  
HENT: Negative for nosebleeds.  
Respiratory: Positive for   
shortness of breath. Negative for   
cough and wheezing.  
Hematologic/Lymphatic: Does not   
bruise/bleed easily.  
Musculoskeletal: Positive for   
arthritis, joint pain and joint   
swelling. Negative for muscle   
cramps and muscle weakness.  
Gastrointestinal: Positive for   
diarrhea (occ). Negative for   
bloating, abdominal pain,   
constipation, heartburn,   
hematochezia, nausea and   
vomiting.  
Genitourinary: Negative for   
hematuria.  
Neurological: Negative for   
dizziness, headaches and   
light-headedness.  
Vascular: Negative for   
claudication and lower extremity   
wounds or ulcers.  
  
CARDIOVASCULAR: Please review   
HPI.  
_______________________  
Physical Examination  
General appearance: Alert,   
oriented and cooperative. In no   
acute distress.  
Skin: Warm and dry to touch.  
Head: Normocephalic, without   
obvious abnormality, atraumatic.  
Ears, Nose, Mouth, Throat: Throat   
clear without erythema or   
exudate. Dentition intact.  
Eyes: Conjunctivae unremarkable,   
EOM intact.  
Neck: No JVD, No carotid bruit.   
Neck supple, trachea midline.  
Respiratory: Clear to   
auscultation bilaterally, no use   
of accessory muscles.  
Cardiovascular: RRR with normal   
S1 and S2 with no murmurs.  
Gastrointestinal: Soft,   
non-tender. Bowel sounds normal.  
Musculoskeletal: No peripheral   
edema.  
Neurologic: Oriented to time,   
person and place, affect   
appropriate. No focal/major motor   
defects noted.  
Psychiatric: Appropriate mood,   
memory and judgement.  
_______________________  
VITAL SIGNS:  
/82   Pulse 70   Ht 175.3   
cm (5' 9.02 )   Wt 100.7 kg (222   
lb)   SpO2 94%   BMI 32.77 kg/m  
_______________________  
No orders of the defined types   
were placed in this encounter.  
  
Medications Discontinued During   
This Encounter  
Medication Reason  
RAMIRO ELLIPTA 200-62.5-25 mcg   
blister with device  
  
_______________________  
[id number] Score: 4  
4.8% Stroke risk per year, 6.7%   
risk of stroke/TIA/systemic   
embolism  
______________  
_________  
IMPRESSIONS/PLAN  
1. Persistent atrial fibrillation   
(CMS-HCC)  
  
2. Chronic combined systolic and   
diastolic congestive heart   
failure (CMS-HCC)  
  
3. Left bundle branch block   
(LBBB)  
  
Her device check reveals   
appropriate sensing and is 100%   
biventricular paced.  
  
She will continue current medical   
therapy. She is planning a trip   
to [place] in the near future   
and has Lasix as needed.  
  
Her battery longevity estimate is   
11 months, and she will establish   
with an electrophysiologist at   
next visit and device check in 6   
months.  
  
Follow-up is arranged  
_______________________  
TODAYS ORDERS  
No orders of the defined types   
were placed in this encounter.  
  
_______________________  
FOLLOW UP  
Return in about 6 months (around   
4/3/2025).  
  
PCP: Alexander Guerra MD  
Referring Physician: Alexander Guerra MD  
[address] [place] [address]  
  
  
Electronically signed by Angel Gerardo DO at 10/03/2024 10:56   
AM EDT  
documented in this encounter            [facility]  
   
                                                    10- History of   
Present illness Narrative               Formatting of this note might be   
different from the original.  
I agree with the findings in the   
scanned document.  
Electronically signed by James Vazquez MD at 10/04/2024 1:43 AM EDT  
documented in this encounter            [facility]  
   
                                                    2024 Telephone   
encounter Note                          Formatting of this note might be   
different from the original.  
OARRs reviewed. Rx.  
Electronically signed by Cj Kennedy NP at 2024 4:09 PM   
EDT  
                                        [facility]  
   
                                                    2024 Miscellaneous   
Notes                                   Formatting of this note might be   
different from the original.  
OARRs reviewed. Rx.  
Electronically signed by Cj Kennedy NP at 2024 4:09 PM   
EDT  
Formatting of this note might be   
different from the original.  
Patient was seen yesterday but Rx   
for Gabapentin 100mg (1 in the   
morning, 2 in the afternoon, 3 at   
night) didn't get sent... Patient   
would like that sent to her   
pharmacy.  
Electronically signed by MECHELLE Rousseau at 2024 3:50 PM   
EDT  
documented in this encounter            [facility]  
   
                                                    2024 Telephone   
encounter Note                          Formatting of this note might be   
different from the original.  
Patient was seen yesterday but Rx   
for Gabapentin 100mg (1 in the   
morning, 2 in the afternoon, 3 at   
night) didn't get sent... Patient   
would like that sent to her   
pharmacy.  
Electronically signed by MECHELLE Rousseau at 2024 3:50 PM   
EDT  
                                        [facility]  
   
                                                    2024 History of   
Present illness Narrative               Formatting of this note is   
different from the original.  
Images from the original note   
were not included.  
  
  
Chief Complaint: lumbar   
radiculopathy, neuropathy, RLS  
  
Subjective  
Ping Chavez, 74 y.o., female  
Patient is here for follow up to   
RLS and neck/ back pain. Admits   
CT of lumbar spine. She did not   
complete the CT of the cervical   
and thoracic spine. She admits   
left sided sciatica pain has been   
better. She continues on   
gabapentin 100-200-300 mg daily   
and states that she feels this is   
beneficial. She denies any saddle   
anesthesias or loss of bowel or   
bladder control. Denies any   
falls. She denies RLS being   
particularly bothersome recently.   
Increased activity can induce   
neck pain. She denies any   
radiating symptoms with this or   
accompanying weakness. The   
tingling in her fingertips has   
not been significant. Denies any   
other concerns.  
  
Review of Systems  
Constitutional: Negative for   
appetite change, fatigue and   
fever.  
Respiratory: Negative for cough,   
shortness of breath and wheezing.  
Cardiovascular: Negative for   
chest pain, palpitations and leg   
swelling.  
Gastrointestinal: Negative for   
abdominal pain, constipation,   
diarrhea and nausea.  
Musculoskeletal: Positive for   
arthralgias, back pain, myalgias   
and neck pain. Negative for gait   
problem.  
Neurological: Negative for   
dizziness, tremors, numbness and   
headaches.  
Positive for tingling in the   
distal extremities.  
  
  
  
Past Medical History:  
Diagnosis Date  
A-fib (CMS/HCC)  
Blood clotting tendency (CMS/HCC)  
Breast cancer (CMS/HCC)  
Cancer (CMS/HCC)  
GERD (gastroesophageal reflux   
disease)  
Gout  
Heart disease  
HTN (hypertension) (CMS/HCC)  
Hypothyroidism (CMS/HCC)  
Neck pain  
Osteoarthritis  
Thyroid disease (CMS/HCC)  
  
Past Surgical History:  
Procedure Laterality Date  
APPENDECTOMY  
CARDIAC DEVICE CHECK -   
IMPLANT/POST-PROCEDURE  
pacemaker  
CHOLECYSTECTOMY  
INSERT / REPLACE / REMOVE   
PACEMAKER  
KNEE SURGERY  
MASTECTOMY  
OTHER SURGICAL HISTORY  
Dermatopathic Lymphadentitis   
under left arm  
NH ARTHROTOMY,OPEN REPAIR   
MENISCUS  
meniscus repair  
TONSILLECTOMY  
  
Family History  
Problem Relation Name Age of   
Onset  
Stroke Mother  
Hypertension Mother  
Heart disease Mother  
Diabetes Mother  
Hypertension Father  
Heart disease Father  
Stroke Father  
Cancer Sibling  
  
Social History  
  
Tobacco Use  
Smoking status: Never  
Smokeless tobacco: Not on file  
Substance Use Topics  
Alcohol use: Yes  
  
Allergies: Cefaclor  
  
Vitals:  
24 1336  
BP: 127/80  
Pulse: 81  
  
Body mass index is 31.47 kg/m .   
weight: 207 lb  
  
Neurologic exam:  
Mental status:  
Well nourished, well developed   
and in no acute distress. Grossly   
oriented to person, place and   
time. Recent and remote memory   
are intact. Language is fluent   
without aphasia. Attention and   
concentration are normal. Fund of   
knowledge is appropriate for   
level of education.  
  
Cranial nerves:  
CN II: Visual acuity is normal.   
Visual fields full to   
confrontation.  
CN III, IV, VI: pupils equal   
round and reactive to light.   
Extraocular movements intact. No   
ptosis present.  
CN V: Facial sensation is normal.  
CN VII: Full and symmetric facial   
movement.  
CN VIII: Hearing is intact.  
CN IX and X: Palate elevates   
symmetrically.  
CN XI: Shoulder shrug is normal   
bilaterally.  
CN XII: Tongue is midline without   
atrophy or fasciculation.  
  
Motor:  
RUE Strength deltoid, , biceps ,   
triceps , wrist extensors , wrist   
flexor ,  strength 5/5.  
LUE Strength deltoid , biceps ,   
triceps , wrist extensors , wrist   
flexor ,  strength 5/5.  
RLE Strength illopsoas,   
quadriceps, tibialis anterior,   
and gastrocnemius strength 5/5.  
LLE Strength illopsoas,   
quadriceps, tibialis anterior,   
and gastrocnemius strength 5/5.  
Normal tone x4 extremities.  
  
Sensory:  
Sensation is intact to light   
touch throughout distal   
extremities.  
Vibratory sensation diminished in   
distal extremities.  
  
Reflexes:  
RUE biceps reflex 1+ ,   
brachioradialis reflex 1+.  
LUE biceps reflex 1+ ,   
brachioradialis reflex 1+.  
RLE knee reflex 2+.  
LLE knee reflex 2+.  
Lazaro's Sign negative  
  
Coordination:  
Finger-to-nose testing is normal  
Rapid alternating movements are   
normal  
  
Gait:  
Normal  
  
Review and summary of old   
records:  
Labs on 24: Creatinine 1.15,   
GFR 46, BUN 28  
CT of the lumbar spine with and   
without contrast on 24:   
lumbar level scoliosis without   
segmental anomaly is unchanged,   
measuring 23 degrees.   
Discovertebral degenerative   
changes from L2 to S1 without   
central spinal stenosis or   
significant neural foraminal   
stenosis. Mild to moderate facet   
arthopathy from L3 to S1. Several   
small radiolucencies from L2-L5   
were unchanged. Radiologist   
comment: I am doubtful that these   
radiolucencies results for   
metastatic disease  
Past oncology note dated 22:   
Bone scan whole body 5/10/22.   
Normal  
LABS on 23: Ferritin 65  
CT thoracic spine with and   
without contrast from 22: T7   
marked compression fracture with   
complete loss of height; post   
traumatic versus pathologic.   
Retropulsion of the posterior   
wall causing marked central canal   
narrowing. There are a lot of   
degenerative changes from T5-6.   
There are also chronic and mild   
compression fractures suspected   
in T8, T9, T11.  
CT lumbar spine with and without   
contrast from 22: Several   
areas with an L2-L5 vertebral   
body suspicious for metastatic   
disease. Consider nuclear   
medicine whole body bone scan for   
evaluation of all skeletal   
structures. Multiple market   
degenerative disc disease and   
degenerative facet arthropathy.   
(metastatic concerns reviewed by   
Dr. Mowat in [place])  
CT cervical spine without   
contrast from September 15, 2021   
showed multilevel degenerative   
changes most notable at C5-6,   
remote traumatic injury of the C2   
vertebral body with stable   
configuration of the C2 vertebral   
body compared to 2012,   
heterogeneous marrow but no   
aggressive osseous lesions   
identified.  
  
Assessment/Plan  
Diagnoses and all orders for this   
visit:  
Lumbar radiculopathy  
The patient does have lumbar   
degenerative disc disease evident   
on previous lumbar CT and has   
symptoms consistent with   
consistent with an L5   
radiculopathy on the left. She is   
unable to have MRI due to   
incompatible AICD. Lumbar CT in   
 with findings concerning for   
metastatic disease. She was   
evaluated for this by oncology   
following this (Dr. Mowat in   
[place]). Despite recommendations   
to again follow up with them she   
has not scheduled. Given her   
significant clinical history and   
reports of worsening pain we did   
update lumbar imaging. CT on   
24 redemonstrates   
degenerative findings without   
evidence of significant stenosis   
or neuroforaminal narrowing and   
radiology felt that   
radiolucencies were not   
consistent with metastatic   
disease. Per patient radicular   
symptoms have improved since last   
visit.  
PLAN:  
- CT of the lumbar spine was   
reviewed with the patient today  
- I have again advised her to   
reach out to oncology and give an   
update on her symptoms given her   
very significant past history of   
cancer. We have sent a message to   
their office regarding our   
concerns previously.  
- Continue to monitor clinically   
and continue gabapentin as per   
below  
  
Metastasis to spinal column   
(CMS/HCC)  
History of breast cancer with   
metastases to spine s/p cervical   
spine radiation (and   
chemotherapy) circa . The   
patient does admit to various   
locations of pain throughout the   
spine. As such we did order   
imaging however it seems the   
patient only completed lumbar CT.  
PLAN:  
- CT of the cervical and thoracic   
spine with and without contrast   
were ordered at her last visit   
(Cannot have MRI due to   
incompatible AICD). I have   
advised her to reach out to   
cardiology/PCP regarding the   
contrast and concerns with   
decreased GFR. If unable to   
complete these with contrast the   
patient will contact oncology for   
further recommendations and hold   
of on the CT scans all together.  
  
RLS (restless legs syndrome)  
Patient reports symptoms   
consistent with nighttime   
restless legs. Could be related   
to lumbar radiculopathy and   
neuropathy. Though ferritin level   
on 23 was below goal of 75.   
Gabapentin does seem to help at   
least in part. The patient is   
inconsistent with taking iron and   
again admits to stopping this due   
to improvement in RLS symptoms.  
PLAN:  
- Recommend close follow up with   
PCP for ongoing monitoring of   
iron levels.  
- Continue gabapentin  
  
Neuropathy  
History of breast cancer (and   
chemotherapy) circa . Likely   
causative for neuropathy and   
neuropathic pain. Pain seems to   
be worsening primarily to the   
tops of her bilateral feet,   
burning in quality.  
PLAN:  
- Continue gabapentin 100 mg in   
the am, 200 mg in the evening,   
and 300 mg at bedtime. OARRs   
reviewed. Currently tolerating   
without side effects.  
  
Neck pain  
Chronic neck pain at   
cervicothoracic junction, which   
radiates slightly outward. She   
has had evaluation by oncology   
for the concerning findings on   
previous imaging in . She   
does have significant muscle   
tension affecting the bilateral   
trapezius muscles as well.   
However trigger injections were   
not beneficial for a meaningful   
duration and muscle relaxers were   
not tolerated. Given her ongoing   
complaints, significant history   
and lack of repeat imaging in 2   
years we did order updated   
imaging.  
PLAN:  
- See plan as per above  
  
History of compression fracture   
of spine  
History of compression fracture   
at T7; she was seen by [facility] and   
they didn't think surgery was   
indicated.  
  
Follow up in 1 month following   
CT's or sooner if symptoms   
worsen, fail to improve, or   
should a new neurological concern   
arise.  
  
Pt has been fully educated on   
their diagnosis, treatment   
options, follow up plan, and   
return instructions  
  
  
Electronically signed by Cj Kennedy NP at 2024 2:37 PM   
EDT  
documented in this encounter            [facility]  
   
                                                    2024 History of   
Present illness Narrative               Formatting of this note is   
different from the original.  
Images from the original note   
were not included.  
  
  
Chief Complaint: lumbar   
radiculopathy, neuropathy, RLS  
  
Subjective  
Ping Chavez, 74 y.o., female  
Patient is here for follow up.   
Last seen for trigger injections   
7-3-24 which were helpful only   
for a few days. She admits left   
sided sciatica pain has been   
bothersome. She denies any saddle   
anesthesias or loss of bowel or   
bladder control. Denies any   
falls. A ferritin lab was ordered   
at her last visit. She denies   
completing this and states she   
stopped taking the ferrous   
sulfate. She denies restless legs   
being particularly bothersome.   
She denies any follow up to   
oncology since her last visit   
here. She continues on Gabapentin   
and she reports that this has   
lost efficacy for relief of neck   
pain, spinal pain and neuralgia.   
Certain activity can induce neck   
pain. Pain is most troublesome on   
the top of the left foot, this is   
burning in quality and she admits   
extreme sensitivity. The tingling   
in her fingertips has not been   
significant. Denies any other   
concerns.  
  
Back Pain  
Pertinent negatives include no   
abdominal pain, chest pain,   
fever, headaches or numbness.  
Neck Pain  
Pertinent negatives include no   
chest pain, fever, headaches or   
numbness.  
Review of Systems  
Constitutional: Negative for   
appetite change, fatigue and   
fever.  
Respiratory: Negative for cough,   
shortness of breath and wheezing.  
Cardiovascular: Negative for   
chest pain, palpitations and leg   
swelling.  
Gastrointestinal: Negative for   
abdominal pain, constipation,   
diarrhea and nausea.  
Musculoskeletal: Positive for   
arthralgias, back pain, myalgias   
and neck pain. Negative for gait   
problem.  
Neurological: Negative for   
dizziness, tremors, numbness and   
headaches.  
  
  
  
Past Medical History:  
Diagnosis Date  
A-fib (CMS/HCC)  
Blood clotting tendency (CMS/HCC)  
Breast cancer (CMS/HCC)  
Cancer (CMS/HCC)  
GERD (gastroesophageal reflux   
disease)  
Gout  
Heart disease  
HTN (hypertension) (CMS/HCC)  
Hypothyroidism (CMS/HCC)  
Neck pain  
Osteoarthritis  
Thyroid disease (CMS/HCC)  
  
Past Surgical History:  
Procedure Laterality Date  
APPENDECTOMY  
CARDIAC DEVICE CHECK -   
IMPLANT/POST-PROCEDURE  
pacemaker  
CHOLECYSTECTOMY  
INSERT / REPLACE / REMOVE   
PACEMAKER  
KNEE SURGERY  
MASTECTOMY  
OTHER SURGICAL HISTORY  
Dermatopathic Lymphadentitis   
under left arm  
NH ARTHROTOMY,OPEN REPAIR   
MENISCUS  
meniscus repair  
TONSILLECTOMY  
  
Family History  
Problem Relation Name Age of   
Onset  
Stroke Mother  
Hypertension Mother  
Heart disease Mother  
Diabetes Mother  
Hypertension Father  
Heart disease Father  
Stroke Father  
Cancer Sibling  
  
Social History  
  
Tobacco Use  
Smoking status: Never  
Smokeless tobacco: Not on file  
Substance Use Topics  
Alcohol use: Yes  
  
Allergies: Cefaclor  
  
Vitals:  
24 0915  
BP: 140/78  
Pulse: 74  
  
Body mass index is 34.21 kg/m .   
weight: 225 lb  
  
Neurologic exam:  
Mental status:  
Well nourished, well developed   
and in no acute distress. Grossly   
oriented to person, place and   
time. Recent and remote memory   
are intact. Language is fluent   
without aphasia. Attention and   
concentration are normal. Fund of   
knowledge is appropriate for   
level of education.  
  
Cranial nerves:  
CN II: Visual acuity is normal.   
Visual fields full to   
confrontation.  
CN III, IV, VI: pupils equal   
round and reactive to light.   
Extraocular movements intact. No   
ptosis present.  
CN V: Facial sensation is normal.  
CN VII: Full and symmetric facial   
movement.  
CN VIII: Hearing is intact.  
CN IX and X: Palate elevates   
symmetrically.  
CN XI: Shoulder shrug is normal   
bilaterally.  
CN XII: Tongue is midline without   
atrophy or fasciculation.  
  
Motor:  
RUE Strength deltoid, , biceps ,   
triceps , wrist extensors , wrist   
flexor ,  strength 5/5.  
LUE Strength deltoid , biceps ,   
triceps , wrist extensors , wrist   
flexor ,  strength 5/5.  
RLE Strength illopsoas,   
quadriceps, tibialis anterior,   
and gastrocnemius strength 5/5.  
LLE Strength illopsoas,   
quadriceps, tibialis anterior,   
and gastrocnemius strength 5/5.  
Normal tone x4 extremities.  
Positive straight leg raise on   
the left.  
  
Sensory:  
Sensation is intact to light   
touch throughout distal   
extremities.  
Vibratory sensation diminished in   
distal extremities.  
  
Reflexes:  
RUE biceps reflex 1+ ,   
brachioradialis reflex 1+.  
LUE biceps reflex 1+ ,   
brachioradialis reflex 1+.  
RLE knee reflex 2+.  
LLE knee reflex 2+.  
Lazaro's Sign negative  
  
Coordination:  
Finger-to-nose testing and rapid   
alternating movements are normal  
  
Gait:  
Normal  
  
Review and summary of old   
records:  
Past oncology note dated 22:   
Bone scan whole body 5/10/22.   
Normal  
LABS on 23: Ferritin 65  
CT thoracic spine with and   
without contrast from 22: T7   
marked compression fracture with   
complete loss of height; post   
traumatic versus pathologic.   
Retropulsion of the posterior   
wall causing marked central canal   
narrowing. There are a lot of   
degenerative changes from T5-6.   
There are also chronic and mild   
compression fractures suspected   
in T8, T9, T11.  
CT lumbar spine with and without   
contrast from 22: Several   
areas with an L2-L5 vertebral   
body suspicious for metastatic   
disease. Consider nuclear   
medicine whole body bone scan for   
evaluation of all skeletal   
structures. Multiple market   
degenerative disc disease and   
degenerative facet arthropathy.   
(metastatic concerns reviewed by   
Dr. Mowat in [place])  
CT cervical spine without   
contrast from September 15, 2021   
showed multilevel degenerative   
changes most notable at C5-6,   
remote traumatic injury of the C2   
vertebral body with stable   
configuration of the C2 vertebral   
body compared to ,   
heterogeneous marrow but no   
aggressive osseous lesions   
identified.  
  
Assessment/Plan  
Diagnoses and all orders for this   
visit:  
Lumbar radiculopathy  
The patient does have lumbar   
degenerative disc disease evident   
on previous lumbar CT and again   
presents with left sided symptoms   
in a dermatomal distribution   
consistent with an L5   
radiculopathy. She is unable to   
have MRI due to incompatible   
AICD. Lumbar CT in  with   
findings concerning for   
metastatic disease. She was   
evaluated for this by oncology   
following this (Dr. Mowat in   
[place]). Despite recommendations   
to again follow up with them she   
has not scheduled. Given her   
significant clinical history and   
reports of worsening pain we will   
update imaging.  
PLAN:  
- CT of the lumbar spine.  
- I have again advised her to   
reach out to oncology and give an   
update on her symptoms given her   
very significant past history of   
cancer. We have sent a message to   
their office regarding our   
concerns previously.  
  
Metastasis to spinal column   
(CMS/HCC)  
History of breast cancer with   
metastases to spine s/p cervical   
spine radiation (and   
chemotherapy) circa . The   
patient does admit to various   
locations of pain throughout the   
spine. As such we will update   
imaging.  
PLAN:  
- CT of the cervical, thoracic,   
and lumbar spine (Cannot have MRI   
due to incompatible AICD)  
  
RLS (restless legs syndrome)  
Patient reports symptoms   
consistent with nighttime   
restless legs. Could be related   
to lumbar radiculopathy and   
neuropathy. Though ferritin level   
on 23 was below goal of 75.   
Gabapentin does seem to help at   
least in part. The patient is   
inconsistent with taking iron and   
again admits to stopping this due   
to improvement in RLS symptoms.  
PLAN:  
- Recommend close follow up with   
PCP for ongoing monitoring of   
iron levels.  
- Continue gabapentin as above  
  
Neuropathy  
History of breast cancer (and   
chemotherapy) circa . Likely   
causative for neuropathy and   
neuropathic pain. Pain seems to   
be worsening primarily to the   
tops of her bilateral feet,   
burning in quality.   
PLAN:  
- Continue gabapentin 100 mg in   
the am, 200 mg in the evening,   
and 300 mg at bedtime. OARRs   
reviewed. Currently tolerating   
without side effects.  
  
Neck pain  
Chronic neck pain at   
cervicothoracic junction, which   
radiates slightly outward. She   
has had evaluation by oncology   
for the concerning findings on   
previous imaging in . She   
does have significant muscle   
tension affecting the bilateral   
trapezius muscles as well.   
However trigger injections were   
not beneficial for a meaningful   
duration and muscle relaxers were   
not tolerated. Given her ongoing   
complaints, significant history   
and lack of repeat imaging in 2   
years we will update.  
PLAN:  
- CT of the cervical and thoracic   
spine.  
- gabapentin as above  
  
History of compression fracture   
of spine  
History of compression fracture   
at T7; she was seen by [facility] and   
they didn't think surgery was   
indicated.  
  
Follow up in 1 month following   
CT's or sooner if symptoms   
worsen, fail to improve, or   
should a new neurological concern   
arise.  
  
Pt has been fully educated on   
their diagnosis, treatment   
options, follow up plan, and   
return instructions  
  
  
Electronically signed by Cj Kennedy NP at 2024 1:14 PM   
EDT  
documented in this encounter            [facility]  
   
                                                    2024 History of   
Present illness Narrative               Formatting of this note is   
different from the original.  
Ping Chavez  
  
Date of visit: 3/12/2024 YOB: 1950  
Age: 74 y.o. MRN: [id number]  
_______________________  
Patient Active Problem List  
Diagnosis  
Automatic implantable   
cardioverter-defibrillator in   
situ [employer].  
Cancer (CMS-HCC)  
Gout  
Dyslipidemia  
Dyspnea  
Edema  
Dizziness  
Vertigo  
Left bundle branch block (LBBB)  
Benign essential hypertension  
Nonischemic congestive   
cardiomyopathy (CMS-[facility])  
Overweight  
Atrial fibrillation (CMS-[facility])  
CHF (congestive heart failure)   
(CMS-[facility])  
  
_______________________  
Allergies  
Allergen Reactions  
Cefaclor  
Other reaction(s):   
Intolerance-unknown  
  
_______________________  
Current Outpatient Medications  
Medication Sig Dispense Refill  
acetaminophen (TYLENOL) 325 mg   
tablet Take 2 tablets (650 mg   
total) by mouth every 6 (six)   
hours as needed for pain.  
allopurinol (ZYLOPRIM) 100 mg   
tablet Take 1 tablet (100 mg   
total) by mouth in the morning.  
calcium carbonate (CALCIUM 500   
ORAL) Take by mouth daily.  
carvediloL (COREG) 6.25 mg tablet   
TAKE 1 TABLET BY MOUTH TWICE   
DAILY WITH MEALS 60 tablet 3  
cholecalciferol, vitamin D3,   
2,000 units tablet Take 1 tablet   
(2,000 Units total) by mouth in   
the morning.  
esomeprazole (NexIUM) 20 mg   
capsule Take 1 capsule (20 mg   
total) by mouth in the evening.  
ferrous sulfate 325 (65 FE) mg EC   
tablet Take 1 tablet (325 mg   
total) by mouth in the morning.  
fluvoxaMINE (LUVOX) 100 mg tablet   
Take 0.5 tablets (50 mg total) by   
mouth in the morning and 0.5   
tablets (50 mg total) before   
bedtime.  
furosemide (LASIX) 20 mg tablet   
Take 0.5 tablets (10 mg total) by   
mouth as needed (please have labs   
drawn for future refills.). 90   
tablet 2  
gabapentin (NEURONTIN) 100 mg   
capsule Take 1 capsule (100 mg   
total) by mouth 3 (three) times a   
day.  
L.acid/B.bifidum/B.animal/FOS   
(PROBIOTIC COMPLEX ORAL) Take by   
mouth daily.  
levothyroxine (SYNTHROID,   
LEVOTHROID) 50 MCG tablet Take 1   
tablet (50 mcg total) by mouth in   
the morning.  
loratadine (CLARITIN) 10 mg   
tablet Take 1 tablet (10 mg   
total) by mouth as needed for   
allergies.  
losartan (COZAAR) 25 mg tablet   
Take 1 tablet (25 mg total) by   
mouth in the morning.  
magnesium oxide 500 mg tablet   
Take 1 tablet (500 mg total) by   
mouth in the morning.  
multivit-iron-FA-calcium &mins   
(THERAGRAN-M) 9 mg iron-400 mcg   
tablet Take 1 tablet by mouth in   
the morning.  
omega-3/dha/epa/dpa/fish oil   
(OMEGA-3 2100 ORAL) Take by mouth   
2 (two) times a day.  
  
simvastatin (ZOCOR) 40 mg tablet   
Take 1 tablet (40 mg total) by   
mouth nightly.  
spironolactone (ALDACTONE) 25 mg   
tablet Take 0.5 tablets (12.5 mg   
total) by mouth in the morning.  
timolol (TIMOPTIC) 0.5 %   
ophthalmic solution Administer 1   
drop to both eyes in the morning   
and 1 drop before bedtime. 15 mL   
3  
XARELTO 20 mg tablet tablet TAKE   
1 TABLET(20 MG) BY MOUTH IN THE   
MORNING 30 tablet 9  
TRELEGY ELLIPTA 200-62.5-25 mcg   
blister with device INHALE 1 PUFF   
BY MOUTH ONCE DAILY (Patient not   
taking: Reported on 2023)  
  
No current facility-administered   
medications for this visit.  
  
_______________________  
Chief Complaint  
Patient presents with  
Follow-up  
Hypertension  
Atrial Fibrillation  
  
_______________________  
History of Present Illness  
Ping was seen today for   
follow-up of her nonischemic   
cardiomyopathy.  
  
She has a biventricular ICD in   
place. She has had no shocks.  
  
She denies any chest pain,   
pressure, or shortness of breath.   
She states that she feels   
somewhat foggy recently but has   
had difficulty with the vertigo   
as well. She has been compliant   
with her medications.  
_______________________  
Past Medical History:  
Diagnosis Date  
[place]  
BREAST  
Cardiomyopathy  
CHF (congestive heart failure)   
(CMS-HCC)  
Dizziness  
Dyslipidemia  
Dyspnea  
Edema  
Glaucoma  
Gout  
History of YAG laser capsulotomy   
of lens of right eye-Dr. Dolan   
2018  
History of YAG laser capsulotomy   
of lens, left-Dr. Dolan   
2018  
Hypertension  
LBBB (left bundle branch block)  
PVD (posterior vitreous   
detachment)  
Vertigo  
  
No data recorded  
_______________________  
Past Surgical History:  
Procedure Laterality Date  
APPENDECTOMY  
BREAST SURGERY  
CARDIAC DEFIBRILLATOR PLACEMENT   
2013  
[place] BiV ICD/ Generator   
replacement: 10/20/2016  
CATARACT EXTRACTION W/   
INTRAOCULAR LENS IMPLANT   
Bilateral   
CHOLECYSTECTOMY  
CHOLECYSTECTOMY  
TONSILLECTOMY  
W/ADENOIDS  
  
_______________________  
Family History  
Problem Relation Age of Onset  
Hypertension Mother  
Coronary artery disease Father  
  
_______________________  
Social History  
  
Socioeconomic History  
Marital status:   
Spouse name: Not on file  
Number of children: Not on file  
Years of education: Not on file  
Highest education level: Not on   
file  
Occupational History  
Not on file  
Tobacco Use  
Smoking status: Never  
Smokeless tobacco: Never  
Vaping Use  
Vaping Use: Never used  
Substance and Sexual Activity  
Alcohol use: Yes  
Comment: BEER ONE A WEEK  
Drug use: No  
Sexual activity: Defer  
Other Topics Concern  
Caffeine Use Yes  
Comment: COFFEE RARE  
Social History Narrative  
Not on file  
  
Social Determinants of Health  
  
Financial Resource Strain: Not on   
file  
Food Insecurity: No Food   
Insecurity (2023)  
Hunger Screening  
Food Insecurity - Worry: Never   
True  
Food Insecurity - Inability:   
Never True  
Transportation Needs: Not on file  
Physical Activity: Not on file  
Stress: Not on file  
Social Connections: Not on file  
Interpersonal Safety: Not on file  
Housing Instability: Not on file  
  
_______________________  
Review of Systems  
Review of Systems  
Constitutional: Negative for   
chills, fever, weight gain and   
weight loss.  
HENT: Negative for nosebleeds.  
Eyes: Negative for blurred vision   
and double vision.  
Vascular: Negative for asymmetric   
leg edema, claudication, lower   
extremity wounds or ulcers and   
varicose veins.  
Respiratory: Negative for cough,   
shortness of breath and wheezing.  
Hematologic/Lymphatic: Negative   
for bleeding problem. Does not   
bruise/bleed easily.  
Skin: Negative for color change   
and rash.  
Musculoskeletal: Negative for   
joint swelling and muscle   
weakness.  
Gastrointestinal: Negative for   
change in bowel habit and   
hematochezia.  
Genitourinary: Negative for   
hematuria.  
Neurological: Negative for   
dizziness, headaches,   
light-headedness, loss of   
balance, numbness and tremors.  
Psychiatric/Behavioral: The   
patient is not nervous/anxious.  
Allergic/Immunologic: Negative   
for environmental allergies.  
  
CARDIOVASCULAR: Please review   
HPI.  
_______________________  
Physical Examination  
General appearance: Alert,   
oriented and cooperative. In no   
acute distress.  
Skin: Warm and dry to touch.  
Head: Normocephalic, without   
obvious abnormality, atraumatic.  
Ears, Nose, Mouth, Throat: Throat   
clear without erythema or   
exudate. Dentition intact.  
Eyes: Conjunctivae unremarkable,   
EOM intact.  
Neck: No JVD, No carotid bruit.   
Neck supple, trachea midline.  
Respiratory: Clear to   
auscultation bilaterally, no use   
of accessory muscles.  
Cardiovascular: RRR with normal   
S1 and S2 with no murmurs.  
Gastrointestinal: Soft,   
non-tender. Bowel sounds normal.  
Musculoskeletal: No peripheral   
edema.  
Neurologic: Oriented to time,   
person and place, affect   
appropriate. No focal/major motor   
defects noted.  
Psychiatric: Appropriate mood,   
memory and judgement.  
_______________________  
VITAL SIGNS:  
/90   Pulse 70   Ht 175.3   
cm (5' 9 )   Wt 103.9 kg (229 lb)   
  SpO2 100%   BMI 33.82 kg/m  
_______________________  
No orders of the defined types   
were placed in this encounter.  
  
There are no discontinued   
medications.  
  
_______________________  
[id number] Score: 4  
4.8% Stroke risk per year, 6.7%   
risk of stroke/TIA/systemic   
embolism  
______________  
_________  
IMPRESSIONS/PLAN  
1. Cardiomyopathy  
  
2. Persistent atrial fibrillation   
(CMS-HCC)  
  
3. Chronic systolic heart failure   
(CMS-HCC)  
  
4. Left bundle branch block   
(LBBB)  
  
Recent device check appear to be   
favorable.  
  
She will be due for a follow-up   
in 6 months with a device check   
at that time.  
  
She appears to be well   
compensated. She will be checking   
her blood pressure on a more   
regular basis as she had an   
elevated reading today. She   
states that generally is in the   
therapeutic range.  
  
Follow-up is arranged for 6   
months.  
_______________________  
TODAYS ORDERS  
No orders of the defined types   
were placed in this encounter.  
  
_______________________  
FOLLOW UP  
Return in about 6 months (around   
2024).  
  
PCP: Alexander Geurra MD  
Referring Physician: Alexander Guerra MD  
[address] [place] [address]  
  
  
Electronically signed by Angel Gerardo DO at 2024 2:15 PM   
EDT  
documented in this encounter            [facility]  
   
                                                    2024 Miscellaneous   
Notes                                   Formatting of this note might be   
different from the original.  
Called patient to remind them to   
bring their most current copy of   
their medication list with them   
to their appt. Patient verbalizes   
understanding.  
Electronically signed by Elba Ibrahim CMA at 2024 8:25 AM   
EDT  
documented in this encounter            [facility]  
   
                                                    2024 Telephone   
encounter Note                          Formatting of this note might be   
different from the original.  
Called patient to remind them to   
bring their most current copy of   
their medication list with them   
to their appt. Patient verbalizes   
understanding.  
Electronically signed by Elba Ibrahim CMA at 2024 8:25 AM   
EDT  
                                        [facility]  
   
                                        2024 Note     Right Eye  
Reliability was good. Progression   
has been stable. Foveal threshold   
was  
normal. Findings include normal   
observations.  
  
Left Eye  
Reliability was good. Progression   
has been stable. Foveal threshold   
was  
normal. Findings include normal   
observations.  
  
Notes  
Clean VF OU                             MANUALLY TRANSCRIBED   
RESULTS  
   
                                                    2024 Miscellaneous   
Notes                                   Formatting of this note might be   
different from the original.  
23 OV  
  
23 CBC, CMP  
Electronically signed by   
Nelda Palencia RN at   
2024 3:09 PM EST  
documented in this encounter            [facility]  
   
                                                    2024 Telephone   
encounter Note                          Formatting of this note might be   
different from the original.  
23 OV  
  
23 CBC CMP  
Electronically signed by   
Nelda Palencia RN at   
2024 3:09 PM EST  
                                        [facility]  
   
                                                    2023 Hospital   
Discharge instructions                    
  
  
Patient Education  
  
  
2023 10:05:11  
  
  
Hematuria, Adult  
  
  
Hematuria, Adult  
Hematuria is blood in the urine.   
Blood may be visible in the   
urine, or it may be identified   
with a test. This condition can   
be caused by infections of the   
bladder, urethra, kidney, or   
prostate. Other possible causes   
include:  
Kidney stones.  
Cancer of the urinary tract.  
Too much calcium in the urine.  
Conditions that are passed from   
parent to child (inherited   
conditions).  
Exercise that requires a lot of   
energy.  
Infections can usually be treated   
with medicine, and a kidney stone   
usually will pass through your   
urine. If neither of these is the   
cause of your hematuria, more   
tests may be needed to identify   
the cause of your symptoms.  
It is very important to tell your   
health care provider about any   
blood in your urine, even if it   
is painless or the blood stops   
without treatment. Blood in the   
urine, when it happens and then   
stops and then happens again, can   
be a symptom of a very serious   
condition, including cancer.   
There is no pain in the initial   
stages of many urinary cancers.  
Follow these instructions at   
home:  
Medicines  
Take over-the-counter and   
prescription medicines only as   
told by your health care   
provider.  
If you were prescribed an   
antibiotic medicine, take it as   
told by your health care   
provider. Do not stop taking the   
antibiotic even if you start to   
feel better.  
Eating and drinking  
Drink enough fluid to keep your   
urine pale yellow. It is   
recommended that you drink 3 4   
quarts (2.8 3.8 L) a day. If you   
have been diagnosed with an   
infection, drinking cranberry   
juice in addition to large   
amounts of water is recommended.  
Avoid caffeine, tea, and   
carbonated beverages. These tend   
to irritate the bladder.  
Avoid alcohol because it may   
irritate the prostate (in males).  
General instructions  
If you have been diagnosed with a   
kidney stone, follow your health   
care provider's instructions   
about straining your urine to   
catch the stone.  
Empty your bladder often. Avoid   
holding urine for long periods of   
time.  
If you are female:  
?After a bowel movement, wipe   
from front to back and use each   
piece of toilet paper only once.  
?Empty your bladder before and   
after sex.  
Pay attention to any changes in   
your symptoms. Tell your health   
care provider about any changes   
or any new symptoms.  
It is up to you to get the   
results of any tests. Ask your   
health care provider, or the   
department that is doing the   
test, when your results will be   
ready.  
Keep all follow-up visits. This   
is important.  
Contact a health care provider   
if:  
You develop back pain.  
You have a fever or chills.  
You have nausea or vomiting.  
Your symptoms do not improve   
after 3 days.  
Your symptoms get worse.  
Get help right away if:  
You develop severe vomiting and   
are unable to take medicine   
without vomiting.  
You develop severe pain in your   
back or abdomen even though you   
are taking medicine.  
You pass a large amount of blood   
in your urine.  
You pass blood clots in your   
urine.  
You feel very weak or like you   
might faint.  
You faint.  
Summary  
Hematuria is blood in the urine.   
It has many possible causes.  
It is very important that you   
tell your health care provider   
about any blood in your urine,   
even if it is painless or the   
blood stops without treatment.  
Take over-the-counter and   
prescription medicines only as   
told by your health care   
provider.  
Drink enough fluid to keep your   
urine pale yellow.  
This information is not intended   
to replace advice given to you by   
your health care provider. Make   
sure you discuss any questions   
you have with your health care   
provider.  
Document Revised: 2021   
Document Reviewed: 2021  
[employer] Patient Education    
[employer]  
  
  
  
Follow Up Care  
  
  
2022 09:43:54  
  
  
With:DEBBI JORDAN, Salvador LAZARO, URL  
Address:  
[facility] [address] [place] [id number]- [phone]  
  
When:  
Unknown                                 [facility]   
[facility]  
Work Phone: [phone]  
   
                                                    2022 Hospital   
Discharge instructions                    
  
  
Patient Education  
  
  
2022 09:42:28  
  
  
Hematuria, Adult  
  
  
Hematuria, Adult  
Hematuria is blood in the urine.   
Blood may be visible in the   
urine, or it may be identified   
with a test. This condition can   
be caused by infections of the   
bladder, urethra, kidney, or   
prostate. Other possible causes   
include:  
Kidney stones.  
Cancer of the urinary tract.  
Too much calcium in the urine.  
Conditions that are passed from   
parent to child (inherited   
conditions).  
Exercise that requires a lot of   
energy.  
Infections can usually be treated   
with medicine, and a kidney stone   
usually will pass through your   
urine. If neither of these is the   
cause of your hematuria, more   
tests may be needed to identify   
the cause of your symptoms.  
It is very important to tell your   
health care provider about any   
blood in your urine, even if it   
is painless or the blood stops   
without treatment. Blood in the   
urine, when it happens and then   
stops and then happens again, can   
be a symptom of a very serious   
condition, including cancer.   
There is no pain in the initial   
stages of many urinary cancers.  
Follow these instructions at   
home:  
Medicines  
Take over-the-counter and   
prescription medicines only as   
told by your health care   
provider.  
If you were prescribed an   
antibiotic medicine, take it as   
told by your health care   
provider. Do not stop taking the   
antibiotic even if you start to   
feel better.  
Eating and drinking  
Drink enough fluid to keep your   
urine clear or pale yellow. It is   
recommended that you drink 3 4   
quarts (2.8 3.8 L) a day. If you   
have been diagnosed with an   
infection, it is recommended that   
you drink cranberry juice in   
addition to large amounts of   
water.  
Avoid caffeine, tea, and   
carbonated beverages. These tend   
to irritate the bladder.  
Avoid alcohol because it may   
irritate the prostate (men).  
General instructions  
If you have been diagnosed with a   
kidney stone, follow your health   
care provider's instructions   
about straining your urine to   
catch the stone.  
Empty your bladder often. Avoid   
holding urine for long periods of   
time.  
If you are female:  
?After a bowel movement, wipe   
from front to back and use each   
piece of toilet paper only once.  
?Empty your bladder before and   
after sex.  
Pay attention to any changes in   
your symptoms. Tell your health   
care provider about any changes   
or any new symptoms.  
It is your responsibility to get   
your test results. Ask your   
health care provider, or the   
department performing the test,   
when your results will be ready.  
Keep all follow-up visits as told   
by your health care provider.   
This is important.  
Contact a health care provider   
if:  
You develop back pain.  
You have a fever.  
You have nausea or vomiting.  
Your symptoms do not improve   
after 3 days.  
Your symptoms get worse.  
Get help right away if:  
You develop severe vomiting and   
are unable take medicine without   
vomiting.  
You develop severe pain in your   
back or abdomen even though you   
are taking medicine.  
You pass a large amount of blood   
in your urine.  
You pass blood clots in your   
urine.  
You feel very weak or like you   
might faint.  
You faint.  
Summary  
Hematuria is blood in the urine.   
It has many possible causes.  
It is very important that you   
tell your health care provider   
about any blood in your urine,   
even if it is painless or the   
blood stops without treatment.  
Take over-the-counter and   
prescription medicines only as   
told by your health care   
provider.  
Drink enough fluid to keep your   
urine clear or pale yellow.  
This information is not intended   
to replace advice given to you by   
your health care provider. Make   
sure you discuss any questions   
you have with your health care   
provider.  
Document Released: 2006   
Document Revised: 2020   
Document Reviewed: 2018  
[employer] Patient Education 2020   
[employer]  
  
  
  
Follow Up Care  
  
  
2022 12:57:02  
  
  
With:DEBBI JORDAN, Salvador LAZARO, URL  
Address:  
[address] [place], [place] [address]-  
[phone]  
  
When:6 months  
Comments:renal US                       [facility]   
[facility]  
Work Phone: [phone]  
   
                                2022 Note                 [facility]  
   
                                        Comment on above:   Result Comment: Elec  
tronically Signed By: Hilary NIEVES\.br\Date and Time Signed: 22 11:39   
EST\.br\Electronically Co-Signed By: Babak Montoya MD\.br\Date and Time Co-Signed: 22 12:00 EST   
  
  
  
                                        2022 Evaluation + Plan note [facility]  
elliot from:  
  
  
  
                                Title:Discharge Note Author:Sana NIEVES Date:22  
  
  
  
                                                    Hemodynamically stable condi  
tion  
  
  
  
Discharge To, Anticipated II -  
Home with responsible caregiver  
  
Discharged to -  
Home with family care  
  
Discharge Status: Improved  
  
Discharge Instructions Given: To patient  
  
Discharge disposition: Home  
  
Prescriptions reviewed with Patient  
  
47 minutes spent in discharge time with patient, collaborating MD, nursing 
staff, CRM  
  
Discharge Diet(s): Regular, Fat Modified- Low cholesterol, Low Sodium- 2000 mg   
(22 11:29:00)  
  
  
Prescriptions  
Levaquin 750 mg Tab, 750 mg= 1 tab(s), Oral, Daily  
Nexium 20 mg Cap-DR, 20 mg= 1 cap(s), Oral, Daily  
nystatin 100,000 units/mL Oral Susp, 387586 unit(s)= 5 mL, Oral, q6hrFT  
Pro-Air HFA CFC free 90 mcg/inh MDI, 2 puff(s), Inhalation, q4hr, PRN  
Zofran ODT 4 mg Tab-Dis, 4 mg= 1 tab(s), Oral, q6hr  
  
Home  
acetaminophen 325 mg Tab, 650 mg= 2 tab(s), Oral, q6hr, PRN  
allopurinol 100 mg Tab, 100 mg= 1 tab(s), Oral, Daily  
calcium (as carbonate) 600 mg oral tablet, 600 mg= 1 tab(s), Oral, Daily  
carvedilol 12.5 mg Tab, 12.5 mg= 1 tab(s), Oral, BID  
Flonase 0.05 mg/inh Spray, 0.1 mg= 2 spray(s), Nasal, Daily  
fluvoxamine 100 mg oral tablet, 50 mg= 0.5 tab(s), Oral, BID  
furosemide 20 mg Tab, See Instructions  
gabapentin 100 mg Cap, 100 mg= 1 cap(s), Oral, TID  
levothyroxine 50 mcg (0.05 mg) Tab, 50 mcg= 1 tab(s), Oral, Daily  
losartan 25 mg Tab, 25 mg= 1 tab(s), Oral, Daily  
magnesium oxide 500 mg oral tablet, 500 mg= 1 tab(s), Oral, Daily  
melatonin 3 mg Tab, 3 mg= 1 tab(s), Oral, Bedtime, PRN  
Mucinex 600 mg Tab-ER, 1200 mg= 2 tab(s), Oral, BID  
Multi-Day Plus Minerals, 1 tab(s), Oral, Daily, Not taking  
Nature's Bounty Probiotic, 1 tab(s), Oral, Daily, Not taking: Not on patient's 
home   
medication list  
simvastatin 40 mg Tab, 40 mg= 1 tab(s), Oral, Once a day (at bedtime)  
spironolactone 25 mg Tab, 12.5 mg= 0.5 tab(s), Oral, Daily  
timolol ophthalmic 0.5% solution, 1 drop(s), Eye-Right, Daily  
Trelegy Ellipta 200 mcg-62.5 mcg-25 mcg/inh inhalation powder, 1 puff(s), 
Inhalation,   
Daily  
Vitamin D3 2000 intl units oral Tab, 2000 International_Unit= 1 cap(s), Oral, 
Daily  
Xarelto 20 mg oral tablet, 20 mg= 1 tab(s), Oral, qPM  
  
  
  
  
With When Contact Information Marty Osborne Within 1 to 2 weeks  
[address] [place] [address]-  
[phone] Business (1)  
Additional Instructions:  
  
Alexei CROW Within 2 to 4 weeks  
[address] [place] [address]-  
[phone] Business (1)  
Additional Instructions:  
  
NIDAA KHAMOUSIA In 0 days  
[address] [place] [address]-  
[phone] Business (1)  
Additional Instructions:  
  
  
  
  
Bacteremia, Adult  
Core Measures- Pneumonia [facility]  
  
  
  
  
  
Extracted from:  
  
                                Title:APSO Note Author:OUMAR GONZALEZ-Hilary RODRIGUEZ  
ate:22  
  
  
  
                                                    1. Bacteremia (R78.81: Bacte  
remia)  
Unclear caution - urine is neg, CT chest/abd./pelvis are non acute, ? early PNA 
that   
was treated prior to scans  
  
-CT chest, abd/pelvis - Hyperattenuating 5 mm right midpole renal cortical 
nodule   
finding may represent hemorrhagic cyst, cardiomegaly, right mastectomy, small   
bilateral pleural effusions, no mass identified, small hiatal hernia,   
cholecystectomy, stable left adrenal nodule  
  
-: Bl. cx. prelim - Streptococcus species Non Hemolytic  
-: Bl. cx. - prelim - neg day 1  
-Levaquin initiated in ED -> transitioned to -> IV vanco, IV cefepime - rx. to 
dose -   
  
  
-Consult ID - : case d/w Dr. Foss via phone w/ recs to transition IV   
vanco/cefepime to IV levaquin w/ transition to po at d/c for a total of 2wks.  
  
  
  
  
  
  
  
Ordered:  
Sbsq Hospital Care/Day High 35 Minutes [id number]  
  
2. Hypoxia (R09.02: Hypoxemia)  
Likely 2/2 b/l pleural effusions, ? bronchitis, no PNA on CT  
-Per chart review nrsng reported pulse ox of 84% but not documented  
-Denies home 02 use -> on RA   
-Flu A&B, COVID 19 - neg  
-CXR: No acute process  
-CT chest: as above  
-Procal level - 1.08  
-Sputum cx. - 1+ yeast, resp. cathi  
-Med nebs, mucinex, IS, supplemental 02  
-IV Azithromycin -   
  
  
  
  
  
  
  
  
  
  
3. Bilateral pleural effusion (J90: Pleural effusion, not elsewhere classified)  
Per CT chest  
-: On RA  
  
  
  
4. Urinary tract infection (N39.0: Urinary tract infection, site not specified)  
-Urine cx. - mixed skin contaminants  
  
  
  
5. Fever (R50.9: Fever, unspecified)  
2/2 above  
-Last fever   
-See above  
  
  
  
6. Elevated troponin (R77.8: Other specified abnormalities of plasma proteins)  
Hx. of non ischemic cardiomyopathy  
-Consult FT/HV - no further inpt. cardiac w/u planned -> cont. chronic meds  
-F/U w/ primary cardiologist as outpt.  
  
  
  
  
7. Nodule of kidney (N28.89: Other specified disorders of kidney and ureter)  
Per CT as above  
-Renal US -7 x 8 x 8 mm right mid pole cortical simple renal cyst - pt. states 
she   
was aware of this finding previously  
-F/U w/ urology as oupt.  
  
  
  
8. Open wound of right knee (S81.001A: Unspecified open wound, right knee, 
initial   
encounter)  
Sm. lac type area - no s/s of infection  
-Band aid  
  
  
  
9. Hyponatremia (E87.1: Hypo-osmolality and hyponatremia)  
Acute on chronic  
-Asymptomatic  
-Baseline Na level: 131  
  
  
  
10. Hypokalemia (E87.6: Hypokalemia)  
Replete K/Mag prn  
-Trend labs  
  
  
  
11. Chronic systolic heart failure (I50.22: Chronic systolic (congestive) heart   
failure)  
-EF 40%, normal RV, biatrial enlargement, mild to moderate MR, moderate to 
severe TR,   
mild PA hypertension, pseudo normal diastolic filling pattern, pacemaker RA/RV.  
  
-Consult FT/HV - euvolemic, stable for d/c from cardio standpoint on chronic 
home   
medications -Resume chronic cardiac meds  
- resume spironolactone  
-Hold furosemide - pt. states she takes on a prn wt. gain basis  
-F/U as above  
  
  
  
  
  
  
12. Presence of combination internal cardiac defibrillator (ICD) and pacemaker   
(Z95.810: Presence of automatic (implantable) cardiac defibrillator)  
Hx. of BiV ICD  
-In situ  
  
  
  
13. History of atrial fibrillation (Z86.79: Personal history of other diseases 
of the   
circulatory system)  
-Carvedilol, xarelto,  
  
  
  
14. Hypertension (I10: Essential (primary) hypertension)  
-Carvedilol, losartan,  
-Hold furosemide,  
-Spironolactone  
  
  
  
15. Hyperlipidemia (E78.5: Hyperlipidemia, unspecified)  
-Simvastatin  
  
  
16. Hypothyroidism (E03.9: Hypothyroidism, unspecified)  
-Levothyroxine  
  
  
  
17. Glaucoma (H40.9: Unspecified glaucoma)  
-Timolol  
  
  
  
18. Chronic GERD (K21.9: Gastro-esophageal reflux disease without esophagitis)  
-PPI  
  
  
19. Obesity (E66.9: Obesity, unspecified)  
BMI 33  
-Educated on need for lifestyle modifications with goal of weight loss as 
obesity has   
a negative impact on co-morbid conditions.  
  
  
  
  
20. History of gout (Z87.39: Personal history of other diseases of the   
musculoskeletal system and connective tissue)  
-Allopurinol, gabapentin  
  
  
  
21. History of breast cancer (Z85.3: Personal history of malignant neoplasm of   
breast)  
S/P radical mastectomy  
  
  
  
22. DVT prophylaxis (Z29.9: Encounter for prophylactic measures, unspecified)  
-Xarelto  
  
  
  
Orders:  
fluticasone nasal, 0.1 mg, 2 spray(s), Spray, Nasal, Daily, Routine, Start date   
22 9:00:00 EST  
gabapentin, 300 mg = 1 cap(s), Cap, Oral, Bedtime for 30 day(s), Stop date 
22   
20:59:00 EST, Start date 22 21:00:00 EST  
magnesium sulfate + Generic Diluent 50 mL, 2 gram = 50 mL, Soln-IV, IV 
Piggyback,   
Once, Stop date 22 9:00:00 EST, Routine, Start date 22 9:00:00 EST, 
25   
mL/hr, Infuse over 2 hour(s)  
polyethylene glycol 3350, 17 gram = 1 EA, Powder-Recon, Oral, Once, Stop date   
22 8:00:00 EST, Routine, Start date 22 8:00:00 EST, 22 7:58:00
 EST  
potassium chloride, 40 mEq = 2 tab(s), Tab-ER, Oral, Once, Stop date 22 
8:00:00   
EST, Routine, Start date 22 8:00:00 EST, 22 7:20:00 EST  
spironolactone, 12.5 mg = 0.5 tab(s), Tab, Oral, Daily, Routine, Start date 
22   
9:00:00 EST, 22 8:59:00 EST  
Basic Metabolic Panel  
Communication Order Physician to Nursing  
eGFR  
Magnesium Level  
Physical Therapy Evaluate Patient, Develop a Plan of Care and Implement Plan  
  
-Plan discussed w/ patient, nursing staff and CRM.  
  
This report was transcribed using voice recognition software. Every effort was 
made   
to ensure accuracy, however, inadvertently computerized transcription mistakes 
may be   
present.  
  
  
  
  
  
  
Extracted from:  
  
                                Title:APSO Note Author:Hilary NIEVES  
ate:22  
  
  
  
                                                    1. Bacteremia (R78.81: Bacte  
remia)  
Unclear caution - urine is neg, CT chest/abd./pelvis are non acute  
-CT chest, abd/pelvis - Hyperattenuating 5 mm right midpole renal cortical 
nodule   
finding may represent hemorrhagic cyst, cardiomegaly, right mastectomy, small   
bilateral pleural effusions, no mass identified, small hiatal hernia,   
cholecystectomy, stable left adrenal nodule  
  
-: Bl. cx. prelim - Streptococcus species Non Hemolytic  
-: Bl. cx. - prelim - pending  
-Levaquin initiated in ED -> transitioned to -> IV vanco, IV cefepime - rx. to 
dose -   
  
  
-Consult ID - : case d/w Dr. Foss via phone w/ recs to transition IV   
vanco/cefepime to IV levaquin w/ transition to po at d/c for a total of 2wks.  
  
  
  
  
  
  
  
Ordered:  
\[facility] [place] [facility]\""q Hospital Care/Day High 35 Minutes [id number]  
  
2. Hypoxia (R09.02: Hypoxemia)  
Likely 2/2 b/l pleural effusions, ? bronchitis, no PNA on CT  
-Per chart review nrsng reported pulse ox of 84% but not documented  
-Denies home 02 use -> on RA today  
-Flu A&B, COVID 19 - neg  
-CXR: No acute process  
-CT chest: as above  
-Procal level - 1.08  
-Sputum cx. - pending  
-Med nebs, mucinex, IS, supplemental 02  
-IV Azithromycin -   
  
  
  
  
  
  
  
  
  
  
3. Bilateral pleural effusion (J90: Pleural effusion, not elsewhere classified)  
Per CT chest  
-: On RA  
  
  
  
4. Urinary tract infection (N39.0: Urinary tract infection, site not specified)  
-Urine cx. - mixed skin contaminants  
  
  
5. Fever (R50.9: Fever, unspecified)  
2/2 above  
-Last fever   
-See above  
  
  
  
6. Elevated troponin (R77.8: Other specified abnormalities of plasma proteins)  
Hx. of non ischemic cardiomyopathy  
-Consult FT/HV - no further inpt. cardiac w/u planned -> cont. chronic meds  
-F/U w/ primary cardiologist as outpt.  
  
  
  
  
7. Nodule of kidney (N28.89: Other specified disorders of kidney and ureter)  
Per CT as above  
-Renal US -7 x 8 x 8 mm right mid pole cortical simple renal cyst.  
-F/U w/ urology as oupt.  
  
  
  
  
8. Open wound of right knee (S81.001A: Unspecified open wound, right knee, 
initial   
encounter)  
Sm. lac type area - no s/s of infection  
-Band aid  
  
  
  
9. Hyponatremia (E87.1: Hypo-osmolality and hyponatremia)  
Acute on chronic  
-Asymptomatic  
-Baseline Na level: 131  
  
-Consult nephro - defer resuming diuretics to cardio, signed off  
-DC q6 Na levels - stable  
-TSH - wnl  
-Trend BMP  
  
  
  
  
  
  
  
  
  
  
10. Hypokalemia (E87.6: Hypokalemia)  
Replete K/Mag prn  
-Trend labs  
  
  
  
11. Chronic systolic heart failure (I50.22: Chronic systolic (congestive) heart   
failure)  
-Consult FT/HV - euvolemic, stable for d/c from cardio standpoint on chronic 
home   
medications  
-EF 40%, normal RV, biatrial enlargement, mild to moderate MR, moderate to 
severe TR,   
mild PA hypertension, pseudo normal diastolic filling pattern, pacemaker RA/RV.  
-Resume cardiac med per cardio  
- resume spironolactone  
-Hold furosemide - pt. states she takes on a prn wt. gain basis  
-F/U as above  
  
  
  
  
  
  
12. Presence of combination internal cardiac defibrillator (ICD) and pacemaker   
(Z95.810: Presence of automatic (implantable) cardiac defibrillator)  
Hx. of BiV ICD  
-In situ  
  
  
  
13. History of atrial fibrillation (Z86.79: Personal history of other diseases 
of the   
circulatory system)  
-Carvedilol, xarelto,  
  
  
14. Hypertension (I10: Essential (primary) hypertension)  
-Carvedilol, losartan,  
-Hold furosemide,  
-Spironolactone  
  
  
  
15. Hyperlipidemia (E78.5: Hyperlipidemia, unspecified)  
-Simvastatin  
  
  
16. Hypothyroidism (E03.9: Hypothyroidism, unspecified)  
-Levothyroxine  
  
  
17. Glaucoma (H40.9: Unspecified glaucoma)  
-Timolol  
  
  
18. Chronic GERD (K21.9: Gastro-esophageal reflux disease without esophagitis)  
-PPI  
  
  
19. Obesity (E66.9: Obesity, unspecified)  
BMI 33  
-Educated on need for lifestyle modifications with goal of weight loss as 
obesity has   
a negative impact on co-morbid conditions.  
  
  
  
20. History of gout (Z87.39: Personal history of other diseases of the   
musculoskeletal system and connective tissue)  
-Allopurinol, gabapentin  
  
  
21. History of breast cancer (Z85.3: Personal history of malignant neoplasm of   
breast)  
S/P radical mastectomy  
  
  
22. DVT prophylaxis (Z29.9: Encounter for prophylactic measures, unspecified)  
-Xarelto  
  
  
Orders:  
gabapentin, 100 mg = 1 cap(s), Cap, Oral, Daily for 30 day(s), Stop date 
22   
8:59:00 EST, Start date 22 9:00:00 EST  
gabapentin, 300 mg = 1 cap(s), Cap, Oral, Bedtime for 30 day(s), Stop date 
22   
20:59:00 EST, Start date 22 21:00:00 EST  
levofloxacin + Dextrose 5% in Water intravenous solution 150 mL, 750 mg = 150 
mL,   
Soln-IV, IV Piggyback, Daily, Routine, Start date 22 9:00:00 EST, 150 
mL/hr,   
Infuse over 1 hour(s)  
magnesium sulfate + Generic Diluent 50 mL, 2 gram = 50 mL, Soln-IV, IV 
Piggyback,   
Once, Stop date 22 9:00:00 EST, Routine, Start date 22 9:00:00 EST, 
25   
mL/hr, Infuse over 2 hour(s)  
potassium chloride, 40 mEq = 2 tab(s), Tab-ER, Oral, Once, Stop date 22 
8:00:00   
EST, Routine, Start date 22 8:00:00 EST, 22 7:20:00 EST  
spironolactone, 12.5 mg = 0.5 tab(s), Tab, Oral, Daily, Routine, Start date 
22   
9:00:00 EST, 22 8:59:00 EST  
Basic Metabolic Panel  
Cardiac Monitoring  
Communication Order Physician to Nursing  
Communication Order Physician to Nursing  
CT Abdomen/Pelvis w/o Contrast  
CT Chest w/o Contrast  
eGFR  
Extra Lav Tube  
Magnesium Level  
Physical Therapy Evaluate Patient, Develop a Plan of Care and Implement Plan  
Resuscitation Status - Full  
Thyroid Stimulating Hormone  
US Renal  
  
-Plan discussed w/ patient, nursing staff and CRM.  
  
This report was transcribed using voice recognition software. Every effort was 
made   
to ensure accuracy, however, inadvertently computerized transcription mistakes 
may be   
present.  
  
  
  
  
  
  
Extracted from:  
  
                                Title:HypoNa    Author:Mahi JORDAN, Po Date:  
  
  
  
                                                    Impression and Plan  
1. Acute hypovolemic hyponatremia with normal baseline sodium: Likely from 
volume   
depletion. Sodium anthony at 129 and normalized to 135.  
2. Heart failure with EF 40% and moderate to severe TR status post AICD: She was
 on   
Lasix and spironolactone prior to admission; will defer to cardiology whether   
diuretics need to be resumed on outpatient basis.  
3. Acute kidney injury: Likely from volume depletion. Cr normalized  
Will sign off at this time; please reconsult if needed.  
  
  
Extracted from:  
  
                                Title:APSO Note Author:OUMAR GONZALEZ-Hilary RODRIGUEZ  
ate:22  
  
  
  
                                                    1. Bacteremia (R78.81: Bacte  
remia)  
Unclear caution - urine is neg  
-: Bl. cx. prelim - Streptococcus species Non Hemolytic  
-: Bl. cx. - prelim - pending  
-Levaquin initiated in ED -> transitioned to -> IV vanco, IV cefepime - rx. to 
dose -   
  
-Consult ID - pending  
-CT chest - pending  
-CT abd/pelvis - pending  
  
  
  
  
  
  
2. Hypoxia (R09.02: Hypoxemia)  
Per chart review nrsng reported pulse ox of 84% but not documented  
-Denies home 02 use -> Remains on NC - will wean down  
-Flu A&B, COVID 19 - neg  
-CXR: No acute process  
-CT chest: as above  
-Procal level - 1.08  
-Sputum cx. - pending  
-Med nebs, mucinex, IS, supplemental 02  
-IV Azithromycin -   
  
  
  
  
  
  
  
  
  
3. Urinary tract infection (N39.0: Urinary tract infection, site not specified)  
-Urine cx. - mixed skin contaminants  
  
  
4. Fever (R50.9: Fever, unspecified)  
2/2 above  
-See above  
  
  
5. Elevated troponin (R77.8: Other specified abnormalities of plasma proteins)  
Hx. of non ischemic cardiomyopathy  
-Consult FT/HV - no further inpt. cardiac w/u planned -> cont. chronic meds  
-F/U w/ primary cardiologist as outpt.  
  
  
  
6. Hyponatremia (E87.1: Hypo-osmolality and hyponatremia)  
Acute on chronic  
-Asymptomatic  
-Baseline Na level: 131  
  
-Consult nephro - hold spironolactone, IV bolus x 1, hold furosemide, 
spironolactone   
until reviewed w/ cardio - see cardio recs to resume home meds as prior  
  
-Na levels q6 per nephro  
-TSH - pending  
-Trend BMP  
  
  
  
  
  
  
  
  
7. Chronic systolic heart failure (I50.22: Chronic systolic (congestive) heart   
failure)  
-Consult FT/HV - euvolemic, stable for d/c from cardio standpoint on chronic 
home   
medications  
-Hold spironolactone per nephro  
-F/U as above  
  
  
  
8. Presence of combination internal cardiac defibrillator (ICD) and pacemaker   
(Z95.810: Presence of automatic (implantable) cardiac defibrillator)  
Hx. of BiV ICD  
-In situ  
  
  
9. History of atrial fibrillation (Z86.79: Personal history of other diseases of
 the   
circulatory system)  
-Carvedilol, xarelto,  
  
  
10. Hypertension (I10: Essential (primary) hypertension)  
-Carvedilol, losartan,  
-Hold furosemide, spironolactone - resume at d/c per cardio  
  
  
11. Hyperlipidemia (E78.5: Hyperlipidemia, unspecified)  
-Simvastatin  
  
  
12. Hypothyroidism (E03.9: Hypothyroidism, unspecified)  
-Levothyroxine,  
  
  
13. Glaucoma (H40.9: Unspecified glaucoma)  
-Timolol  
  
  
14. Chronic GERD (K21.9: Gastro-esophageal reflux disease without esophagitis)  
-PPI  
  
  
15. Obesity (E66.9: Obesity, unspecified)  
BMI 33  
-Educated on need for lifestyle modifications with goal of weight loss as 
obesity has   
a negative impact on co-morbid conditions.  
  
  
16. History of gout (Z87.39: Personal history of other diseases of the   
musculoskeletal system and connective tissue)  
-Allopurinol, gabapentin  
  
  
17. History of breast cancer (Z85.3: Personal history of malignant neoplasm of   
breast)  
S/P radical mastectomy  
  
  
18. DVT prophylaxis (Z29.9: Encounter for prophylactic measures, unspecified)  
-Xarelto  
  
  
Orders:  
gabapentin, 400 mg = 4 cap(s), Cap, Oral, BID, Routine, Start date 22 
21:00:00   
EST, 22 10:21:00 EST  
ipratropium, 2.5 mL, Soln-Inh, NEB, QID, Routine, Start date 22 12:00:00 
EST  
levalbuterol, 0.63 mg = 3 mL, Soln-Inh, Inhalation, QID, Routine, Start date 
22   
12:00:00 EST, 22 8:30:00 EST  
Cardiac Monitoring  
Communication Order Physician to Nursing  
Consult to Infectious Disease Physician  
CT Abdomen/Pelvis w/o Contrast  
CT Chest w/o Contrast  
Incentive Spirometry  
Resuscitation Status - Full  
Thyroid Stimulating Hormone  
  
-Plan discussed w/ patient, nursing staff and CRM.  
  
This report was transcribed using voice recognition software. Every effort was 
made   
to ensure accuracy, however, inadvertently computerized transcription mistakes 
may be   
present.  
  
  
  
  
  
  
Extracted from:  
  
                                Title:APSO Note Author:Tone JORDAN, Abeer Date:22  
  
  
  
                                                    1. Fever (R50.9: Fever, unsp  
ecified)  
Abnormal UA on admission, CXR with vage opacity  
2 bottle of blood Cx with gram positive cocci in chain  
F/U sputum/ urine Cx  
F/U final Blood Cx and repeated blood Cx  
IV antibiotics and reassess  
  
  
  
Ordered:  
  
2. Hypoxia (R09.02: Hypoxemia)  
Desat to 84% in the ER as per the notes, Not on home O2,  
On 2 L NC today , likely due to above  
  
  
3. Urinary tract infection (N39.0: Urinary tract infection, site not specified)  
See #1  
  
4. Elevated troponin (R77.8: Other specified abnormalities of plasma proteins)  
Slight elevation, pt presented with infection, likely type II  
telemetry  
  
  
  
5. Hyponatremia (E87.1: Hypo-osmolality and hyponatremia)  
Improving, Na level is trending up  
Received IVF on admission  
Hx of CHF, with with AICD,  
F/U Na level  
  
  
  
6. Chronic systolic heart failure (I50.22: Chronic systolic (congestive) heart   
failure)  
Not in acute exacerbation  
  
7. Presence of combination internal cardiac defibrillator (ICD) and pacemaker   
(Z95.810: Presence of automatic (implantable) cardiac defibrillator)  
No recent firing  
  
8. History of atrial fibrillation (Z86.79: Personal history of other diseases of
 the   
circulatory system)  
C/W BB and Xarelto  
  
9. Hypertension (I10: Essential (primary) hypertension)  
C/w home meds  
  
10. Hyperlipidemia (E78.5: Hyperlipidemia, unspecified)  
statin  
  
11. Chronic GERD (K21.9: Gastro-esophageal reflux disease without esophagitis)  
was on PPI at home  
  
12. History of gout (Z87.39: Personal history of other diseases of the   
musculoskeletal system and connective tissue)  
allopurinol  
  
13. History of breast cancer (Z85.3: Personal history of malignant neoplasm of   
breast)  
s/p right radical mastectomy in   
  
14. Obesity (E66.9: Obesity, unspecified)  
Lifestyle modification and outpatient follow up with PCP  
  
Orders:  
Basic Metabolic Panel  
Blood Culture Charcoal  
Blood Culture Charcoal  
eGFR  
Extra Lav Tube  
Sodium Level  
This report was transcribed using voice recognition software , Every effort was 
made   
to ensure accuracy , however, inadvertently computerized transcription mistakes 
may   
be present .  
  
  
  
Extracted from:  
  
                                Title:Progress/SOAP Note Author:Marty Johnson. Date:22  
  
  
  
                                                    Stable for discharge from [facility] perspective. Follow-up with her outpatient   
cardiologist. Continue same medications as patient was admitted with for heart   
failure. We will sign off cardiology  
1. Fever (R50.9: Fever, unspecified)  
2. Hypoxia (R09.02: Hypoxemia)  
3. Urinary tract infection (N39.0: Urinary tract infection, site not specified)  
4. Elevated troponin (R77.8: Other specified abnormalities of plasma proteins)  
5. Hyponatremia (E87.1: Hypo-osmolality and hyponatremia)  
6. Chronic systolic heart failure (I50.22: Chronic systolic (congestive) heart   
failure)  
7. Presence of combination internal cardiac defibrillator (ICD) and pacemaker   
(Z95.810: Presence of automatic (implantable) cardiac defibrillator)  
8. History of atrial fibrillation (Z86.79: Personal history of other diseases of
 the   
circulatory system)  
9. Hypertension (I10: Essential (primary) hypertension)  
10. Hyperlipidemia (E78.5: Hyperlipidemia, unspecified)  
11. Chronic GERD (K21.9: Gastro-esophageal reflux disease without esophagitis)  
12. History of gout (Z87.39: Personal history of other diseases of the   
musculoskeletal system and connective tissue)  
13. History of breast cancer (Z85.3: Personal history of malignant neoplasm of   
breast)  
14. Obesity (E66.9: Obesity, unspecified)  
  
  
  
Extracted from:  
  
                                Title:Consult Note Author:Dylon JORDAN, Roverto REAGAN. Date:22  
  
  
  
                                                    Patient with nonischemic car  
diomyopathy and chronic systolic heart failure comes  
 in   
with systemic complaints sounding more like infection possibly pneumonia or UTI 
then   
cardiac event. Elevated troponin probably represents congestive heart failure 
which   
is chronic and acute renal failure. Would not recommend any further intervention
 at   
this point in time. Acceptable to continue cardiac meds as you have done 
including   
Xarelto simvastatin carvedilol thank you for the consultation  
1. Fever (R50.9: Fever, unspecified)  
2. Hypoxia (R09.02: Hypoxemia)  
3. Urinary tract infection (N39.0: Urinary tract infection, site not specified)  
4. Elevated troponin (R77.8: Other specified abnormalities of plasma proteins)  
5. Hyponatremia (E87.1: Hypo-osmolality and hyponatremia)  
6. Chronic systolic heart failure (I50.22: Chronic systolic (congestive) heart   
failure)  
7. Presence of combination internal cardiac defibrillator (ICD) and pacemaker   
(Z95.810: Presence of automatic (implantable) cardiac defibrillator)  
8. History of atrial fibrillation (Z86.79: Personal history of other diseases of
 the   
circulatory system)  
9. Hypertension (I10: Essential (primary) hypertension)  
10. Hyperlipidemia (E78.5: Hyperlipidemia, unspecified)  
11. Chronic GERD (K21.9: Gastro-esophageal reflux disease without esophagitis)  
12. History of gout (Z87.39: Personal history of other diseases of the   
musculoskeletal system and connective tissue)  
13. History of breast cancer (Z85.3: Personal history of malignant neoplasm of   
breast)  
14. Obesity (E66.9: Obesity, unspecified)  
  
  
  
Extracted from:  
  
                                Title:HypoNa    Author:Po Champagne MD Date:  
  
  
  
                                                    Impression and Plan  
1. Acute hypovolemic hyponatremia with normal baseline sodium: Likely from 
volume   
depletion. Sodium anthony at 129. Recommend stopping spironolactone for today. We 
will   
give 500 cc of normal saline bolus. Please check sodiums every 6 hours.  
2. Heart failure with unknown EF status post AICD: Please update 2D echo. She 
was on   
Lasix and spironolactone prior to admission; will defer to cardiology whether   
diuretics need to be resumed on outpatient basis.  
3. Acute kidney injury: Likely from volume depletion. IV fluids as above.  
Thank you for involving us in the consultation of this patient. Please feel free
 to   
call with any questions.  
No in person nephrology service available tomorrow.  
  
  
Extracted from:  
  
                                Title:Admission H & P Author:J Luis VERDUGO DO Date:22  
  
  
  
                                                    1. Fever (R50.9: Fever, unsp  
ecified)  
Suspect urinary tract infection based on abnormal UA however await urine 
culture.   
Note below as well. Chest x-ray demonstrates vague opacity I suspect however 
this is   
overlying breast tissue on the left which she does not have from a prior 
mastectomy   
on the right responsible for the symmetry. Patient however does have a cough 
(see   
below) therefore differential also includes pneumonia versus bronchitis. Note 
patient   
was negative for influenza A&B and COVID 19. Differential also includes 
bronchitis of   
either bacterial or viral origin. Patient does have allergies to Ceclor 
therefore   
Rocephin has not been initiated. Agree with Levaquin pending further evaluation 
as it   
would cover both respiratory and urinary pathogens. Await urine culture. If 
patient   
develops a productive cough we will send sputum for analysis. Await blood 
cultures.   
We will check procalcitonin. Do not feel that patient at present is septic  
Ordered:  
levofloxacin + Dextrose 5% in Water intravenous solution 150 mL, 750 mg = 150 
mL, IV   
Piggyback, q24hr, Routine, Start date 22 1:42:00 EST, 150 mL/hr, Infuse 
over 1   
hour(s), 22 1:42:00 EST  
  
2. Hypoxia (R09.02: Hypoxemia)  
Note Per discussion with emergency department clinician nursing had witnessed 
patient   
on continuous monitor with sats as low as 84%. Curiously when she first 
presented she   
had recorded saturation of 93%. She denied any shortness of breath and her lungs
 were   
clear to auscultation. We will continue O2 for now pending above work-up. Do not
 feel   
that patient is volume overloaded which she would be at risk for with below. Per
   
review of the emergency department documents patient did receive some fluid in 
the   
emergency department. We will be cautious with additional fluid based on her 
history   
in light of a normal lactic acid, normal creatinine not prerenal and not 
hypotensive  
Ordered:  
Procalcitonin  
  
3. Urinary tract infection (N39.0: Urinary tract infection, site not specified)  
Await urine culture. Continue Levaquin  
  
4. Elevated troponin (R77.8: Other specified abnormalities of plasma proteins)  
Slight elevation. Please note patient has nonischemic cardiomyopathy. She denied
 any   
chest pain. She was denying any shortness of breath. EKG demonstrates paced   
ventricle. There were no subsequent troponins ordered we therefore reorder and 
if   
rising will complete trend and consider cardiology consult. Question if this is 
a   
type II in response to above  
Ordered:  
Troponin  
  
5. Hyponatremia (E87.1: Hypo-osmolality and hyponatremia)  
Patient had reportedly received IV fluids in the squad. We will be cautious 
about   
additional fluids with her history of cardiomyopathy. In regards to above 
diagnosis   
this could potentially further support pneumonia if this were secondary to 
SIADH.   
Could also be due to loop diuretic  
  
6. Chronic systolic heart failure (I50.22: Chronic systolic (congestive) heart   
failure)  
Patient states she had an echocardiogram at [place] ordered by her cardiologist 
in   
[place]. She indicates that there was  decreased function  I suspect she 
is   
referring to ejection fraction. Await confirmation of home meds she had been on   
Coreg, losartan, Lasix and Aldactone we will continue if confirmed while 
watching   
electrolytes. Do not feel the patient is volume overloaded despite reports that   
patient received fluid in route via the squad  
  
7. Presence of combination internal cardiac defibrillator (ICD) and pacemaker   
(Z95.810: Presence of automatic (implantable) cardiac defibrillator)  
No recent firing of the defibrillator. She denies any history of cardiac arrest 
or   
known history of ventricular tachycardia likely this was placed in response to   
diminished ejection fraction in the past  
  
8. History of atrial fibrillation (Z86.79: Personal history of other diseases of
 the   
circulatory system)  
Await confirmation of home meds if confirmed she is on Coreg we will continue 
this,   
continue Xarelto   
  
9. Hypertension (I10: Essential (primary) hypertension)  
Await confirmation of home meds if on Coreg and losartan will be continued  
  
10. Hyperlipidemia (E78.5: Hyperlipidemia, unspecified)  
Continue statin once confirmed  
  
11. Chronic GERD (K21.9: Gastro-esophageal reflux disease without esophagitis)  
Continue PPI once confirmed  
  
12. History of gout (Z87.39: Personal history of other diseases of the   
musculoskeletal system and connective tissue)  
Continue allopurinol once confirmed  
  
13. History of breast cancer (Z85.3: Personal history of malignant neoplasm of   
breast)  
Patient had a right radical mastectomy in  for breast cancer. She received 
chemo   
and radiation therapy with no recent recurrence  
  
14. Obesity (E66.9: Obesity, unspecified)  
With multiple comorbidities patient would benefit from weight loss  
  
Orders:  
acetaminophen, 650 mg = 2 tab(s), Tab, Oral, q6hr PRN Pain, Routine, Start date   
22 5:20:00 EST, 22 5:20:00 EST  
guaifenesin, 1,200 mg = 2 tab(s), Tab-ER, Oral, BID, Routine, Start date 
22   
9:00:00 EST, 22 5:20:00 EST  
hydrALAZINE, 10 mg = 0.5 mL, Injection, IV Push, q6hr PRN Other (see comment),   
Routine, Start date 22 5:20:00 EST, 22 5:20:00 EST  
Sodium Chloride 0.9% intravenous solution 1,000 mL, 1,000 mL, IV, 30 mL/hr, 
Routine,   
Start date 22 5:20:00 EST, 33.3 hour(s), Total volume (mL): 1,000, 103.5 
kg,   
2.23, m2  
Basic Metabolic Panel  
CBC w/ Auto Diff  
Elevate Head of Bed  
Legionella Antigen Urine  
Notify Provider Vital Signs  
Notify Provider Vital Signs  
Oxygen Protocol  
Place in Status  
Pneumonia Quality Measures  
Regular Diet  
Sputum Culture  
Vital Signs  
Weight  
Weight  
Admit patient as a general inpatient with hypoxia febrile illness anticipating 
she   
will require 2 midnight stay  
  
  
  
Extracted from:  
  
                                Title:ED Note   Author:Rama Long DO Date  
:22  
  
  
  
                                                    Elevated troponin (R77.8: Ot  
her specified abnormalities of plasma proteins)  
Hyponatremia (E87.1: Hypo-osmolality and hyponatremia)  
Pneumonia (J18.9: Pneumonia, unspecified organism)  
Orders:  
azithromycin + Sodium Chloride 0.9% intravenous solution 250 mL, 500 mg = 1 EA, 
IV   
Piggyback, Daily, STAT, Start date 22 1:42:00 EST, 250 mL/hr, Infuse over 
60   
minute(s), 22 1:42:00 EST  
levofloxacin + Dextrose 5% in Water intravenous solution 150 mL, 750 mg = 150 
mL, IV   
Piggyback, Once, Stop date 22 1:42:00 EST, STAT, Start date 22 
1:42:00   
EST, 150 mL/hr, Infuse over 1 hour(s), 22 1:42:00 EST  
ondansetron, 4 mg = 2 mL, Injection, IV Push, Once, Stop date 22 3:01:00 
EST,   
STAT, Start date 22 3:01:00 EST, 22 3:01:00 EST  
Sodium Chloride 0.9% intravenous solution, 1,000 mL, Soln-IV, IV, Once, Stop 
date   
22 0:45:00 EST, STAT, Start date 22 0:45:00 EST, Infuse over 61,   
minute(s)  
Automated Diff  
Blood Culture Charcoal  
Blood Culture Charcoal  
CBC w/ Auto Diff  
Comprehensive Metabolic Panel  
Continuous Pulse Oximetry  
ECG 12 Lead Adult  
ED Cardiac Monitoring  
ED Physician consult Hospitalist for continued care  
eGFR  
Influenza A&B Ag  
Lactic Acid  
Oxygen Therapy  
PT & PTT  
[place]  
Troponin  
UA With Cult Reflex  
XR Chest Single View  
  
  
  
  
Future Appointments  
  
  
  
Appointment Date:2022 09:15:00 AM  
Scheduled Provider:Salvador CORCORAN MD  
Location:[facility]  
Appointment Type:URO Office Visit  
  
  
[facility]11- Hospital Discharge instructions  
  
Patient Education  
  
  
  
2022 11:32:23  
  
  
  
Bacteremia, Adult  
  
  
  
Bacteremia, Adult  
Bacteremia is the presence of bacteria in the blood. When bacteria enter the 
bloodstream, they can cause a life-threatening reaction called sepsis. Sepsis is
 a medical emergency.  
What are the causes?  
This condition is caused by bacteria that get into the blood. Bacteria can enter
 the blood from an infection, including:  
A skin infection or injury, such as a burn or a cut.  
A lung infection (pneumonia).  
An infection in the stomach or intestines.  
An infection in the bladder or urinary system (urinary tract infection).  
A bacterial infection in another part of the body that spreads to the blood.  
Bacteria can also enter the blood during a dental or medical procedure, from 
bleeding gums, or through use of an unclean needle.  
What increases the risk?  
This condition is more likely to develop in children, older adults, and people 
who have:  
A long-term (chronic) disease or condition like diabetes or chronic kidney 
failure.  
An artificial joint or heart valve, or heart valve disease.  
A tube inserted to treat a medical condition, such as a urinary catheter or IV.  
A weak disease-fighting system (immune system).  
Injected illegal drugs.  
Been hospitalized for more than 10 days in a row.  
What are the signs or symptoms?  
Symptoms of this condition include:  
Fever and chills.  
Fast heartbeat and shortness of breath.  
Dizziness, weakness, and low blood pressure.  
Confusion or anxiety.  
Pain in the abdomen, nausea, vomiting, and diarrhea.  
Bacteremia that has spread to other parts of the body may cause symptoms in 
those areas. In some cases, there are no symptoms.  
How is this diagnosed?  
This condition may be diagnosed with a physical exam and tests, such as:  
Blood tests to check for bacteria (cultures) or other signs of infection.  
Tests of any tubes that you have had inserted. These tests check for a source of
 infection.  
Urine tests to check for bacteria in the urine.  
Imaging tests, such as an X-ray, a CT scan, an MRI, or a heart ultrasound. These
 check for a sourceof infection in other parts of your body, such as your lungs,
 heart valves, or joints.  
How is this treated?  
This condition is usually treated in the hospital. If you are treated at home, 
you may need to return to the hospital for medicines, blood tests, and 
evaluation. Treatment may include:  
Antibiotic medicines. These may be given by mouth or directly into your blood 
through an IV. You may need antibiotics for several weeks. At first, you may be 
given an antibiotic to kill most types ofblood bacteria. If tests show that a 
certain kind of bacteria is causing the problem, you may be given a different 
antibiotic.  
IV fluids.  
Removing any catheter or device that could be a source of infection.  
Blood pressure and breathing support, if needed.  
Surgery to control the source or the spread of infection, such as surgery to 
remove an implanted device, abscess, or infected tissue.  
Follow these instructions at home:  
Medicines  
Take over-the-counter and prescription medicines only as told by your health 
care provider.  
If you were prescribed an antibiotic medicine, take it as told by your health 
care provider. Do notstop taking the antibiotic even if you start to feel 
better.  
General instructions  
Rest as needed. Ask your health care provider when you may return to normal 
activities.  
Drink enough fluid to keep your urine pale yellow.  
Do not use any products that contain nicotine or tobacco, such as cigarettes, e-
cigarettes, and chewing tobacco. If you need help quitting, ask your health care
 provider.  
Keep all follow-up visits as told by your health care provider. This is 
important.  
How is this prevented?  
Wash your hands regularly with soap and water. If soap and water are not 
available, use hand .  
You should wash your hands:  
?After using the toilet or changing a diaper.  
?Before preparing, cooking, serving, or eating food.  
?While caring for a sick person or while visiting someone in a hospital.  
?Before and after changing bandages (dressings) over wounds.  
Clean any scrapes or cuts with soap and water and cover them with a clean 
bandage.  
Get vaccinations as recommended by your health care provider.  
Practice good oral hygiene. Brush your teeth two times a day, and floss 
regularly.  
Take good care of your skin. This includes bathing and moisturizing on a regular
 basis.  
Contact a health care provider if:  
Your symptoms get worse, and medicines do not help.  
You have severe pain.  
Get help right away if you have:  
Pain.  
A fever or chills.  
Trouble breathing.  
A fast heart rate.  
Skin that is blotchy, pale, or clammy.  
Confusion.  
Weakness.  
Lack of energy or unusual sleepiness.  
New symptoms that develop after treatment has started.  
These symptoms may represent a serious problem that is an emergency. Do not wait
 to see if the symptoms will go away. Get medical help right away. Call your 
local emergency services (911 in the [place] Do not drive yourself to the 
hospital.  
Summary  
Bacteremia is the presence of bacteria in the blood. When bacteria enter the 
bloodstream, they can cause a life-threatening reaction called sepsis.  
Bacteremia is usually treated with antibiotic medicines in the hospital.  
If you were prescribed an antibiotic medicine, take it as told by your health 
care provider. Do notstop taking the antibiotic even if you start to feel 
better.  
Get help right away if you have any new symptoms that develop after treatment 
has started.  
This information is not intended to replace advice given to you by your health 
care provider. Make sure you discuss any questions you have with your health 
care provider.  
Document Released: 10/01/2007 Document Revised: 2020 Document Reviewed: 
2020  
[employer] Patient Education  [employer]  
  
  
  
  
2022 23:29:07  
  
  
  
Core Measures[place]onia [facility] [place]  
  
  
  
Pneumonia  
  
Pneumonia is an infection of the lungs which may be viral or bacterial (with 
germs). Most forms of infectious (disease producing) pneumonias are bacterial. 
This means the are caused by a germ. Usually these infections are caused by 
breathing in infectious particles into the respiratory tract (lungs).  
  
SYMPTOMS  
The most common symptoms (problems) are:  
Cough.  
Fever.  
Chest pain.  
Increased rate of breathing.  
Wheezing.  
Sputum production.  
  
DIAGNOSIS  
Often these infections are diagnosed on exam by your caregiver. Sometimes the 
diagnosis (learning what is wrong) may require chest x-rays, blood analysis, and
 or cultures.  
Your caregiver may do tests (such as blood gasses or pulse oximetry) to see how 
well your lungs areworking.  
Blood cultures may be done to help find the cause of your pneumonia.  
  
TREATMENT  
The bacterial pneumonias generally respond well to antibiotics (medications 
which kill germs). Viral infections must run their course. These infections will
 not respond to antibiotics. A pneumococcalvaccine (shot) is available to 
prevent a common bacterial pneumonia. This is usually suggested for the elderly 
and for other groups of higher risk individuals, such as those on chemotherapy 
or those that have problems with their immune system.  
You will have pneumococcal screening or vaccination if you are over 65 years old
 and are not immunized.  
  
You have received a pneumococcal vaccination on: ____________________  
  
If you are a smoker, it is time to quit. You will receive instructions on how to
 best stop smoking.Your caregivers can provide medications and counseling to 
help you quit.  
  
HOME CARE INSTRUCTIONS  
Cough suppressants may be used if you are losing too much rest; however cough 
protects you by clearing your lungs. This is one reason for not using cough 
suppressants, if able, as they take away thisprotection.  
Your caregiver may have prescribed an antibiotic if she or he feels your cough 
is caused by a bacterial infection. Take all your medication until you are 
finished.  
Your caregiver may also prescribe an expectorant to loosen the mucous to be 
coughed up.  
Only take over-the-counter or prescription medicines for pain, discomfort, or 
fever as directed by your caregiver.  
Smoking is a common cause of bronchitis and can contribute to pneumonia. 
Stopping this habit is an important self help step.  
A cold steam vaporizer or humidifier in your room or home may help loosen 
mucous.  
Cough is most often worse at night. Sleeping in a semi-upright position in a 
recliner, or using a couple pillows under your head will help with this.  
Get rest as you feel it is needed. Your body will usually let you know when to 
rest.  
If you are a smoker and continue to smoke, your cough may last several weeks 
after your pneumonia has cleared.  
Exercise your lungs. Deep breathing helps to open the air passages in your 
lungs. Coughing helps tobring up sputum (mucus) from your lungs. Take a deep 
breath and hold the breath as long as you can.Then push the air out of your 
lungs with a deep, strong cough. Put any sputum that you have coughedup into a 
tissue and throw it away. Take 10 deep breaths in a row every hour that you are 
awake. Remember to follow each deep breath with a cough.  
  
SEEK IMMEDIATE MEDICAL CARE IF:  
You develop more purulent (pus like) sputum, have uncontrolled temperature, or 
your illness becomesworse. This is especially true if you are elderly or 
weakened from any other disease.  
You cannot control your cough with suppressants and are losing sleep.  
You begin coughing up blood.  
You develop pain which is getting worse or is uncontrolled with medications.  
You develop an oral temperature above ____________________, after being afebrile
 (not having a temperature for one or more days).  
Any of the symptoms which initially brought you in for treatment are getting 
worse rather than better, or you develop shortness of breath or chest pain. Dial
 911 for immediate emergency care.  
  
AGREEMENT BETWEEN PATIENT AND HEALTHCARE TEAM:  
Your signature on this document represents an understanding between you and the 
healthcare team that took care of you today. That means that you:  
Understand these discharge instructions.  
Will monitor your condition.  
Will seek immediate medical care as instructed.  
  
Document Released: 2006 Document Re-Released: 06/15/2009  
[employer]Care Patient Information 2009 [employer].  
  
  
  
  
Follow Up Care  
  
  
  
2022 00:44:11  
  
  
  
With:Alexei CROW  
Address:  
[address] [place] [address]  
[phone] Business (1)  
  
When:2022 09:15:00  
  
  
  
With:Marty Osborne  
Address:  
[address] [place] [id number]  
[phone] Business (1)  
  
When:1 to 2 weeks  
  
  
  
With:ALEXANDER GUERRA  
Address:  
[address] [place] [address]  
[place] [address]  
[phone] Business (1)  
  
When:  
Unknown  
  
[facility] 
[facility]:Result Comment: Electronically Signed By: J Luis VERDUGO DO\Date and Time Signed: 22 05:25 YSN83- Evaluation + Plan note
Extracted from:  
  
                                Title:ED Note   Author:Jose TREJO, Trae Alejo  
te:22  
  
  
  
                                                    1. Upper respiratory infecti  
on (J06.9: Acute upper respiratory infection,   
unspecified)  
2. Eloped from emergency department (Z53.21: Procedure and treatment not carried
 out   
due to patient leaving prior to being seen by health care provider)  
  
  
[facility]03- NoteHNO ID: [id number]  
Author: Gab Mcneill (Cnp)  
Service: ?  
Author Type: Nurse Practitioner  
Type: Progress Notes  
Filed: 3/30/2021 5:10 PM  
Note Text:  
Ms. Chavez is a 71 year old female who comes in for evaluation of sinus  
issues and sore throat.  
72yo F presents to clinic for chronic maxillary sinus issues and trouble  
swallowing for the last 6 months that has started to worsen. Patient  
reports that she thinks that the increase in saliva is due to her sinuses.  
Patient constantly feels like she needs to clear her throat. Previously  
worked up for acid reflux and was on PPIs then transitioned to Pepcid  
which the patient states has been helping with her symptoms. Trouble  
swallowing both liquids and solids, occasionally chokes when eating.  
Patient has tried smaller portions, more chewing between bites, and more  
liquid to help but still experiencing these issues. When she does  
swallow, she feels a thick mucus sensation, sometimes goes down the wrong  
pipe.  
Patient is currently experiencing sinus pressure with mild headache.  
Patient endorses PND. Never been treated with antibiotics in the past.  
Patient has a history of radiation for inflammatory breast cancer that  
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  
Past history :  
PAST MEDICAL HISTORY  
Diagnosis Date  
- CHF (congestive heart failure) (HCC)  
- History of bilateral breast cancer  
- Hypothyroidism  
- Neuropathy  
Current medication:  
Current Outpatient Medications  
Medication Sig  
- acetaminophen (TYLENOL) 325 mg tablet Take 650 mg by mouth every 6 hours  
as needed.  
- allopurinol (ZYLOPRIM) 100 mg tablet TAKE 1 TABLET BY MOUTH ONCE DAILY  
FOR 90 DAYS  
- carvedilol (COREG) 6.25 mg tablet Take 6.25 mg by mouth twice daily with  
meals.  
- cholecalciferol (VITAMIN D3) 50 mcg (2,000 unit) tablet Take 2,000 Units  
by mouth once daily.  
- dorzolamide-timolol (COSOPT) 22.3-6.8 mg/mL ophthalmic solution Use 1  
Drop in both eyes twice daily.  
- famotidine (PEPCID) 20 mg tablet Take 20 mg by mouth at bedtime as  
needed.  
- fluvoxaMINE (LUVOX) 100 mg tablet Take 100 mg by mouth twice daily.  
- furosemide (LASIX) 40 mg tablet Take 10 mg by mouth at bedtime as  
needed.  
- levothyroxine (SYNTHROID) 50 mcg tablet Take 50 mcg by mouth every  
morning. Take On an Empty Stomach  
- loratadine (CLARITIN) 10 mg tablet Take 10 mg by mouth at bedtime as  
needed.  
- losartan (COZAAR) 25 mg tablet TAKE 1 TABLET BY MOUTH ONCE DAILY FOR 90  
DAYS  
- Magnesium Oxide 500 mg tab Take 500 mg by mouth once daily.  
- therapeutic multivitamin-minerals (THERA-M PLUS) 9 mg iron-400 mcg  
tablet Take 1 tablet by mouth once daily.  
- rivaroxaban (XARELTO) 20 mg tablet TAKE 1 TABLET BY MOUTH ONCE DAILY  
WITH SUPPER  
- simvastatin (ZOCOR) 40 mg tablet Take 40 mg by mouth daily at bedtime.  
- spironolactone (ALDACTONE) 25 mg tablet TAKE 1 2 (ONE HALF) TABLET BY  
MOUTH ONCE DAILY FOR 90 DAYS  
- Lactobacillus acidophilus (PROBIOTIC ORAL) Take by mouth.  
- flaxseed oil (OMEGA 3 ORAL) Take by mouth.  
- lactase (ULTRA DAIRY DIGESTIVE ORAL) Take by mouth.  
No current facility-administered medications for this visit.  
Allergies:  
ALLERGIES  
Allergen Reactions  
- Ceclor [Cefaclor] Rash, Swelling  
And redness  
Social history:  
Social History  
Tobacco Use  
- Smoking status: Never Smoker  
- Smokeless tobacco: Never Used  
Substance Use Topics  
- Alcohol use: Yes  
- Drug use: Never  
Family history: No family history on file.  
There are no exam notes on file for this visit.  
Physical exam:  
General Appearance: 71 year old female is alert, oriented, not in acute  
distress. Hearing is grossly normal, voice is raspy. There is no  
tenderness with percussion over the paranasal sinuses.  
Eyes: PEERLA, extraocular movements are full.  
Nose: Clean, septum is straight. There are no polyps. There is no  
discharge.  
Oropharynx: Teeth are in good repair. Lips, gums, tongue and posterior  
pharynx are within normal limits. Gag reflex is intact.  
Nasopharynx: Visualization is limited.  
Hypopharynx: Visualization is limited.  
Neck: No masses palpated. Thyroid is not enlarged. Trachea is in the  
midline.  
Ears: Both ear canals are clean. Both TMs are intact and mobile.  
Impression: Throat discomfort, swallowing problem, and hoarseness of voice  
exact etiology unclear. GERD may be contributing.  
Plan of management: I will perform fiberoptic laryngoscopy and discuss  
further management options with her.  
Procedure: After the procedure was explained to the patient and patient  
agreed to have the procedure done 1% Yobani-Synephrine and 4% Xylocaine was  
sprayed to both nasal cavities. Fiberoptic scope was inserted through the  
right nasal airway. Right nasal airway was normal. Nasopharynx was normal.  
Hypopharyngeal exam revealed moderately severe mucosal thickening in the  
posterior commissure with mild hyperemia. There is pooling of saliva.  
Both vocal cords are mobile. Findings were explained to the patient.  
She will have EGD. We bert (more content not included)...[facility]03- NoteHNO ID: [id number]  
Author: Dago Lo  
Service: ?  
Author Type: Physician  
Type: Progress Notes  
Filed: 3/5/2021 8:53 AM  
Note Text:  
This document has been created with the use of voice recognition  
technology. It may contain inaccuracies: misspellings, inaccurate syntax  
or word sense that escaped review.  
CHIEF COMPLAINT: Ping Chavez is a 71 year old female who presents today  
for follow up of bilateral knee pain secondary to osteoarthritis.  
HISTORY OF PRESENT ILLNESS:  
PAIN EVALUATION  
3/5/2021  
0839  
Pain Level: 5  
Pain Location: ?  
bilateral knees  
Description: Aching  
Duration Units: Unknown  
Frequency: Intermittent  
Intervention: Reposition;Relaxation;Positioning;Medication;Heat  
HISTORY: Ping Chavez is here for follow up of complaints of bilateral  
knee pain right greater than left. She states that the injection she  
received in September of last year offered relief. She requested we  
inject both knees again today. No new injury. No hip or neurologic  
complaints.  
No other musculoskeletal complaints  
ROS:  
REVIEW OF SYMPTOMS:  
Constitutional: patient denies any recent fever or significant change in  
weight  
Gastrointestinal: patient denies any current abdominal discomfort  
Musculoskeletal: as noted in the HPI  
Neurologic: as noted in the HPI  
SOCIAL HISTORY:  
Tobacco Use: Not on file  
ALLERGIES:  
ALLERGIES  
Allergen Reactions  
- Ceclor [Cefaclor] Rash, Swelling  
And redness  
PAST MEDICAL HISTORY:  
PAST MEDICAL HISTORY  
Diagnosis Date  
- CHF (congestive heart failure) (HCC)  
- History of bilateral breast cancer  
- Hypothyroidism  
- Neuropathy  
SOCIAL HISTORY:  
Tobacco Use: Not on file  
EXAMINATION:  
GENERAL: Appears healthy, well-nourished, no deformities.  
ORIENTATION: Alert and oriented to person place and time  
HABITUS: Normal  
GAIT: Normal, the patient did not have trouble getting onto the exam  
table.  
bilateral knee exam:  
No effusion,  
Tenderness with patellar apprehension  
Right knee valgus left knee neutral alignment  
Active full extension, flexion 120. Good patellar tracking/mild patella  
femoral crepitation  
Mild pain with palpating medial compartment, mild pain with palpating  
lateral compartment.  
Stable to varus and valgus stresses.  
Lachman is negative  
Negative anterior/posterior drawer.  
Palpable dorsalis pedis pulse  
Calf soft and nontender.  
Hip exam negative  
Intact sensation to light touch distally.  
.  
RADIOGRAPHS: XR Obtained today and personally reviewed by myself  
demonstrating none today x-rays some 2020 reviewed with  
severe lateral compartment osteoarthritis on the right moderate on the  
left  
IMPRESSION:  
Encounter Diagnosis  
ICD-10-CM  
1. Primary osteoarthritis of both knees M17.0  
Large Joint Arthro/Inj: bilateral knee joints  
The risks, benefits and alternatives of the procedure were reviewed with  
the patient/surrogate, who agreed to proceed.  
Written Consent Obtained: Yes  
Sign In Communication: Completed  
Time Out: Time Out completed  
The  Time-Out  verifies the correct patient, procedure, side/site,  
position (if applicable) and completion and review of fire risk  
assessment/protocols (if appropriate):  
Affirmation of Time Out: Yes  
Signout Discussion: yes  
3/5/2021 8:52 AM  
The procedure site was prepped in the usual sterile fashion.  
Allergies were reviewed  
Site: bilateral knee joints  
Medications (Right): 40 mg triamcinolone acetonide 40 mg/mL  
Medications (Left): 40 mg triamcinolone acetonide 40 mg/mL  
Anesthetics (Right): 4 mL lidocaine (PF) 10 mg/mL (1 %)  
Anesthetics (Left): 4 mL lidocaine (PF) 10 mg/mL (1 %)  
Outcome: Tolerated well, no immediate complications  
Post-injection instructions were reviewed with the patient and the patient  
voiced understanding of these instructions.  
Plan: Patient with bilateral knee osteoarthritis. She requested we  
injected both knees today with cortisone. We again discussed surgical  
options especially with the right knee. We discussed intervals between  
injections.  
Follow-up as symptoms dictate  
Dago Lo [facility]  
  
Future Appointments  
  
  
  
Appointment Date:2022 09:15:00 AM  
Scheduled Provider:Salvador CORCORAN MD  
Location:[facility]  
Appointment Type:URO Office Visit  
  
  
[facility]  
  
Future Appointments  
  
  
  
Appointment Date:2023 09:45:00 AM  
Scheduled Provider:Salvador CORCORAN MD  
Location:[facility]  
Appointment Type:URO Office Visit  
  
  
[facility]  
Work Phone: [phone] noteNo assessment information available
[facility]  
Work Phone: [phone] note*   
  
                          Diagnosis    Onset Date   Resolution   Status  
   
                          Primary osteoarthritis of knees, bilateral              
               acute  
  
  
[facility]  
Work Phone: [phone] note*   
  
                                                    Diagnosis  
   
                                                      
  
  
Primary open angle glaucoma of right eye, mild stage  
  
documented in this encounter  
[facility]   
  
                                                    Diagnosis  
   
                                                      
  
  
Cardiomyopathy (CMS-HCC)- Primary  
   
                                                      
  
  
Persistent atrial fibrillation (CMS-HCC)  
  
  
Atrial fibrillation  
   
                                                      
  
  
Chronic systolic heart failure (CMS-HCC)  
  
  
Chronic systolic heart failure  
   
                                                      
  
  
Left bundle branch block (LBBB)  
  
documented in this encounter  
[facility]   
  
                                                    Diagnosis  
   
                                                      
  
  
Chronic systolic congestive heart failure (CMS-HCC)- Primary  
   
                                                      
  
  
Paroxysmal atrial fibrillation (CMS-HCC)  
  
  
Atrial fibrillation  
  
documented in this encounter  
[facility]   
  
                                                    Diagnosis  
   
                                                      
  
  
Persistent atrial fibrillation (CMS-HCC)- Primary  
  
  
Atrial fibrillation  
   
                                                      
  
  
Chronic combined systolic and diastolic congestive heart failure (CMS-HCC)  
   
                                                      
  
  
Left bundle branch block (LBBB)  
  
documented in this encounter  
[facility]   
  
                                                    Diagnosis  
   
                                                      
  
  
Automatic implantable cardioverter-defibrillator in situ [employer] 
Primary  
  
documented in this encounter  
[facility]   
  
                                                    Diagnosis  
   
                                                      
  
  
Pain  
  
  
Generalized pain  
   
                                                      
  
  
Pre-op evaluation- Primary  
  
  
Preoperative examination, unspecified  
   
                                                      
  
  
Dysphagia, unspecified type  
   
                                                      
  
  
ICD (implantable cardioverter-defibrillator) in place  
  
  
Automatic implantable cardiac defibrillator in situ  
   
                                                      
  
  
Congestive heart failure, unspecified HF chronicity, unspecified heart failure 
type   
(HCC)  
   
                                                      
  
  
Nonischemic congestive cardiomyopathy (HCC)  
  
  
Other primary cardiomyopathies  
   
                                                      
  
  
Hypothyroidism, unspecified type  
   
                                                      
  
  
Chronic gout of multiple sites, unspecified cause  
   
                                                      
  
  
Bilateral malignant neoplasm of overlapping sites of breast in female, 
unspecified   
estrogen receptor status (HCC)  
   
                                                      
  
  
Neuropathy  
  
  
Mononeuritis of unspecified site  
   
                                                      
  
  
Other hyperlipidemia  
  
documented in this encounter  
[facility]   
  
                                                    Diagnosis  
   
                                                      
  
  
Chronic combined systolic and diastolic congestive heart failure (CMS-HCC)  
   
                                                      
  
  
Persistent atrial fibrillation (CMS-HCC)  
  
  
Atrial fibrillation  
   
                                                      
  
  
Nonischemic congestive cardiomyopathy (CMS-HCC)  
   
                                                      
  
  
Benign essential hypertension  
  
  
Essential hypertension, benign  
   
                                                      
  
  
Left bundle branch block (LBBB)  
   
                                                      
  
  
Automatic implantable cardioverter-defibrillator in situ [employer]  
  
documented in this encounter  
[facility]   
  
                                                    Diagnosis  
   
                                                      
  
  
Lumbar radiculopathy- Primary  
  
  
Thoracic or lumbosacral neuritis or radiculitis, unspecified  
   
                                                      
  
  
RLS (restless legs syndrome)  
  
  
Restless legs syndrome (RLS)  
   
                                                      
  
  
Neck pain  
  
  
Cervicalgia  
   
                                                      
  
  
Metastasis to spinal column (CMS/HCC)  
  
  
Secondary malignant neoplasm of bone and bone marrow  
  
documented in this encounter  
[facility]   
  
                                                    Diagnosis  
   
                                                      
  
  
Lumbar radiculopathy- Primary  
  
  
Thoracic or lumbosacral neuritis or radiculitis, unspecified  
   
                                                      
  
  
Neck pain  
  
  
Cervicalgia  
   
                                                      
  
  
Metastasis to spinal column (CMS/HCC)  
  
  
Secondary malignant neoplasm of bone and bone marrow  
   
                                                      
  
  
History of compression fracture of spine  
  
  
Personal history of traumatic fracture  
   
                                                      
  
  
RLS (restless legs syndrome)  
  
  
Restless legs syndrome (RLS)  
   
                                                      
  
  
Neuropathy  
  
  
Mononeuritis of unspecified site  
   
                                                      
  
  
Medication monitoring encounter  
  
  
Encounter for therapeutic drug monitoring  
  
documented in this encounter  
[facility]   
  
                                                    Diagnosis  
   
                                                      
  
  
Neuropathy  
  
  
Mononeuritis of unspecified site  
  
documented in this encounter  
[facility]   
  
                                                    Diagnosis  
   
                                                      
  
  
Lumbar radiculopathy- Primary  
  
  
Thoracic or lumbosacral neuritis or radiculitis, unspecified  
   
                                                      
  
  
Neck pain  
  
  
Cervicalgia  
   
                                                      
  
  
Metastasis to spinal column (CMS/HCC)  
  
  
Secondary malignant neoplasm of bone and bone marrow  
   
                                                      
  
  
RLS (restless legs syndrome)  
  
  
Restless legs syndrome (RLS)  
   
                                                      
  
  
Neuropathy  
  
  
Mononeuritis of unspecified site  
  
documented in this encounter  
[facility]  
  
No data available for this section  
  
[facility]  
  
No data available for this section  
  
[facility]ructionsNot on filedocumented in this [facility]Not [facility] filedocumented in [facility]  
  
No data available for this section  
  
[facility]  
  
Summary Purpose  
  
  
                                                      
  
  
  
Family History  
No Family History Records FoundNo Family History Records FoundNo Family History 
Records FoundNo Family History Records FoundNo Family History Records FoundNo 
Family History Records FoundNo Family History Records FoundNo Family History 
Records FoundNo Family History Records FoundNo Family History Records FoundNo 
Family History Records FoundNo Family History Records Found  
  
Advance Directives  
  
  
                                Advance Directive Response        Recorded Date/  
Time  
   
                                Advance Directives No              Jocelin 1st, 202  
3 9:26am  
  
  
  
                                Advance Directive Response        Recorded Date/  
Time  
   
                                Advance Directives No              Jocelin 1st, 202  
3 8:26am  
  
                                Documents on File  
  
                          Type         Date Recorded Patient Representative Expl  
anation  
   
                          Advance Directive(s) 2021 9:02 AM                
  
  
  
Assessments  
  
  
                                                    Diagnosis  
   
                                                      
  
  
Pain- Primary  
  
  
Generalized pain  
  
  
  
Chief Complaint and Reason for Visit  
  
  
                                        Chief Complaint     Renal cyst  
  
  
  
                                        Chief Complaint     NEW LT KNEE PAIN NX  
M25.562  
   
                                        Reason for Visit    Primary osteoarthrit  
is of knees, bilateral  
  
  
  
Reason for Referral  
  
  
                          Specialty    Diagnoses / Procedures Referred By Contac  
t Referred To Contact  
   
                                                              
  
  
Diagnoses  
  
  
Lumbar radiculopathy  
  
  
Metastasis to spinal column   
(CMS/HCC)  
  
  
  
Procedures  
  
  
CT lumbar spine w and wo IV   
contrast                                  
  
  
Cj Kennedy, MODESTO  
  
  
[address] [place] [address]  
  
  
Phone: [phone]  
  
  
Fax: [phone]                         
  
  
[facility]   
Scheduling  
  
  
[address] [place] [address]  
  
  
Phone: [phone]  
  
  
Fax: [phone]  
  
  
  
                    Referral ID Status    Reason    Start Date Expiration Date V  
isits   
Requested                               Visits   
Authorized  
   
                                        [id number]              Pending   
Review                    2024    1            1  
  
  
  
                          Specialty    Diagnoses / Procedures Referred By Contac  
t Referred To Contact  
   
                                                              
  
  
Diagnoses  
  
  
Metastasis to spinal column   
(CMS/HCC)  
  
  
History of compression   
fracture of spine  
  
  
  
Procedures  
  
  
CT thoracic spine w and wo   
IV contrast                               
  
  
Cj Kennedy NP  
  
  
[address] [address]  
  
  
Phone: [phone]  
  
  
Fax: [phone]                         
  
  
[facility]   
Scheduling  
  
  
[address] [place] [address]  
  
  
Phone: [phone]  
  
  
Fax: [phone]  
  
  
  
                    Referral ID Status    Reason    Start Date Expiration Date V  
isits   
Requested                               Visits   
Authorized  
   
                                        [id number]              Pending   
Review                    2024    1            1  
  
  
  
                          Specialty    Diagnoses / Procedures Referred By Contac  
t Referred To Contact  
   
                                                              
  
  
Diagnoses  
  
  
Neck pain  
  
  
Metastasis to spinal column   
(CMS/HCC)  
  
  
  
Procedures  
  
  
CT cervical spine w and wo   
IV contrast                               
  
  
Cj Kennedy NP  
  
  
[address] [address]  
  
  
Phone: [phone]  
  
  
Fax: [phone]                         
  
  
[facility]   
Scheduling  
  
  
[address] [place] [address]  
  
  
Phone: [phone]  
  
  
Fax: [phone]  
  
  
  
                    Referral ID Status    Reason    Start Date Expiration Date V  
isits   
Requested                               Visits   
Authorized  
   
                                        [id number]              Pending   
Review                    2024    1            1  
  
  
  
                          Specialty    Diagnoses / Procedures Referred By Contac  
t Referred To Contact  
   
                                                              
  
  
Diagnoses  
  
  
Persistent atrial fibrillation   
(CMS-HCC)  
  
  
Chronic combined systolic and   
diastolic congestive heart   
failure (CMS-HCC)  
  
  
Left bundle branch block   
(LBBB)  
  
  
  
Procedures  
  
  
Device Interrogation                      
  
  
Angel Gerardo DO  
  
  
[address] [facility],   
[address]  
  
  
[place] [address]  
  
  
Phone: [phone]  
  
  
Fax: [phone]                         
  
  
  
  
  
                    Referral ID Status    Reason    Start Date Expiration Date V  
isits   
Requested                               Visits   
Authorized  
   
                                        [id number]            Pending   
Review                    10/3/2024    10/3/2025    1            1  
  
  
  
Additional Source Comments  
  
  
  
                                                    INFORMATION SOURCE (unrecogn  
ized section and content)  
   
                                          
  
  
  
                                        DATE CREATED        AUTHOR  
   
                                2018                      [facility]  
  
  
  
                                DATE CREATED    AUTHOR          AUTHOR'S ORGANIZ  
ATION  
   
                                2021                      [facility]  
  
  
  
                                DATE CREATED    AUTHOR          AUTHOR'S ORGANIZ  
ATION  
   
                                2021                      [facility]  
  
  
  
                                DATE CREATED    AUTHOR          AUTHOR'S ORGANIZ  
ATION  
   
                                2022                      [facility]  
  
  
  
                                DATE CREATED    AUTHOR          AUTHOR'S ORGANIZ  
ATION  
   
                                2023                      [facility]  
  
  
  
                                DATE CREATED    AUTHOR          AUTHOR'S ORGANIZ  
ATION  
   
                                2023                      [facility]  
[facility]  
  
  
  
                                DATE CREATED    AUTHOR          AUTHOR'S ORGANIZ  
ATION  
   
                                10/21/2023                      [facility]  
[facility]  
  
  
  
                                DATE CREATED    AUTHOR          AUTHOR'S ORGANIZ  
ATION  
   
                                2024                      [facility]  
  
  
  
                                DATE CREATED    AUTHOR          AUTHOR'S ORGANIZ  
ATION  
   
                                2024                      [facility]  
  
  
  
                                DATE CREATED    AUTHOR          AUTHOR'S ORGANIZ  
ATION  
   
                                2024                      [facility]  
[facility]  
  
  
  
                                DATE CREATED    AUTHOR          AUTHOR'S ORGANIZ  
ATION  
   
                                10/05/2024                      [facility]  
  
  
  
                                DATE CREATED    AUTHOR          AUTHOR'S ORGANIZ  
ATION  
   
                                2024                      [facility]  
[facility]  
  
  
  
  
  
                                                    Source Comments (unrecognize  
d section and content)  
   
                                                    In the event this informatio  
n is protected by the Federal Confidentiality of   
Alcohol   
and Drug Abuse Patient Records regulations: This information has been disclosed 
to   
you from records protected by Federal confidentiality rules (42 CFR Part 2). The
   
Federal rules prohibit you from making any further disclosure of this 
information   
unless further disclosure is expressly permitted by the written consent of the 
person   
to whom it pertains or as otherwise permitted by 42 CFR Part 2. A general   
authorization for the release of medical or other information is NOT sufficient 
for   
this purpose. The Federal rules restrict any use of the information to 
criminally   
investigate or prosecute any alcohol or drug abuse patient.[facility] 
the   
event this information is protected by the Federal Confidentiality of Alcohol 
and   
Drug Abuse Patient Records regulations: This information has been disclosed to 
you   
from records protected by Federal confidentiality rules (42 CFR Part 2). The 
Federal   
rules prohibit you from making any further disclosure of this information unless
   
further disclosure is expressly permitted by the written consent of the person 
to   
whom it pertains or as otherwise permitted by 42 CFR Part 2. A general 
authorization   
for the release of medical or other information is NOT sufficient for this 
purpose.   
The Federal rules restrict any use of the information to criminally investigate 
or   
prosecute any alcohol or drug abuse patient.[facility]  
  
  
  
  
                                                    Care Team (unrecognized sect  
ion and content)  
   
                                          
  
  
  
                      Team Member Relationship Specialty  Start Date End Date  
   
                                                      
  
  
Alexander Guerra MD  
  
  
[address] [place], [place]  
  
  
[phone] (Work)  
  
  
[phone] (Fax) PCP - General   Internal Medicine 17          
  
  
  
                      Team Member Relationship Specialty  Start Date End Date  
   
                                                      
  
  
Alexander Guerra MD  
  
  
[address] [place], [place]  
  
  
[phone] (Work)  
  
  
[phone] (Fax) PCP - General   Internal Medicine 17          
  
  
  
                                                    Team Status: Active   
   
                          Member       Role         Status       Dates  
   
                          Alexander Guerra MD Primary Care Provider Active       
    
  
  
  
                                                    Team Status: Inactive   
   
                          Member       Role         Status       Dates  
   
                          Salvador Corcoran MD Attending Provider Active         
   
                          Alexander Guerra MD Primary Care Provider Active       
    
  
  
  
                      Team Member Relationship Specialty  Start Date End Date  
   
                                                      
  
  
Alexander Guerra MD  
  
  
NPI: [id number]  
  
  
[address] [place] [address]  
  
  
[phone] (Work)  
  
  
[phone] (Fax) PCP - General                   14           
  
  
  
                                                    Team Status: Inactive   
   
                          Member       Role         Status       Dates  
   
                          Alexander Guerra MD Primary Care Provider Active       
  Start: 2024  
End: 2024  
   
                          Alexei Bartlett II, MD Attending Provider Active      
   Start: 2024  
End: 2024  
  
  
  
                      Team Member Relationship Specialty  Start Date End Date  
   
                                                      
  
  
Alexander Guerra MD  
  
  
NPI: [id number]  
  
  
[address] [place] [address]  
  
  
[phone] (Work)  
  
  
[phone] (Fax) PCP - General                   14           
  
  
  
                      Team Member Relationship Specialty  Start Date End Date  
   
                                                      
  
  
Alexander Guerra MD  
  
  
NPI: [id number]  
  
  
[address] [place] [address]  
  
  
[phone] (Work)  
  
  
[phone] (Fax) PCP - General                   14           
  
  
  
                      Team Member Relationship Specialty  Start Date End Date  
   
                                                      
  
  
Alexander Guerra MD  
  
  
NPI: [id number]  
  
  
[address] [place] [address]  
  
  
[phone] (Work)  
  
  
[phone] (Fax) PCP - General                   14           
  
  
  
                      Team Member Relationship Specialty  Start Date End Date  
   
                                                      
  
  
Alexander Guerra MD  
  
  
NPI: [id number]  
  
  
[address] [place] [address]  
  
  
[phone] (Work)  
  
  
[phone] (Fax) PCP - General                   14           
  
  
  
                      Team Member Relationship Specialty  Start Date End Date  
   
                                                      
  
  
Alexander Guerra MD  
  
  
NPI: [id number]  
  
  
[address] [place] [address]  
  
  
[phone] (Work)  
  
  
[phone] (Fax) PCP - General                   14           
  
  
  
                      Team Member Relationship Specialty  Start Date End Date  
   
                                                      
  
  
Alexander Guerra MD  
  
  
NPI: [id number]  
  
  
[address] [place] [address]  
  
  
[phone] (Work)  
  
  
[phone] (Fax) PCP - General                   14           
  
  
  
                      Team Member Relationship Specialty  Start Date End Date  
   
                                                      
  
  
Alexander Guerra MD  
  
  
NPI: [id number]  
  
  
[address]  
  
  
[place] [address] [phone] (Work)  
  
  
[phone] (Fax) PCP - General   Internal Medicine 24           
  
  
  
                      Team Member Relationship Specialty  Start Date End Date  
   
                                                      
  
  
Alexander Guerra MD  
  
  
NPI: [id number]  
  
  
[address] [place] [address]  
  
  
[phone] (Work)  
  
  
[phone] (Fax) PCP - General                   14           
  
  
  
                      Team Member Relationship Specialty  Start Date End Date  
   
                                                      
  
  
Alexander Guerra MD  
  
  
NPI: [id number]  
  
  
[address] [place] [address] [phone] (Work)  
  
  
[phone] (Fax) PCP - General   Internal Medicine 24           
  
  
  
                      Team Member Relationship Specialty  Start Date End Date  
   
                                                      
  
  
Alexander Guerra MD  
  
  
NPI: [id number]  
  
  
[address] [place] [place] [address] [phone] (Work)  
  
  
[phone] (Fax) PCP - General   Internal Medicine 24           
  
  
  
                      Team Member Relationship Specialty  Start Date End Date  
   
                                                      
  
  
Alexander Guerra MD  
  
  
NPI: [id number]  
  
  
[address] [place] [place] [address] [phone] (Work)  
  
  
[phone] (Fax) PCP - General   Internal Medicine 24           
  
  
  
                      Team Member Relationship Specialty  Start Date End Date  
   
                                                      
  
  
Alexander Guerra MD  
  
  
NPI: [id number]  
  
  
[address] [place] [address] [phone] (Work)  
  
  
[phone] (Fax) PCP - General   Internal Medicine 24           
  
  
  
                      Team Member Relationship Specialty  Start Date End Date  
   
                                                      
  
  
Alexander Guerra MD  
  
  
NPI: [id number]  
  
  
[address] [place] [address] [phone] (Work)  
  
  
[phone] (Fax) PCP - General   Internal Medicine 24           
  
  
  
                      Team Member Relationship Specialty  Start Date End Date  
   
                                                      
  
  
Alexander Guerra MD  
  
  
NPI: [id number]  
  
  
[address] [place] [address] [phone] (Work)  
  
  
[phone] (Fax) PCP - General   Internal Medicine 24           
  
  
  
  
  
                                                    Goals (unrecognized section   
and content)  
   
                                                    Goals may be documented in a  
n alternate section  
  
  
  
  
                                                    Reason for Visit (unrecogniz  
ed section and content)  
   
                                          
  
  
  
                                        Reason              Comments  
   
                                        Med Refill            
  
  
  
                                Reason          Onset Date      Comments  
   
                                Appointment     2024        
  
  
  
                                        Reason              Comments  
   
                                        Follow-up             
   
                                        Hypertension          
   
                                        Atrial Fibrillation   
  
  
  
                                        Reason              Comments  
   
                                        Device Check          
   
                                        Follow-up           6MO W/DEVICE, SCHED   
W/PT  
  
  
  
                                        Reason              Comments  
   
                                        Device Check          
  
  
  
                                        Reason              Comments  
   
                                        Radiology XR          
  
  
  
                                Reason          Onset Date      Comments  
   
                                CT SCAN FYI     10/22/2024        
  
  
  
                                        Reason              Comments  
   
                                        Back Pain             
   
                                        Neck Pain             
   
                                        Restless Legs         
  
  
  
                                        Reason              Comments  
   
                                        Back Pain             
   
                                        Neck Pain             
  
  
  
                                Reason          Onset Date      Comments  
   
                                Med Refill      2024        
  
  
FOR RECORDS PERTAINING TO PATIENTS WHO ARE OR HAVE BEEN ENROLLED IN A CHEMICAL 
DEPENDENCY/SUBSTANCEABUSE PROGRAM, SOME INFORMATION MAY BE OMITTED. This 
clinical summary was aggregated from multiple sources. Caution should be 
exercised in using it in the provision of clinical care. This summary normalizes
 information from multiple sources, and as a consequence, information in this 
document may materially change the coding, format and clinical context of 
patient data. In addition, data may be omitted in some cases. CLINICAL DECISIONS
 SHOULD BE BASED ON THE PRIMARY CLINICAL RECORDS. [employer] [facility]. provides
 no warranty or guarantee of the accuracy or completeness of information in this
 document.

## 2025-06-02 ENCOUNTER — HOSPITAL ENCOUNTER
Dept: HOSPITAL 101 - LAB | Age: 75
Discharge: HOME | End: 2025-06-02
Payer: MEDICARE

## 2025-06-02 DIAGNOSIS — I10: ICD-10-CM

## 2025-06-02 DIAGNOSIS — E03.9: Primary | ICD-10-CM

## 2025-06-02 LAB
ADD MANUAL DIFF: NO
ANION GAP: 16.4
BASOPHILS # BLD AUTO: 0.1 10^3/UL (ref 0–0.1)
BASOPHILS NFR BLD AUTO: 0.7 % (ref 0.2–2)
BUN SERPL-MCNC: 30 MG/DL (ref 7–18)
BUN/CREAT SERPL: 29.7
CALCIUM: 9.9 MG/DL (ref 8.5–10.1)
CHLORIDE: 104 MMOL/L (ref 98–107)
CO2 BLD-SCNC: 24.9 MMOL/L (ref 21–32)
CREAT SERPL-SCNC: 1.01 MG/DL (ref 0.55–1.02)
EOSINOPHIL # BLD AUTO: 0.2 10^3/UL (ref 0–0.7)
EOSINOPHIL NFR BLD AUTO: 2.1 % (ref 0.9–7)
ERYTHROCYTE [DISTWIDTH] IN BLOOD BY AUTOMATED COUNT: 14.3 % (ref 11–15)
ESTIMATED GFR (AFRICAN AMERICA: >60
ESTIMATED GFR (NON-AFRICAN AME: 53
FREE T4: 1.06 NG/DL (ref 0.76–1.46)
GLUCOSE SERPLBLD-MCNC: 86 MG/DL (ref 74–106)
HCT VFR BLD CALC: 41.5 % (ref 36–48)
HEMOGLOBIN: 13.5 G/DL (ref 12–16)
IMMATURE GRANULOCYTES ABS AUTO: 0.03 10^3/UL (ref 0–0.03)
IMMATURE GRANULOCYTES PCT AUTO: 0.3 % (ref 0–0.5)
LYMPHOCYTES  ABSOLUTE AUTO: 1.2 10^3/UL (ref 1.2–3.8)
LYMPHOCYTES PERCENT AUTO: 10.2 % (ref 20.5–60)
MCH RBC QN AUTO: 30.3 PG (ref 26.7–34)
MCV RBC: 93 FL (ref 81–99)
MEAN CORPUSCULAR HGB CONC: 32.5 G/DL (ref 29.9–35.2)
MEAN PLATELET VOLUME: 9.6 FL (ref 9.5–13.5)
MONOCYTES # BLD AUTO: 1 10^3/UL (ref 0.3–0.8)
MONOCYTES NFR BLD AUTO: 8.8 % (ref 1.7–12)
NEUTROPHILS # BLD AUTO: 9 10^3/UL (ref 1.4–6.5)
NEUTROPHILS NFR BLD AUTO: 77.9 % (ref 43–75)
PLATELET # BLD: 384 10^3/UL (ref 150–450)
POTASSIUM SERPLBLD-SCNC: 4.3 MMOL/L (ref 3.5–5.1)
RBC # BLD AUTO: 4.46 10^6/UL (ref 4.2–5.4)
SODIUM BLD-SCNC: 141 MMOL/L (ref 136–145)
THYROID STIMULATING HORMONE: 2.07 UIU/ML (ref 0.36–3.74)
WBC # BLD: 11.5 10^3/UL (ref 4–11)

## 2025-06-02 PROCEDURE — 80048 BASIC METABOLIC PNL TOTAL CA: CPT

## 2025-06-02 PROCEDURE — 84443 ASSAY THYROID STIM HORMONE: CPT

## 2025-06-02 PROCEDURE — 36415 COLL VENOUS BLD VENIPUNCTURE: CPT

## 2025-06-02 PROCEDURE — 84439 ASSAY OF FREE THYROXINE: CPT

## 2025-06-02 PROCEDURE — 85025 COMPLETE CBC W/AUTO DIFF WBC: CPT

## 2025-07-02 ENCOUNTER — HOSPITAL ENCOUNTER (EMERGENCY)
Age: 75
Discharge: HOME | End: 2025-07-02
Attending: FAMILY MEDICINE
Payer: MEDICARE

## 2025-07-02 VITALS
HEIGHT: 68 IN | DIASTOLIC BLOOD PRESSURE: 69 MMHG | HEART RATE: 71 BPM | WEIGHT: 214 LBS | OXYGEN SATURATION: 94 % | SYSTOLIC BLOOD PRESSURE: 130 MMHG | RESPIRATION RATE: 20 BRPM | BODY MASS INDEX: 32.43 KG/M2 | TEMPERATURE: 98.2 F

## 2025-07-02 DIAGNOSIS — D69.0 HENOCH-SCHONLEIN PURPURA: Primary | ICD-10-CM

## 2025-07-02 LAB
ALBUMIN SERPL-MCNC: 3.7 G/DL (ref 3.5–5.2)
ALBUMIN/GLOB SERPL: 1.2 {RATIO} (ref 1–2.5)
ALP SERPL-CCNC: 80 U/L (ref 35–104)
ALT SERPL-CCNC: 17 U/L (ref 5–33)
ANION GAP SERPL CALCULATED.3IONS-SCNC: 9 MMOL/L (ref 9–17)
AST SERPL-CCNC: 19 U/L
BASOPHILS # BLD: 0.05 K/UL (ref 0–0.2)
BASOPHILS NFR BLD: 1 % (ref 0–2)
BILIRUB SERPL-MCNC: 0.5 MG/DL (ref 0.3–1.2)
BUN SERPL-MCNC: 28 MG/DL (ref 8–23)
CALCIUM SERPL-MCNC: 9.3 MG/DL (ref 8.6–10.4)
CHLORIDE SERPL-SCNC: 105 MMOL/L (ref 98–107)
CO2 SERPL-SCNC: 23 MMOL/L (ref 20–31)
CREAT SERPL-MCNC: 1.5 MG/DL (ref 0.5–0.9)
EOSINOPHIL # BLD: 0.16 K/UL (ref 0–0.4)
EOSINOPHILS RELATIVE PERCENT: 2 % (ref 0–5)
ERYTHROCYTE [DISTWIDTH] IN BLOOD BY AUTOMATED COUNT: 14.2 % (ref 12.1–15.2)
ERYTHROCYTE [SEDIMENTATION RATE] IN BLOOD BY PHOTOMETRIC METHOD: 56 MM/HR (ref 0–30)
GFR, ESTIMATED: 36 ML/MIN/1.73M2
GLUCOSE SERPL-MCNC: 125 MG/DL (ref 70–99)
HCT VFR BLD AUTO: 37.1 % (ref 36–46)
HGB BLD-MCNC: 12 G/DL (ref 12–16)
IMM GRANULOCYTES # BLD AUTO: 0.05 K/UL (ref 0–0.3)
IMM GRANULOCYTES NFR BLD: 1 % (ref 0–5)
INR PPP: 3
LYMPHOCYTES NFR BLD: 1.06 K/UL (ref 1–4.8)
LYMPHOCYTES RELATIVE PERCENT: 10 % (ref 15–40)
MCH RBC QN AUTO: 29.8 PG (ref 26–34)
MCHC RBC AUTO-ENTMCNC: 32.3 G/DL (ref 31–37)
MCV RBC AUTO: 92.1 FL (ref 80–100)
MONOCYTES NFR BLD: 0.82 K/UL (ref 0–1)
MONOCYTES NFR BLD: 8 % (ref 4–8)
NEUTROPHILS NFR BLD: 78 % (ref 47–75)
NEUTS SEG NFR BLD: 8.47 K/UL (ref 2.5–7)
PARTIAL THROMBOPLASTIN TIME: 41.1 SEC (ref 23.9–33.8)
PLATELET # BLD AUTO: 457 K/UL (ref 140–450)
PMV BLD AUTO: 9.3 FL (ref 6–12)
POTASSIUM SERPL-SCNC: 4.5 MMOL/L (ref 3.7–5.3)
PROT SERPL-MCNC: 6.8 G/DL (ref 6.4–8.3)
PROTHROMBIN TIME: 32.2 SEC (ref 11.5–14.2)
RBC # BLD AUTO: 4.03 M/UL (ref 4–5.2)
SODIUM SERPL-SCNC: 137 MMOL/L (ref 135–144)
WBC OTHER # BLD: 10.6 K/UL (ref 3.5–11)

## 2025-07-02 PROCEDURE — 85730 THROMBOPLASTIN TIME PARTIAL: CPT

## 2025-07-02 PROCEDURE — 80053 COMPREHEN METABOLIC PANEL: CPT

## 2025-07-02 PROCEDURE — 99283 EMERGENCY DEPT VISIT LOW MDM: CPT

## 2025-07-02 PROCEDURE — 6360000002 HC RX W HCPCS: Performed by: FAMILY MEDICINE

## 2025-07-02 PROCEDURE — 36415 COLL VENOUS BLD VENIPUNCTURE: CPT

## 2025-07-02 PROCEDURE — 85025 COMPLETE CBC W/AUTO DIFF WBC: CPT

## 2025-07-02 PROCEDURE — 85652 RBC SED RATE AUTOMATED: CPT

## 2025-07-02 PROCEDURE — 85610 PROTHROMBIN TIME: CPT

## 2025-07-02 RX ORDER — DEXAMETHASONE SODIUM PHOSPHATE 10 MG/ML
10 INJECTION, SOLUTION INTRAMUSCULAR; INTRAVENOUS ONCE
Status: COMPLETED | OUTPATIENT
Start: 2025-07-02 | End: 2025-07-02

## 2025-07-02 RX ORDER — PREDNISONE 20 MG/1
TABLET ORAL
Qty: 26 TABLET | Refills: 0 | Status: SHIPPED | OUTPATIENT
Start: 2025-07-02

## 2025-07-02 RX ADMIN — DEXAMETHASONE SODIUM PHOSPHATE 10 MG: 10 INJECTION, SOLUTION INTRAMUSCULAR; INTRAVENOUS at 16:22

## 2025-07-02 ASSESSMENT — ENCOUNTER SYMPTOMS
NAUSEA: 0
ABDOMINAL PAIN: 0
VOMITING: 0
DIARRHEA: 0

## 2025-07-02 ASSESSMENT — LIFESTYLE VARIABLES
HOW MANY STANDARD DRINKS CONTAINING ALCOHOL DO YOU HAVE ON A TYPICAL DAY: PATIENT DOES NOT DRINK
HOW OFTEN DO YOU HAVE A DRINK CONTAINING ALCOHOL: NEVER

## 2025-07-02 ASSESSMENT — PAIN - FUNCTIONAL ASSESSMENT: PAIN_FUNCTIONAL_ASSESSMENT: NONE - DENIES PAIN

## 2025-07-02 NOTE — ED PROVIDER NOTES
[facility]  EMERGENCY DEPARTMENT ENCOUNTER      Pt Name: Tari Molina  MRN: [id number]  Birthdate 1950  Date of evaluation: 7/2/2025  Provider: Ramon Do MD    CHIEF COMPLAINT       Chief Complaint   Patient presents with    Rash     Bilateral ankle swelling that started a few days ago, there is a rash on bilateral legs that is getting worse.         HISTORY OF PRESENT ILLNESS      Tari Molina is a 75 y.o. female who presents to the emergency department via private vehicle, patient planing of bilateral ankle swelling started few days prior, notes rash that began on her lower legs and seems to be worsening, goes as high as her mid thighs.  Patient states she did recently get discharged from [facility] due to pneumonia, afterwards was in rehab, has been home for the past 4 days.  Patient has rash first began as dots on her lower legs of increased in size coalesced.  Denies any new soaps surgically prior to hygiene products, denies any drainage from any of the wounds, does describe this somewhat painful in some areas.  Denies any abdominal pain, denies any fevers.    PCP: Dr. Nunez        REVIEW OF SYSTEMS       Review of Systems   Constitutional:  Negative for chills and fever.   Gastrointestinal:  Negative for abdominal pain, diarrhea, nausea and vomiting.   Musculoskeletal:  Negative for myalgias.   Skin:  Positive for rash. Negative for wound.   Neurological:  Positive for weakness.   All other systems reviewed and are negative.        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

## 2025-07-02 NOTE — DISCHARGE INSTRUCTIONS
Given your recent bout of pneumonia with hospitalization, with rash developing days to weeks later, beginning of small red spots and coalescing into larger purpura that are palpable, as well as and swelling ankle, clinically this can be consistent with [place] showing purpura, and IgA vasculitis.  Blood work was noted, slight increase in your baseline kidney function, and noted increase in your PT and INR level, the latter which would be somewhat atypical.  I did give you first dose steroids here in the emergency room, and a printed prescription for a steroid taper over the next several days, however your internal medicine provider may change that dosing or frequency so I would hold filling the prescription until seeing them tomorrow.    Please keep your site appointment with your primary care provider on 7/3/2025, I would advise to take some pictures with your cell phone of areas of the purpura so your provider can see any changes between today and tomorrow, and they will have access to the lab work performed here through the [facility] Everywhere tab if they want to do any repeat labs to follow your kidney function and coag studies closely.    If any symptoms change worsen or other concerns, please return to the emergency room.

## 2025-07-07 ENCOUNTER — HOSPITAL ENCOUNTER
Age: 75
Discharge: HOME | End: 2025-07-07
Payer: MEDICARE

## 2025-07-07 DIAGNOSIS — D69.0: Primary | ICD-10-CM

## 2025-07-07 LAB
ADD MANUAL DIFF: NO
ANION GAP: 12.7
BASOPHILS # BLD AUTO: 0 10^3/UL (ref 0–0.1)
BASOPHILS NFR BLD AUTO: 0.2 % (ref 0.2–2)
BUN SERPL-MCNC: 42 MG/DL (ref 7–18)
BUN/CREAT SERPL: 40.8
CALCIUM: 9.8 MG/DL (ref 8.5–10.1)
CHLORIDE: 103 MMOL/L (ref 98–107)
CO2 BLD-SCNC: 24.7 MMOL/L (ref 21–32)
CREAT SERPL-SCNC: 1.03 MG/DL (ref 0.55–1.02)
EOSINOPHIL # BLD AUTO: 0 10^3/UL (ref 0–0.7)
EOSINOPHIL NFR BLD AUTO: 0.3 % (ref 0.9–7)
ERYTHROCYTE [DISTWIDTH] IN BLOOD BY AUTOMATED COUNT: 14.2 % (ref 11–15)
ESTIMATED GFR (AFRICAN AMERICA: >60
ESTIMATED GFR (NON-AFRICAN AME: 52
GLUCOSE SERPLBLD-MCNC: 104 MG/DL (ref 74–106)
HCT VFR BLD CALC: 37.8 % (ref 36–48)
HEMOGLOBIN: 12.4 G/DL (ref 12–16)
IMMATURE GRANULOCYTES ABS AUTO: 0.1 10^3/UL (ref 0–0.03)
IMMATURE GRANULOCYTES PCT AUTO: 0.7 % (ref 0–0.5)
INR: 1.51
LYMPHOCYTES  ABSOLUTE AUTO: 0.8 10^3/UL (ref 1.2–3.8)
LYMPHOCYTES PERCENT AUTO: 5.6 % (ref 20.5–60)
MCH RBC QN AUTO: 30.6 PG (ref 26.7–34)
MCV RBC: 93.3 FL (ref 81–99)
MEAN CORPUSCULAR HGB CONC: 32.8 G/DL (ref 29.9–35.2)
MEAN PLATELET VOLUME: 9.6 FL (ref 9.5–13.5)
MONOCYTES # BLD AUTO: 0.6 10^3/UL (ref 0.3–0.8)
MONOCYTES NFR BLD AUTO: 4.2 % (ref 1.7–12)
NEUTROPHILS # BLD AUTO: 12.6 10^3/UL (ref 1.4–6.5)
NEUTROPHILS NFR BLD AUTO: 89 % (ref 43–75)
PLATELET # BLD: 410 10^3/UL (ref 150–450)
POTASSIUM SERPLBLD-SCNC: 4.4 MMOL/L (ref 3.5–5.1)
PROTHROMBIN TIME: 15.4 SEC (ref 9–11.6)
RBC # BLD AUTO: 4.05 10^6/UL (ref 4.2–5.4)
SODIUM BLD-SCNC: 136 MMOL/L (ref 136–145)
WBC # BLD: 14.2 10^3/UL (ref 4–11)

## 2025-07-07 PROCEDURE — 80048 BASIC METABOLIC PNL TOTAL CA: CPT

## 2025-07-07 PROCEDURE — 36415 COLL VENOUS BLD VENIPUNCTURE: CPT

## 2025-07-07 PROCEDURE — 85025 COMPLETE CBC W/AUTO DIFF WBC: CPT

## 2025-07-07 PROCEDURE — 85610 PROTHROMBIN TIME: CPT

## (undated) DEVICE — JELLY,LUBE,STERILE,FLIP TOP,TUBE,2-OZ: Brand: MEDLINE

## (undated) DEVICE — BITEBLOCK ENDOSCP 60FR MAXI WHT POLYETH STURDY W/ VELC WVN

## (undated) DEVICE — FORCEPS BX L240CM JAW DIA2.8MM L CAP W/ NDL MIC MESH TOOTH

## (undated) DEVICE — GOWN POLY REINF SONT XLG: Brand: MEDLINE INDUSTRIES, INC.

## (undated) DEVICE — MEDICINE CUP, GRADUATED, STER: Brand: MEDLINE

## (undated) DEVICE — ADAPTER TBNG LUER STUB 15 GA INTMED

## (undated) DEVICE — FORCEPS BX L240CM WRK CHN 2.8MM STD CAP W/ NDL MIC MESH

## (undated) DEVICE — SYRINGE MED 50ML LUERLOCK TIP

## (undated) DEVICE — GAUZE,SPONGE,4"X4",16PLY,STRL,LF,10/TRAY: Brand: MEDLINE